# Patient Record
Sex: FEMALE | Race: WHITE | NOT HISPANIC OR LATINO | Employment: OTHER | URBAN - METROPOLITAN AREA
[De-identification: names, ages, dates, MRNs, and addresses within clinical notes are randomized per-mention and may not be internally consistent; named-entity substitution may affect disease eponyms.]

---

## 2017-02-28 ENCOUNTER — ALLSCRIPTS OFFICE VISIT (OUTPATIENT)
Dept: OTHER | Facility: OTHER | Age: 82
End: 2017-02-28

## 2017-02-28 DIAGNOSIS — L98.9 DISORDER OF SKIN OR SUBCUTANEOUS TISSUE: ICD-10-CM

## 2017-08-31 ENCOUNTER — ALLSCRIPTS OFFICE VISIT (OUTPATIENT)
Dept: OTHER | Facility: OTHER | Age: 82
End: 2017-08-31

## 2017-10-25 NOTE — PROGRESS NOTES
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)   2  Screening for skin condition (V82 0) (Z13 89)   3  Seborrheic keratosis (702 19) (L82 1)   4  H/O nonmelanoma skin cancer (V10 83) (Z85 828)    Plan   · Follow-up visit in 6 months Evaluation and Treatment  Follow-up  Status: Complete   Done: [id number]   · Use a sun block product with an SPF of 15 or more ; Status:Complete;   Done:  [id number]   · Wound care as instructed ; Status:Complete;   Done: [id number]    Discussion/Summary  Discussion Summary- [facility]:   Assessment #1: Verrucous keratosis  Care Plan:   Lesions look benign however concern raised that they are early squamous cell carcinomas hopefully will resolve with cryosurgery if not patient to let us know especially if they start growing  Assessment #2: Seborrheic keratosis  Care Plan:   Patient reassured these are normal growths we acquire with age no treatment needed  Assessment #3: History skin cancer  Care Plan:   No recurrence nothing else atypical noted sunblock recommended followup in 6 months  Assessment #4: Screening for dermatologic disorders  Care Plan:   Nothing else noted on complete exam followup in 6 months  Chief Complaint  Chief Complaint Free Text Note Form: 6 month check up  History of Present Illness  HPI: 15-year-old female with previous history skin cancer presents for overall checkup  Concerned regarding a growth on her hands and lesions on her legs      Review of Systems  [facility] [address] [place] [address] [address] Patient:   Constitutional: Denies constitutional symptoms  Eyes: Denies eye symptoms  ENT:  denies ear symptoms, nasal symptoms, mouth or throat symptoms  Cardiovascular: Denies cardiovascular symptoms  Respiratory: Denies respiratory symptoms  Gastrointestinal: Denies gastrointestinal symptoms  Musculoskeletal: Denies musculoskeletal symptoms  Integumentary: Denies skin, hair and nail symptoms     Neurological: Denies neurologic symptoms  Psychiatric: Denies psychiatric symptoms  Endocrine: Denies endocrine symptoms  Hematologic/Lymphatic: Denies hematologic symptoms  Active Problems  1  Actinic keratosis (702 0) (L57 0)   2  Changing skin lesion (709 9) (L98 9)   3  Screening for skin condition (V82 0) (Z13 89)   4  Seborrheic keratosis (702 19) (L82 1)   5  Squamous cell carcinoma of skin of right lower extremity (173 72) (C44 722)   6  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)    Past Medical History  1  H/O nonmelanoma skin cancer (V10 83) (H31 152)  Past Medical History Reviewed- Derm:   The past medical history was reviewed  Surgical History  Surgical History Reviewed [facility]- Derm:   Surgical History reviewed      Family History  Mother    1  No pertinent family history  Family History Reviewed- Derm:   Family History was reviewed      Social History   · Never a smoker  Social History Reviewed [facility]- Derm: The social history was reviewed      Current Meds   1  AmLODIPine Besylate 5 MG Oral Tablet; Therapy: [id number] to (Juliet Samples) Recorded   2  Aspirin 81 MG TABS; Therapy: (Recorded:60Bbr1667) to Recorded   3  Atorvastatin Calcium 40 MG Oral Tablet; Therapy: 97XJE0141 to (Juliet Samples) Recorded   4  Januvia 50 MG Oral Tablet; Therapy: (Recorded:03Fhv1538) to Recorded   5  Lantus SoloStar 100 UNIT/ML Subcutaneous Solution Pen-injector; Therapy: [id number] to (Evaluate:96Wuj1186) Recorded   6  Losartan Potassium-HCTZ 100-25 MG Oral Tablet; Therapy: 75CYE2361 to (60 124 37 75) Recorded   7  Metoprolol Succinate ER 25 MG Oral Tablet Extended Release 24 Hour; Therapy: 90HCJ2275 to (Evaluate:22Mar2015) Recorded    Allergies  1  No Known Drug Allergies    Physical Exam    Constitutional   General appearance: Appears healthy and well developed  Lymphatic   No visible disturbance  Musculoskeletal   Digits and nails: No clubbing, cyanosis or edema   Cutaneous and nail exam normal  Skin   Scalp skin texture and hair distribution: Normal skin texture on scalp, normal hair distribution  Head: Normal turgor, no rashes, no lesions  Neck: Normal turgor, no rashes, no lesions  Chest: Normal turgor, no rashes, no lesions  Abdomen: Normal turgor, no rashes, no lesions  Back: Normal turgor, no rashes, no lesions  Right upper extremity: Normal turgor, no rashes, no lesions  Left upper extremity: Abnormal     Right lower extremity: Normal turgor, no rashes, no lesions  Left lower extremity: Abnormal     Neuro/Psych   Alert and oriented x 3  Displays comfort and cooperation during encounterl  Affect is normal     Finding Verrucous keratotic papules noted on the left lateral fifth finger verrucous keratotic papules ? 2 also noted on the legs previous sites of skin cancer well-healed without recurrence normal keratotic papules with greasy stuck on appearance nothing else atypical noted on exam       Procedure    Procedure: destruction of lesion  Indications for the procedure include Verrucous keratosis  Risks, benefits, alternatives, infection risk, bleeding risk, risk of allergic reaction and the risk of scarring were discussed with the patient--   verbal consent was obtained prior to the procedure  Procedure Note:   The lesion location: Left hand and left leg  Destruction Technique: cryotherapy with liquid nitrogen application-- and-- 50-68 seconds via cryospray--   Destruction of 3 lesions  Post-Procedure:   Patient Status: the patient tolerated the procedure well  Complications: there were no complications        Signatures   Electronically signed by : BONG Owen ; Aug 31 2017 12:38PM EST                       (Author)

## 2017-12-27 ENCOUNTER — HOSPITAL ENCOUNTER (INPATIENT)
Facility: HOSPITAL | Age: 82
LOS: 4 days | Discharge: NURSING, REHAB OR LONG-TERM CARE | DRG: 551 | End: 2017-12-31
Attending: EMERGENCY MEDICINE | Admitting: INTERNAL MEDICINE
Payer: MEDICARE

## 2017-12-27 ENCOUNTER — APPOINTMENT (EMERGENCY)
Dept: CT IMAGING | Facility: HOSPITAL | Age: 82
DRG: 551 | End: 2017-12-27
Payer: MEDICARE

## 2017-12-27 DIAGNOSIS — I10 HTN (HYPERTENSION): ICD-10-CM

## 2017-12-27 DIAGNOSIS — R77.8 ELEVATED TROPONIN: ICD-10-CM

## 2017-12-27 DIAGNOSIS — R11.2 NAUSEA & VOMITING: Primary | ICD-10-CM

## 2017-12-27 DIAGNOSIS — M54.9 BACK PAIN: ICD-10-CM

## 2017-12-27 DIAGNOSIS — M48.061 SPINAL STENOSIS OF LUMBAR REGION, UNSPECIFIED WHETHER NEUROGENIC CLAUDICATION PRESENT: ICD-10-CM

## 2017-12-27 DIAGNOSIS — I21.4 NSTEMI (NON-ST ELEVATED MYOCARDIAL INFARCTION) (HCC): ICD-10-CM

## 2017-12-27 LAB
ALBUMIN SERPL BCP-MCNC: 3.5 G/DL (ref 3.5–5)
ALP SERPL-CCNC: 107 U/L (ref 46–116)
ALT SERPL W P-5'-P-CCNC: 45 U/L (ref 12–78)
ANION GAP SERPL CALCULATED.3IONS-SCNC: 10 MMOL/L (ref 4–13)
APTT PPP: 25 SECONDS (ref 23–35)
AST SERPL W P-5'-P-CCNC: 30 U/L (ref 5–45)
BACTERIA UR QL AUTO: NORMAL /HPF
BASOPHILS # BLD AUTO: 0.09 THOUSANDS/ΜL (ref 0–0.1)
BASOPHILS NFR BLD AUTO: 1 % (ref 0–1)
BILIRUB SERPL-MCNC: 0.5 MG/DL (ref 0.2–1)
BILIRUB UR QL STRIP: NEGATIVE
BUN SERPL-MCNC: 20 MG/DL (ref 5–25)
CALCIUM SERPL-MCNC: 9.3 MG/DL (ref 8.3–10.1)
CHLORIDE SERPL-SCNC: 103 MMOL/L (ref 100–108)
CLARITY UR: CLEAR
CO2 SERPL-SCNC: 28 MMOL/L (ref 21–32)
COLOR UR: YELLOW
CREAT SERPL-MCNC: 0.85 MG/DL (ref 0.6–1.3)
EOSINOPHIL # BLD AUTO: 0.03 THOUSAND/ΜL (ref 0–0.61)
EOSINOPHIL NFR BLD AUTO: 0 % (ref 0–6)
ERYTHROCYTE [DISTWIDTH] IN BLOOD BY AUTOMATED COUNT: 13.2 % (ref 11.6–15.1)
ERYTHROCYTE [DISTWIDTH] IN BLOOD BY AUTOMATED COUNT: 13.4 % (ref 11.6–15.1)
GFR SERPL CREATININE-BSD FRML MDRD: 61 ML/MIN/1.73SQ M
GLUCOSE SERPL-MCNC: 179 MG/DL (ref 65–140)
GLUCOSE UR STRIP-MCNC: ABNORMAL MG/DL
HCT VFR BLD AUTO: 41.3 % (ref 34.8–46.1)
HCT VFR BLD AUTO: 44.1 % (ref 34.8–46.1)
HGB BLD-MCNC: 13.8 G/DL (ref 11.5–15.4)
HGB BLD-MCNC: 15 G/DL (ref 11.5–15.4)
HGB UR QL STRIP.AUTO: ABNORMAL
INR PPP: 0.98 (ref 0.86–1.16)
KETONES UR STRIP-MCNC: ABNORMAL MG/DL
LEUKOCYTE ESTERASE UR QL STRIP: NEGATIVE
LIPASE SERPL-CCNC: 363 U/L (ref 73–393)
LYMPHOCYTES # BLD AUTO: 1.17 THOUSANDS/ΜL (ref 0.6–4.47)
LYMPHOCYTES NFR BLD AUTO: 8 % (ref 14–44)
MAGNESIUM SERPL-MCNC: 1.7 MG/DL (ref 1.6–2.6)
MCH RBC QN AUTO: 30.7 PG (ref 26.8–34.3)
MCH RBC QN AUTO: 31.2 PG (ref 26.8–34.3)
MCHC RBC AUTO-ENTMCNC: 33.4 G/DL (ref 31.4–37.4)
MCHC RBC AUTO-ENTMCNC: 34 G/DL (ref 31.4–37.4)
MCV RBC AUTO: 90 FL (ref 82–98)
MCV RBC AUTO: 93 FL (ref 82–98)
MONOCYTES # BLD AUTO: 0.79 THOUSAND/ΜL (ref 0.17–1.22)
MONOCYTES NFR BLD AUTO: 6 % (ref 4–12)
NEUTROPHILS # BLD AUTO: 12.09 THOUSANDS/ΜL (ref 1.85–7.62)
NEUTS SEG NFR BLD AUTO: 84 % (ref 43–75)
NITRITE UR QL STRIP: NEGATIVE
NON-SQ EPI CELLS URNS QL MICRO: NORMAL /HPF
NRBC BLD AUTO-RTO: 0 /100 WBCS
PH UR STRIP.AUTO: 7 [PH] (ref 4.5–8)
PLATELET # BLD AUTO: 258 THOUSANDS/UL (ref 149–390)
PLATELET # BLD AUTO: 354 THOUSANDS/UL (ref 149–390)
PMV BLD AUTO: 8.9 FL (ref 8.9–12.7)
PMV BLD AUTO: 9.6 FL (ref 8.9–12.7)
POTASSIUM SERPL-SCNC: 3.9 MMOL/L (ref 3.5–5.3)
PROT SERPL-MCNC: 7 G/DL (ref 6.4–8.2)
PROT UR STRIP-MCNC: ABNORMAL MG/DL
PROTHROMBIN TIME: 13.2 SECONDS (ref 12.1–14.4)
RBC # BLD AUTO: 4.43 MILLION/UL (ref 3.81–5.12)
RBC # BLD AUTO: 4.88 MILLION/UL (ref 3.81–5.12)
RBC #/AREA URNS AUTO: NORMAL /HPF
SODIUM SERPL-SCNC: 141 MMOL/L (ref 136–145)
SP GR UR STRIP.AUTO: 1.01 (ref 1–1.03)
TROPONIN I SERPL-MCNC: 0.11 NG/ML
UROBILINOGEN UR QL STRIP.AUTO: 0.2 E.U./DL
WBC # BLD AUTO: 13.01 THOUSAND/UL (ref 4.31–10.16)
WBC # BLD AUTO: 14.37 THOUSAND/UL (ref 4.31–10.16)
WBC #/AREA URNS AUTO: NORMAL /HPF

## 2017-12-27 PROCEDURE — 83735 ASSAY OF MAGNESIUM: CPT | Performed by: EMERGENCY MEDICINE

## 2017-12-27 PROCEDURE — 81001 URINALYSIS AUTO W/SCOPE: CPT

## 2017-12-27 PROCEDURE — 74177 CT ABD & PELVIS W/CONTRAST: CPT

## 2017-12-27 PROCEDURE — 36415 COLL VENOUS BLD VENIPUNCTURE: CPT

## 2017-12-27 PROCEDURE — 85730 THROMBOPLASTIN TIME PARTIAL: CPT | Performed by: EMERGENCY MEDICINE

## 2017-12-27 PROCEDURE — 85610 PROTHROMBIN TIME: CPT | Performed by: EMERGENCY MEDICINE

## 2017-12-27 PROCEDURE — 84484 ASSAY OF TROPONIN QUANT: CPT | Performed by: EMERGENCY MEDICINE

## 2017-12-27 PROCEDURE — 85027 COMPLETE CBC AUTOMATED: CPT | Performed by: EMERGENCY MEDICINE

## 2017-12-27 PROCEDURE — 80053 COMPREHEN METABOLIC PANEL: CPT

## 2017-12-27 PROCEDURE — 83690 ASSAY OF LIPASE: CPT

## 2017-12-27 PROCEDURE — 93005 ELECTROCARDIOGRAM TRACING: CPT

## 2017-12-27 PROCEDURE — 85025 COMPLETE CBC W/AUTO DIFF WBC: CPT

## 2017-12-27 PROCEDURE — 93005 ELECTROCARDIOGRAM TRACING: CPT | Performed by: EMERGENCY MEDICINE

## 2017-12-27 RX ORDER — MAGNESIUM HYDROXIDE/ALUMINUM HYDROXICE/SIMETHICONE 120; 1200; 1200 MG/30ML; MG/30ML; MG/30ML
30 SUSPENSION ORAL ONCE
Status: COMPLETED | OUTPATIENT
Start: 2017-12-27 | End: 2017-12-27

## 2017-12-27 RX ORDER — LOSARTAN POTASSIUM AND HYDROCHLOROTHIAZIDE 25; 100 MG/1; MG/1
TABLET ORAL
COMMUNITY
Start: 2014-07-30 | End: 2017-12-28

## 2017-12-27 RX ORDER — ATORVASTATIN CALCIUM 40 MG/1
40 TABLET, FILM COATED ORAL
COMMUNITY
Start: 2014-07-30 | End: 2020-06-24

## 2017-12-27 RX ORDER — HEPARIN SODIUM 1000 [USP'U]/ML
1950 INJECTION, SOLUTION INTRAVENOUS; SUBCUTANEOUS AS NEEDED
Status: DISCONTINUED | OUTPATIENT
Start: 2017-12-27 | End: 2017-12-28

## 2017-12-27 RX ORDER — DIAZEPAM 2 MG/1
2 TABLET ORAL ONCE
Status: COMPLETED | OUTPATIENT
Start: 2017-12-27 | End: 2017-12-27

## 2017-12-27 RX ORDER — HEPARIN SODIUM 10000 [USP'U]/100ML
3-20 INJECTION, SOLUTION INTRAVENOUS
Status: DISCONTINUED | OUTPATIENT
Start: 2017-12-27 | End: 2017-12-28

## 2017-12-27 RX ORDER — HEPARIN SODIUM 1000 [USP'U]/ML
3900 INJECTION, SOLUTION INTRAVENOUS; SUBCUTANEOUS ONCE
Status: COMPLETED | OUTPATIENT
Start: 2017-12-27 | End: 2017-12-28

## 2017-12-27 RX ORDER — ASPIRIN 81 MG/1
324 TABLET, CHEWABLE ORAL ONCE
Status: COMPLETED | OUTPATIENT
Start: 2017-12-27 | End: 2017-12-27

## 2017-12-27 RX ORDER — AMLODIPINE BESYLATE 5 MG/1
5 TABLET ORAL DAILY
COMMUNITY
Start: 2014-07-30 | End: 2020-05-26 | Stop reason: SDUPTHER

## 2017-12-27 RX ORDER — METOPROLOL SUCCINATE 50 MG/1
50 TABLET, EXTENDED RELEASE ORAL DAILY
COMMUNITY
Start: 2014-07-30 | End: 2021-11-04 | Stop reason: SDUPTHER

## 2017-12-27 RX ORDER — HEPARIN SODIUM 1000 [USP'U]/ML
3900 INJECTION, SOLUTION INTRAVENOUS; SUBCUTANEOUS AS NEEDED
Status: DISCONTINUED | OUTPATIENT
Start: 2017-12-27 | End: 2017-12-28

## 2017-12-27 RX ORDER — METOPROLOL TARTRATE 50 MG/1
25 TABLET, FILM COATED ORAL EVERY 12 HOURS SCHEDULED
COMMUNITY
End: 2017-12-28

## 2017-12-27 RX ADMIN — ASPIRIN 81 MG 324 MG: 81 TABLET ORAL at 22:24

## 2017-12-27 RX ADMIN — DIAZEPAM 2 MG: 2 TABLET ORAL at 20:35

## 2017-12-27 RX ADMIN — IOHEXOL 100 ML: 350 INJECTION, SOLUTION INTRAVENOUS at 22:03

## 2017-12-27 RX ADMIN — ALUMINUM HYDROXIDE, MAGNESIUM HYDROXIDE, AND SIMETHICONE 30 ML: 200; 200; 20 SUSPENSION ORAL at 22:55

## 2017-12-28 ENCOUNTER — APPOINTMENT (INPATIENT)
Dept: NON INVASIVE DIAGNOSTICS | Facility: HOSPITAL | Age: 82
DRG: 551 | End: 2017-12-28
Payer: MEDICARE

## 2017-12-28 PROBLEM — E11.65 DIABETES MELLITUS WITH HYPERGLYCEMIA (HCC): Status: ACTIVE | Noted: 2017-12-28

## 2017-12-28 PROBLEM — K59.00 CONSTIPATION: Status: ACTIVE | Noted: 2017-12-28

## 2017-12-28 PROBLEM — R11.2 NAUSEA AND VOMITING: Status: ACTIVE | Noted: 2017-12-28

## 2017-12-28 PROBLEM — R77.8 ELEVATED TROPONIN: Status: ACTIVE | Noted: 2017-12-28

## 2017-12-28 PROBLEM — M48.061 LUMBAR STENOSIS: Status: ACTIVE | Noted: 2017-12-28

## 2017-12-28 PROBLEM — D72.829 LEUKOCYTOSIS: Status: ACTIVE | Noted: 2017-12-28

## 2017-12-28 PROBLEM — K63.9 ABNORMALITY OF COLON: Status: ACTIVE | Noted: 2017-12-28

## 2017-12-28 PROBLEM — R53.1 WEAKNESS: Status: ACTIVE | Noted: 2017-12-28

## 2017-12-28 PROBLEM — I10 ESSENTIAL HYPERTENSION: Status: ACTIVE | Noted: 2017-12-28

## 2017-12-28 LAB
ANION GAP SERPL CALCULATED.3IONS-SCNC: 9 MMOL/L (ref 4–13)
APTT PPP: 133 SECONDS (ref 23–35)
ATRIAL RATE: 60 BPM
ATRIAL RATE: 71 BPM
ATRIAL RATE: 86 BPM
ATRIAL RATE: 93 BPM
BUN SERPL-MCNC: 19 MG/DL (ref 5–25)
CALCIUM SERPL-MCNC: 9.1 MG/DL (ref 8.3–10.1)
CHLORIDE SERPL-SCNC: 105 MMOL/L (ref 100–108)
CHOLEST SERPL-MCNC: 169 MG/DL (ref 50–200)
CO2 SERPL-SCNC: 27 MMOL/L (ref 21–32)
CREAT SERPL-MCNC: 0.82 MG/DL (ref 0.6–1.3)
ERYTHROCYTE [DISTWIDTH] IN BLOOD BY AUTOMATED COUNT: 13.5 % (ref 11.6–15.1)
EST. AVERAGE GLUCOSE BLD GHB EST-MCNC: 174 MG/DL
GFR SERPL CREATININE-BSD FRML MDRD: 64 ML/MIN/1.73SQ M
GLUCOSE SERPL-MCNC: 136 MG/DL (ref 65–140)
GLUCOSE SERPL-MCNC: 151 MG/DL (ref 65–140)
GLUCOSE SERPL-MCNC: 172 MG/DL (ref 65–140)
GLUCOSE SERPL-MCNC: 184 MG/DL (ref 65–140)
GLUCOSE SERPL-MCNC: 214 MG/DL (ref 65–140)
HBA1C MFR BLD: 7.7 % (ref 4.2–6.3)
HCT VFR BLD AUTO: 40.8 % (ref 34.8–46.1)
HDLC SERPL-MCNC: 57 MG/DL (ref 40–60)
HGB BLD-MCNC: 13.5 G/DL (ref 11.5–15.4)
INR PPP: 1.11 (ref 0.86–1.16)
LDLC SERPL CALC-MCNC: 94 MG/DL (ref 0–100)
MAGNESIUM SERPL-MCNC: 2.1 MG/DL (ref 1.6–2.6)
MCH RBC QN AUTO: 30.3 PG (ref 26.8–34.3)
MCHC RBC AUTO-ENTMCNC: 33.1 G/DL (ref 31.4–37.4)
MCV RBC AUTO: 92 FL (ref 82–98)
NT-PROBNP SERPL-MCNC: 2071 PG/ML
P AXIS: 67 DEGREES
P AXIS: 67 DEGREES
P AXIS: 68 DEGREES
P AXIS: 71 DEGREES
PLATELET # BLD AUTO: 327 THOUSANDS/UL (ref 149–390)
PMV BLD AUTO: 9 FL (ref 8.9–12.7)
POTASSIUM SERPL-SCNC: 4.1 MMOL/L (ref 3.5–5.3)
PR INTERVAL: 206 MS
PR INTERVAL: 208 MS
PR INTERVAL: 214 MS
PR INTERVAL: 216 MS
PROTHROMBIN TIME: 14.5 SECONDS (ref 12.1–14.4)
QRS AXIS: -13 DEGREES
QRS AXIS: -3 DEGREES
QRS AXIS: -6 DEGREES
QRS AXIS: -7 DEGREES
QRSD INTERVAL: 64 MS
QRSD INTERVAL: 66 MS
QRSD INTERVAL: 68 MS
QRSD INTERVAL: 68 MS
QT INTERVAL: 388 MS
QT INTERVAL: 394 MS
QT INTERVAL: 438 MS
QT INTERVAL: 460 MS
QTC INTERVAL: 460 MS
QTC INTERVAL: 471 MS
QTC INTERVAL: 475 MS
QTC INTERVAL: 482 MS
RBC # BLD AUTO: 4.45 MILLION/UL (ref 3.81–5.12)
SODIUM SERPL-SCNC: 141 MMOL/L (ref 136–145)
T WAVE AXIS: 78 DEGREES
T WAVE AXIS: 90 DEGREES
T WAVE AXIS: 93 DEGREES
T WAVE AXIS: 93 DEGREES
TRIGL SERPL-MCNC: 90 MG/DL
TROPONIN I SERPL-MCNC: 0.13 NG/ML
TROPONIN I SERPL-MCNC: 0.16 NG/ML
TROPONIN I SERPL-MCNC: 0.18 NG/ML
VENTRICULAR RATE: 60 BPM
VENTRICULAR RATE: 71 BPM
VENTRICULAR RATE: 86 BPM
VENTRICULAR RATE: 93 BPM
WBC # BLD AUTO: 13.19 THOUSAND/UL (ref 4.31–10.16)

## 2017-12-28 PROCEDURE — 93306 TTE W/DOPPLER COMPLETE: CPT

## 2017-12-28 PROCEDURE — 82948 REAGENT STRIP/BLOOD GLUCOSE: CPT

## 2017-12-28 PROCEDURE — 83036 HEMOGLOBIN GLYCOSYLATED A1C: CPT | Performed by: PHYSICIAN ASSISTANT

## 2017-12-28 PROCEDURE — 83735 ASSAY OF MAGNESIUM: CPT | Performed by: PHYSICIAN ASSISTANT

## 2017-12-28 PROCEDURE — 85610 PROTHROMBIN TIME: CPT | Performed by: PHYSICIAN ASSISTANT

## 2017-12-28 PROCEDURE — 80061 LIPID PANEL: CPT | Performed by: PHYSICIAN ASSISTANT

## 2017-12-28 PROCEDURE — 84484 ASSAY OF TROPONIN QUANT: CPT | Performed by: PHYSICIAN ASSISTANT

## 2017-12-28 PROCEDURE — 99285 EMERGENCY DEPT VISIT HI MDM: CPT

## 2017-12-28 PROCEDURE — 83880 ASSAY OF NATRIURETIC PEPTIDE: CPT | Performed by: PHYSICIAN ASSISTANT

## 2017-12-28 PROCEDURE — 51798 US URINE CAPACITY MEASURE: CPT

## 2017-12-28 PROCEDURE — 85730 THROMBOPLASTIN TIME PARTIAL: CPT | Performed by: PHYSICIAN ASSISTANT

## 2017-12-28 PROCEDURE — 36415 COLL VENOUS BLD VENIPUNCTURE: CPT | Performed by: PHYSICIAN ASSISTANT

## 2017-12-28 PROCEDURE — 93005 ELECTROCARDIOGRAM TRACING: CPT | Performed by: PHYSICIAN ASSISTANT

## 2017-12-28 PROCEDURE — 80048 BASIC METABOLIC PNL TOTAL CA: CPT | Performed by: PHYSICIAN ASSISTANT

## 2017-12-28 PROCEDURE — 93005 ELECTROCARDIOGRAM TRACING: CPT

## 2017-12-28 PROCEDURE — 85027 COMPLETE CBC AUTOMATED: CPT | Performed by: PHYSICIAN ASSISTANT

## 2017-12-28 RX ORDER — LATANOPROST 50 UG/ML
1 SOLUTION/ DROPS OPHTHALMIC
Status: DISCONTINUED | OUTPATIENT
Start: 2017-12-28 | End: 2017-12-31 | Stop reason: HOSPADM

## 2017-12-28 RX ORDER — NITROGLYCERIN 0.4 MG/1
0.4 TABLET SUBLINGUAL
Status: DISCONTINUED | OUTPATIENT
Start: 2017-12-28 | End: 2017-12-31 | Stop reason: HOSPADM

## 2017-12-28 RX ORDER — LATANOPROST 50 UG/ML
1 SOLUTION/ DROPS OPHTHALMIC
COMMUNITY
End: 2020-05-28

## 2017-12-28 RX ORDER — ASPIRIN 81 MG/1
81 TABLET, CHEWABLE ORAL DAILY
Status: DISCONTINUED | OUTPATIENT
Start: 2017-12-28 | End: 2017-12-31 | Stop reason: HOSPADM

## 2017-12-28 RX ORDER — METOPROLOL TARTRATE 5 MG/5ML
5 INJECTION INTRAVENOUS EVERY 6 HOURS PRN
Status: DISCONTINUED | OUTPATIENT
Start: 2017-12-28 | End: 2017-12-28

## 2017-12-28 RX ORDER — ONDANSETRON 2 MG/ML
4 INJECTION INTRAMUSCULAR; INTRAVENOUS EVERY 6 HOURS PRN
Status: DISCONTINUED | OUTPATIENT
Start: 2017-12-28 | End: 2017-12-31 | Stop reason: HOSPADM

## 2017-12-28 RX ORDER — METOPROLOL SUCCINATE 25 MG/1
25 TABLET, EXTENDED RELEASE ORAL DAILY
Status: DISCONTINUED | OUTPATIENT
Start: 2017-12-28 | End: 2017-12-28

## 2017-12-28 RX ORDER — ACETAMINOPHEN 325 MG/1
650 TABLET ORAL EVERY 6 HOURS PRN
Status: DISCONTINUED | OUTPATIENT
Start: 2017-12-28 | End: 2017-12-31 | Stop reason: HOSPADM

## 2017-12-28 RX ORDER — HYDRALAZINE HYDROCHLORIDE 10 MG/1
10 TABLET, FILM COATED ORAL 3 TIMES DAILY
COMMUNITY
End: 2020-05-27

## 2017-12-28 RX ORDER — METOPROLOL SUCCINATE 50 MG/1
50 TABLET, EXTENDED RELEASE ORAL DAILY
Status: DISCONTINUED | OUTPATIENT
Start: 2017-12-29 | End: 2017-12-31 | Stop reason: HOSPADM

## 2017-12-28 RX ORDER — INSULIN GLARGINE 100 [IU]/ML
14 INJECTION, SOLUTION SUBCUTANEOUS
Status: DISCONTINUED | OUTPATIENT
Start: 2017-12-28 | End: 2017-12-31 | Stop reason: HOSPADM

## 2017-12-28 RX ORDER — MORPHINE SULFATE 2 MG/ML
1 INJECTION, SOLUTION INTRAMUSCULAR; INTRAVENOUS EVERY 4 HOURS PRN
Status: DISCONTINUED | OUTPATIENT
Start: 2017-12-28 | End: 2017-12-31 | Stop reason: HOSPADM

## 2017-12-28 RX ORDER — AMLODIPINE BESYLATE 5 MG/1
5 TABLET ORAL DAILY
Status: DISCONTINUED | OUTPATIENT
Start: 2017-12-28 | End: 2017-12-31 | Stop reason: HOSPADM

## 2017-12-28 RX ORDER — LOSARTAN POTASSIUM 100 MG/1
100 TABLET ORAL DAILY
COMMUNITY
End: 2020-05-28

## 2017-12-28 RX ORDER — SENNOSIDES 8.6 MG
2 TABLET ORAL
Status: DISCONTINUED | OUTPATIENT
Start: 2017-12-28 | End: 2017-12-31 | Stop reason: HOSPADM

## 2017-12-28 RX ORDER — OXYCODONE HYDROCHLORIDE 10 MG/1
10 TABLET ORAL EVERY 4 HOURS PRN
Status: DISCONTINUED | OUTPATIENT
Start: 2017-12-28 | End: 2017-12-31 | Stop reason: HOSPADM

## 2017-12-28 RX ORDER — DOCUSATE SODIUM 100 MG/1
100 CAPSULE, LIQUID FILLED ORAL 2 TIMES DAILY
Status: DISCONTINUED | OUTPATIENT
Start: 2017-12-28 | End: 2017-12-31 | Stop reason: HOSPADM

## 2017-12-28 RX ORDER — LOSARTAN POTASSIUM 50 MG/1
100 TABLET ORAL DAILY
Status: DISCONTINUED | OUTPATIENT
Start: 2017-12-29 | End: 2017-12-31 | Stop reason: HOSPADM

## 2017-12-28 RX ORDER — CELECOXIB 200 MG/1
200 CAPSULE ORAL 2 TIMES DAILY
Status: DISCONTINUED | OUTPATIENT
Start: 2017-12-28 | End: 2017-12-31 | Stop reason: HOSPADM

## 2017-12-28 RX ORDER — POLYETHYLENE GLYCOL 3350 17 G/17G
17 POWDER, FOR SOLUTION ORAL DAILY
Status: DISCONTINUED | OUTPATIENT
Start: 2017-12-29 | End: 2017-12-31 | Stop reason: HOSPADM

## 2017-12-28 RX ORDER — OXYCODONE HYDROCHLORIDE 5 MG/1
5 TABLET ORAL EVERY 4 HOURS PRN
Status: DISCONTINUED | OUTPATIENT
Start: 2017-12-28 | End: 2017-12-31 | Stop reason: HOSPADM

## 2017-12-28 RX ORDER — CELECOXIB 200 MG/1
200 CAPSULE ORAL 2 TIMES DAILY
COMMUNITY
End: 2017-12-31 | Stop reason: HOSPADM

## 2017-12-28 RX ORDER — ATORVASTATIN CALCIUM 40 MG/1
40 TABLET, FILM COATED ORAL
Status: DISCONTINUED | OUTPATIENT
Start: 2017-12-28 | End: 2017-12-31 | Stop reason: HOSPADM

## 2017-12-28 RX ORDER — HYDRALAZINE HYDROCHLORIDE 10 MG/1
10 TABLET, FILM COATED ORAL 3 TIMES DAILY
Status: DISCONTINUED | OUTPATIENT
Start: 2017-12-28 | End: 2017-12-31 | Stop reason: HOSPADM

## 2017-12-28 RX ADMIN — HEPARIN SODIUM 3900 UNITS: 1000 INJECTION, SOLUTION INTRAVENOUS; SUBCUTANEOUS at 00:03

## 2017-12-28 RX ADMIN — METOPROLOL SUCCINATE 25 MG: 25 TABLET, FILM COATED, EXTENDED RELEASE ORAL at 09:32

## 2017-12-28 RX ADMIN — HEPARIN SODIUM AND DEXTROSE 9 UNITS/KG/HR: 10000; 5 INJECTION INTRAVENOUS at 07:07

## 2017-12-28 RX ADMIN — INSULIN LISPRO 1 UNITS: 100 INJECTION, SOLUTION INTRAVENOUS; SUBCUTANEOUS at 18:42

## 2017-12-28 RX ADMIN — DOCUSATE SODIUM 100 MG: 100 CAPSULE, LIQUID FILLED ORAL at 17:36

## 2017-12-28 RX ADMIN — INSULIN GLARGINE 14 UNITS: 100 INJECTION, SOLUTION SUBCUTANEOUS at 22:07

## 2017-12-28 RX ADMIN — Medication 1 TABLET: at 12:42

## 2017-12-28 RX ADMIN — INSULIN LISPRO 2 UNITS: 100 INJECTION, SOLUTION INTRAVENOUS; SUBCUTANEOUS at 12:44

## 2017-12-28 RX ADMIN — HYDRALAZINE HYDROCHLORIDE 10 MG: 10 TABLET, FILM COATED ORAL at 15:52

## 2017-12-28 RX ADMIN — HYDRALAZINE HYDROCHLORIDE 10 MG: 10 TABLET, FILM COATED ORAL at 21:54

## 2017-12-28 RX ADMIN — HEPARIN SODIUM AND DEXTROSE 12 UNITS/KG/HR: 10000; 5 INJECTION INTRAVENOUS at 00:05

## 2017-12-28 RX ADMIN — LATANOPROST 1 DROP: 50 SOLUTION OPHTHALMIC at 23:26

## 2017-12-28 RX ADMIN — ASPIRIN 81 MG 81 MG: 81 TABLET ORAL at 08:37

## 2017-12-28 RX ADMIN — INSULIN LISPRO 6 UNITS: 100 INJECTION, SOLUTION INTRAVENOUS; SUBCUTANEOUS at 18:43

## 2017-12-28 RX ADMIN — INSULIN LISPRO 1 UNITS: 100 INJECTION, SOLUTION INTRAVENOUS; SUBCUTANEOUS at 21:56

## 2017-12-28 RX ADMIN — HYDRALAZINE HYDROCHLORIDE 10 MG: 10 TABLET, FILM COATED ORAL at 12:43

## 2017-12-28 RX ADMIN — AMLODIPINE BESYLATE 5 MG: 5 TABLET ORAL at 09:31

## 2017-12-28 RX ADMIN — CELECOXIB 200 MG: 200 CAPSULE ORAL at 21:52

## 2017-12-28 RX ADMIN — SENNOSIDES 17.2 MG: 8.6 TABLET, FILM COATED ORAL at 21:52

## 2017-12-28 RX ADMIN — ATORVASTATIN CALCIUM 40 MG: 40 TABLET, FILM COATED ORAL at 15:52

## 2017-12-28 RX ADMIN — DOCUSATE SODIUM 100 MG: 100 CAPSULE, LIQUID FILLED ORAL at 08:37

## 2017-12-28 RX ADMIN — HYDROCHLOROTHIAZIDE: 25 TABLET ORAL at 09:33

## 2017-12-28 NOTE — ED NOTES
Dr Raiza Dubois, orthopedic surgery at the bedside evaluating the patient        Trey Schreiber, KIERRA  12/28/17 0536 [address] [facility] Alejo Angelucci RN  12/28/17 3967

## 2017-12-28 NOTE — CONSULTS
Orthopedics   Laisha Pitts 80 y o  female MRN: [id number]  Unit/Bed#: MO ECHO      Chief Complaint:   Back Pain    HPI:   80 y  o female complaining of Lumbar back pain  Patient with a history of lower back pain  Patient had lower back surgery about 17 years ago which helped with the pain for about two years and then pain gradually started coming back  Patient has had pain on and off gradually getting worse over the past few months  Patient was hospitalized last month as well for an exacerbation of lower back pain  Patient has seen pain management as well as spine surgeon, Dr Jas Mcdonald  She was told by Dr Jas Mcdonald that surgery is not indicated at this time and was told to start physical therapy  Patient has not had outpatient physical therapy yet but has had a few sessions of home therapy  Patient had recent CT scan which shows severe stenosis  Patient is unable to have MRIs, patient had recent cardiac pacemaker placed  She is lying in bed comfortable now in no acute distress  Denies any numbness or tingling in lower extremity  Denies any radiating pain down the legs  She states when she gets the pain it is always on the lower left side of the lower back pain ever radiates to the buttock or legs  Patient never has any complaints of numbness or tingling  Severe pain when attempting to walk or sit up straight  She states at times pain is so severe it causes her to have nausea and vomiting and she felt lightheaded and dizzy which was why she came to the ER this time  Review Of Systems:   · Skin: Normal  · Neuro: See HPI  · Musculoskeletal: See HPI  · 14 point review of systems negative except as stated above     Past Medical History:   Past Medical History:   Diagnosis Date    Chronic pain     Diabetes mellitus ([address] )     Hyperlipidemia     Hypertension        Past Surgical History:   History reviewed  No pertinent surgical history      Family History:  Family history reviewed and non-contributory  History reviewed  No pertinent family history  Social History:  Social History     Social History    Marital status:       Spouse name: N/A    Number of children: N/A    Years of education: N/A     Social History Main Topics    Smoking status: Unknown If Ever Smoked    Smokeless tobacco: None    Alcohol use None    Drug use: Unknown    Sexual activity: Not Asked     Other Topics Concern    None     Social History Narrative    None       Allergies:   No Known Allergies        Labs:    0  Lab Value Date/Time   HCT 40 8 12/28/2017 0502   HCT 41 3 12/27/2017 2345   HCT 44 1 12/27/2017 2021   HGB 13 5 12/28/2017 0502   HGB 13 8 12/27/2017 2345   HGB 15 0 12/27/2017 2021   INR 1 11 12/28/2017 0502   WBC 13 19 (H) 12/28/2017 0502   WBC 13 01 (H) 12/27/2017 2345   WBC 14 37 (H) 12/27/2017 2021       Meds:    Current Facility-Administered Medications:     acetaminophen (TYLENOL) tablet 650 mg, 650 mg, Oral, Q6H PRN, Aleja Hidalgo PA-C    amLODIPine (NORVASC) tablet 5 mg, 5 mg, Oral, Daily, Makenna Hoang PA-C, 5 mg at 12/28/17 1362    aspirin chewable tablet 81 mg, 81 mg, Oral, Daily, Aleja Hidalgo PA-C, 81 mg at 12/28/17 4988    atorvastatin (LIPITOR) tablet 40 mg, 40 mg, Oral, Daily With Dinner, Aleja Hidalgo PA-C    docusate sodium (COLACE) capsule 100 mg, 100 mg, Oral, BID, Aleja Hidalgo PA-C, 100 mg at 12/28/17 5169    hydrALAZINE (APRESOLINE) tablet 10 mg, 10 mg, Oral, TID, Rashid Gaitan DO, 10 mg at 12/28/17 1243    insulin glargine (LANTUS) subcutaneous injection 14 Units, 14 Units, Subcutaneous, HS, Rashid Gaitan DO    insulin lispro (HumaLOG) 100 units/mL subcutaneous injection 1-5 Units, 1-5 Units, Subcutaneous, TID AC, 2 Units at 12/28/17 1244 **AND** Fingerstick Glucose (POCT), , , TID AC, Aleja Hidalgo PA-C    insulin lispro (HumaLOG) 100 units/mL subcutaneous injection 1-5 Units, 1-5 Units, Subcutaneous, HS, Aleja Hidalgo PA-C    insulin lispro (HumaLOG) 100 units/mL subcutaneous injection 6 Units, 6 Units, Subcutaneous, TID With Meals, Yaw Duffy DO, Stopped at 12/28/17 1252    latanoprost (XALATAN) 0 005 % ophthalmic solution 1 drop, 1 drop, Both Eyes, HS, Yaw Duffy DO    [START ON 12/29/2017] losartan (COZAAR) tablet 100 mg, 100 mg, Oral, Daily, Yaw Duffy DO    magnesium hydroxide (MILK OF MAGNESIA) 400 mg/5 mL oral suspension 15 mL, 15 mL, Oral, Daily PRN, Ozzy Polanco PA-C    metoprolol (LOPRESSOR) injection 5 mg, 5 mg, Intravenous, Q6H PRN, Ozzy Polanco PA-C    [START ON 12/29/2017] metoprolol succinate (TOPROL-XL) 24 hr tablet 50 mg, 50 mg, Oral, Daily, Yaw Duffy DO    multivitamin-minerals (CENTRUM) tablet 1 tablet, 1 tablet, Oral, Daily, Yaw Duffy DO, 1 tablet at 12/28/17 1242    nitroglycerin (NITROSTAT) SL tablet 0 4 mg, 0 4 mg, Sublingual, Q5 Min PRN, Baron Niyah Hoang PA-C    ondansetron [facility]) injection 4 mg, 4 mg, Intravenous, Q6H PRN, Ozzy Polanco PA-C    Current Outpatient Prescriptions:     amLODIPine (NORVASC) 5 mg tablet, Take by mouth, Disp: , Rfl:     aspirin 81 MG tablet, Take by mouth, Disp: , Rfl:     atorvastatin (LIPITOR) 40 mg tablet, Take by mouth, Disp: , Rfl:     hydrALAZINE (APRESOLINE) 10 mg tablet, Take 10 mg by mouth 3 (three) times a day, Disp: , Rfl:     insulin glargine (LANTUS SOLOSTAR) injection pen 100 units/mL, Inject 14 Units under the skin daily at bedtime  , Disp: , Rfl:     Insulin Lispro (HUMALOG KWIKPEN SC), Inject 6 Units under the skin 3 (three) times a day before meals, Disp: , Rfl:     latanoprost (XALATAN) 0 005 % ophthalmic solution, Administer 1 drop to both eyes daily at bedtime, Disp: , Rfl:     losartan (COZAAR) 100 MG tablet, Take 100 mg by mouth daily, Disp: , Rfl:     metoprolol succinate (TOPROL-XL) 50 mg 24 hr tablet, Take 50 mg by mouth daily  , Disp: , Rfl:     Multiple Vitamins-Minerals (CENTRUM SILVER 50+WOMEN PO), Take 1 tablet by mouth daily, Disp: , Rfl:     Blood Culture:   No results found for: BLOODCX    Wound Culture:   No results found for: WOUNDCULT    Ins and Outs:  I/O last 24 hours: In: 500 [I V :500]  Out: -           Physical Exam:   BP (!) 183/76   Pulse 72   Temp 98 8 °F (37 1 °C) (Oral)   Resp 18   Ht 5' 2" (1 575 m)   Wt 65 8 kg (145 lb 1 oz)   SpO2 97%   BMI 26 53 kg/m²   Gen: Alert and oriented to person, place, time  HEENT: EOMI, eyes clear, moist mucus membranes, hearing intact  Respiratory: Bilateral chest rise  No audible wheezing found  Cardiovascular: Regular Rate and Rhythm  Abdomen: soft nontender/nondistended  Musculoskeletal: BACK  · Skin intact, no ecchymosis or erythema noted  Tenderness to palpation over the paraspinal muscles lumbar spine on the left side only L2-L3 area  · 5/5 strength with hip flexion/extension/abduction, Knee Flexion/extension, ankle dorsi/plantar flexion, EHL/FHL bilateral lower extremities  · Sensation intact L1-S1 bilateral lower extremities      Radiology:   I personally reviewed the films  CT SHOWS MULTILEVEL LUMBAR SPONDYLOSIS RESULTING IN MODERATE TO SEVERE DEGREES OF STENOSIS  THERE IS ALSO LEFT DISC EXTRUSION AT L1-L2 EXTENDING TO THE LEFT LATERAL RECESS UP L1 ENCROACHING ON THE DESCENDING LEFT L1 NERVE ROOT    _*_*_*_*_*_*_*_*_*_*_*_*_*_*_*_*_*_*_*_*_*_*_*_*_*_*_*_*_*_*_*_*_*_*_*_*_*_*_*_*_*    Assessment:  80 y  o female acute on chronic lower back pain  Patient with lumbar stenosis    Plan:   · Will follow up with Dr Lauren Marie for possible surgical options  · See pain management to discuss treatment options  · Pain medication as needed per medical team  · DVT prophylaxis per medical team  · No emergent orthopedic intervention needed at this time  · Dispo: [facility] for discharge from [facility] perspective         [facility] NENA Carter

## 2017-12-28 NOTE — PLAN OF CARE
Problem: DISCHARGE PLANNING - CARE MANAGEMENT  Goal: Discharge to post-acute care or home with appropriate resources  INTERVENTIONS:  - Conduct assessment to determine patient/family and health care team treatment goals, and need for post-acute services based on payer coverage, community resources, and patient preferences, and barriers to discharge  - Address psychosocial, clinical, and financial barriers to discharge as identified in assessment in conjunction with the patient/family and health care team  - Arrange appropriate level of post-acute services according to patients   needs and preference and payer coverage in collaboration with the physician and health care team  - Communicate with and update the patient/family, physician, and health care team regarding progress on the discharge plan  - Arrange appropriate transportation to post-acute venues  Outcome: Progressing  LOS: 1 CCM met with pt and son in law at bedside  Pt reports she lives in [facility] alone  Pt lives in [place] that has 2 steps to enter  Pt denies DME  Pt reports she could ambulate and complete ADL's independently prior to admission  Pt has no hx of Rehab  Pt had [facility] prior to admission and would like [facility]  Pt uses [facility]  Pt denies hx of MH and substance abuse  Pt's sonYudi is POA  Pt is retired and drives  Pt reports family will transport home  CCM discussed discharge planning  Pt and son in law explained pt has difficulties ambulating and with ADL's when pain worsens  CCM discussed pt may benefit from STR pending PT/OT recs  Pt and son in law agreeable for CM department to discuss further post PT/OT eval   Pt has no other needs at this time  CM department to follow pt through discharge

## 2017-12-28 NOTE — ED PROVIDER NOTES
History  Chief Complaint   Patient presents with    Weakness - Generalized     Pt presents to ER via EMS from home with c/o's nausea & vomitting for about 2 days  Healthy appearing elderly female presents in no distress  Patient is an 49-year-old female with a history of diabetes, hypertension, hyperlipidemia, degenerative disc disease as well as spinal stenosis here with her daughter after she woke up today with increased back pain  Patient states she was being feeling well up until today  Patient was diagnosed with degenerative disc disease and spinal stenosis several months ago, follows with her PCP as well as a pain management/ Ortho Spine surgeon  Patient has been receiving pain control as well as steroid injections  Patient has had x-rays only to her low back  Patient denies any fevers, chills, abdominal pain, urinary retention, loss of bowel or bladder  Patient has no paresthesias throughout the bilateral lower extremities, gait abnormalities or recent falls  Patient states she woke up this morning and "the pain was so bad I could not get out of bed or move  I just had persistent nauseousness and vomiting  The pain management doctor told me this was a normal response to pain"  Patient has been unable to take any of her pain medications or diabetes/hypertensive medications as well  She denies any chest pain, shortness of breath, thoracic spine pain          History provided by:  Patient and relative   used: No    Back Pain   Location:  Lumbar spine  Quality:  Aching and cramping  Radiates to:  Does not radiate  Pain severity:  Mild  Pain is:  Unable to specify  Onset quality:  Gradual  Timing:  Constant  Progression:  Worsening  Chronicity:  Recurrent  Context comment:  Rest  Relieved by:  None tried  Worsened by:  Twisting and standing  Ineffective treatments:  Cold packs, ibuprofen, NSAIDs, OTC medications, heating pad and being still  Associated symptoms: no abdominal pain, no abdominal swelling, no bladder incontinence, no bowel incontinence, no chest pain, no dysuria, no fever, no headaches, no leg pain, no numbness, no paresthesias, no pelvic pain, no perianal numbness, no tingling, no weakness and no weight loss    Risk factors: hx of osteoporosis and menopause    Risk factors: no hx of cancer, no lack of exercise, not obese, no recent surgery, no steroid use and no vascular disease        Prior to Admission Medications   Prescriptions Last Dose Informant Patient Reported? Taking? amLODIPine (NORVASC) 5 mg tablet  Self Yes Yes   Sig: Take by mouth   aspirin 81 MG tablet  Self Yes Yes   Sig: Take by mouth   atorvastatin (LIPITOR) 40 mg tablet  Self Yes Yes   Sig: Take by mouth   insulin glargine (LANTUS SOLOSTAR) injection pen 100 units/mL  Self Yes Yes   Sig: Inject under the skin   losartan-hydrochlorothiazide (HYZAAR) 100-25 MG per tablet  Self Yes Yes   Sig: Take by mouth   metoprolol succinate (TOPROL-XL) 25 mg 24 hr tablet  Self Yes Yes   Sig: Take by mouth   metoprolol tartrate (LOPRESSOR) 50 mg tablet  Self Yes Yes   Sig: Take 25 mg by mouth every 12 (twelve) hours   sitaGLIPtin (JANUVIA) 50 mg tablet  Self Yes Yes   Sig: Take by mouth      Facility-Administered Medications: None       Past Medical History:   Diagnosis Date    Chronic pain     Diabetes mellitus ([facility] [address] )     Hyperlipidemia     Hypertension        History reviewed  No pertinent surgical history  History reviewed  No pertinent family history  I have reviewed and agree with the history as documented  Social History   Substance Use Topics    Smoking status: Unknown If Ever Smoked    Smokeless tobacco: Not on file    Alcohol use Not on file        Review of Systems   Constitutional: Negative for chills, diaphoresis, fatigue, fever and weight loss  Respiratory: Negative for choking, chest tightness, shortness of breath, wheezing and stridor      Cardiovascular: Negative for chest pain and leg swelling  Gastrointestinal: Positive for nausea and vomiting  Negative for abdominal pain and bowel incontinence  Genitourinary: Negative for bladder incontinence, dysuria and pelvic pain  Musculoskeletal: Positive for back pain  Negative for arthralgias, gait problem and neck stiffness  Neurological: Negative for tingling, weakness, numbness, headaches and paresthesias  Physical Exam  ED Triage Vitals [12/27/17 1916]   Temperature Pulse Respirations Blood Pressure SpO2   98 8 °F (37 1 °C) 96 20 142/94 99 %      Temp Source Heart Rate Source Patient Position - Orthostatic VS BP Location FiO2 (%)   Oral Monitor Lying Left arm --      Pain Score       --           Orthostatic Vital Signs  Vitals:    12/27/17 1916   BP: 142/94   Pulse: 96   Patient Position - Orthostatic VS: Lying       Physical Exam   Constitutional: She is oriented to person, place, and time  She appears well-developed and well-nourished  No distress  HENT:   Head: Normocephalic and atraumatic  Nose: Nose normal    Oropharynx clear  Dry mucous membranes   Eyes: Conjunctivae and EOM are normal  Pupils are equal, round, and reactive to light  Neck: Normal range of motion  Neck supple  Cardiovascular: Normal rate, regular rhythm, normal heart sounds and intact distal pulses  No murmur heard  Pulmonary/Chest: Effort normal and breath sounds normal  No stridor  No respiratory distress  She exhibits no tenderness  Abdominal: Soft  Bowel sounds are normal  She exhibits no distension  There is no tenderness  Musculoskeletal: Normal range of motion  She exhibits no edema  Lumbar back: She exhibits tenderness and bony tenderness  She exhibits normal range of motion, no swelling, no edema, no deformity, no laceration, no pain, no spasm and normal pulse  Back:    Neurological: She is alert and oriented to person, place, and time  She displays normal reflexes  No cranial nerve deficit or sensory deficit   She exhibits normal muscle tone  Coordination normal    Straight leg raise bilaterally negative at 30 and 45 degrees  Negative clonus bilaterally     Skin: Skin is warm  Capillary refill takes less than 2 seconds  She is not diaphoretic  Psychiatric: She has a normal mood and affect  Her behavior is normal    Nursing note and vitals reviewed        ED Medications  Medications   heparin (ACS LOW) (not administered)    EMS REPLENISHMENT MED ( Does not apply Given to EMS 12/27/17 1922)   diazepam (VALIUM) tablet 2 mg (2 mg Oral Given 12/27/17 2035)   aspirin chewable tablet 324 mg (324 mg Oral Given 12/27/17 2224)   iohexol (OMNIPAQUE) 350 MG/ML injection (MULTI-DOSE) 100 mL (100 mL Intravenous Given 12/27/17 2203)   aluminum-magnesium hydroxide-simethicone (MYLANTA) 200-200-20 mg/5 mL oral suspension 30 mL (30 mL Oral Given 12/27/17 2255)       Diagnostic Studies  Results Reviewed     Procedure Component Value Units Date/Time    APTT six (6) hours after Heparin bolus/drip initiation or dosing change [[id number]]     Lab Status:  No result Specimen:  Blood     CBC [[id number]]     Lab Status:  No result Specimen:  Blood     Protime-INR [[id number]]     Lab Status:  No result Specimen:  Blood     Urine Microscopic [[id number]]  (Normal) Collected:  12/27/17 2251    Lab Status:  Final result Specimen:  Urine from Urine, Clean Catch Updated:  12/27/17 2305     RBC, UA None Seen /hpf      WBC, UA None Seen /hpf      Epithelial Cells None Seen /hpf      Bacteria, UA None Seen /hpf     UA w Reflex to Microscopic [[id number]]  (Abnormal) Collected:  12/27/17 2251    Lab Status:  Final result Specimen:  Urine from Urine, Clean Catch Updated:  12/27/17 2257     Color, UA Yellow     Clarity, UA Clear     Specific Gravity, UA 1 010     pH, UA 7 0     Leukocytes, UA Negative     Nitrite, UA Negative     Protein,  (2+) (A) mg/dl      Glucose,  (1/10%) (A) mg/dl      Ketones, UA 15 (1+) (A) mg/dl      Urobilinogen, UA 0 2 E U /dl Bilirubin, UA Negative     Blood, UA Trace-Intact (A)    Troponin I [[id number]]  (Abnormal) Collected:  12/27/17 2020    Lab Status:  Final result Specimen:  Blood from Arm, Right Updated:  12/27/17 2055     Troponin I 0 11 (H) ng/mL     Narrative:         [employer] Chemistry analyzer 99% cutoff is > 0 04 ng/mL in network labs    o cTnI 99% cutoff is useful only when applied to patients in the clinical setting of myocardial ischemia  o cTnI 99% cutoff should be interpreted in the context of clinical history, ECG findings and possibly cardiac imaging to establish correct diagnosis  o cTnI 99% cutoff may be suggestive but clearly not indicative of a coronary event without the clinical setting of myocardial ischemia  Comprehensive metabolic panel [[id number]]  (Abnormal) Collected:  12/27/17 2021    Lab Status:  Final result Specimen:  Blood from Line, Venous Updated:  12/27/17 2048     Sodium 141 mmol/L      Potassium 3 9 mmol/L      Chloride 103 mmol/L      CO2 28 mmol/L      Anion Gap 10 mmol/L      BUN 20 mg/dL      Creatinine 0 85 mg/dL      Glucose 179 (H) mg/dL      Calcium 9 3 mg/dL      AST 30 U/L      ALT 45 U/L      Alkaline Phosphatase 107 U/L      Total Protein 7 0 g/dL      Albumin 3 5 g/dL      Total Bilirubin 0 50 mg/dL      eGFR 61 ml/min/1 73sq m     Narrative:         National Kidney Disease Education Program recommendations are as follows:  GFR calculation is accurate only with a steady state creatinine  Chronic Kidney disease less than 60 ml/min/1 73 sq  meters  Kidney failure less than 15 ml/min/1 73 sq  meters      Lipase [[id number]]  (Normal) Collected:  12/27/17 2021    Lab Status:  Final result Specimen:  Blood from Line, Venous Updated:  12/27/17 2048     Lipase 363 u/L     Magnesium [[id number]]  (Normal) Collected:  12/27/17 2021    Lab Status:  Final result Specimen:  Blood from Line, Venous Updated:  12/27/17 2048     Magnesium 1 7 mg/dL     CBC and differential [[id number]]  (Abnormal) Collected:  12/27/17 2021    Lab Status:  Final result Specimen:  Blood from Arm, Right Updated:  12/27/17 2031     WBC 14 37 (H) Thousand/uL      RBC 4 88 Million/uL      Hemoglobin 15 0 g/dL      Hematocrit 44 1 %      MCV 90 fL      MCH 30 7 pg      MCHC 34 0 g/dL      RDW 13 2 %      MPV 8 9 fL      Platelets 136 Thousands/uL      nRBC 0 /100 WBCs      Neutrophils Relative 84 (H) %      Lymphocytes Relative 8 (L) %      Monocytes Relative 6 %      Eosinophils Relative 0 %      Basophils Relative 1 %      Neutrophils Absolute 12 09 (H) Thousands/µL      Lymphocytes Absolute 1 17 Thousands/µL      Monocytes Absolute 0 79 Thousand/µL      Eosinophils Absolute 0 03 Thousand/µL      Basophils Absolute 0 09 Thousands/µL                  CT abdomen pelvis with contrast   Final Result by Fei Pak MD (12/27 2230)      No acute inflammatory process within the abdomen or pelvis  Mild fecal burden throughout the colon      3 cm cystic structure abutting the descending colon may be developmental cyst versus acquired if there is any history of prior surgery  Workstation performed: [id number]         CT recon only lumbar spine   Final Result by Fei Pak MD (12/27 2229)      Multilevel lumbar spondylosis resulting in moderate to severe degrees of stenosis in the lower lumbar spine  Left subarticular disc extrusion at L1-L2 extending to the left lateral recess of L1 encroaching on the descending left L1 nerve roots           Workstation performed: [id number]                    Procedures  Procedures       Phone Contacts  ED Phone Contact    ED Course  ED Course          HEART Risk Score    Flowsheet Row Most Recent Value   History  1 Filed at: 12/27/2017 2253   ECG  1 Filed at: 12/27/2017 2253   Age  2 Filed at: 12/27/2017 2253   Risk Factors  2 Filed at: 12/27/2017 2253   Troponin  1 Filed at: 12/27/2017 2253   Heart Score Risk Calculator   History  1 Filed at: 12/27/2017 2253   ECG  1 Filed at: 12/27/2017 2253   Age  2 Filed at: 12/27/2017 2253   Risk Factors  2 Filed at: 12/27/2017 2253   Troponin  1 Filed at: 12/27/2017 2253   HEART Score  7 Filed at: 12/27/2017 2253   HEART Score  7 Filed at: 12/27/2017 2253                            MDM  Number of Diagnoses or Management Options  Back pain:   HTN (hypertension):   Nausea & vomiting:   NSTEMI (non-ST elevated myocardial infarction) [facility]):   Diagnosis management comments: Patient is an 27-year-old female with a history of diabetes, hypertension, hyperlipidemia with degenerative disc disease as well as spinal stenosis coming in today with worsening back pain as well as nausea vomiting  Given patient's age as well as cor morbidities will obtain EKG, labs for CBC, CMP, troponin as well as CT abdomen pelvis with recons of the lumbar spine  At the time patient is neurovascular intact with no signs of cauda equina, however will obtain CTs to rule out intra-abdominal pathology such as but not limited to:  AAA, bowel obstruction, masses, lesions, sciatica, herniated disc, arthritis, spinal stenosis, strain, sprain, fracture, cauda equina syndrome, epidural abscess, AAA, UTI  Patient and family aware and agreeable  10:12 PM  Patient updated on labs thus far as well as pending CTs  Patient does have an elevated troponin and at this time complaining of indigestion  Patient understands will repeat EKG and need for admission despite CT reading  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 however never had cardiac workup for stress test, echo or cardiac catheterization  10:53 PM  CTs without acute abdominal pathology  Will admit for elevated troponins with nausea vomiting without any EKG changes  CT scan does show severe spinal stenosis with L1 nerve entrapment however patient without any evidence of cauda equina on physical exam   Pending urine PVR  Patient remains neurovascular intact    Patient never had a cardiac workup thus priorAnd patient    EKG [id number]  RHYTHM:  Normal sinus rhythm at 86 beats per minute  AXIS:  Normal axis  INTERVALS:  1st degree AV block  QRS COMPLEX:  Within normal limits  ST SEGMENT:  Nonspecific ST segment changes  There is mild ST sloping in 1 as well as in aVL with T-wave inversions in aVL    QT INTERVAL:  QTC measured at 471 milliseconds  COMPARED WITH PRIOR no old to compare  Interpretation by Johan Serrano DO     EKG INTERPRETATION 2222  RHYTHM:  Normal sinus rhythm at 90 beats per minute  AXIS:  Normal axis  INTERVALS:  First-degree AV block  QRS COMPLEX:  Within normal limits  ST SEGMENT:  Nonspecific ST segment changes with ST sloping at 1 in aVL with T-wave inversions in aVL  QT INTERVAL:  QTC measured at 482 milliseconds  COMPARED WITH PRIOR no acute change from today's  Interpretation by Johan Serrano DO          Amount and/or Complexity of Data Reviewed  Clinical lab tests: ordered and reviewed  Tests in the radiology section of CPT®: ordered and reviewed  Tests in the medicine section of CPT®: ordered and reviewed  Independent visualization of images, tracings, or specimens: yes      CritCare Time    Disposition  Final diagnoses:   Nausea & vomiting   Back pain   HTN (hypertension)   NSTEMI (non-ST elevated myocardial infarction) ([facility] [address] )     Time reflects when diagnosis was documented in both MDM as applicable and the Disposition within this note     Time User Action Codes Description Comment    12/27/2017 10:14 PM Galdino Ping L Add [R11 2] Nausea & vomiting     12/27/2017 10:14 PM Vesna Ahmadi Add [R74 8] Elevated troponin     12/27/2017 10:14 PM Bendwayne Ping L Add [M54 9] Back pain     12/27/2017 10:14 PM [place]ne Ping L Add [I10] HTN (hypertension)     12/27/2017 11:08 PM [place] Ping L Remove [R74 8] Elevated troponin     12/27/2017 11:08 PM Vanita Ahmadi Add [I21 4] NSTEMI (non-ST elevated myocardial infarction) [facility])       ED Disposition     ED Disposition Condition Comment    Admit  Case was discussed with [place] and the patient's admission status was agreed to be Admission Status: inpatient status to the service of Dr Fabi Viveros   Follow-up Information    None       Patient's Medications   Discharge Prescriptions    No medications on file     No discharge procedures on file      ED Provider  Electronically Signed by           Jeff Garcia DO  12/29/17 4425

## 2017-12-28 NOTE — CASE MANAGEMENT
Initial Clinical Review    Admission: Date/Time/Statement: 12/27/17 @ 0284     Orders Placed This Encounter   Procedures    Inpatient Admission (expected length of stay for this patient is greater than two midnights)     Standing Status:   Standing     Number of Occurrences:   1     Order Specific Question:   Admitting Physician     Answer:   Rupinder Wright [[id number]]     Order Specific Question:   Level of Care     Answer:   Med Surg [[id number]]     Order Specific Question:   Estimated length of stay     Answer:   More than 2 Midnights     Order Specific Question:   Certification     Answer:   I certify that inpatient services are medically necessary for this patient for a duration of greater than two midnights  See H&P and MD Progress Notes for additional information about the patient's course of treatment  ED: Date/Time/Mode of Arrival:   ED Arrival Information     Expected Arrival Acuity Means of Arrival Escorted By Service Admission Type    - 12/27/2017 19:13 Urgent Ambulance [facility] Urgent    Arrival Complaint    WEAKNESS          Chief Complaint:   Chief Complaint   Patient presents with    Weakness - Generalized     Pt presents to ER via EMS from home with c/o's nausea & vomitting for about 2 days  Healthy appearing elderly female presents in no distress  History of Illness: Alexander Cline is a 80 y o  female who presents with PMHx of DM, chronic pain 2/2 arthritis, HTN, HLD with prior back surgery years ago presenting with worsening back pain limiting her ambulation today  Back pain is chronic in nature-lumbar wo radiation  Denies numbness/tingling/bowel/bladder dysfunction  Denies muscle weakness  Pt lives alone and uses no assistance for ambulation  Denies falls  Denies chest pain and tightness, denies SOB  Admits to some vomiting  Denies any other systemic sx at this time  Fhx of cardiac disease in her father       ED Vital Signs:   ED Triage Vitals   Temperature Pulse Respirations Blood Pressure SpO2   12/27/17 1916 12/27/17 1916 12/27/17 1916 12/27/17 1916 12/27/17 1916   98 8 °F (37 1 °C) 96 20 142/94 99 %      Temp Source Heart Rate Source Patient Position - Orthostatic VS BP Location FiO2 (%)   12/27/17 1916 12/27/17 1916 12/27/17 1916 12/27/17 1916 --   Oral Monitor Lying Left arm       Pain Score       12/28/17 0830       4        Wt Readings from Last 1 Encounters:   12/27/17 65 8 kg (145 lb 1 oz)       Vital Signs (abnormal):   12/28/17 0830  --  76  18  161/69  97 %  None (Room air)  Lying   12/28/17 0515  --  68  19   176/80  99 %  None (Room air)  Sitting         Abnormal Labs/Diagnostic Test Results: wbc  13 01  Protein,  (2+) (A) Negative mg/dl      Glucose,  (1/10%) (A) Negative mg/dl     Ketones, UA 15 (1+) (A) Negative mg/dl     Urobilinogen, UA 0 2 0 2, 1 0 E U /dl E U /dl     Bilirubin, UA Negative Negative     Blood, UA Trace-Intact (A) Negative       Troponin I 0 11 (H)      Glucose 179 (H)      WBC 14 37 (H) 4 31 - 10 16 Thousand   Ct abd-      No acute inflammatory process within the abdomen or pelvis       Mild fecal burden throughout the colon    3 cm cystic structure abutting the descending colon may be developmental cyst versus acquired if there is any history of prior surgery  CT lumbar spine-      Multilevel lumbar spondylosis resulting in moderate to severe degrees of stenosis in the lower lumbar spine      Left subarticular disc extrusion at L1-L2 extending to the left lateral recess of L1 encroaching on the descending left L1 nerve roots        EKG- NSR w/ 1st deg AVB     ED Treatment:   Medication Administration from 12/27/2017 1912 to 12/28/2017 1137       Date/Time Order Dose Route Action Action by Comments     12/27/2017 1922  EMS REPLENISHMENT MED 0  Does not apply Given to EMS Sully Castellanos RN [place]     12/27/2017 2035 diazepam (VALIUM) tablet 2 mg 2 mg Oral Given Parth Clark RN      12/27/2017 2224 aspirin chewable tablet 324 mg 324 mg Oral Given Odessa Blake RN      12/27/2017 2203 iohexol (OMNIPAQUE) 350 MG/ML injection (MULTI-DOSE) 100 mL 100 mL Intravenous Given Aubrie Silvestre      12/27/2017 2255 aluminum-magnesium hydroxide-simethicone (MYLANTA) 200-200-20 mg/5 mL oral suspension 30 mL 30 mL Oral Given Odessa Blake RN      12/28/2017 0003 heparin (porcine) injection 3,900 Units 3,900 Units Intravenous Given Martina Parker RN      12/28/2017 0949 heparin (porcine) 25,000 units in 250 mL infusion (premix) 0 Units/kg/hr Intravenous Stopped Wilver Gallardo RN      12/28/2017 0707 heparin (porcine) 25,000 units in 250 mL infusion (premix) 9 Units/kg/hr Intravenous [facility] KIERRA Parker      12/28/2017 0555 heparin (porcine) 25,000 units in 250 mL infusion (premix) 0 Units/kg/hr Intravenous Stopped Martina Parker RN      12/28/2017 0005 heparin (porcine) 25,000 units in 250 mL infusion (premix) 12 Units/kg/hr Intravenous 101 [facility] Banda-KIERRA Burr      12/28/2017 0931 amLODIPine (NORVASC) tablet 5 mg 5 mg Oral Given Wilver Gallardo RN      12/28/2017 8497 aspirin chewable tablet 81 mg 81 mg Oral Given Wilver Gallardo RN      12/28/2017 0933 losartan potassium-hydrochlorothiazide (HYZAAR 100/25) combo dose   Oral Given Wilver Gallardo RN      12/28/2017 0932 metoprolol succinate (TOPROL-XL) 24 hr tablet 25 mg 25 mg Oral Given Wilver Gallardo RN      12/28/2017 0757 insulin lispro (HumaLOG) 100 units/mL subcutaneous injection 1-5 Units 1 Units Subcutaneous Not Given Wilver Gallardo RN AM POCT glucose =136      12/28/2017 0837 docusate sodium (COLACE) capsule 100 mg 100 mg Oral Given Wilver Gallardo RN           Past Medical/Surgical History:    Active Ambulatory Problems     Diagnosis Date Noted    No Active Ambulatory Problems     Resolved Ambulatory Problems     Diagnosis Date Noted    No Resolved Ambulatory Problems     Past Medical History:   Diagnosis Date    Chronic pain  Diabetes mellitus ([facility] [address] )     Hyperlipidemia     Hypertension        Admitting Diagnosis: Weakness [R53 1]    Age/Sex: 80 y o  female    Assessment/Plan:Hospital Problem List:      Principal Problem:    Elevated troponin  Active Problems:    Essential hypertension    Diabetes mellitus with hyperglycemia (HCC)    Leukocytosis    Abnormality of colon    Constipation    Lumbar stenosis    Weakness    Nausea and vomiting        Plan for the Primary Problem(s):  · Elevated troponin  ? Heparin drip, trend trop, cards consult and cards diet, ECHO, ASA, statin, lipid panel, st monitoring, telemetry, nitro PRN     Plan for Additional Problems:   · HTN  ? PRN lopressor, monitor  · DM with hyperglycemia  ? SSI, a1c, BGM, diabetic diet  · Leukocytosis  ? Etiology unknown, monitor  · Abnormality of colon with constipation  ? Bowel protocol with colace and MOM, consider GI workup for cystic structure  · Lumbar stenosis  ? Neurologically intact, consider ortho consult  · Weakness/back pain  ? Fall precautions, PT/OT  · Nausea and vomiting  ? IV zofran, CT abdomen shows no acute abnormality     VTE Prophylaxis: Heparin Drip  / sequential compression device   Code Status: DNR/DNI  POLST: POLST form is not discussed and not completed at this time      Anticipated Length of Stay:  Patient will be admitted on an Inpatient basis with an anticipated length of stay of  > 2 midnights     Justification for Hospital Stay: cards consult/workup    Admission Orders:  Scheduled Meds:   amLODIPine 5 mg Oral Daily   aspirin 81 mg Oral Daily   atorvastatin 40 mg Oral Daily With Dinner   docusate sodium 100 mg Oral BID   hydrALAZINE 10 mg Oral TID   insulin glargine 14 Units Subcutaneous HS   insulin lispro 1-5 Units Subcutaneous TID AC   insulin lispro 1-5 Units Subcutaneous HS   insulin lispro 6 Units Subcutaneous TID With Meals   latanoprost 1 drop Both Eyes HS   [START ON 12/29/2017] losartan 100 mg Oral Daily   [START ON 12/29/2017] metoprolol succinate 50 mg Oral Daily   multivitamin-minerals 1 tablet Oral Daily     Continuous Infusions:    PRN Meds:   acetaminophen    magnesium hydroxide    metoprolol    nitroglycerin    ondansetron    Fingerstick ac and hs   Cardiac diet   Up w/ assist   Fall precautions  Pacemaker precautions  Inc spirom   EKG prn cp   Echo   EKG w/ 3rd trop   Serial trop   OT PT eval   O2 keep sat > 90 %   SCD  Tele   Orthopedic sx consult   Cardiology consult   12/28 lipid profile, coags, cbc , ,mg , bnp , hgb a1c     Trop #1  0 11  Trop #2   0 18   #3  0 16

## 2017-12-28 NOTE — ED NOTES
Received critical value PTT of 133  Heparin protocol is to hold heparin for an hour  Paged SLIM to notify on elevated labs        Kristine Rocha RN  12/28/17 Hilario Huerta RN  12/28/17 7927

## 2017-12-28 NOTE — SOCIAL WORK
LOS: 1 Nurse phoned CCM to speak with pt's son in law  CCM spoke with pt's son in law who requested to discuss discharge planning again  Pt's son in law explained they are interested in rehab prior to discharge home  CCM reported PT/OT will eval and CM Department will follow up to discuss recs (acute vs STR)  Pt's son in law in agreement to plan  CCM updated SLIM  CM department to follow up post PT/OT

## 2017-12-28 NOTE — SOCIAL WORK
LOS: 1 CCM met with pt and son in law at bedside  Pt reports she lives in [facility] alone  Pt lives in [place] that has 2 steps to enter  Pt denies DME  Pt reports she could ambulate and complete ADL's independently prior to admission  Pt has no hx of Rehab  Pt had [facility] prior to admission and would like [facility]  Pt uses [facility]  Pt denies hx of MH and substance abuse  Pt's son, Nikky Moise is POA  Pt is retired and drives  Pt reports family will transport home  CCM discussed discharge planning  Pt and son in law explained pt has difficulties ambulating and with ADL's when pain worsens  CCM discussed pt may benefit from STR pending PT/OT recs  Pt and son in law agreeable for CM department to discuss further post PT/OT eval   Pt has no other needs at this time  CM department to follow pt through discharge

## 2017-12-28 NOTE — ED NOTES
Report given to [facility] med surg 2 RN        Mayda Tijerina, RN  12/28/17 7316

## 2017-12-28 NOTE — CONSULTS
Consultation - Cardiology    Sabrina Soto 80 y o  female MRN: [id number]  Unit/Bed#: ED 16 Encounter: [id number]    Physician Requesting Consult: Lisa Aguirre DO    Reason for Consult / Principal Problem: elevated troponin     HPI: Sabrina Soto is a 80y o  year old female with a past medical history significant for hypertension, hyperlipidemia, diabetes mellitus, chronic back pain, permanent pacemaker in-situ  Patient presented with complaints of worsening back pain  Patient does admit to nausea  She was also noted by family to have decreased oral intake  Patient denies chest pain, palpitation, shortness of breath, fever  She follows with cardiologist Dr Jose Causey, pacemaker placed however she is uncertain of the reason  Patient denies prior history of CAD  Historical Information   Past Medical History:   Diagnosis Date    Chronic pain     Diabetes mellitus ([address] )     Hyperlipidemia     Hypertension      History reviewed  No pertinent surgical history  History   Alcohol use Not on file     History   Drug use: Unknown     History   Smoking Status    Unknown If Ever Smoked   Smokeless Tobacco    Not on file       FAMILY HISTORY:  History reviewed  No pertinent family history      MEDS & ALLERGIES:  all current active meds have been reviewed and current meds:   Current Facility-Administered Medications   Medication Dose Route Frequency    acetaminophen (TYLENOL) tablet 650 mg  650 mg Oral Q6H PRN    amLODIPine (NORVASC) tablet 5 mg  5 mg Oral Daily    aspirin chewable tablet 81 mg  81 mg Oral Daily    atorvastatin (LIPITOR) tablet 40 mg  40 mg Oral Daily With Dinner    docusate sodium (COLACE) capsule 100 mg  100 mg Oral BID    hydrALAZINE (APRESOLINE) tablet 10 mg  10 mg Oral TID    insulin glargine (LANTUS) subcutaneous injection 14 Units  14 Units Subcutaneous HS    insulin lispro (HumaLOG) 100 units/mL subcutaneous injection 1-5 Units  1-5 Units Subcutaneous TID AC    insulin lispro (HumaLOG) 100 units/mL subcutaneous injection 1-5 Units  1-5 Units Subcutaneous HS    insulin lispro (HumaLOG) 100 units/mL subcutaneous injection 6 Units  6 Units Subcutaneous TID With Meals    latanoprost (XALATAN) 0 005 % ophthalmic solution 1 drop  1 drop Both Eyes HS    [START ON 2017] losartan (COZAAR) tablet 100 mg  100 mg Oral Daily    magnesium hydroxide (MILK OF MAGNESIA) 400 mg/5 mL oral suspension 15 mL  15 mL Oral Daily PRN    metoprolol (LOPRESSOR) injection 5 mg  5 mg Intravenous Q6H PRN    [START ON 2017] metoprolol succinate (TOPROL-XL) 24 hr tablet 50 mg  50 mg Oral Daily    multivitamin-minerals (CENTRUM) tablet 1 tablet  1 tablet Oral Daily    nitroglycerin (NITROSTAT) SL tablet 0 4 mg  0 4 mg Sublingual Q5 Min PRN    ondansetron (ZOFRAN) injection 4 mg  4 mg Intravenous Q6H PRN       heparin (porcine) 3-20 Units/kg/hr (Order-Specific) Last Rate: 9 Units/kg/hr (17 0707)     No Known Allergies      REVIEW OF SYSTEMS:  Constitutional:+ poor appetite Denies fever or chills  Eyes: Denies visual disturbance change in visual acuity   HENT: Denies nasal congestion or sore throat   Respiratory: Denies cough, expectoration or shortness of breath   Cardiovascular: Denies chest pain, palpitations or lower extremity swelling   GI: + nausea/vomiting Denies abdominal pain, bloody stools or diarrhea   : Denies dysuria, frequency, hematuria  Musculoskeletal: + back pain  Neurologic: Denies lightheadedness, syncope, headache, focal weakness or sensory changes   Psychiatric: Denies depression, anxiety      PHYSICAL EXAM:  Vitals:   Vitals:    17 0932   BP: 156/69   Pulse: 72   Resp:    Temp:    SpO2:      Body mass index is 26 53 kg/m²      Systolic (84VXT), BKS:960 , Min:142 , BC     Diastolic (02QCY), ZYR:39, Min:69, Max:94      Intake/Output Summary (Last 24 hours) at 17 0941  Last data filed at 17 4573   Gross per 24 hour   Intake              500 ml   Output 0 ml   Net              500 ml     Weight (last 2 days)     Date/Time   Weight    12/27/17 1916  65 8 (145 06)            Invasive Devices     Peripheral Intravenous Line            Peripheral IV 12/27/17 Left Antecubital less than 1 day    Peripheral IV 12/27/17 Right Antecubital less than 1 day                General: Patient is not in acute distress  Laying comfortably in the bed  Head: Normocephalic  Atraumatic  HEENT: Both pupils normal sized, round and reactive to light  Nonicteric  Neck: JVP not elevated  Trachea central    Respiratory: Bilateral bronchovascular breath sounds with no crackles or rhonchi  Cardiovascular: RRR  S1 and S2 normal  No murmur, rub or gallop  GI: Abdomen soft, nontender  No guarding or rigidity  Neurologic: Patient is awake, alert, responding to commands  Well-oriented to time, place and person   Moving all extremities  Extremities: No clubbing, cyanosis or edema  Integument: No skin rashes or ulceration    LABORATORY RESULTS:    Results from last 7 days  Lab Units 12/28/17  0836 12/28/17  0502 12/27/17 2020   TROPONIN I ng/mL 0 16* 0 18* 0 11*       CBC with diff:   Results from last 7 days  Lab Units 12/28/17  0502 12/27/17  2345 12/27/17 2021   WBC Thousand/uL 13 19* 13 01* 14 37*   HEMOGLOBIN g/dL 13 5 13 8 15 0   HEMATOCRIT % 40 8 41 3 44 1   MCV fL 92 93 90   PLATELETS Thousands/uL 327 258 354   MCH pg 30 3 31 2 30 7   MCHC g/dL 33 1 33 4 34 0   RDW % 13 5 13 4 13 2   MPV fL 9 0 9 6 8 9   NRBC AUTO /100 WBCs  --   --  0       CMP:  Results from last 7 days  Lab Units 12/28/17  0502 12/27/17 2021   SODIUM mmol/L 141 141   POTASSIUM mmol/L 4 1 3 9   CHLORIDE mmol/L 105 103   CO2 mmol/L 27 28   ANION GAP mmol/L 9 10   BUN mg/dL 19 20   CREATININE mg/dL 0 82 0 85   GLUCOSE RANDOM mg/dL 151* 179*   CALCIUM mg/dL 9 1 9 3   AST U/L  --  30   ALT U/L  --  45   ALK PHOS U/L  --  107   TOTAL PROTEIN g/dL  --  7 0   ALBUMIN g/dL  --  3 5   BILIRUBIN TOTAL mg/dL  -- 0 50   EGFR ml/min/1 73sq m 64 61       BMP:  Results from last 7 days  Lab Units 12/28/17  0502 12/27/17  2021   SODIUM mmol/L 141 141   POTASSIUM mmol/L 4 1 3 9   CHLORIDE mmol/L 105 103   CO2 mmol/L 27 28   BUN mg/dL 19 20   CREATININE mg/dL 0 82 0 85   GLUCOSE RANDOM mg/dL 151* 179*   CALCIUM mg/dL 9 1 9 3         Results from last 7 days  Lab Units 12/28/17  0502   NT-PRO BNP pg/mL 2,071*        Results from last 7 days  Lab Units 12/28/17  0502 12/27/17  2021   MAGNESIUM mg/dL 2 1 1 7       Results from last 7 days  Lab Units 12/28/17  0502   HEMOGLOBIN A1C % 7 7*           Results from last 7 days  Lab Units 12/28/17  0502 12/27/17  2336   INR  1 11 0 98       Lipid Profile:   Lab Results   Component Value Date    CHOL 169 12/28/2017     Lab Results   Component Value Date    HDL 57 12/28/2017     Lab Results   Component Value Date    LDLCALC 94 12/28/2017     Lab Results   Component Value Date    TRIG 90 12/28/2017     Imaging: I have personally reviewed pertinent reports  EKG reviewed personally:  Normal sinus rhythm, nonspecific ST/T-wave gini    Telemetry reviewed personally:  No significant events      Assessment and Plan:  Elevated troponin 0 11/0 18/0 16  -Minimal and flat elevation of unknown etiology  -Patient presently on heparin drip however there does not appear to be any evidence of ACS, will discontinue at this time  -Will obtain echocardiogram to evaluate LVEF and status of valves  -may consider stress test as outpatient  Vs inpatient if echo unremarkable     Hypertension  -BP intermittently elevated, likely secondary to pain  -will continue to monitor    Hyperlipidemia  -continue statin    Code Status: Level 3 - DNAR and DNI      Thank you for allowing us to participate in this patient's care  Counseling / Coordination of Care  Total floor / unit time spent today 35 minutes    Greater than 50% of total time was spent with the patient and / or family counseling and / or coordination of care  A description of the counseling / coordination of care: Review of history, current assessment, development of a plan         Tyra Remy PA-C  12/28/2017,9:41 AM

## 2017-12-28 NOTE — ED NOTES
Notified Rivera Ceja PA-C on elevated troponin levels  NNO at this time  Will continue to monitor        Mariano Hoyt RN  12/28/17 7392

## 2017-12-28 NOTE — ED NOTES
Carrie Seek from case management on the phone with patients son, updating him at this time        Tia Dunne RN  12/28/17 5545

## 2017-12-28 NOTE — PHYSICIAN ADVISOR
Current patient class: Inpatient  The patient is currently on Hospital Day: 2      The patient was admitted to the hospital at [phone] on 12/27/17 for the following diagnosis:  Back pain [M54 9]  HTN (hypertension) [I10]  Weakness [R53 1]  Nausea & vomiting [R11 2]  Elevated troponin [R74 8]  NSTEMI (non-ST elevated myocardial infarction) [facility]) [I21 4]  Spinal stenosis of lumbar region, unspecified whether neurogenic claudication present [M48 061]       There is documentation in the medical record of an expected length of stay of at least 2 midnights  The patient is therefore expected to satisfy the 2 midnight benchmark and given the 2 midnight presumption is appropriate for INPATIENT ADMISSION  Given this expectation of a satisfying stay, CMS instructs us that the patient is most often appropriate for inpatient admission under part A provided medical necessity is documented in the chart  After review of the relevant documentation, labs, vital signs and test results, the patient is appropriate for INPATIENT ADMISSION  Admission to the hospital as an inpatient is a complex decision making process which requires the practitioner to consider the patients presenting complaint, history and physical examination and all relevant testing  With this in mind, in this case, the patient was deemed appropriate for INPATIENT ADMISSION  After review of the documentation and testing available at the time of the admission I concur with this clinical determination of medical necessity  Rationale is as follows: The patient is an 80year-old female who presented to the ED with the complaints of lower back pain and gait instability  She was found to have an elevated troponin level and was initially treated with a Heparin drip/infusion  She was seen in consultation by Cardiology  She continues to experience lower back pain    A CT scan of the lumbar spine was completed on 12/27/17, which showed moderate to severe degrees of lumbar stenosis and a left sub-articular disc extrusion at L1-L2 to the left lateral recess of L1 encroaching on the descending left L1 nerve roots  She continues to experience gait instability and limited mobility with ambulatory dysfunction secondary to her lower back pain  She will likely require either short-term rehabilitation or acute rehabilitation after discharge  The patient is appropriate for an inpatient admission  The patients vitals on arrival were ED Triage Vitals   Temperature Pulse Respirations Blood Pressure SpO2   12/27/17 1916 12/27/17 1916 12/27/17 1916 12/27/17 1916 12/27/17 1916   98 8 °F (37 1 °C) 96 20 142/94 99 %      Temp Source Heart Rate Source Patient Position - Orthostatic VS BP Location FiO2 (%)   12/27/17 1916 12/27/17 1916 12/27/17 1916 12/27/17 1916 --   Oral Monitor Lying Left arm       Pain Score       12/28/17 0830       4           Past Medical History:   Diagnosis Date    Chronic pain     Diabetes mellitus ([facility] [address] )     Hyperlipidemia     Hypertension      History reviewed  No pertinent surgical history          Consults have been placed to:   IP CONSULT TO CARDIOLOGY  IP CONSULT TO ORTHOPEDIC SURGERY    Vitals:    12/28/17 0932 12/28/17 1243 12/28/17 1357 12/28/17 1552   BP: 156/69 (!) 183/76 129/60 134/65   Pulse: 72  61 60   Resp:   18 16   Temp:       TempSrc:       SpO2:   97% 95%   Weight:       Height:           Most recent labs:    Recent Labs      12/27/17   2021   12/28/17   0502   12/28/17   1229   WBC  14 37*   < >  13 19*   --    --    HGB  15 0   < >  13 5   --    --    HCT  44 1   < >  40 8   --    --    PLT  354   < >  327   --    --    K  3 9   --   4 1   --    --    NA  141   --   141   --    --    CALCIUM  9 3   --   9 1   --    --    BUN  20   --   19   --    --    CREATININE  0 85   --   0 82   --    --    LIPASE  363   --    --    --    --    INR   --    < >  1 11   --    --    TROPONINI   --    --   0 18*   < >  0 13*   AST  30   --    --    -- --    ALT  45   --    --    --    --    ALKPHOS  107   --    --    --    --    BILITOT  0 50   --    --    --    --     < > = values in this interval not displayed         Scheduled Meds:  amLODIPine 5 mg Oral Daily   aspirin 81 mg Oral Daily   atorvastatin 40 mg Oral Daily With Dinner   docusate sodium 100 mg Oral BID   hydrALAZINE 10 mg Oral TID   insulin glargine 14 Units Subcutaneous HS   insulin lispro 1-5 Units Subcutaneous TID AC   insulin lispro 1-5 Units Subcutaneous HS   insulin lispro 6 Units Subcutaneous TID With Meals   latanoprost 1 drop Both Eyes HS   [START ON 12/29/2017] losartan 100 mg Oral Daily   [START ON 12/29/2017] metoprolol succinate 50 mg Oral Daily   multivitamin-minerals 1 tablet Oral Daily     Continuous Infusions:   PRN Meds:   acetaminophen    magnesium hydroxide    metoprolol    nitroglycerin    ondansetron    Surgical procedures (if appropriate):

## 2017-12-28 NOTE — H&P
History and Physical - [facility] Internal Medicine    Patient Information: Sabrina Soto 80 y o  female MRN: [id number]  Unit/Bed#: ED 16 Encounter: [id number]  Admitting Physician: Debra Christianson PA-C  PCP: Tenzin Garvey MD  Date of Admission:  12/28/17    Assessment/Plan:    Hospital Problem List:     Principal Problem:    Elevated troponin  Active Problems:    Essential hypertension    Diabetes mellitus with hyperglycemia (HCC)    Leukocytosis    Abnormality of colon    Constipation    Lumbar stenosis    Weakness    Nausea and vomiting      Plan for the Primary Problem(s):  · Elevated troponin  · Heparin drip, trend trop, cards consult and cards diet, ECHO, ASA, statin, lipid panel, st monitoring, telemetry, nitro PRN    Plan for Additional Problems:   · HTN  · PRN lopressor, monitor  · DM with hyperglycemia  · SSI, a1c, BGM, diabetic diet  · Leukocytosis  · Etiology unknown, monitor  · Abnormality of colon with constipation  · Bowel protocol with colace and MOM, consider GI workup for cystic structure  · Lumbar stenosis  · Neurologically intact, consider ortho consult  · Weakness/back pain  · Fall precautions, PT/OT  · Nausea and vomiting  · IV zofran, CT abdomen shows no acute abnormality    VTE Prophylaxis: Heparin Drip  / sequential compression device   Code Status: DNR/DNI  POLST: POLST form is not discussed and not completed at this time  Anticipated Length of Stay:  Patient will be admitted on an Inpatient basis with an anticipated length of stay of  > 2 midnights  Justification for Hospital Stay: cards consult/workup    Total Time for Visit, including Counseling / Coordination of Care: 1 hour  Greater than 50% of this total time spent on direct patient counseling and coordination of care      Chief Complaint:   Back pain    History of Present Illness:    Sabrina Soto is a 80 y o  female who presents with PMHx of DM, chronic pain 2/2 arthritis, HTN, HLD with prior back surgery years ago presenting with worsening back pain limiting her ambulation today  Back pain is chronic in nature-lumbar wo radiation  Denies numbness/tingling/bowel/bladder dysfunction  Denies muscle weakness  Pt lives alone and uses no assistance for ambulation  Denies falls  Denies chest pain and tightness, denies SOB  Admits to some vomiting  Denies any other systemic sx at this time  Fhx of cardiac disease in her father  Review of Systems:    Review of Systems   Constitutional: Negative for chills and fever  HENT: Negative for congestion  Eyes: Negative for visual disturbance  Respiratory: Negative for cough, shortness of breath and wheezing  Cardiovascular: Negative for chest pain, palpitations and leg swelling  Gastrointestinal: Negative for abdominal distention, abdominal pain, constipation, diarrhea, nausea and vomiting  Genitourinary: Negative for hematuria  Musculoskeletal: Positive for back pain and gait problem  Skin: Negative for color change  Neurological: Negative for dizziness, seizures, syncope, speech difficulty, weakness, light-headedness, numbness and headaches  Psychiatric/Behavioral: Negative for confusion  Past Medical and Surgical History:     Past Medical History:   Diagnosis Date    Chronic pain     Diabetes mellitus ([facility] [address] )     Hyperlipidemia     Hypertension        History reviewed  No pertinent surgical history  Meds/Allergies:    Prior to Admission medications    Medication Sig Start Date End Date Taking?  Authorizing Provider   amLODIPine (NORVASC) 5 mg tablet Take by mouth 7/30/14  Yes Historical Provider, MD   aspirin 81 MG tablet Take by mouth   Yes Historical Provider, MD   atorvastatin (LIPITOR) 40 mg tablet Take by mouth 7/30/14  Yes Historical Provider, MD   insulin glargine (LANTUS SOLOSTAR) injection pen 100 units/mL Inject under the skin 2/11/14  Yes Historical Provider, MD   losartan-hydrochlorothiazide (HYZAAR) 100-25 MG per tablet Take by mouth 7/30/14  Yes Historical Provider, MD   metoprolol succinate (TOPROL-XL) 25 mg 24 hr tablet Take by mouth 7/30/14  Yes Historical Provider, MD   metoprolol tartrate (LOPRESSOR) 50 mg tablet Take 25 mg by mouth every 12 (twelve) hours   Yes Historical Provider, MD   sitaGLIPtin (JANUVIA) 50 mg tablet Take by mouth   Yes Historical Provider, MD     nurse med rec    Allergies: No Known Allergies    Social History:     Marital Status:    Occupation: unknown  Patient Pre-hospital Living Situation: alone  Patient Pre-hospital Level of Mobility: full  Patient Pre-hospital Diet Restrictions: diabetic  Substance Use History:   History   Alcohol use Not on file     History   Smoking Status    Unknown If Ever Smoked   Smokeless Tobacco    Not on file     History   Drug use: Unknown       Family History:    History reviewed  No pertinent family history  Physical Exam:     Vitals:   Blood Pressure: 142/94 (12/27/17 1916)  Pulse: 96 (12/27/17 1916)  Temperature: 98 8 °F (37 1 °C) (12/27/17 1916)  Temp Source: Oral (12/27/17 1916)  Respirations: 20 (12/27/17 1916)  Height: 5' 2" (157 5 cm) (12/27/17 1916)  Weight - Scale: 65 8 kg (145 lb 1 oz) (12/27/17 1916)  SpO2: 99 % (12/27/17 1916)    Physical Exam   Constitutional: She is oriented to person, place, and time  She appears well-developed and well-nourished  No distress  HENT:   Head: Normocephalic and atraumatic  Mouth/Throat: Oropharynx is clear and moist  No oropharyngeal exudate  Eyes: Conjunctivae are normal  Right eye exhibits no discharge  Left eye exhibits no discharge  No scleral icterus  Neck: Neck supple  Cardiovascular: Normal rate, regular rhythm, normal heart sounds and intact distal pulses  Exam reveals no gallop and no friction rub  No murmur heard  Pulmonary/Chest: Effort normal and breath sounds normal  No respiratory distress  She has no wheezes  She has no rales  She exhibits no tenderness  Abdominal: Soft   Bowel sounds are normal  She exhibits no distension and no mass  There is no tenderness  There is no rebound and no guarding  Musculoskeletal: Normal range of motion  She exhibits no edema, tenderness or deformity  Lumbar tenderness midline, no deformities or step offs noted   Neurological: She is alert and oriented to person, place, and time  No cranial nerve deficit  Coordination normal    Full sensation UE and LE bilaterally  Skin: Skin is warm and dry  No rash noted  She is not diaphoretic  No erythema  No pallor  Psychiatric: She has a normal mood and affect  Her behavior is normal    Nursing note and vitals reviewed  Additional Data:     Lab Results: I have personally reviewed pertinent reports  Results from last 7 days  Lab Units 12/27/17  2345 12/27/17 2021   WBC Thousand/uL 13 01* 14 37*   HEMOGLOBIN g/dL 13 8 15 0   HEMATOCRIT % 41 3 44 1   PLATELETS Thousands/uL 258 354   NEUTROS PCT %  --  84*   LYMPHS PCT %  --  8*   MONOS PCT %  --  6   EOS PCT %  --  0       Results from last 7 days  Lab Units 12/27/17 2021   SODIUM mmol/L 141   POTASSIUM mmol/L 3 9   CHLORIDE mmol/L 103   CO2 mmol/L 28   BUN mg/dL 20   CREATININE mg/dL 0 85   CALCIUM mg/dL 9 3   TOTAL PROTEIN g/dL 7 0   BILIRUBIN TOTAL mg/dL 0 50   ALK PHOS U/L 107   ALT U/L 45   AST U/L 30   GLUCOSE RANDOM mg/dL 179*       Results from last 7 days  Lab Units 12/27/17  2336   INR  0 98       Imaging: I have personally reviewed pertinent reports  Ct Recon Only Lumbar Spine    Result Date: 12/27/2017  Narrative: CT LUMBAR SPINE INDICATION:  Back pain COMPARISON:  Concurrent CT of the abdomen and pelvis TECHNIQUE: Axial CT examination of the lumbar spine was obtained utilizing reconstructed images from CT of the chest, abdomen and pelvis performed the same day  Images were reformatted in the sagittal and coronal planes   This examination, like all CT scans performed in the [facility], was performed utilizing techniques to minimize radiation dose exposure, including the use of iterative reconstruction and automated exposure control  FINDINGS: ALIGNMENT: Minimal levocurvature  VERTEBRAL BODIES: No focal lytic or blastic lesion  No acute fracture  DEGENERATIVE CHANGES: T11-T12: No significant spinal canal stenosis  No right and no left neural foraminal stenosis  T12-L1: No significant spinal canal stenosis  No right and no left neural foraminal stenosis  L1-L2: There is a left subarticular disc extrusion with superior migration, measuring 2 1 cm in longest craniocaudal dimension and 8 mm in widest AP dimension  Extension into the left lateral recess of L1 noted likely encroaching on the L1 nerve roots  Mild spinal canal stenosis  Mild right and moderate left neural foraminal stenosis  L2-L3: Calcified circumferential disc bulge with left foraminal protrusion  Mild spinal canal stenosis  Mild right and mild left neural foraminal stenosis  L3-L4: Calcified circumferential disc bulge with bilateral facet arthropathy and ligamentum flavum thickening  No significant spinal canal stenosis  Moderate right and mild left neural foraminal stenosis  L4-L5: Circumferential disc bulge and bilateral facet arthropathy and ligamentum flavum thickening  Moderate spinal canal stenosis  Mild to moderate right and moderate to severe left neural foraminal stenosis  L5-S1: Left subarticular disc extrusion with the presence of air likely encroaching on the descending left S1 nerve roots  Mild spinal canal stenosis  Moderate to severe right and moderate to severe left neural foraminal stenosis  PREVERTEBRAL AND PARASPINAL SOFT TISSUES: See dedicated CT  Impression: Multilevel lumbar spondylosis resulting in moderate to severe degrees of stenosis in the lower lumbar spine  Left subarticular disc extrusion at L1-L2 extending to the left lateral recess of L1 encroaching on the descending left L1 nerve roots   Workstation performed: [id number]     Ct Abdomen Pelvis With Contrast    Result Date: 12/27/2017  Narrative: CT ABDOMEN AND PELVIS WITH IV CONTRAST INDICATION:  Nausea and vomiting  Back pain  COMPARISON: None  TECHNIQUE:  CT examination of the abdomen and pelvis was performed  Reformatted images were created in axial, sagittal, and coronal planes  Radiation dose length product (DLP) for this visit:  440 mGy-cm   This examination, like all CT scans performed in the [facility], was performed utilizing techniques to minimize radiation dose exposure, including the use of iterative reconstruction and automated exposure control  IV Contrast:  100 mL of iohexol (OMNIPAQUE)         Enteric Contrast:  Enteric contrast was not administered  FINDINGS: ABDOMEN LOWER CHEST:  Reticulation and groundglass seen at the lung bases  May reflect an element of atelectasis superimposed on chronic interstitial lung disease  LIVER/BILIARY TREE:  Calcification in the dome of the right hepatic lobe  Otherwise, no suspicious lesion portal vasculature is patent  GALLBLADDER:  No calcified gallstones  No pericholecystic inflammatory change  SPLEEN:  Unremarkable  PANCREAS:  Unremarkable  ADRENAL GLANDS:  Unremarkable  KIDNEYS/URETERS:  Unremarkable  No hydronephrosis  STOMACH AND BOWEL:  Patulous distal esophagus  No obstruction  Mild fecal burden throughout the distal colon  No signs of inflammatory change  There is a cystic structure abutting the right colon measuring 3 0 x 2 2 cm  This is of uncertain etiology and could reflect developmental cyst versus acquired given prior hysterectomy  The overall appearance is benign  Consider correlating with prior  imaging  APPENDIX:  No findings to suggest appendicitis  ABDOMINOPELVIC CAVITY:  No ascites or free intraperitoneal air  No lymphadenopathy  VESSELS:  Aortoiliac calcifications noted  PELVIS REPRODUCTIVE ORGANS:  Hysterectomy versus atrophic  URINARY BLADDER:  Mild distention   ABDOMINAL WALL/INGUINAL REGIONS: Unremarkable  OSSEOUS STRUCTURES:  Lumbar spondylosis noted  There is a moderate to large left subarticular disc extrusion at L1-L2 with superior migration into the left lateral recess at L1, presumably impinging on the descending L1 nerve roots  Additional degenerative changes described in the dedicated lumbar spine  No lytic or blastic lesion  Impression: No acute inflammatory process within the abdomen or pelvis  Mild fecal burden throughout the colon 3 cm cystic structure abutting the descending colon may be developmental cyst versus acquired if there is any history of prior surgery  Workstation performed: [id number]       EKG, Pathology, and Other Studies Reviewed on Admission:   · CT abdomen pelvis, CT lumbar spine, CMP, trop, CBC, UA    Allscripts / Epic Records Reviewed: No     ** Please Note: This note has been constructed using a voice recognition system   **

## 2017-12-29 LAB
ANION GAP SERPL CALCULATED.3IONS-SCNC: 8 MMOL/L (ref 4–13)
BUN SERPL-MCNC: 31 MG/DL (ref 5–25)
CALCIUM SERPL-MCNC: 9 MG/DL (ref 8.3–10.1)
CHLORIDE SERPL-SCNC: 105 MMOL/L (ref 100–108)
CO2 SERPL-SCNC: 27 MMOL/L (ref 21–32)
CREAT SERPL-MCNC: 1 MG/DL (ref 0.6–1.3)
ERYTHROCYTE [DISTWIDTH] IN BLOOD BY AUTOMATED COUNT: 13.5 % (ref 11.6–15.1)
GFR SERPL CREATININE-BSD FRML MDRD: 50 ML/MIN/1.73SQ M
GLUCOSE SERPL-MCNC: 112 MG/DL (ref 65–140)
GLUCOSE SERPL-MCNC: 126 MG/DL (ref 65–140)
GLUCOSE SERPL-MCNC: 183 MG/DL (ref 65–140)
GLUCOSE SERPL-MCNC: 252 MG/DL (ref 65–140)
GLUCOSE SERPL-MCNC: 71 MG/DL (ref 65–140)
HCT VFR BLD AUTO: 38.9 % (ref 34.8–46.1)
HGB BLD-MCNC: 12.8 G/DL (ref 11.5–15.4)
MCH RBC QN AUTO: 30.1 PG (ref 26.8–34.3)
MCHC RBC AUTO-ENTMCNC: 32.9 G/DL (ref 31.4–37.4)
MCV RBC AUTO: 92 FL (ref 82–98)
PLATELET # BLD AUTO: 295 THOUSANDS/UL (ref 149–390)
PMV BLD AUTO: 9.1 FL (ref 8.9–12.7)
POTASSIUM SERPL-SCNC: 4 MMOL/L (ref 3.5–5.3)
RBC # BLD AUTO: 4.25 MILLION/UL (ref 3.81–5.12)
SODIUM SERPL-SCNC: 140 MMOL/L (ref 136–145)
WBC # BLD AUTO: 12.25 THOUSAND/UL (ref 4.31–10.16)

## 2017-12-29 PROCEDURE — 82948 REAGENT STRIP/BLOOD GLUCOSE: CPT

## 2017-12-29 PROCEDURE — G8987 SELF CARE CURRENT STATUS: HCPCS

## 2017-12-29 PROCEDURE — G8978 MOBILITY CURRENT STATUS: HCPCS

## 2017-12-29 PROCEDURE — G8979 MOBILITY GOAL STATUS: HCPCS

## 2017-12-29 PROCEDURE — 80048 BASIC METABOLIC PNL TOTAL CA: CPT | Performed by: INTERNAL MEDICINE

## 2017-12-29 PROCEDURE — 85027 COMPLETE CBC AUTOMATED: CPT | Performed by: INTERNAL MEDICINE

## 2017-12-29 PROCEDURE — G8988 SELF CARE GOAL STATUS: HCPCS

## 2017-12-29 PROCEDURE — 97163 PT EVAL HIGH COMPLEX 45 MIN: CPT

## 2017-12-29 PROCEDURE — 97167 OT EVAL HIGH COMPLEX 60 MIN: CPT

## 2017-12-29 RX ORDER — ACETAMINOPHEN 325 MG/1
975 TABLET ORAL 3 TIMES DAILY
Status: DISCONTINUED | OUTPATIENT
Start: 2017-12-29 | End: 2017-12-31 | Stop reason: HOSPADM

## 2017-12-29 RX ADMIN — DOCUSATE SODIUM 100 MG: 100 CAPSULE, LIQUID FILLED ORAL at 09:43

## 2017-12-29 RX ADMIN — DOCUSATE SODIUM 100 MG: 100 CAPSULE, LIQUID FILLED ORAL at 17:25

## 2017-12-29 RX ADMIN — ATORVASTATIN CALCIUM 40 MG: 40 TABLET, FILM COATED ORAL at 17:25

## 2017-12-29 RX ADMIN — LOSARTAN POTASSIUM 100 MG: 50 TABLET, FILM COATED ORAL at 09:43

## 2017-12-29 RX ADMIN — ACETAMINOPHEN 975 MG: 325 TABLET ORAL at 21:12

## 2017-12-29 RX ADMIN — INSULIN LISPRO 6 UNITS: 100 INJECTION, SOLUTION INTRAVENOUS; SUBCUTANEOUS at 09:45

## 2017-12-29 RX ADMIN — METOPROLOL SUCCINATE 50 MG: 50 TABLET, FILM COATED, EXTENDED RELEASE ORAL at 09:53

## 2017-12-29 RX ADMIN — HYDRALAZINE HYDROCHLORIDE 10 MG: 10 TABLET, FILM COATED ORAL at 17:27

## 2017-12-29 RX ADMIN — INSULIN LISPRO 6 UNITS: 100 INJECTION, SOLUTION INTRAVENOUS; SUBCUTANEOUS at 12:05

## 2017-12-29 RX ADMIN — ACETAMINOPHEN 975 MG: 325 TABLET ORAL at 17:25

## 2017-12-29 RX ADMIN — Medication 1 TABLET: at 09:43

## 2017-12-29 RX ADMIN — CELECOXIB 200 MG: 200 CAPSULE ORAL at 09:43

## 2017-12-29 RX ADMIN — HYDRALAZINE HYDROCHLORIDE 10 MG: 10 TABLET, FILM COATED ORAL at 21:13

## 2017-12-29 RX ADMIN — CELECOXIB 200 MG: 200 CAPSULE ORAL at 17:26

## 2017-12-29 RX ADMIN — HYDRALAZINE HYDROCHLORIDE 10 MG: 10 TABLET, FILM COATED ORAL at 09:44

## 2017-12-29 RX ADMIN — ASPIRIN 81 MG 81 MG: 81 TABLET ORAL at 09:43

## 2017-12-29 RX ADMIN — INSULIN GLARGINE 14 UNITS: 100 INJECTION, SOLUTION SUBCUTANEOUS at 21:12

## 2017-12-29 RX ADMIN — POLYETHYLENE GLYCOL 3350 17 G: 17 POWDER, FOR SOLUTION ORAL at 09:44

## 2017-12-29 RX ADMIN — INSULIN LISPRO 6 UNITS: 100 INJECTION, SOLUTION INTRAVENOUS; SUBCUTANEOUS at 17:29

## 2017-12-29 RX ADMIN — AMLODIPINE BESYLATE 5 MG: 5 TABLET ORAL at 09:43

## 2017-12-29 RX ADMIN — LATANOPROST 1 DROP: 50 SOLUTION OPHTHALMIC at 21:15

## 2017-12-29 RX ADMIN — INSULIN LISPRO 1 UNITS: 100 INJECTION, SOLUTION INTRAVENOUS; SUBCUTANEOUS at 21:14

## 2017-12-29 RX ADMIN — INSULIN LISPRO 2 UNITS: 100 INJECTION, SOLUTION INTRAVENOUS; SUBCUTANEOUS at 12:05

## 2017-12-29 RX ADMIN — ENOXAPARIN SODIUM 40 MG: 40 INJECTION SUBCUTANEOUS at 09:44

## 2017-12-29 NOTE — PLAN OF CARE
Problem: OCCUPATIONAL THERAPY ADULT  Goal: Performs self-care activities at highest level of function for planned discharge setting  See evaluation for individualized goals  Equipment Recommended: Bedside commode, Tub seat with back       See flowsheet documentation for full assessment, interventions and recommendations  Limitation: Decreased ADL status, Decreased UE strength, Decreased endurance, Decreased self-care trans, Decreased high-level ADLs  Prognosis: Good  Assessment: Patient is a 80 y o  female seen for OT evaluation s/p admit to [facility] on 12/27/2017 w/Elevated troponin  Patient presenting with worsening back pain limiting her ambulation today  Back pain is chronic in nature-lumbar wo radiation  Commorbidities affecting patient's functional performance at time of assessment include:  DM, chronic pain 2/2 arthritis, HTN, HLD,  with prior back surgery years ago  Prior to admission, Patient reporting independent with ADLs/ IADLs up to 6 weeks ago with new onset of back pain  Currently has home attendant 3 days per week for assist with housework and showers  [place], [address], alone with family support  Ambulatory with no AD, with ocassional use of RW or SPC  Patient was receiving Home PT  Personal factors affecting patient at time of initial evaluation include: limited caregiver support, steps to enter, decreased initiation and engagement, difficulty performing ADLs and difficulty performing IADLs  Upon evaluation, patient requires minimal  assist for UB ADLs, moderate and maximal assist for LB ADLs, transfers and functional ambulation in room and bathroom with minimal  and moderate assist, with the use of Rolling Walker   Occupational performance is affected by the following deficits: anxiety, decreased functional use of BUEs, decreased muscle strength, degenerative arthritic joint changes, dynamic sit/ stand balance deficit with poor standing tolerance time for self care and functional mobility, decreased activity tolerance, decreased safety awareness, increased pain, orthopedic restrictions and postural control and postural alignment deficit, requiring external assistance to complete transitional movements  Therapist completed  extensive additional review of medical records and additional review of physical, cognitive or psychosocial history, clinical examination identifying 5 or more performance deficits, clinical decision making of a high complexity , consistent with a high complexity level evaluation  Patient to benefit from continued Occupational Therapy treatment while in the hospital to address deficits as defined above and maximize level of functional independence with ADLs and functional mobility        OT Discharge Recommendation: Short Term Rehab  OT - OK to Discharge: Yes (STR when medically cleared)

## 2017-12-29 NOTE — PROGRESS NOTES
Progress Note - Cardiology     Sabrina Soto 80 y o  female MRN: [id number]  Unit/Bed#: -01 Encounter: [id number]      Subjective:   No new complaints at this time  Patient continues to have back pain  She was evaluated by Orthopedics  Objective:   Vitals:  Vitals:    12/29/17 1100   BP: 152/70   Pulse: 56   Resp: 18   Temp: 98 4 °F (36 9 °C)   SpO2: 98%       Body mass index is 26 53 kg/m²  Systolic (30IWR), MNT:127 , Min:118 , GIE:425     Diastolic (16WCH), ZSM:78, Min:50, Max:76      Intake/Output Summary (Last 24 hours) at 12/29/17 1234  Last data filed at 12/28/17 2100   Gross per 24 hour   Intake              300 ml   Output                0 ml   Net              300 ml     Weight (last 2 days)     Date/Time   Weight    12/27/17 1916  65 8 (145 06)              PHYSICAL EXAM:  General: Patient is not in acute distress  Head: Normocephalic  Atraumatic  HEENT: Both pupils normal sized, round and reactive to light  Nonicteric  Neck: JVP not raised  Trachea central   Respiratory: Bilateral bronchovascular breath sounds with no crackles or rhonchi  Cardiovascular: RRR  S1 and S2 normal  No murmur, rub or gallop  GI: Abdomen soft, nontender  Neurologic: Patient is awake, alert, responding to command   Moving all extremities  Integumentary:  No skin rash  Extremities: No clubbing, cyanosis or edema    LABORATORY RESULTS:    Results from last 7 days  Lab Units 12/28/17  1229 12/28/17  0836 12/28/17  0502   TROPONIN I ng/mL 0 13* 0 16* 0 18*     CBC with diff:   Results from last 7 days  Lab Units 12/29/17  0636 12/28/17  0502 12/27/17  2345 12/27/17  2021   WBC Thousand/uL 12 25* 13 19* 13 01* 14 37*   HEMOGLOBIN g/dL 12 8 13 5 13 8 15 0   HEMATOCRIT % 38 9 40 8 41 3 44 1   MCV fL 92 92 93 90   PLATELETS Thousands/uL 295 327 258 354   MCH pg 30 1 30 3 31 2 30 7   MCHC g/dL 32 9 33 1 33 4 34 0   RDW % 13 5 13 5 13 4 13 2   MPV fL 9 1 9 0 9 6 8 9   NRBC AUTO /100 WBCs  --   --   --  0       CMP:  Results from last 7 days  Lab Units 17  0636 17  0502 17  202   SODIUM mmol/L 140 141 141   POTASSIUM mmol/L 4 0 4 1 3 9   CHLORIDE mmol/L 105 105 103   CO2 mmol/L  28   ANION GAP mmol/L 8 9 10   BUN mg/dL 31* 19 20   CREATININE mg/dL 1 00 0 82 0 85   GLUCOSE RANDOM mg/dL 126 151* 179*   CALCIUM mg/dL 9 0 9 1 9 3   AST U/L  --   --  30   ALT U/L  --   --  45   ALK PHOS U/L  --   --  107   TOTAL PROTEIN g/dL  --   --  7 0   ALBUMIN g/dL  --   --  3 5   BILIRUBIN TOTAL mg/dL  --   --  0 50   EGFR ml/min/1 73sq m 50 64 61       BMP:  Results from last 7 days  Lab Units 17  0636 17  0502 17  2021   SODIUM mmol/L 140 141 141   POTASSIUM mmol/L 4 0 4 1 3 9   CHLORIDE mmol/L 105 105 103   CO2 mmol/L    BUN mg/dL 31* 19 20   CREATININE mg/dL 1 00 0 82 0 85   GLUCOSE RANDOM mg/dL 126 151* 179*   CALCIUM mg/dL 9 0 9 1 9 3         Results from last 7 days  Lab Units 17  0502   NT-PRO BNP pg/mL 2,071*          Results from last 7 days  Lab Units 17  0502 17  2021   MAGNESIUM mg/dL 2 1 1 7         Results from last 7 days  Lab Units 17  0502   HEMOGLOBIN A1C % 7 7*               Results from last 7 days  Lab Units 17  0502 17  2336   INR  1 11 0 98       Lipid Profile:   Lab Results   Component Value Date    CHOL 169 2017     Lab Results   Component Value Date    HDL 57 2017     Lab Results   Component Value Date    LDLCALC 94 2017     Lab Results   Component Value Date    TRIG 90 2017       Cardiac testing:   Results for orders placed during the hospital encounter of 17   Echo complete with contrast if indicated    Narrative [address] [place] [id number]  [phone]    Transthoracic Echocardiogram  2D, M-mode, Doppler, and Color Doppler    Study date:  28-Dec-2017    Patient: Kenji Woods  MR number: [id number]  Account number: [de-identified]  : 24-Mar-1929  Age: 80 years  Gender: Female  Status: Inpatient  Location: Emergency room  Height: 62 in  Weight: 145 lb  BP: 129/ 60 mmHg    Indications: Assess left ventricular function  Diagnoses: R07 9 - Chest pain, unspecified    Sonographer:  EMILY Paez  Interpreting Physician:  Fransico Quinn MD  Primary Physician:  Rodney Webber PA-C  Group:  [facility]    SUMMARY    PROCEDURE INFORMATION:  This was a technically difficult study  Echocardiographic views were limited due to poor acoustic window availability, decreased penetration, and lung interference  LEFT VENTRICLE:  Systolic function was normal  Ejection fraction was estimated to be 60 %  There were no regional wall motion abnormalities  Wall thickness was mildly increased  There was mild concentric hypertrophy  Doppler parameters were consistent with abnormal left ventricular relaxation (grade 1 diastolic dysfunction)  RIGHT VENTRICLE:  The size was normal   Systolic function was normal     MITRAL VALVE:  There was mild annular calcification  AORTIC VALVE:  There was mild regurgitation  HISTORY: Symptoms: chest pain  PRIOR HISTORY: Elevated Troponin Level; Risk factors: hypertension and diabetes  PROCEDURE: The procedure was performed in the emergency room  This was a routine study  The transthoracic approach was used  The study included complete 2D imaging, M-mode, complete spectral Doppler, and color Doppler  The heart rate was  62 bpm, at the start of the study  Images were obtained from the parasternal, apical, subcostal, and suprasternal notch acoustic windows  Echocardiographic views were limited due to poor acoustic window availability, decreased penetration,  and lung interference  This was a technically difficult study  LEFT VENTRICLE: Size was normal  Systolic function was normal  Ejection fraction was estimated to be 60 %  There were no regional wall motion abnormalities  Wall thickness was mildly increased  There was mild concentric hypertrophy  No  evidence of apical thrombus  DOPPLER: Doppler parameters were consistent with abnormal left ventricular relaxation (grade 1 diastolic dysfunction)  RIGHT VENTRICLE: The size was normal  Systolic function was normal  Wall thickness was normal     LEFT ATRIUM: Size was normal     RIGHT ATRIUM: Size was normal     MITRAL VALVE: There was mild annular calcification  There was normal leaflet separation  DOPPLER: The transmitral velocity was within the normal range  There was no evidence for stenosis  There was no significant regurgitation  AORTIC VALVE: The valve was trileaflet  Leaflets exhibited mildly increased thickness and normal cuspal separation  DOPPLER: Transaortic velocity was within the normal range  There was no evidence for stenosis  There was mild  regurgitation  TRICUSPID VALVE: The valve structure was normal  There was normal leaflet separation  DOPPLER: The transtricuspid velocity was within the normal range  There was no evidence for stenosis  There was no significant regurgitation  PULMONIC VALVE: Leaflets exhibited normal thickness, no calcification, and normal cuspal separation  DOPPLER: The transpulmonic velocity was within the normal range  There was no significant regurgitation  PERICARDIUM: There was no pericardial effusion  The pericardium was normal in appearance  AORTA: The root exhibited normal size  SYSTEMIC VEINS: IVC: The inferior vena cava was not well visualized      SYSTEM MEASUREMENT TABLES    2D  %FS: 37 5 %  Ao Diam: 2 9 cm  EDV(Teich): 56 5 ml  EF(Teich): 68 4 %  ESV(Teich): 17 8 ml  IVSd: 1 2 cm  LA Area: 19 2 cm2  LA Diam: 2 9 cm  LVEDV MOD A4C: 65 4 ml  LVEF MOD A4C: 76 1 %  LVESV MOD A4C: 15 7 ml  LVIDd: 3 7 cm  LVIDs: 2 3 cm  LVLd A4C: 7 2 cm  LVLs A4C: 5 1 cm  LVOT Diam: 1 6 cm  LVPWd: 1 2 cm  RA Area: 13 3 cm2  RVIDd: 2 9 cm  SV MOD A4C: 49 8 ml  SV(Teich): 38 7 ml    CW  AR Dec [place]: 1 6 m/s2  AR Dec Time: 2394 2 ms  AR PHT: 694 3 ms  AR Vmax: 3 7 m/s  AR maxP 6 mmHg  AV Env  Ti: 300 7 ms  AV VTI: 40 cm  AV Vmax: 2 2 m/s  AV Vmean: 1 3 m/s  AV maxP 7 mmHg  AV meanP 5 mmHg  HR: 68 5 BPM  MV PHT: 82 7 ms  MV VTI: 40 6 cm  MV Vmax: 1 5 m/s  MV Vmean: 0 8 m/s  MV maxP 3 mmHg  MV meanPG: 3 1 mmHg  MVA By PHT: 2 7 cm2    MM  TAPSE: 2 4 cm    PW  LUDWIN (VTI): 1 3 cm2  LUDWIN Vmax: 1 2 cm2  AVC: 335 6 ms  E': 0 m/s  E/E': 31 3  LVOT Env  Ti: 312 1 ms  LVOT VTI: 27 cm  LVOT Vmax: 1 3 m/s  LVOT Vmean: 0 9 m/s  LVOT maxP 1 mmHg  LVOT meanPG: 3 5 mmHg  LVSV Dopp: 51 ml  MV A Cordell: 1 4 m/s  MV Dec [place]: 2 5 m/s2  MV DecT: 381 4 ms  MV E Cordell: 0 9 m/s  MV E/A Ratio: 0 7  MV PHT: 110 6 ms  MVA (VTI): 1 3 cm2  MVA By PHT: 2 cm2    [facility] Accredited [facility]    Prepared and electronically signed by    Jack Moran MD  Signed [id number] 16:37:45         Meds/Allergies   all current active meds have been reviewed  Prescriptions Prior to Admission   Medication    amLODIPine (NORVASC) 5 mg tablet    aspirin 81 MG tablet    atorvastatin (LIPITOR) 40 mg tablet    celecoxib (CeleBREX) 200 mg capsule    hydrALAZINE (APRESOLINE) 10 mg tablet    insulin glargine (LANTUS SOLOSTAR) injection pen 100 units/mL    Insulin Lispro (HUMALOG KWIKPEN SC)    latanoprost (XALATAN) 0 005 % ophthalmic solution    losartan (COZAAR) 100 MG tablet    metoprolol succinate (TOPROL-XL) 50 mg 24 hr tablet    Multiple Vitamins-Minerals (CENTRUM SILVER 50+WOMEN PO)              Assessment and Plan:  Elevated troponin 0 11/0 18/0 16  -Minimal  elevation of unknown etiology  -patient without symptoms suggestive of angina  -echocardiogram EF 60%, no regional wall motion abnormality, mild concentric hypertrophy, grade 1 diastolic dysfunction  -may consider stress test as outpatient vs continuing with medical therapy given patient's advanced age    -continue aspirin, statin, beta-blocker     Hypertension  -BP overall stable  -will continue to monitor     Hyperlipidemia  -continue statin    PPM in situ  -unknown reason for implant  Follow-up with Dr Jose Causey , electrophysiologist as outpatient    ** Please Note: Dragon 360 Dictation voice to text software may have been used in the creation of this document   **

## 2017-12-29 NOTE — SOCIAL WORK
CM met with pt at bedside  CM reviewed IMM with pt  Pt was agreeable  CM gave pt a copy and placed copy in pt medical record  CM informed pt of STR placement and time that SLET will  of 10:30am  Pt has no other needs at this time

## 2017-12-29 NOTE — PLAN OF CARE
Problem: DISCHARGE PLANNING - CARE MANAGEMENT  Goal: Discharge to post-acute care or home with appropriate resources  INTERVENTIONS:  - Conduct assessment to determine patient/family and health care team treatment goals, and need for post-acute services based on payer coverage, community resources, and patient preferences, and barriers to discharge  - Address psychosocial, clinical, and financial barriers to discharge as identified in assessment in conjunction with the patient/family and health care team  - Arrange appropriate level of post-acute services according to patients   needs and preference and payer coverage in collaboration with the physician and health care team  - Communicate with and update the patient/family, physician, and health care team regarding progress on the discharge plan  - Arrange appropriate transportation to post-acute venues   Outcome: Roberto Carlos  CM met with pt at bedside to discuss choices for STR  Pt was agreeable to [facility]  CM sent referral for both  Pt has no other needs at this time

## 2017-12-29 NOTE — PHYSICAL THERAPY NOTE
PHYSICAL THERAPY EVALUATION           Patient Name: Farzana Mnotgomery  CLMNQ'H Date: 12/29/2017 12/29/17 0930   Note Type   Note type Eval only   Pain Assessment   Pain Assessment 0-10   Pain Score 5   Pain Type Chronic pain   Pain Location Back   Pain Orientation Lower  ("lumbar" as per patient report )   Pain Radiating Towards (left side of hip )   Pain Descriptors Aching   Pain Frequency Constant/continuous   Pain Onset Ongoing   Effect of Pain on Daily Activities limited mobility, standing tolerance and ambulation secondary to pain    Home Living   [facility]   Home Layout Two level;Performs ADLs on one level; Able to live on main level with bedroom/bathroom  (2 ANTHONY; 13 steps to 2nd floor; first floor set-up )   Bathroom Shower/Tub Tub/shower unit   Bathroom Toilet Standard   Bathroom Equipment Grab bars in shower   [address] Cane;Walker  (utilizing RW 6 weeks ago; furniture walking now )   Additional Comments Pt reported that she drives  Prior Function   Level of [place] Independent with ADLs and functional mobility   Lives With Alone   Receives Help From Personal care attendant  (3 times per week; ~2 hrs/day to A w/housework and shower )   ADL Assistance Independent   IADLs Needs assistance   Falls in the last 6 months 0   Vocational Retired   Comments pt reported that the day after [place] she starting vomiting  Patient reported independence w/ADLs up until 6 weeks ago w/new onset of low back pain  Patient ambulates in home without AD (utilizes furniture) w/occasional use of RW and cane  Pt receiving home PT  Restrictions/Precautions   Weight Bearing Precautions Per Order No   Braces or Orthoses Other (Comment)  (none as per patient )   Other Precautions Chair Alarm; Bed Alarm;Telemetry; Fall Risk;Pain  (Pacemaker )   General   Additional Pertinent History refer to assessment for details    Family/Caregiver Present No   Cognition   Overall Cognitive Status WFL   Arousal/Participation Alert   Attention Within functional limits   Orientation Level Oriented X4   Memory Within functional limits   Following Commands Follows all commands and directions without difficulty   RUE Assessment   RUE Assessment (refer to OT evaluation for details )   LUE Assessment   LUE Assessment (refer to OT evaluation for details )   RLE Assessment   RLE Assessment WFL   Strength RLE   R Hip Flexion 4-/5   R Knee Extension 4-/5   R Ankle Dorsiflexion 4-/5   LLE Assessment   LLE Assessment WFL   Strength LLE   L Hip Flexion 4-/5   L Knee Extension 4-/5   L Ankle Dorsiflexion 4-/5   Coordination   Movements are Fluid and Coordinated 1   Sensation WFL   Light Touch   RLE Light Touch Grossly intact   LLE Light Touch Grossly intact   Bed Mobility   Supine to Sit 4  Minimal assistance   Additional items Assist x 2;HOB elevated; Bedrails; Increased time required;Verbal cues;LE management  (sidelying to sit after log roll technique performed )   Transfers   Sit to Stand 4  Minimal assistance   Additional items Assist x 1;Bedrails; Increased time required;Verbal cues   Stand to Sit 4  Minimal assistance   Additional items Assist x 1; Armrests; Increased time required;Verbal cues   Toilet transfer 4  Minimal assistance   Additional items Assist x 1; Increased time required;Verbal cues;Standard toilet  (grab bar )   Additional Comments Pt required 100% verbal cues to increase her step and stride length bilaterally in order to provide for an increase in gait efficiency and stability and to maintain body position within the center frame of the walker  Patient required verbal cues for proper sequencing and management of RW especially w/completion of 180 degree turn  Ambulation/Elevation   Gait pattern Decreased foot clearance;Shuffling; Short stride; Excessively slow; Forward Flexion   Gait Assistance 4  Minimal assist   Additional items Assist x 1;Verbal cues; Tactile cues   Assistive Device Rolling walker   Distance 70 feet    Stair Management Assistance Not tested   Additional items (fair carryover for correction of gait deficits and posture)   Balance   Static Sitting Good   Dynamic Sitting Fair +   Static Standing Fair  (with RW )   Dynamic Standing Fair -  (with RW )   Ambulatory Fair -   Activity Tolerance   Activity Tolerance Patient limited by fatigue;Patient limited by pain   Medical Staff Made Aware yes, pt w/up w/A order   Nurse Made Aware yes, RN Ela Lou verbalized pt appropriate for PT evaluation    Assessment   Prognosis Good   Problem List Decreased strength;Decreased endurance; Impaired balance;Decreased mobility; Decreased safety awareness;Pain   Assessment Pt is 80 y o  female seen for PT evaluation on 12/29/2017 s/p admit to [facility] on 12/27/2017 w/ Elevated troponin  PT consulted to assess pt's functional mobility and d/c needs  Order placed for PT eval and tx, w/ up w/ A order  Comorbidities affecting pt's physical performance at time of assessment include: essential HTN, diabetes mellitus with hyperglycemia, leukocytosis, abnormality of colon, constipation, lumbar stenosis, weakness and nausea and vomiting   PTA, pt was independent w/ all functional mobility w/ no AD in home w/occasional use of RW and cane , ambulates household distances, has 2 ANTHONY, lives alone in two story home with first floor set-up, retired and requiring A with showering and housework 3 times a week from personal care attendant   Personal factors affecting pt at time of IE include: inaccessible home environment, ambulating w/ assistive device, stairs to enter home, inability to ambulate household distances, inability to navigate level surfaces w/o external assistance, unable to perform physical activity, inability to perform IADLs, inability to perform ADLs and inability to live alone   Please find objective findings from PT assessment regarding body systems outlined above with impairments and limitations including weakness, impaired balance, decreased endurance, gait deviations, pain, decreased activity tolerance, decreased functional mobility tolerance, decreased safety awareness and fall risk, as well as mobility assessment (need for minimal  assist w/ all phases of mobility when usually ambulating independently, tolerance to only 70 feet of ambulation and need for cueing for mobility technique)  The following objective measures performed on IE also reveal limitations: Barthel Index: 55/100 and Modified Collier: 4 (moderate/severe disability)  Pt's clinical presentation is currently unstable/unpredictable seen in pt's presentation of need for minimal  assist w/ all phases of mobility when usually mobilizing independently, tolerance to only 70 feet of ambulation, back  pain impacting overall mobility status and on telemetry monitoring  Pt to benefit from continued PT tx to address deficits as defined above and maximize level of functional independent mobility and consistency  From PT/mobility standpoint, recommendation at time of d/c would be STR pending progress in order to facilitate return to PLOF     Barriers to Discharge Inaccessible home environment;Decreased caregiver support   Goals   Patient Goals to return home   STG Expiration Date 01/08/18   Short Term Goal #1 In 7-10 days: Increase bilateral LE strength 1/2 grade to facilitate independent mobility, Perform all bed mobility tasks modified independent to decrease caregiver burden, Perform all transfers modified independent to improve independence, Ambulate > 150 ft  with RW modified independent w/o LOB and w/ normalized gait pattern 100% of the time, Navigate 2 stairs modified independent with bilateral handrails to facilitate return to previous living environment, Increase static and dynamic standing balance 1 grade to decrease risk for falls, Complete exercise program independently, Tolerate 4 hr OOB to faciliate upright tolerance and Improve Barthel Index score to 85 or greater to facilitate independence   Plan   Treatment/Interventions Functional transfer training;LE strengthening/ROM; Elevations; Therapeutic exercise; Endurance training;Patient/family training;Equipment eval/education; Bed mobility;Gait training;Spoke to nursing;OT   PT Frequency 5x/wk   Recommendation   Recommendation Short-term skilled PT   Equipment Recommended Walker  (RW )   PT - OK to Discharge Yes  (when medically cleared, if to STR )   Modified [place] Scale   Modified Cher Scale 4   Barthel Index   Feeding 10   Bathing 0   Grooming Score 5   Dressing Score 5   Bladder Score 10   Bowels Score 10   Toilet Use Score 5   Transfers (Bed/Chair) Score 10   Mobility (Level Surface) Score 0   Stairs Score 0   Barthel Index Score 55   Macey Gaytan, PT

## 2017-12-29 NOTE — OCCUPATIONAL THERAPY NOTE
Occupational Therapy Evaluation        Patient Name: Guerline Gary  Today's Date: 12/29/2017 12/29/17 0900   Note Type   Note type Eval/Treat   Restrictions/Precautions   Weight Bearing Precautions Per Order No   Braces or Orthoses Other (Comment)  (none per patient)   Pain Assessment   Pain Assessment 0-10   Pain Score 5   Pain Type Chronic pain;Acute pain   Pain Location Back   Pain Orientation Lower   Pain Radiating Towards left side of the hip   Pain Descriptors Aching   Pain Frequency Constant/continuous   Pain Onset Ongoing   Effect of Pain on Daily Activities limits mobility and comfort   Home Living   Type of Home House  (townhouse)   Home Layout Two level;Performs ADLs on one level; Able to live on main level with bedroom/bathroom  (2 ANTHONY/ 13 steps to 2nd floor/ 1st floor set up)   Bathroom Shower/Tub Tub/shower unit   Bathroom Toilet Standard   Bathroom Equipment Grab bars in shower   [address] Cane;Walker  (usually walking with RW 6 weeks ago, furniture walking now)   Additional Comments patient drives   Prior Function   Level of [place] Independent with ADLs and functional mobility   Lives With Alone   Receives Help From Personal care attendant  (3 times per week- ~ 2 hours housework & shower)   ADL Assistance Independent   IADLs Needs assistance   Falls in the last 6 months 0   Vocational Retired   Lifestyle   Autonomy Patient reporting independent with ADLs/ IADLs up to 6 weeks ago with new onset of back pain  Currently has home attendant 3 days per week for assist with housework and showers  Lives in a townhouse, 1st floor set up, 2 ANTHONY, alone with family support  Ambulatory with no AD, with ocassional use of RW or SPC  Patient was receiving Home PT      Reciprocal Relationships Supportive family   Psychosocial   Psychosocial (WDL) WDL   ADL   Eating Assistance 5  Supervision/Setup   Grooming Assistance 5  Supervision/Setup   UB Bathing Assistance 4  Minimal Assistance   LB Bathing Assistance 2  Maximal Assistance   UB Dressing Assistance 4  Minimal Assistance   LB Dressing Assistance 2  Maximal Assistance   Toileting Assistance  3  Moderate Assistance   Functional Assistance 3  Moderate Assistance   Bed Mobility   Rolling R 4  Minimal assistance   Additional items Assist x 1; Increased time required;Verbal cues;LE management  (Log rolling technique)   Rolling L 4  Minimal assistance   Additional items Assist x 1;Bedrails; Increased time required;Verbal cues;LE management  (log rolling technique)   Transfers   Sit to Stand 4  Minimal assistance   Additional items Assist x 1; Armrests; Increased time required;Verbal cues   Stand to Sit 4  Minimal assistance   Additional items Assist x 1; Increased time required;Verbal cues   Toilet transfer 4  Minimal assistance   Additional items Assist x 1; Increased time required;Verbal cues;Standard toilet  (grab bar)   Functional Mobility   Functional Mobility 4  Minimal assistance   Additional items Rolling walker   Balance   Static Sitting Good   Dynamic Sitting Fair +   Static Standing Fair +   Dynamic Standing Fair +   Activity Tolerance   Activity Tolerance Patient limited by fatigue;Patient limited by pain   Nurse Made Aware KIERRA Avendano verbalized patient appropriate for therapy     RUE Assessment   RUE Assessment X  (AROM WFL, strength deficit)   RUE Strength   RUE Overall Strength Deficits  (3+/5)   LUE Assessment   LUE Assessment X  (AROM WFL, strength deficit)   LUE Strength   LUE Overall Strength Deficits  (3+/5)   Hand Function   Gross Motor Coordination Functional   Fine Motor Coordination Functional   Sensation   Light Touch No apparent deficits  (BUEs)   Proprioception   Proprioception No apparent deficits   Vision-Basic Assessment   Current Vision Wears glasses only for reading   Patient Visual Report (No significant changes reported)   Perception   Inattention/Neglect Appears intact   Cognition Overall Cognitive Status [facility]   Arousal/Participation Alert; Responsive; Cooperative   Attention Within functional limits   Orientation Level Oriented X4   Memory Within functional limits   Following Commands Follows all commands and directions without difficulty   AssessmentPatient is a 80 y o  female seen for OT evaluation s/p admit to [address] [place] [address] on 12/27/2017 w/Elevated troponin  Patient presenting with worsening back pain limiting her ambulation today  Back pain is chronic in nature-lumbar wo radiation  Commorbidities affecting patient's functional performance at time of assessment include:  DM, chronic pain 2/2 arthritis, HTN, HLD,  with prior back surgery years ago  Prior to admission, Patient reporting independent with ADLs/ IADLs up to 6 weeks ago with new onset of back pain  Currently has home attendant 3 days per week for assist with housework and showers  Lives in a townhouse, 1st floor set up, 2 ANTHONY, alone with family support  Ambulatory with no AD, with ocassional use of RW or SPC  Patient was receiving Home PT  Personal factors affecting patient at time of initial evaluation include: limited caregiver support, steps to enter, decreased initiation and engagement, difficulty performing ADLs and difficulty performing IADLs  Upon evaluation, patient requires minimal  assist for UB ADLs, moderate and maximal assist for LB ADLs, transfers and functional ambulation in room and bathroom with minimal  and moderate assist, with the use of Rolling Walker   Occupational performance is affected by the following deficits: anxiety, decreased functional use of BUEs, decreased muscle strength, degenerative arthritic joint changes, dynamic sit/ stand balance deficit with poor standing tolerance time for self care and functional mobility, decreased activity tolerance, decreased safety awareness, increased pain, orthopedic restrictions and postural control and postural alignment deficit, requiring external assistance to complete transitional movements  Therapist completed  extensive additional review of medical records and additional review of physical, cognitive or psychosocial history, clinical examination identifying 5 or more performance deficits, clinical decision making of a high complexity , consistent with a high complexity level evaluation  Patient to benefit from continued Occupational Therapy treatment while in the hospital to address deficits as defined above and maximize level of functional independence with ADLs and functional mobility  Occupational Performance areas to address include: grooming , bathing/ shower, dressing, toilet hygiene, transfer to all surfaces, functional ambulation, functional mobility, emergency response, health maintenance, IADLs: safety procedures, Leisure Participation and Social participation  From OT standpoint, recommendation at time of d/c would be Short Term Rehab  Limitation Decreased ADL status; Decreased UE strength;Decreased endurance;Decreased self-care trans;Decreased high-level ADLs   Prognosis Good   Recommendation   OT Discharge Recommendation Short Term Rehab   Equipment Recommended Bedside commode;Tub seat with back   OT - OK to Discharge Yes  (STR when medically cleared)   Barthel Index   Feeding 10   Bathing 0   Grooming Score 5   Dressing Score 5   Bladder Score 10   Bowels Score 10   Toilet Use Score 5   Transfers (Bed/Chair) Score 10   Mobility (Level Surface) Score 0   Stairs Score 0   Barthel Index Score 55   Modified Luverne Scale   Modified Luverne Scale 4       Occupational therapy Goals: In 7- 10 days:  1- Patient will verbalize and demonstrate use of energy conservation/ deep breathing technique and work simplification skills during functional activity with no verbal cues    2- Patient will verbalize and demonstrate good body mechanics and joint protection techniques during  ADLs/ IADLs with no verbal cues   3- Patient will increase OOB/ sitting tolerance to 2-4 hours per day for increased participation in self care and leisure tasks with no s/s of exertion    4- Patient will identify s/s of exertion during ADL and functional mobility with no  verbal cues  5-Patient will verbalize/ demonstrate  compensatory strategies to recover from exertion with no  verbal cues  6-Patient will increase standing tolerance time to  10  minutes with  Unilateral UE support to complete sink level ADLs @ Mod I level    7- Patient will increase sitting tolerance at edge of bed to 30 minutes to complete UB ADLs @ Mod I level  ADLs/IADLs  8- Patient will complete LB ADLs at Mod I level,  with the use of AE as indicated    5- Patient/ Family  will demonstrate competency with UE Home Exercise Program

## 2017-12-29 NOTE — SOCIAL WORK
CM met with pt at bedside to discuss choices for STR  Pt was agreeable to [facility] [employer] and [facility]  CM sent referral for both  Pt has no other needs at this time

## 2017-12-29 NOTE — PLAN OF CARE
Problem: DISCHARGE PLANNING - CARE MANAGEMENT  Goal: Discharge to post-acute care or home with appropriate resources  INTERVENTIONS:  - Conduct assessment to determine patient/family and health care team treatment goals, and need for post-acute services based on payer coverage, community resources, and patient preferences, and barriers to discharge  - Address psychosocial, clinical, and financial barriers to discharge as identified in assessment in conjunction with the patient/family and health care team  - Arrange appropriate level of post-acute services according to patients   needs and preference and payer coverage in collaboration with the physician and health care team  - Communicate with and update the patient/family, physician, and health care team regarding progress on the discharge plan  - Arrange appropriate transportation to post-acute venues   Outcome: Progressing  CM met with pt at bedside  CM reviewed IMM with pt  Pt was agreeable  CM gave pt a copy and placed copy in pt medical record  CM informed pt of STR placement and time that SLET will  of 10:30am  Pt has no other needs at this time

## 2017-12-29 NOTE — PROGRESS NOTES
[facility] Internal Medicine Progress Note  Patient: Tariq Corley 80 y o  female   MRN: [id number]  PCP: Gloria Justin MD  Unit/Bed#: -[facility] Encounter: [id number]  Date Of Visit: 17    Assessment:    Principal Problem:    Elevated troponin  Active Problems:    Essential hypertension    Diabetes mellitus with hyperglycemia (HCC)    Leukocytosis    Abnormality of colon    Constipation    Lumbar stenosis    Weakness    Nausea and vomiting      Plan:    # Elevated troponin - non cardiac, trended down  - no further intervention per cardiology   - pt is cp free    # Back pain - secondary to lumbar stenosis  - s/p orthopedics input, no need emergent surgical intervention can be considered in the future but pt is reluctant against this  - cont prn pain analgesics, cont celexa, add tylenol 975 tid, and prn oxycodone  - s/p PT/OT eval needs STR    # HTN - cont meds    # IDDM - cont insulin regimen    # Leukocytosis - stress induced, trending down no focal source of infxn    VTE Pharmacologic Prophylaxis:   Pharmacologic: Enoxaparin (Lovenox)  Mechanical VTE Prophylaxis in Place: Yes    Patient Centered Rounds: I have performed bedside rounds with nursing staff today  Discussions with Specialists or Other Care Team Provider:     Education and Discussions with Family / Patient: Patient and her son-in-law    Time Spent for Care: 30 minutes  More than 50% of total time spent on counseling and coordination of care as described above  Current Length of Stay: 2 day(s)    Current Patient Status: Inpatient   Certification Statement: The patient will continue to require additional inpatient hospital stay due to Pain management, awaiting placement    Discharge Plan / Estimated Discharge Date: d/c planning tomorrow    Code Status: Level 3 - DNAR and DNI      Subjective:   + back pain mostly exacerbated with movement    No cardiac symptoms, no acute events overnight  Objective:     Vitals:   Temp (24hrs), Av 4 °F (36 9 °C), Min:98 °F (36 7 °C), Max:98 7 °F (37 1 °C)    HR:  [56-64] 56  Resp:  [16-18] 18  BP: (118-155)/(50-70) 152/70  SpO2:  [95 %-99 %] 98 %  Body mass index is 26 53 kg/m²  Input and Output Summary (last 24 hours): Intake/Output Summary (Last 24 hours) at 12/29/17 1539  Last data filed at 12/29/17 1322   Gross per 24 hour   Intake              660 ml   Output              625 ml   Net               35 ml       Physical Exam:     Physical Exam   Constitutional: She is oriented to person, place, and time  She appears well-developed and well-nourished  Neck: Neck supple  Cardiovascular: Normal rate, regular rhythm, normal heart sounds and intact distal pulses  Exam reveals no gallop and no friction rub  No murmur heard  Pulmonary/Chest: Effort normal and breath sounds normal  No respiratory distress  She has no wheezes  She has no rales  She exhibits no tenderness  Abdominal: Soft  Bowel sounds are normal  She exhibits no distension and no mass  There is no tenderness  There is no rebound and no guarding  Musculoskeletal: She exhibits no edema, tenderness or deformity  Limited ROM of lumbar region   Neurological: She is alert and oriented to person, place, and time  She has normal reflexes  She displays normal reflexes  No cranial nerve deficit  She exhibits normal muscle tone  Coordination normal    Skin: Skin is warm and dry  Psychiatric: She has a normal mood and affect  Her behavior is normal  Judgment and thought content normal      Additional Data:     Labs:      Results from last 7 days  Lab Units 12/29/17  0636  12/27/17 2021   WBC Thousand/uL 12 25*  < > 14 37*   HEMOGLOBIN g/dL 12 8  < > 15 0   HEMATOCRIT % 38 9  < > 44 1   PLATELETS Thousands/uL 295  < > 354   NEUTROS PCT %  --   --  84*   LYMPHS PCT %  --   --  8*   MONOS PCT %  --   --  6   EOS PCT %  --   --  0   < > = values in this interval not displayed      Results from last 7 days  Lab Units 12/29/17  0636  12/27/17 2021 SODIUM mmol/L 140  < > 141   POTASSIUM mmol/L 4 0  < > 3 9   CHLORIDE mmol/L 105  < > 103   CO2 mmol/L 27  < > 28   BUN mg/dL 31*  < > 20   CREATININE mg/dL 1 00  < > 0 85   CALCIUM mg/dL 9 0  < > 9 3   TOTAL PROTEIN g/dL  --   --  7 0   BILIRUBIN TOTAL mg/dL  --   --  0 50   ALK PHOS U/L  --   --  107   ALT U/L  --   --  45   AST U/L  --   --  30   GLUCOSE RANDOM mg/dL 126  < > 179*   < > = values in this interval not displayed  Results from last 7 days  Lab Units 12/28/17  0502   INR  1 11       * I Have Reviewed All Lab Data Listed Above  * Additional Pertinent Lab Tests Reviewed: All Labs Within Last 24 Hours Reviewed    Imaging:    Imaging Reports Reviewed Today Include:   Imaging Personally Reviewed by Myself Includes:      Recent Cultures (last 7 days):           Last 24 Hours Medication List:     acetaminophen 975 mg Oral TID   amLODIPine 5 mg Oral Daily   aspirin 81 mg Oral Daily   atorvastatin 40 mg Oral Daily With Dinner   celecoxib 200 mg Oral BID   docusate sodium 100 mg Oral BID   enoxaparin 40 mg Subcutaneous Q24H BOONE   hydrALAZINE 10 mg Oral TID   insulin glargine 14 Units Subcutaneous HS   insulin lispro 1-5 Units Subcutaneous TID AC   insulin lispro 1-5 Units Subcutaneous HS   insulin lispro 6 Units Subcutaneous TID With Meals   latanoprost 1 drop Both Eyes HS   losartan 100 mg Oral Daily   metoprolol succinate 50 mg Oral Daily   multivitamin-minerals 1 tablet Oral Daily   polyethylene glycol 17 g Oral Daily   senna 2 tablet Oral HS        Today, Patient Was Seen By: Lisa Aguirre DO    ** Please Note: This note has been constructed using a voice recognition system   **

## 2017-12-29 NOTE — PLAN OF CARE
DISCHARGE PLANNING - CARE MANAGEMENT     Discharge to post-acute care or home with appropriate resources Progressing        Knowledge Deficit     Patient/family/caregiver demonstrates understanding of disease process, treatment plan, medications, and discharge instructions Progressing        Nutrition/Hydration-ADULT     Nutrient/Hydration intake appropriate for improving, restoring or maintaining nutritional needs Progressing        PAIN - ADULT     Verbalizes/displays adequate comfort level or baseline comfort level Progressing        Potential for Falls     Patient will remain free of falls Progressing

## 2017-12-29 NOTE — PLAN OF CARE
Problem: PHYSICAL THERAPY ADULT  Goal: Performs mobility at highest level of function for planned discharge setting  See evaluation for individualized goals  Treatment/Interventions: Functional transfer training, LE strengthening/ROM, Elevations, Therapeutic exercise, Endurance training, Patient/family training, Equipment eval/education, Bed mobility, Gait training, Spoke to nursing, OT  Equipment Recommended: Obie Castleman (CHUCK )       See flowsheet documentation for full assessment, interventions and recommendations  Prognosis: Good  Problem List: Decreased strength, Decreased endurance, Impaired balance, Decreased mobility, Decreased safety awareness, Pain  Assessment: Pt is 80 y o  female seen for PT evaluation on 12/29/2017 s/p admit to [facility] on 12/27/2017 w/ Elevated troponin  PT consulted to assess pt's functional mobility and d/c needs  Order placed for PT eval and tx, w/ up w/ A order  Comorbidities affecting pt's physical performance at time of assessment include: essential HTN, diabetes mellitus with hyperglycemia, leukocytosis, abnormality of colon, constipation, lumbar stenosis, weakness and nausea and vomiting   PTA, pt was independent w/ all functional mobility w/ no AD in home w/occasional use of RW and cane , ambulates household distances, has 2 ANTHONY, lives alone in two story home with first floor set-up, retired and requiring A with showering and housework 3 times a week from personal care attendant   Personal factors affecting pt at time of IE include: inaccessible home environment, ambulating w/ assistive device, stairs to enter home, inability to ambulate household distances, inability to navigate level surfaces w/o external assistance, unable to perform physical activity, inability to perform IADLs, inability to perform ADLs and inability to live alone   Please find objective findings from PT assessment regarding body systems outlined above with impairments and limitations including weakness, impaired balance, decreased endurance, gait deviations, pain, decreased activity tolerance, decreased functional mobility tolerance, decreased safety awareness and fall risk, as well as mobility assessment (need for minimal  assist w/ all phases of mobility when usually ambulating independently, tolerance to only 70 feet of ambulation and need for cueing for mobility technique)  The following objective measures performed on IE also reveal limitations: Barthel Index: 55/100 and Modified [place]: 4 (moderate/severe disability)  Pt's clinical presentation is currently unstable/unpredictable seen in pt's presentation of need for minimal  assist w/ all phases of mobility when usually mobilizing independently, tolerance to only 70 feet of ambulation, back  pain impacting overall mobility status and on telemetry monitoring  Pt to benefit from continued PT tx to address deficits as defined above and maximize level of functional independent mobility and consistency  From PT/mobility standpoint, recommendation at time of d/c would be STR pending progress in order to facilitate return to PLOF  Barriers to Discharge: Inaccessible home environment, Decreased caregiver support     Recommendation: Short-term skilled PT     PT - OK to Discharge: Yes (when medically cleared, if to STR )    See flowsheet documentation for full assessment

## 2017-12-30 LAB
ANION GAP SERPL CALCULATED.3IONS-SCNC: 9 MMOL/L (ref 4–13)
BUN SERPL-MCNC: 33 MG/DL (ref 5–25)
CALCIUM SERPL-MCNC: 8.9 MG/DL (ref 8.3–10.1)
CHLORIDE SERPL-SCNC: 104 MMOL/L (ref 100–108)
CO2 SERPL-SCNC: 26 MMOL/L (ref 21–32)
CREAT SERPL-MCNC: 1.36 MG/DL (ref 0.6–1.3)
ERYTHROCYTE [DISTWIDTH] IN BLOOD BY AUTOMATED COUNT: 13.4 % (ref 11.6–15.1)
GFR SERPL CREATININE-BSD FRML MDRD: 35 ML/MIN/1.73SQ M
GLUCOSE SERPL-MCNC: 127 MG/DL (ref 65–140)
GLUCOSE SERPL-MCNC: 140 MG/DL (ref 65–140)
GLUCOSE SERPL-MCNC: 143 MG/DL (ref 65–140)
GLUCOSE SERPL-MCNC: 209 MG/DL (ref 65–140)
GLUCOSE SERPL-MCNC: 223 MG/DL (ref 65–140)
HCT VFR BLD AUTO: 38.2 % (ref 34.8–46.1)
HGB BLD-MCNC: 12.7 G/DL (ref 11.5–15.4)
MCH RBC QN AUTO: 30.5 PG (ref 26.8–34.3)
MCHC RBC AUTO-ENTMCNC: 33.2 G/DL (ref 31.4–37.4)
MCV RBC AUTO: 92 FL (ref 82–98)
PLATELET # BLD AUTO: 272 THOUSANDS/UL (ref 149–390)
PMV BLD AUTO: 9.7 FL (ref 8.9–12.7)
POTASSIUM SERPL-SCNC: 4.4 MMOL/L (ref 3.5–5.3)
RBC # BLD AUTO: 4.16 MILLION/UL (ref 3.81–5.12)
SODIUM SERPL-SCNC: 139 MMOL/L (ref 136–145)
WBC # BLD AUTO: 13.08 THOUSAND/UL (ref 4.31–10.16)

## 2017-12-30 PROCEDURE — 85027 COMPLETE CBC AUTOMATED: CPT | Performed by: INTERNAL MEDICINE

## 2017-12-30 PROCEDURE — 82948 REAGENT STRIP/BLOOD GLUCOSE: CPT

## 2017-12-30 PROCEDURE — 80048 BASIC METABOLIC PNL TOTAL CA: CPT | Performed by: INTERNAL MEDICINE

## 2017-12-30 RX ORDER — SODIUM CHLORIDE 9 MG/ML
75 INJECTION, SOLUTION INTRAVENOUS CONTINUOUS
Status: DISCONTINUED | OUTPATIENT
Start: 2017-12-30 | End: 2017-12-31

## 2017-12-30 RX ORDER — HYDRALAZINE HYDROCHLORIDE 20 MG/ML
20 INJECTION INTRAMUSCULAR; INTRAVENOUS ONCE
Status: COMPLETED | OUTPATIENT
Start: 2017-12-30 | End: 2017-12-30

## 2017-12-30 RX ORDER — ENALAPRILAT 2.5 MG/2ML
1.25 INJECTION INTRAVENOUS ONCE
Status: COMPLETED | OUTPATIENT
Start: 2017-12-30 | End: 2017-12-30

## 2017-12-30 RX ADMIN — HYDRALAZINE HYDROCHLORIDE 10 MG: 10 TABLET, FILM COATED ORAL at 08:31

## 2017-12-30 RX ADMIN — SODIUM CHLORIDE 75 ML/HR: 0.9 INJECTION, SOLUTION INTRAVENOUS at 09:05

## 2017-12-30 RX ADMIN — CELECOXIB 200 MG: 200 CAPSULE ORAL at 17:03

## 2017-12-30 RX ADMIN — SENNOSIDES 17.2 MG: 8.6 TABLET, FILM COATED ORAL at 22:14

## 2017-12-30 RX ADMIN — INSULIN GLARGINE 14 UNITS: 100 INJECTION, SOLUTION SUBCUTANEOUS at 22:16

## 2017-12-30 RX ADMIN — ENOXAPARIN SODIUM 40 MG: 40 INJECTION SUBCUTANEOUS at 10:25

## 2017-12-30 RX ADMIN — HYDRALAZINE HYDROCHLORIDE 10 MG: 10 TABLET, FILM COATED ORAL at 17:04

## 2017-12-30 RX ADMIN — ACETAMINOPHEN 975 MG: 325 TABLET ORAL at 22:14

## 2017-12-30 RX ADMIN — INSULIN LISPRO 2 UNITS: 100 INJECTION, SOLUTION INTRAVENOUS; SUBCUTANEOUS at 11:56

## 2017-12-30 RX ADMIN — HYDRALAZINE HYDROCHLORIDE 20 MG: 20 INJECTION INTRAMUSCULAR; INTRAVENOUS at 08:55

## 2017-12-30 RX ADMIN — HYDRALAZINE HYDROCHLORIDE 10 MG: 10 TABLET, FILM COATED ORAL at 22:15

## 2017-12-30 RX ADMIN — INSULIN LISPRO 6 UNITS: 100 INJECTION, SOLUTION INTRAVENOUS; SUBCUTANEOUS at 11:56

## 2017-12-30 RX ADMIN — ENALAPRILAT 1.25 MG: 1.25 INJECTION INTRAVENOUS at 08:55

## 2017-12-30 RX ADMIN — ATORVASTATIN CALCIUM 40 MG: 40 TABLET, FILM COATED ORAL at 17:03

## 2017-12-30 RX ADMIN — INSULIN LISPRO 6 UNITS: 100 INJECTION, SOLUTION INTRAVENOUS; SUBCUTANEOUS at 17:04

## 2017-12-30 RX ADMIN — INSULIN LISPRO 1 UNITS: 100 INJECTION, SOLUTION INTRAVENOUS; SUBCUTANEOUS at 22:16

## 2017-12-30 RX ADMIN — AMLODIPINE BESYLATE 5 MG: 5 TABLET ORAL at 08:28

## 2017-12-30 RX ADMIN — DOCUSATE SODIUM 100 MG: 100 CAPSULE, LIQUID FILLED ORAL at 08:29

## 2017-12-30 RX ADMIN — OXYCODONE HYDROCHLORIDE 10 MG: 10 TABLET ORAL at 05:42

## 2017-12-30 RX ADMIN — ASPIRIN 81 MG 81 MG: 81 TABLET ORAL at 08:28

## 2017-12-30 RX ADMIN — ACETAMINOPHEN 975 MG: 325 TABLET ORAL at 17:03

## 2017-12-30 RX ADMIN — LOSARTAN POTASSIUM 100 MG: 50 TABLET, FILM COATED ORAL at 08:28

## 2017-12-30 RX ADMIN — Medication 1 TABLET: at 08:28

## 2017-12-30 RX ADMIN — METOPROLOL SUCCINATE 50 MG: 50 TABLET, FILM COATED, EXTENDED RELEASE ORAL at 13:19

## 2017-12-30 RX ADMIN — ONDANSETRON 4 MG: 2 INJECTION INTRAMUSCULAR; INTRAVENOUS at 08:55

## 2017-12-30 RX ADMIN — LATANOPROST 1 DROP: 50 SOLUTION OPHTHALMIC at 22:15

## 2017-12-30 NOTE — SOCIAL WORK
Pt was to have been discharged today to [facility] via SLET at 10:30  Discharge cx per SLIM  SLET and [facility] notified

## 2017-12-30 NOTE — PROGRESS NOTES
[facility] Progress Note  Patient: Arya Steele 80 y o  female   MRN: [id number]  PCP: Myrna Clark MD  Unit/Bed#: -01 Encounter: [id number]  Date Of Visit: 12/30/17    Assessment:    Principal Problem:    Elevated troponin  Active Problems:    Essential hypertension    Diabetes mellitus with hyperglycemia (HCC)    Leukocytosis    Abnormality of colon    Constipation    Lumbar stenosis    Weakness    Nausea and vomiting      Plan:    # Nausea/vomiting - likely medication induced, ie narcotics  - no clinical sign of intestinal obstruction  - cont supportive care    # Back pain - secondary to lumbar stenosis  - s/p orthopedics input, no need emergent surgical intervention can be considered in the future but pt is reluctant against this  - cont prn pain analgesics, cont celexa, add tylenol 975 tid, cont prn oxycodone as has relief but use zofran prior  - s/p PT/OT eval needs STR    # Elevated troponin - non cardiac, trended down  - no further intervention per cardiology   - pt is cp free    # DAVE - likely pre-renal  - IVF hydration    # HTN urgency  - likely stress induced give IV hydralazine now, better controlled  - cont to monitor    # IDDM - cont insulin regimen    # Leukocytosis - possibly stress induced, trending down no focal source of infxn; u/a benign  - cont to monitor     VTE Pharmacologic Prophylaxis:   Pharmacologic: Enoxaparin (Lovenox)  Mechanical VTE Prophylaxis in Place: Yes    Patient Centered Rounds: I have performed bedside rounds with nursing staff today  Discussions with Specialists or Other Care Team Provider:     Education and Discussions with Family / Patient: Patient and I also called her son and updated him on change in plans on discharge  Time Spent for Care: 30 minutes  More than 50% of total time spent on counseling and coordination of care as described above      Current Length of Stay: 3 day(s)    Current Patient Status: Inpatient   Certification Statement: The patient will continue to require additional inpatient hospital stay due to Clinical condition; DAVE requiring IVF hydration    Discharge Plan / Estimated Discharge Date: Unfortunately held d/c given acute events this am   Hopefully d/c planning tomorrow  Code Status: Level 3 - DNAR and DNI      Subjective:   Notified per RN pt with vomiting episode, bp elevated in the 200's; she was given her meds which she vomited  She denies cp or assn symptoms  She did express relief of back pain w/ oxycodone though  Objective:     Vitals:   Temp (24hrs), Av 2 °F (36 8 °C), Min:97 9 °F (36 6 °C), Max:98 7 °F (37 1 °C)    HR:  [60-69] 69  Resp:  [18-19] 18  BP: (122-212)/(52-92) 137/65  SpO2:  [94 %-98 %] 94 %  Body mass index is 26 53 kg/m²  Input and Output Summary (last 24 hours): Intake/Output Summary (Last 24 hours) at 17 1258  Last data filed at 17 2112   Gross per 24 hour   Intake              600 ml   Output             1075 ml   Net             -475 ml       Physical Exam:     Physical Exam   Constitutional: She is oriented to person, place, and time  She appears well-developed and well-nourished  Neck: Neck supple  Cardiovascular: Normal rate, regular rhythm, normal heart sounds and intact distal pulses  Exam reveals no gallop and no friction rub  No murmur heard  Pulmonary/Chest: Effort normal and breath sounds normal  No respiratory distress  She has no wheezes  She has no rales  She exhibits no tenderness  Abdominal: Soft  Bowel sounds are normal  She exhibits no distension and no mass  There is no tenderness  There is no rebound and no guarding  Musculoskeletal: She exhibits no edema, tenderness or deformity  Limited ROM of lumbar region   Neurological: She is alert and oriented to person, place, and time  She has normal reflexes  No cranial nerve deficit  She exhibits normal muscle tone  Coordination normal    Skin: Skin is warm and dry     Psychiatric: She has a normal mood and affect  Her behavior is normal  Judgment and thought content normal      Additional Data:     Labs:      Results from last 7 days  Lab Units 12/30/17 0444 12/27/17 2021   WBC Thousand/uL 13 08*  < > 14 37*   HEMOGLOBIN g/dL 12 7  < > 15 0   HEMATOCRIT % 38 2  < > 44 1   PLATELETS Thousands/uL 272  < > 354   NEUTROS PCT %  --   --  84*   LYMPHS PCT %  --   --  8*   MONOS PCT %  --   --  6   EOS PCT %  --   --  0   < > = values in this interval not displayed  Results from last 7 days  Lab Units 12/30/17 0444 12/27/17 2021   SODIUM mmol/L 139  < > 141   POTASSIUM mmol/L 4 4  < > 3 9   CHLORIDE mmol/L 104  < > 103   CO2 mmol/L 26  < > 28   BUN mg/dL 33*  < > 20   CREATININE mg/dL 1 36*  < > 0 85   CALCIUM mg/dL 8 9  < > 9 3   TOTAL PROTEIN g/dL  --   --  7 0   BILIRUBIN TOTAL mg/dL  --   --  0 50   ALK PHOS U/L  --   --  107   ALT U/L  --   --  45   AST U/L  --   --  30   GLUCOSE RANDOM mg/dL 143*  < > 179*   < > = values in this interval not displayed  Results from last 7 days  Lab Units 12/28/17  0502   INR  1 11       * I Have Reviewed All Lab Data Listed Above  * Additional Pertinent Lab Tests Reviewed:  All Labs Within Last 24 Hours Reviewed    Imaging:    Imaging Reports Reviewed Today Include:   Imaging Personally Reviewed by Myself Includes:      Recent Cultures (last 7 days):           Last 24 Hours Medication List:     acetaminophen 975 mg Oral TID   amLODIPine 5 mg Oral Daily   aspirin 81 mg Oral Daily   atorvastatin 40 mg Oral Daily With Dinner   celecoxib 200 mg Oral BID   docusate sodium 100 mg Oral BID   enoxaparin 40 mg Subcutaneous Q24H BOONE   hydrALAZINE 10 mg Oral TID   insulin glargine 14 Units Subcutaneous HS   insulin lispro 1-5 Units Subcutaneous TID AC   insulin lispro 1-5 Units Subcutaneous HS   insulin lispro 6 Units Subcutaneous TID With Meals   latanoprost 1 drop Both Eyes HS   losartan 100 mg Oral Daily   metoprolol succinate 50 mg Oral Daily multivitamin-minerals 1 tablet Oral Daily   polyethylene glycol 17 g Oral Daily   senna 2 tablet Oral HS        Today, Patient Was Seen By: Ivonne Crockett DO    ** Please Note: This note has been constructed using a voice recognition system   **

## 2017-12-30 NOTE — PLAN OF CARE
Problem: DISCHARGE PLANNING - CARE MANAGEMENT  Goal: Discharge to post-acute care or home with appropriate resources  INTERVENTIONS:  - Conduct assessment to determine patient/family and health care team treatment goals, and need for post-acute services based on payer coverage, community resources, and patient preferences, and barriers to discharge  - Address psychosocial, clinical, and financial barriers to discharge as identified in assessment in conjunction with the patient/family and health care team  - Arrange appropriate level of post-acute services according to patients   needs and preference and payer coverage in collaboration with the physician and health care team  - Communicate with and update the patient/family, physician, and health care team regarding progress on the discharge plan  - Arrange appropriate transportation to post-acute venues   Outcome: Progressing  Pt was to have been discharged today to [facility] via [facility] at 10:30  Discharge cx per [facility]  [facility] and [facility] notified

## 2017-12-31 ENCOUNTER — APPOINTMENT (INPATIENT)
Dept: RADIOLOGY | Facility: HOSPITAL | Age: 82
DRG: 551 | End: 2017-12-31
Payer: MEDICARE

## 2017-12-31 VITALS
TEMPERATURE: 98.2 F | OXYGEN SATURATION: 95 % | WEIGHT: 145.06 LBS | HEART RATE: 64 BPM | SYSTOLIC BLOOD PRESSURE: 168 MMHG | BODY MASS INDEX: 26.69 KG/M2 | DIASTOLIC BLOOD PRESSURE: 72 MMHG | HEIGHT: 62 IN | RESPIRATION RATE: 17 BRPM

## 2017-12-31 PROBLEM — K63.9 ABNORMALITY OF COLON: Status: RESOLVED | Noted: 2017-12-28 | Resolved: 2017-12-31

## 2017-12-31 PROBLEM — M48.061 LUMBAR STENOSIS: Status: RESOLVED | Noted: 2017-12-28 | Resolved: 2017-12-31

## 2017-12-31 PROBLEM — K59.00 CONSTIPATION: Status: RESOLVED | Noted: 2017-12-28 | Resolved: 2017-12-31

## 2017-12-31 PROBLEM — E11.65 DIABETES MELLITUS WITH HYPERGLYCEMIA (HCC): Status: RESOLVED | Noted: 2017-12-28 | Resolved: 2017-12-31

## 2017-12-31 PROBLEM — R53.1 WEAKNESS: Status: RESOLVED | Noted: 2017-12-28 | Resolved: 2017-12-31

## 2017-12-31 PROBLEM — R11.2 NAUSEA AND VOMITING: Status: RESOLVED | Noted: 2017-12-28 | Resolved: 2017-12-31

## 2017-12-31 LAB
ANION GAP SERPL CALCULATED.3IONS-SCNC: 8 MMOL/L (ref 4–13)
BILIRUB UR QL STRIP: NEGATIVE
BUN SERPL-MCNC: 26 MG/DL (ref 5–25)
CALCIUM SERPL-MCNC: 9.2 MG/DL (ref 8.3–10.1)
CHLORIDE SERPL-SCNC: 105 MMOL/L (ref 100–108)
CLARITY UR: CLEAR
CO2 SERPL-SCNC: 26 MMOL/L (ref 21–32)
COLOR UR: YELLOW
CREAT SERPL-MCNC: 1.13 MG/DL (ref 0.6–1.3)
ERYTHROCYTE [DISTWIDTH] IN BLOOD BY AUTOMATED COUNT: 13.6 % (ref 11.6–15.1)
GFR SERPL CREATININE-BSD FRML MDRD: 43 ML/MIN/1.73SQ M
GLUCOSE SERPL-MCNC: 156 MG/DL (ref 65–140)
GLUCOSE SERPL-MCNC: 157 MG/DL (ref 65–140)
GLUCOSE SERPL-MCNC: 225 MG/DL (ref 65–140)
GLUCOSE UR STRIP-MCNC: NEGATIVE MG/DL
HCT VFR BLD AUTO: 40.5 % (ref 34.8–46.1)
HGB BLD-MCNC: 13.6 G/DL (ref 11.5–15.4)
HGB UR QL STRIP.AUTO: NEGATIVE
KETONES UR STRIP-MCNC: NEGATIVE MG/DL
LEUKOCYTE ESTERASE UR QL STRIP: NEGATIVE
MCH RBC QN AUTO: 30.6 PG (ref 26.8–34.3)
MCHC RBC AUTO-ENTMCNC: 33.6 G/DL (ref 31.4–37.4)
MCV RBC AUTO: 91 FL (ref 82–98)
NITRITE UR QL STRIP: NEGATIVE
PH UR STRIP.AUTO: 6.5 [PH] (ref 4.5–8)
PLATELET # BLD AUTO: 310 THOUSANDS/UL (ref 149–390)
PMV BLD AUTO: 9.3 FL (ref 8.9–12.7)
POTASSIUM SERPL-SCNC: 4.6 MMOL/L (ref 3.5–5.3)
PROT UR STRIP-MCNC: NEGATIVE MG/DL
RBC # BLD AUTO: 4.44 MILLION/UL (ref 3.81–5.12)
SODIUM SERPL-SCNC: 139 MMOL/L (ref 136–145)
SP GR UR STRIP.AUTO: 1.01 (ref 1–1.03)
UROBILINOGEN UR QL STRIP.AUTO: 0.2 E.U./DL
WBC # BLD AUTO: 13.21 THOUSAND/UL (ref 4.31–10.16)

## 2017-12-31 PROCEDURE — 80048 BASIC METABOLIC PNL TOTAL CA: CPT | Performed by: INTERNAL MEDICINE

## 2017-12-31 PROCEDURE — 90670 PCV13 VACCINE IM: CPT | Performed by: INTERNAL MEDICINE

## 2017-12-31 PROCEDURE — 81003 URINALYSIS AUTO W/O SCOPE: CPT | Performed by: INTERNAL MEDICINE

## 2017-12-31 PROCEDURE — 71010 HB  X-RAY EXAM CHEST 1 VIEW (PORTABLE): CPT

## 2017-12-31 PROCEDURE — G0009 ADMIN PNEUMOCOCCAL VACCINE: HCPCS | Performed by: INTERNAL MEDICINE

## 2017-12-31 PROCEDURE — 82948 REAGENT STRIP/BLOOD GLUCOSE: CPT

## 2017-12-31 PROCEDURE — 85027 COMPLETE CBC AUTOMATED: CPT | Performed by: INTERNAL MEDICINE

## 2017-12-31 RX ORDER — POLYETHYLENE GLYCOL 3350 17 G/17G
17 POWDER, FOR SOLUTION ORAL DAILY PRN
Qty: 14 EACH | Refills: 0
Start: 2017-12-31 | End: 2020-05-26 | Stop reason: ALTCHOICE

## 2017-12-31 RX ORDER — TRAMADOL HYDROCHLORIDE 50 MG/1
50 TABLET ORAL EVERY 6 HOURS PRN
Qty: 20 TABLET | Refills: 0 | Status: SHIPPED | OUTPATIENT
Start: 2017-12-31 | End: 2017-12-31

## 2017-12-31 RX ORDER — TRAMADOL HYDROCHLORIDE 50 MG/1
50 TABLET ORAL EVERY 6 HOURS PRN
Qty: 20 TABLET | Refills: 0 | Status: SHIPPED | OUTPATIENT
Start: 2017-12-31 | End: 2018-01-10

## 2017-12-31 RX ORDER — DOCUSATE SODIUM 100 MG/1
100 CAPSULE, LIQUID FILLED ORAL 2 TIMES DAILY PRN
Qty: 10 CAPSULE | Refills: 0 | Status: SHIPPED | OUTPATIENT
Start: 2017-12-31 | End: 2020-05-26 | Stop reason: ALTCHOICE

## 2017-12-31 RX ORDER — ACETAMINOPHEN 325 MG/1
975 TABLET ORAL 3 TIMES DAILY
Qty: 30 TABLET | Refills: 0 | Status: SHIPPED | OUTPATIENT
Start: 2017-12-31 | End: 2020-10-15

## 2017-12-31 RX ORDER — ONDANSETRON 4 MG/1
4 TABLET, FILM COATED ORAL EVERY 8 HOURS PRN
Qty: 12 TABLET | Refills: 0
Start: 2017-12-31 | End: 2020-05-26 | Stop reason: ALTCHOICE

## 2017-12-31 RX ORDER — SENNOSIDES 8.6 MG
2 TABLET ORAL
Qty: 120 EACH | Refills: 0 | Status: SHIPPED | OUTPATIENT
Start: 2017-12-31 | End: 2020-05-26 | Stop reason: ALTCHOICE

## 2017-12-31 RX ADMIN — AMLODIPINE BESYLATE 5 MG: 5 TABLET ORAL at 08:11

## 2017-12-31 RX ADMIN — PNEUMOCOCCAL 13-VALENT CONJUGATE VACCINE 0.5 ML: 2.2; 2.2; 2.2; 2.2; 2.2; 4.4; 2.2; 2.2; 2.2; 2.2; 2.2; 2.2; 2.2 INJECTION, SUSPENSION INTRAMUSCULAR at 13:38

## 2017-12-31 RX ADMIN — ONDANSETRON 4 MG: 2 INJECTION INTRAMUSCULAR; INTRAVENOUS at 07:04

## 2017-12-31 RX ADMIN — ACETAMINOPHEN 975 MG: 325 TABLET ORAL at 08:10

## 2017-12-31 RX ADMIN — ENOXAPARIN SODIUM 40 MG: 40 INJECTION SUBCUTANEOUS at 08:08

## 2017-12-31 RX ADMIN — POLYETHYLENE GLYCOL 3350 17 G: 17 POWDER, FOR SOLUTION ORAL at 08:08

## 2017-12-31 RX ADMIN — METOPROLOL SUCCINATE 50 MG: 50 TABLET, FILM COATED, EXTENDED RELEASE ORAL at 08:12

## 2017-12-31 RX ADMIN — ASPIRIN 81 MG 81 MG: 81 TABLET ORAL at 08:13

## 2017-12-31 RX ADMIN — SODIUM CHLORIDE 75 ML/HR: 0.9 INJECTION, SOLUTION INTRAVENOUS at 04:46

## 2017-12-31 RX ADMIN — HYDRALAZINE HYDROCHLORIDE 10 MG: 10 TABLET, FILM COATED ORAL at 08:14

## 2017-12-31 RX ADMIN — INSULIN LISPRO 1 UNITS: 100 INJECTION, SOLUTION INTRAVENOUS; SUBCUTANEOUS at 08:09

## 2017-12-31 RX ADMIN — DOCUSATE SODIUM 100 MG: 100 CAPSULE, LIQUID FILLED ORAL at 08:10

## 2017-12-31 RX ADMIN — INSULIN LISPRO 2 UNITS: 100 INJECTION, SOLUTION INTRAVENOUS; SUBCUTANEOUS at 11:50

## 2017-12-31 RX ADMIN — INSULIN LISPRO 6 UNITS: 100 INJECTION, SOLUTION INTRAVENOUS; SUBCUTANEOUS at 08:09

## 2017-12-31 RX ADMIN — LOSARTAN POTASSIUM 100 MG: 50 TABLET, FILM COATED ORAL at 08:13

## 2017-12-31 RX ADMIN — INSULIN LISPRO 6 UNITS: 100 INJECTION, SOLUTION INTRAVENOUS; SUBCUTANEOUS at 11:51

## 2017-12-31 RX ADMIN — Medication 1 TABLET: at 08:09

## 2017-12-31 NOTE — PROGRESS NOTES
Attempted to change patient IV  Patient refused  Patient IV leaking  Slim Pa made aware  Unable to continue NSS administration

## 2017-12-31 NOTE — PLAN OF CARE
Knowledge Deficit     Patient/family/caregiver demonstrates understanding of disease process, treatment plan, medications, and discharge instructions Progressing        Nutrition/Hydration-ADULT     Nutrient/Hydration intake appropriate for improving, restoring or maintaining nutritional needs Progressing        PAIN - ADULT     Verbalizes/displays adequate comfort level or baseline comfort level Progressing        Potential for Falls     Patient will remain free of falls Progressing

## 2017-12-31 NOTE — DISCHARGE SUMMARY
Discharge Summary - [facility]    Patient Information: Tamie Ledbetter 80 y o  female MRN: [id number]  Unit/Bed#: -01 Encounter: [id number]    Discharging Physician / Practitioner: Zaira Romo DO  PCP: Becky Evangelista MD  Admission Date: 12/27/2017  Discharge Date: 12/31/17    Reason for Admission: Back pain, elevated troponin    Discharge Diagnoses:     Principal Problem (Resolved):    Lumbar stenosis  Active Problems:    Essential hypertension    Leukocytosis    Elevated troponin  Resolved Problems:    Diabetes mellitus with hyperglycemia (HCC)    Abnormality of colon    Constipation    Weakness    Nausea and vomiting    HPI: Patient is a 80-year-old female medical history significant for lumbar stenosis, diabetes, hypertension, pacemaker placement, otherwise no no history of coronary disease she presents with an acute exacerbation of chronic back pain  Upon presentation to the ER noted with mildly elevated troponin  However the patient denies any cardiac symptoms  Consultations During Hospital Stay:  · Cardiology  · Orthopedics    Procedures Performed:     · CT a/p  · CT lumbar    Significant Findings:     · Refer to hospital course    Incidental Findings:   ·     Test Results Pending at Discharge (will require follow up):   ·      Outpatient Tests Requested:  ·     Complications:  None    Hospital Course:     # Nausea/vomiting - likely medication induced, ie narcotics  - no clinical sign of intestinal obstruction; s/p ct a/p  - resolved  - consider use of zofran prior to administration of tramadol     # Back pain - secondary to lumbar stenosis  - s/p orthopedics input, no need for emergent surgical intervention can be considered in the future but pt is reluctant against this    Patient follows with spine ortho Dr Lito Dias  - cont prn pain analgesics, hold celexa given baseline renal insufficiency (have communicated to patient) on tylenol 975 tid then can change to 650mg q6 prn in 1 week, prn low dose tramadol prn (use zofran prior if needed)  - s/p PT/OT, d/c to STR     # Elevated troponin - non cardiac, trended down  - no further intervention per cardiology   - pt is cp free  - s/p echo - preserved ef, no wall motion wall abnormalitiy     # DAVE - likely pre-renal  - s/p IVF hydration, trended down     # HTN urgency  - BP better controlled     # IDDM - cont insulin regimen     # Leukocytosis - stress induced, trending down no focal source of infxn; u/a benign, s/p cxr; s/p CT spine     Disp: D/c to STR  Plan of care extensively discussed with patient and I also called her son-in-law    Condition at Discharge: stable     Discharge Day Visit / Exam:     Subjective:  Back pain improved, actually managed to be ambulating in her room  No recurring vomiting episode  BP improved after meds   Vitals: Blood Pressure: 168/72 (12/31/17 1100)  Pulse: 64 (12/31/17 0811)  Temperature: 98 2 °F (36 8 °C) (12/31/17 0700)  Temp Source: Oral (12/31/17 0700)  Respirations: 17 (12/31/17 0700)  Height: 5' 2" (157 5 cm) (12/29/17 1349)  Weight - Scale: 65 8 kg (145 lb 1 oz) (12/27/17 1916)  SpO2: 95 % (12/31/17 0700)  Exam:   Physical Exam   Constitutional: She is oriented to person, place, and time  She appears well-developed and well-nourished  Neck: Neck supple  Cardiovascular: Normal rate, regular rhythm, normal heart sounds and intact distal pulses  Exam reveals no gallop and no friction rub  No murmur heard  Pulmonary/Chest: Effort normal and breath sounds normal  No respiratory distress  She has no wheezes  She has no rales  She exhibits no tenderness  Abdominal: Soft  Bowel sounds are normal  She exhibits no distension and no mass  There is no tenderness  There is no rebound and no guarding  Musculoskeletal: She exhibits no edema, tenderness or deformity  Limited ROM of lower back   Neurological: She is alert and oriented to person, place, and time  She has normal reflexes   She displays normal reflexes  No cranial nerve deficit  She exhibits normal muscle tone  Coordination normal    Skin: Skin is warm and dry  No rash noted  No erythema  No pallor  Psychiatric: She has a normal mood and affect  Her behavior is normal  Judgment and thought content normal        Discharge instructions/Information to patient and family:   See after visit summary for information provided to patient and family  Provisions for Follow-Up Care:  See after visit summary for information related to follow-up care and any pertinent home health orders  Disposition:     [facility] to [facility]:   · Not Applicable to this Patient - Not Applicable to this Patient    Planned Readmission: NO   Discharge Statement:  I spent > 30 minutes discharging the patient  This time was spent on the day of discharge  I had direct contact with the patient on the day of discharge  Greater than 50% of the total time was spent examining patient, answering all patient questions, arranging and discussing plan of care with patient as well as directly providing post-discharge instructions  Additional time then spent on discharge activities  Discharge Medications:  See after visit summary for reconciled discharge medications provided to patient and family  ** Please Note: Dragon 360 Dictation voice to text software may have been used in the creation of this document   **

## 2017-12-31 NOTE — PLAN OF CARE

## 2017-12-31 NOTE — DISCHARGE INSTRUCTIONS
Degenerative Disc Disease   WHAT YOU NEED TO KNOW:   Degenerative disc disease happens when one or more discs between the vertebrae (bones in your spine) wear down  Discs act like a cushion between your vertebrae and help to stabilize your spine  Degenerative disc disease commonly occurs in the neck or lower back as you get older  DISCHARGE INSTRUCTIONS:   Medicines: You may need the following:  · NSAIDs , such as ibuprofen, help decrease swelling, pain, and fever  This medicine is available with or without a doctor's order  NSAIDs can cause stomach bleeding or kidney problems in certain people  If you take blood thinner medicine, always ask your healthcare provider if NSAIDs are safe for you  Always read the medicine label and follow directions  · Acetaminophen  decreases pain  It is available without a doctor's order  Ask how much to take and how often to take it  Follow directions  Acetaminophen can cause liver damage if not taken correctly  · Prescription pain medicine  may be given  Ask how to take this medicine safely  · Take your medicine as directed  Contact your healthcare provider if you think your medicine is not helping or if you have side effects  Tell him or her if you are allergic to any medicine  Keep a list of the medicines, vitamins, and herbs you take  Include the amounts, and when and why you take them  Bring the list or the pill bottles to follow-up visits  Carry your medicine list with you in case of an emergency  Follow up with your healthcare provider as directed:  Write down your questions so you remember to ask them during your visits  Go to physical therapy as directed:  A physical therapist will help you find stretches and exercises to decrease pain and improve movement and strength  He may also do spinal decompression to stretch and open the area between your vertebra  Manage your symptoms:   · Avoid activities that make your symptoms worse    Ask your healthcare provider for ways to decrease your symptoms  Certain stretches or exercises may relieve your symptoms  Ask how to stay active without further injury  · Apply heat or ice as directed  Heat or ice may help decrease pain, inflammation, and muscle spasms  · Maintain a healthy weight  If you are overweight, weight loss may help improve your symptoms  Ask your healthcare provider to help you create a weight loss plan if you are overweight  · Find ways to manage your stress  Behavioral therapy may help you learn ways to manage stress and decrease pain  Ask for more information about behavioral therapy  · Do not smoke  If you smoke, it is never too late to quit  Ask for information if you need help quitting  Contact your healthcare provider if:   · Your pain gets worse, or you cannot control it with pain medicine  · Your symptoms get worse  · You have questions or concerns about your condition or care  Seek care immediately if:   · You have severe pain or weakness, or you cannot move your arm or leg  · You lose control of your bladder or bowels  © 2017 [facility] Information is for End User's use only and may not be sold, redistributed or otherwise used for commercial purposes  All illustrations and images included in CareNotes® are the copyrighted property of A D A M , Inc  or Damion Gonzalez  The above information is an  only  It is not intended as medical advice for individual conditions or treatments  Talk to your doctor, nurse or pharmacist before following any medical regimen to see if it is safe and effective for you  Lower Back Exercises   WHAT YOU NEED TO KNOW:   What do I need to know about lower back exercises? Lower back exercises help heal and strengthen your back muscles to prevent another injury  Ask your healthcare provider if you need to see a physical therapist for more advanced exercises    · Do the exercises on a mat or firm surface  (not on a bed) to support your spine and prevent low back pain  · Move slowly and smoothly  Avoid fast or jerky motions  · Breathe normally  Do not hold your breath  · Stop if you feel pain  It is normal to feel some discomfort at first  Regular exercise will help decrease your discomfort over time  How do I perform lower back exercises safely? Your healthcare provider may recommend that you do back exercises 10 to 30 minutes each day  He may also recommend that you do exercises 1 to 3 times each day  Ask your healthcare provider which exercises are best for you and how often to do them  · Ankle pumps:  Lie on your back  Move your foot up (with your toes pointing toward your head)  Then, move your foot down (with your toes pointing away from you)  Repeat this exercise 10 times on each side  · Heel slides:  Lie on your back  Slowly bend one leg and then straighten it  Next, bend the other leg and then straighten it  Repeat 10 times on each side  · Pelvic tilt:  Lie on your back with your knees bent and feet flat on the floor  Place your arms in a relaxed position beside your body  Tighten the muscles of your abdomen and flatten your back against the floor  Hold for 5 seconds  Repeat 5 times  · Back stretch:  Lie on your back with your hands behind your head  Bend your knees and turn the lower half of your body to one side  Hold this position for 10 seconds  Repeat 3 times on each side  · Straight leg raises:  Lie on your back with one leg straight  Bend the other knee  Tighten your abdomen and then slowly lift the straight leg up about 6 to 12 inches off the floor  Hold for 1 to 5 seconds  Lower your leg slowly  Repeat 10 times on each leg  · Knee-to-chest:  Lie on your back with your knees bent and feet flat on the floor  Pull one of your knees toward your chest and hold it there for 5 seconds   Return your leg to the starting position  Lift the other knee toward your chest and hold for 5 seconds  Do this 5 times on each side  · Cat and camel:  Place your hands and knees on the floor  Arch your back upward toward the ceiling and lower your head  Round out your spine as much as you can  Hold for 5 seconds  Lift your head upward and push your chest downward toward the floor  Hold for 5 seconds  Do 3 sets or as directed  · Wall squats:  Stand with your back against a wall  Tighten the muscles of your abdomen  Slowly lower your body until your knees are bent at a 45 degree angle  Hold this position for 5 seconds  Slowly move back up to a standing position  Repeat 10 times  · Curl up:  Lie on your back with your knees bent and feet flat on the floor  Place your hands, palms down, underneath the curve in your lower back  Next, with your elbows on the floor, lift your shoulders and chest 2 to 3 inches  Keep your head in line with your shoulders  Hold this position for 5 seconds  When you can do this exercise without pain for 10 to 15 seconds, you may add a rotation  While your shoulders and chest are lifted off the ground, turn slightly to the left and hold  Repeat on the other side  · Bird dog:  Place your hands and knees on the floor  Keep your wrists directly below your shoulders and your knees directly below your hips  Pull your belly button in toward your spine  Do not flatten or arch your back  Tighten your abdominal muscles  Raise one arm straight out so that it is aligned with your head  Next, raise the leg opposite your arm  Hold this position for 15 seconds  Lower your arm and leg slowly and change sides  Do 5 sets  When should I seek immediate care? · You have severe pain that prevents you from moving  When should I contact my healthcare provider? · Your pain becomes worse  · You have new pain  · You have questions or concerns about your condition or care    CARE AGREEMENT: You have the right to help plan your care  Learn about your health condition and how it may be treated  Discuss treatment options with your caregivers to decide what care you want to receive  You always have the right to refuse treatment  The above information is an  only  It is not intended as medical advice for individual conditions or treatments  Talk to your doctor, nurse or pharmacist before following any medical regimen to see if it is safe and effective for you  © 2017 [address] [employer]  Information is for End User's use only and may not be sold, redistributed or otherwise used for commercial purposes  All illustrations and images included in CareNotes® are the copyrighted property of A D A M , Inc  or Damion Gonzalez  Lumbar Spinal Stenosis   WHAT YOU NEED TO KNOW:   Lumbar spinal stenosis is narrowing of the spinal canal in your lower back  Your spinal canal holds your spinal cord  The spinal cord controls your ability to move  When your spinal canal narrows, it may put pressure on your spinal cord  DISCHARGE INSTRUCTIONS:   Seek care immediately if:   · You have pain in your leg that does not go away or gets worse  · You have trouble moving your legs  · You cannot control when you urinate or have a bowel movement  Contact your healthcare provider if:   · You have new or worsening symptoms  · Your symptoms keep you from doing your daily activities  · You have questions or concerns about your condition or care  Medicines:   · NSAIDs , such as ibuprofen, help decrease swelling, pain, and fever  NSAIDs can cause stomach bleeding or kidney problems in certain people  If you take blood thinner medicine, always ask your healthcare provider if NSAIDs are safe for you  Always read the medicine label and follow directions  · Acetaminophen  decreases pain and fever  It is available without a doctor's order  Ask how much to take and how often to take it  Follow directions  Read the labels of all other medicines you are using to see if they also contain acetaminophen, or ask your doctor or pharmacist  Acetaminophen can cause liver damage if not taken correctly  Do not use more than 4 grams (4,000 milligrams) total of acetaminophen in one day  · Prescription pain medicine  may be given  Ask how to take this medicine safely  · Muscle relaxers  help decrease pain and muscle spasms  · Take your medicine as directed  Contact your healthcare provider if you think your medicine is not helping or if you have side effects  Tell him or her if you are allergic to any medicine  Keep a list of the medicines, vitamins, and herbs you take  Include the amounts, and when and why you take them  Bring the list or the pill bottles to follow-up visits  Carry your medicine list with you in case of an emergency  Self-care:   · Rest  when you feel it is needed  Slowly start to do more each day  Return to your daily activities as directed  · Apply heat on your back for 20 to 30 minutes every 2 hours for as many days as directed  Heat helps decrease pain and muscle spasms  · Apply ice  on your back for 15 to 20 minutes every hour or as directed  Use an ice pack, or put crushed ice in a plastic bag  Cover it with a towel before you apply it to your skin  Ice helps prevent tissue damage and decreases swelling and pain  Go to physical and occupational therapy as directed:  A physical therapist teaches you exercises to help improve movement and strength, and to decrease pain  An occupational therapist teaches you skills to help with your daily activities  Follow up with your healthcare provider as directed:  Write down your questions so you remember to ask them during your visits  © 2017 [address] [facility] Information is for End User's use only and may not be sold, redistributed or otherwise used for commercial purposes   All illustrations and images included in CareNotes® are the copyrighted property of A [employer] M , Inc  or Damion Gonzalez  The above information is an  only  It is not intended as medical advice for individual conditions or treatments  Talk to your doctor, nurse or pharmacist before following any medical regimen to see if it is safe and effective for you

## 2017-12-31 NOTE — PLAN OF CARE
Problem: DISCHARGE PLANNING - CARE MANAGEMENT  Goal: Discharge to post-acute care or home with appropriate resources  INTERVENTIONS:  - Conduct assessment to determine patient/family and health care team treatment goals, and need for post-acute services based on payer coverage, community resources, and patient preferences, and barriers to discharge  - Address psychosocial, clinical, and financial barriers to discharge as identified in assessment in conjunction with the patient/family and health care team  - Arrange appropriate level of post-acute services according to patient's   needs and preference and payer coverage in collaboration with the physician and health care team  - Communicate with and update the patient/family, physician, and health care team regarding progress on the discharge plan  - Arrange appropriate transportation to post-acute venues    Outcome: Completed Date Met: 12/31/17  CM met with pt at bedside  Pt to be transported to [place] at 13:30  IMM reviewed  No questions  Copy to pt and copy to MR for scanning

## 2017-12-31 NOTE — PLAN OF CARE
Knowledge Deficit     Patient/family/caregiver demonstrates understanding of disease process, treatment plan, medications, and discharge instructions Adequate for Discharge        Nutrition/Hydration-ADULT     Nutrient/Hydration intake appropriate for improving, restoring or maintaining nutritional needs Adequate for Discharge        PAIN - ADULT     Verbalizes/displays adequate comfort level or baseline comfort level Adequate for Discharge        Potential for Falls     Patient will remain free of falls Adequate for Discharge

## 2017-12-31 NOTE — PROGRESS NOTES
Pt IV fell out  Refused placement of new Iv  SLIM aware    Zayda Guerrero, KIERRA  12/31/17  7:51 AM  ----------------------------------------------------------------------

## 2017-12-31 NOTE — SOCIAL WORK
CM met with pt at bedside  Pt to be transported to [place] at 13:30  IMM reviewed  No questions  Copy to pt and copy to MR for scanning

## 2018-02-28 ENCOUNTER — OFFICE VISIT (OUTPATIENT)
Dept: DERMATOLOGY | Facility: CLINIC | Age: 83
End: 2018-02-28
Payer: MEDICARE

## 2018-02-28 DIAGNOSIS — L57.0 ACTINIC KERATOSIS: Primary | ICD-10-CM

## 2018-02-28 DIAGNOSIS — L82.1 SEBORRHEIC KERATOSIS: ICD-10-CM

## 2018-02-28 DIAGNOSIS — Z13.89 SCREENING FOR SKIN CONDITION: ICD-10-CM

## 2018-02-28 DIAGNOSIS — Z85.828 HISTORY OF SKIN CANCER: ICD-10-CM

## 2018-02-28 PROCEDURE — 17003 DESTRUCT PREMALG LES 2-14: CPT | Performed by: DERMATOLOGY

## 2018-02-28 PROCEDURE — 99213 OFFICE O/P EST LOW 20 MIN: CPT | Performed by: DERMATOLOGY

## 2018-02-28 PROCEDURE — 17000 DESTRUCT PREMALG LESION: CPT | Performed by: DERMATOLOGY

## 2018-02-28 RX ORDER — OMEPRAZOLE 20 MG/1
20 CAPSULE, DELAYED RELEASE ORAL DAILY
COMMUNITY
End: 2020-06-24

## 2018-02-28 NOTE — PATIENT INSTRUCTIONS
Actinic Keratosis:  Patient advised lesions are precancers  These should resolve with cryosurgery if not to let us know sun block recommended  Seborrheic keratosis patient reassured these are normal growths we acquire with age no treatment needed  History of skin cancer in no recurrence nothing else atypical sunblock recommended follow-up in 6 months  Screening for dermatologic disorders nothing else of concern noted on complete exam follow-up in 6 months  Treatment with Cryotherapy    The doctor has treated your skin with nitrogen, which is 320 degrees Fahrenheit below zero  He has given the treated area "frostbite "    Stinging should subside within a few hours  You can take Tylenol for pain, if needed  Over the next few days, it is normal if the area becomes reddened, a blood blister, or swollen with fluid  If the lesion treated was near the eye - you could get a swollen eye over the next few days  Do not panic! This is all temporary, and will resolve with time  There is no special treatment - just keep the area clean  Makeup and BandAids can be used, if preferred  When the area starts to dry up and peel off, using Vaseline can help healing  It usually takes up to a month for it to heal   Some lesions are recurrent and may require repeat treatments  If a lesion has not healed in one month, please don't hesitate to contact us  If you have any further questions that are not answered here, please call us  [phone]    Thank you for allowing us to care for you

## 2018-02-28 NOTE — PROGRESS NOTES
[address] [facility]  [address] [address]     MRN: [id number] : 3/24/1929  Encounter: [id number]  Patient Information: Arya Steele  Chief complaint: 6 month skin check    History of present illness:  80-year-old female with previous history of skin cancer actinic keratosis presents for overall checkup concerned regarding several new lesions on her skin  Past Medical History:   Diagnosis Date    Chronic pain     Diabetes mellitus ([address] )     Hyperlipidemia     Hypertension      No past surgical history on file  Social History   History   Alcohol Use No     History   Drug Use No     History   Smoking Status    Never Smoker   Smokeless Tobacco    Never Used     No family history on file  Meds/Allergies   No Known Allergies    Meds:  Prior to Admission medications    Medication Sig Start Date End Date Taking?  Authorizing Provider   amLODIPine (NORVASC) 5 mg tablet Take 5 mg by mouth daily   14  Yes Historical Provider, MD   aspirin 81 MG tablet Take 81 mg by mouth daily     Yes Historical Provider, MD   atorvastatin (LIPITOR) 40 mg tablet Take 40 mg by mouth daily at bedtime   14  Yes Historical Provider, MD   docusate sodium (COLACE) 100 mg capsule Take 1 capsule by mouth 2 (two) times a day as needed for constipation 17  Yes Jennifer Ellis DO   hydrALAZINE (APRESOLINE) 10 mg tablet Take 10 mg by mouth 3 (three) times a day   Yes Historical Provider, MD   insulin glargine (LANTUS SOLOSTAR) injection pen 100 units/mL Inject 14 Units under the skin daily at bedtime   14  Yes Historical Provider, MD   Insulin Lispro (HUMALOG KWIKPEN SC) Inject 6 Units under the skin 3 (three) times a day before meals   Yes Historical Provider, MD   insulin lispro (HumaLOG) 100 units/mL injection Inject 1-5 Units under the skin 3 (three) times a day before meals 17  Yes Jennifer Ellis DO   insulin lispro (HumaLOG) 100 units/mL injection Inject 1-5 Units under the skin daily at bedtime 12/31/17  Yes Jocelyne Strange DO   latanoprost (XALATAN) 0 005 % ophthalmic solution Administer 1 drop to both eyes daily at bedtime   Yes Historical Provider, MD   losartan (COZAAR) 100 MG tablet Take 100 mg by mouth daily   Yes Historical Provider, MD   magnesium hydroxide (MILK OF MAGNESIA) 400 mg/5 mL oral suspension Take 15 mL by mouth daily as needed for constipation or heartburn 12/31/17  Yes Jocelyne Strange DO   metoprolol succinate (TOPROL-XL) 50 mg 24 hr tablet Take 50 mg by mouth daily   7/30/14  Yes Historical Provider, MD   Multiple Vitamins-Minerals (CENTRUM SILVER 50+WOMEN PO) Take 1 tablet by mouth daily   Yes Historical Provider, MD   omeprazole (PriLOSEC) 20 mg delayed release capsule Take 20 mg by mouth daily   Yes Historical Provider, MD   ondansetron (ZOFRAN) 4 mg tablet Take 1 tablet by mouth every 8 (eight) hours as needed for nausea or vomiting 12/31/17  Yes Jocelyne Strange DO   polyethylene glycol (MIRALAX) 17 g packet Take 17 g by mouth daily as needed (constipation) 12/31/17  Yes Jocelyne Strange DO   senna (SENOKOT) 8 6 mg Take 2 tablets by mouth daily at bedtime 12/31/17  Yes Jocelyne Strange DO   acetaminophen (TYLENOL) 325 mg tablet Take 3 tablets by mouth 3 (three) times a day Take 975mg by mouth three times a day x 1 week then change to 650mg PO q4hr prn for pain 12/31/17   Jocelyne Strange DO       Subjective:     Review of Systems:    General: negative for - chills, fatigue, fever,  weight gain or weight loss  Psychological: negative for - anxiety, behavioral disorder, concentration difficulties, decreased libido, depression, irritability, memory difficulties, mood swings, sleep disturbances or suicidal ideation  ENT: negative for - hearing difficulties , nasal congestion, nasal discharge, oral lesions, sinus pain, sneezing, sore throat  Allergy and Immunology: negative for - hives, insect bite sensitivity,  Hematological and Lymphatic: negative for - bleeding problems, blood clots,bruising, swollen lymph nodes  Endocrine: negative for - hair pattern changes, hot flashes, malaise/lethargy, mood swings, palpitations, polydipsia/polyuria, skin changes, temperature intolerance or unexpected weight change  Respiratory: negative for - cough, hemoptysis, orthopnea, shortness of breath, or wheezing  Cardiovascular: negative for - chest pain, dyspnea on exertion, edema,  Gastrointestinal: negative for - abdominal pain, nausea/vomiting  Genito-Urinary: negative for - dysuria, incontinence, irregular/heavy menses or urinary frequency/urgency  Musculoskeletal: negative for - gait disturbance, joint pain, joint stiffness, joint swelling, muscle pain, muscular weakness  Dermatological:  As in HPI  Neurological: negative for confusion, dizziness, headaches, impaired coordination/balance, memory loss, numbness/tingling, seizures, speech problems, tremors or weakness       Objective: There were no vitals taken for this visit  Physical Exam:    General Appearance:    Alert, cooperative, no distress   Head:    Normocephalic, without obvious abnormality, atraumatic           Skin:   A full skin exam was performed including scalp, head scalp, eyes, ears, nose, lips, neck, chest, axilla, abdomen, back, buttocks, bilateral upper extremities, bilateral lower extremities, hands, feet, fingers, toes, fingernails, and toenails previous site of skin cancer well healed without recurrence scaling erythematous areas as noted below normal keratotic papules with greasy stuck on appearance slightly indurated area noted on the left upper arm that appears to be almost verrucous in nature  Nothing else atypical noted on exam   Cryotherapy Procedure Note    Pre-operative Diagnosis: actinic keratosis    Plan:  1  Instructed to keep the area dry and clean thereafter  Apply petrolatum if area gets crusty        2  Recommended that the patient use acetaminophen  as needed for pain  Locations: Face left leg both and arms and right hand    Indications: Destruction of  Actinic keratosis times 6    Patient informed of risks (permanent scarring, infection, light or dark discoloration, bleeding, infection, weakness, numbness and recurrence of the lesion) and benefits of the procedure and verbal informed consent obtained  The areas are treated with liquid nitrogen therapy, frozen until ice ball extended 2 mm beyond lesion, allowed to thaw, and treated again  The patient tolerated procedure well  The patient was instructed on post-op care, warned that there may be blister formation, redness and pain  Recommend OTC analgesia as needed for pain  Condition:  Stable    Complications:  none  Physical Exam   Constitutional:           Assessment:     1  Actinic keratosis     2  Seborrheic keratosis     3  Screening for skin condition     4  History of skin cancer           Plan:   Actinic Keratosis:  Patient advised lesions are precancers  These should resolve with cryosurgery if not to let us know sun block recommended  Seborrheic keratosis patient reassured these are normal growths we acquire with age no treatment needed  History of skin cancer in no recurrence nothing else atypical sunblock recommended follow-up in 6 months  Screening for dermatologic disorders nothing else of concern noted on complete exam follow-up in 6 months    Ivonne Keller MD  2/28/2018,2:18 PM    Portions of the record may have been created with voice recognition software   Occasional wrong word or "sound a like" substitutions may have occurred due to the inherent limitations of voice recognition software   Read the chart carefully and recognize, using context, where substitutions have occurred

## 2018-08-28 ENCOUNTER — OFFICE VISIT (OUTPATIENT)
Dept: DERMATOLOGY | Facility: CLINIC | Age: 83
End: 2018-08-28
Payer: MEDICARE

## 2018-08-28 DIAGNOSIS — L82.1 SEBORRHEIC KERATOSIS: Primary | ICD-10-CM

## 2018-08-28 DIAGNOSIS — Z85.828 HISTORY OF SKIN CANCER: ICD-10-CM

## 2018-08-28 DIAGNOSIS — L98.9 UNKNOWN SKIN LESION: ICD-10-CM

## 2018-08-28 DIAGNOSIS — Z13.89 SCREENING FOR SKIN CONDITION: ICD-10-CM

## 2018-08-28 PROCEDURE — 88305 TISSUE EXAM BY PATHOLOGIST: CPT | Performed by: PATHOLOGY

## 2018-08-28 PROCEDURE — 11100 PR BIOPSY OF SKIN LESION: CPT | Performed by: DERMATOLOGY

## 2018-08-28 PROCEDURE — 99213 OFFICE O/P EST LOW 20 MIN: CPT | Performed by: DERMATOLOGY

## 2018-08-28 NOTE — PROGRESS NOTES
[address]  [address]  [place]  [phone]  [phone]     MRN: [id number] : 3/24/1929  Encounter: [id number]  Patient Information: Kat Baez  Chief complaint:Six-month check  History of present illness:  71-year-old female with previous history of skin cancer presents for overall checkup no specific complaints lesion we noted on left jaw appears to be relatively new  Past Medical History:   Diagnosis Date    Chronic pain     Diabetes mellitus (HCC)     Hyperlipidemia     Hypertension      No past surgical history on file  Social History   History   Alcohol Use No     History   Drug Use No     History   Smoking Status    Never Smoker   Smokeless Tobacco    Never Used     No family history on file  Meds/Allergies   No Known Allergies    Meds:  Prior to Admission medications    Medication Sig Start Date End Date Taking?  Authorizing Provider   acetaminophen (TYLENOL) 325 mg tablet Take 3 tablets by mouth 3 (three) times a day Take 975mg by mouth three times a day x 1 week then change to 650mg PO q4hr prn for pain 17  Yes Kevin Sterling DO   amLODIPine (NORVASC) 5 mg tablet Take 5 mg by mouth daily   14  Yes Historical Provider, MD   aspirin 81 MG tablet Take 81 mg by mouth daily     Yes Historical Provider, MD   atorvastatin (LIPITOR) 40 mg tablet Take 40 mg by mouth daily at bedtime   14  Yes Historical Provider, MD   docusate sodium (COLACE) 100 mg capsule Take 1 capsule by mouth 2 (two) times a day as needed for constipation 17  Yes Kevin Sterling DO   hydrALAZINE (APRESOLINE) 10 mg tablet Take 10 mg by mouth 3 (three) times a day   Yes Historical Provider, MD   insulin glargine (LANTUS SOLOSTAR) injection pen 100 units/mL Inject 14 Units under the skin daily at bedtime   14  Yes Historical Provider, MD   Insulin Lispro (HUMALOG KWIKPEN SC) Inject 6 Units under the skin 3 (three) times a day before meals   Yes Historical Provider, MD   insulin lispro (HumaLOG) 100 units/mL injection Inject 1-5 Units under the skin 3 (three) times a day before meals 12/31/17  Yes Radha Burleson DO   insulin lispro (HumaLOG) 100 units/mL injection Inject 1-5 Units under the skin daily at bedtime 12/31/17  Yes Radha Burleson DO   latanoprost (XALATAN) 0 005 % ophthalmic solution Administer 1 drop to both eyes daily at bedtime   Yes Historical Provider, MD   losartan (COZAAR) 100 MG tablet Take 100 mg by mouth daily   Yes Historical Provider, MD   magnesium hydroxide (MILK OF MAGNESIA) 400 mg/5 mL oral suspension Take 15 mL by mouth daily as needed for constipation or heartburn 12/31/17  Yes Radha Burleson DO   metoprolol succinate (TOPROL-XL) 50 mg 24 hr tablet Take 50 mg by mouth daily   7/30/14  Yes Historical Provider, MD   Multiple Vitamins-Minerals (CENTRUM SILVER 50+WOMEN PO) Take 1 tablet by mouth daily   Yes Historical Provider, MD   omeprazole (PriLOSEC) 20 mg delayed release capsule Take 20 mg by mouth daily   Yes Historical Provider, MD   ondansetron (ZOFRAN) 4 mg tablet Take 1 tablet by mouth every 8 (eight) hours as needed for nausea or vomiting 12/31/17  Yes Radha Burleson DO   polyethylene glycol (MIRALAX) 17 g packet Take 17 g by mouth daily as needed (constipation) 12/31/17  Yes Radha Burleson DO   senna (SENOKOT) 8 6 mg Take 2 tablets by mouth daily at bedtime 12/31/17  Yes Radha Burleson DO       Subjective:     Review of Systems:    General: negative for - chills, fatigue, fever,  weight gain or weight loss  Psychological: negative for - anxiety, behavioral disorder, concentration difficulties, decreased libido, depression, irritability, memory difficulties, mood swings, sleep disturbances or suicidal ideation  ENT: negative for - hearing difficulties , nasal congestion, nasal discharge, oral lesions, sinus pain, sneezing, sore throat  Allergy and Immunology: negative for - hives, insect bite sensitivity,  Hematological and Lymphatic: negative for - bleeding problems, blood clots,bruising, swollen lymph nodes  Endocrine: negative for - hair pattern changes, hot flashes, malaise/lethargy, mood swings, palpitations, polydipsia/polyuria, skin changes, temperature intolerance or unexpected weight change  Respiratory: negative for - cough, hemoptysis, orthopnea, shortness of breath, or wheezing  Cardiovascular: negative for - chest pain, dyspnea on exertion, edema,  Gastrointestinal: negative for - abdominal pain, nausea/vomiting  Genito-Urinary: negative for - dysuria, incontinence, irregular/heavy menses or urinary frequency/urgency  Musculoskeletal: negative for - gait disturbance, joint pain, joint stiffness, joint swelling, muscle pain, muscular weakness  Dermatological:  As in HPI  Neurological: negative for confusion, dizziness, headaches, impaired coordination/balance, memory loss, numbness/tingling, seizures, speech problems, tremors or weakness       Objective: There were no vitals taken for this visit  Physical Exam:    General Appearance:    Alert, cooperative, no distress   Head:    Normocephalic, without obvious abnormality, atraumatic           Skin:   A full skin exam was performed including scalp, head scalp, eyes, ears, nose, lips, neck, chest, axilla, abdomen, back, buttocks, bilateral upper extremities, bilateral lower extremities, hands, feet, fingers, toes, fingernails, and toenails   Normal keratotic papules with greasy stuck on appearance numerous scaling s erythematous areas noted on the dorsum of the feet  Indurated 6 mm papule noted on the left jaw which is pearly nature previous sites of skin cancer well healed without recurrence      Shave Biopsy Procedure Note    Pre-operative Diagnosis:  Basal cell carcinoma    Plan:  1  Instructed to keep the wound dry and covered for 24 and clean thereafter  2  Warning signs of infection were reviewed      3  Recommended that the patient use OTC acetaminophen as needed for pain  4  Return  Pending results of biopsy(ies)    Locations: left jawline    Indications:  Suspicious lesion    Anesthesia: Lidocaine 1% without epinephrine without added sodium bicarbonate    Procedure Details     Patient informed of the risks (including bleeding and infection) and benefits of the   procedure and Verbal informed consent obtained  The lesion and surrounding area were given a sterile prep using alcohol and draped in the usual sterile fashion  A Blue blade razor was used to obtain a specimen  Hemostasis achieved with aluminum chloride  Petrolatum and a sterile dressing applied  The specimen was sent for pathologic examination  The patient tolerated the procedure(s) well  Complications:  none  Assessment:     1  Seborrheic keratosis     2  History of skin cancer     3  Screening for skin condition     4  Unknown skin lesion           Plan:    skin lesion possible basal cell carcinoma would require excision  Seborrheic keratosis patient reassured these are normal growths we acquire with age no treatment needed  History of skin cancer in no recurrence nothing else atypical sunblock recommended follow-up in 6 months  Screening for dermatologic disorders nothing else of concern noted on complete exam follow-up in 6 months lesions on the foot  May be actinic verses stucco type keratoses at present elected not to do anything because of concerns regarding potential healing will only consider biopsy if lesions appear to be active    Mary Jo Rios MD  8/28/2018,2:18 PM    Portions of the record may have been created with voice recognition software   Occasional wrong word or "sound a like" substitutions may have occurred due to the inherent limitations of voice recognition software   Read the chart carefully and recognize, using context, where substitutions have occurred

## 2018-10-02 ENCOUNTER — OFFICE VISIT (OUTPATIENT)
Dept: DERMATOLOGY | Facility: CLINIC | Age: 83
End: 2018-10-02
Payer: MEDICARE

## 2018-10-02 DIAGNOSIS — L57.0 ACTINIC KERATOSIS: Primary | ICD-10-CM

## 2018-10-02 PROCEDURE — 17000 DESTRUCT PREMALG LESION: CPT | Performed by: DERMATOLOGY

## 2018-10-02 NOTE — PATIENT INSTRUCTIONS
Actinic Keratosis:  Patient advised lesions are precancers  These should resolve with cryosurgery if not to let us know sun block recommended  Treatment with Cryotherapy    The doctor has treated your skin with nitrogen, which is 320 degrees Fahrenheit below zero  He has given the treated area "frostbite "    Stinging should subside within a few hours  You can take Tylenol for pain, if needed  Over the next few days, it is normal if the area becomes reddened, a blood blister, or swollen with fluid  If the lesion treated was near the eye - you could get a swollen eye over the next few days  Do not panic! This is all temporary, and will resolve with time  There is no special treatment - just keep the area clean  Makeup and BandAids can be used, if preferred  When the area starts to dry up and peel off, using Vaseline can help healing  It usually takes up to a month for it to heal   Some lesions are recurrent and may require repeat treatments  If a lesion has not healed in one month, please don't hesitate to contact us  If you have any further questions that are not answered here, please call us  [phone]    Thank you for allowing us to care for you

## 2018-10-02 NOTE — PROGRESS NOTES
[facility] DERMATOLOGY  [address] [place] [address]  [phone]  [phone]     MRN: [id number] : 3/24/1929  Encounter: [id number]  Patient Information: Tamie Ledbetter    Subjective:     71-year-old female presents for  Previously biopsy actinic keratosis left jawline and planned treatment     Objective: There were no vitals taken for this visit  Physical Exam:    General Appearance:    Alert, cooperative, no distress   Skin:    Previous site of biopsy noted  Cryotherapy Procedure Note    Pre-operative Diagnosis: actinic keratosis    Plan:  1  Instructed to keep the area dry and clean thereafter  Apply petrolatum if area gets crusty  2  Recommended that the patient use acetaminophen  as needed for pain  Locations:  Left cheek    Indications: Destruction of  Actinic keratosis x1 previously biopsied    Patient informed of risks (permanent scarring, infection, light or dark discoloration, bleeding, infection, weakness, numbness and recurrence of the lesion) and benefits of the procedure and verbal informed consent obtained  The areas are treated with liquid nitrogen therapy, frozen until ice ball extended 2 mm beyond lesion, allowed to thaw, and treated again  The patient tolerated procedure well  The patient was instructed on post-op care, warned that there may be blister formation, redness and pain  Recommend OTC analgesia as needed for pain  Condition:  Stable    Complications:  none  Assessment:     1  Actinic keratosis           Plan:   Actinic Keratosis:  Patient advised lesions are precancers  These should resolve with cryosurgery if not to let us know sun block recommended  Prior to Admission medications    Medication Sig Start Date End Date Taking?  Authorizing Provider   acetaminophen (TYLENOL) 325 mg tablet Take 3 tablets by mouth 3 (three) times a day Take 975mg by mouth three times a day x 1 week then change to 650mg PO q4hr prn for pain 12/31/17  Yes Amos Liter, DO   amLODIPine (NORVASC) 5 mg tablet Take 5 mg by mouth daily   7/30/14  Yes Historical Provider, MD   aspirin 81 MG tablet Take 81 mg by mouth daily     Yes Historical Provider, MD   atorvastatin (LIPITOR) 40 mg tablet Take 40 mg by mouth daily at bedtime   7/30/14  Yes Historical Provider, MD   docusate sodium (COLACE) 100 mg capsule Take 1 capsule by mouth 2 (two) times a day as needed for constipation 12/31/17  Yes Amos Liter, DO   hydrALAZINE (APRESOLINE) 10 mg tablet Take 10 mg by mouth 3 (three) times a day   Yes Historical Provider, MD   insulin glargine (LANTUS SOLOSTAR) injection pen 100 units/mL Inject 14 Units under the skin daily at bedtime   2/11/14  Yes Historical Provider, MD   Insulin Lispro (HUMALOG KWIKPEN SC) Inject 6 Units under the skin 3 (three) times a day before meals   Yes Historical Provider, MD   insulin lispro (HumaLOG) 100 units/mL injection Inject 1-5 Units under the skin 3 (three) times a day before meals 12/31/17  Yes Amos Liter, DO   insulin lispro (HumaLOG) 100 units/mL injection Inject 1-5 Units under the skin daily at bedtime 12/31/17  Yes Amos Barros, DO   latanoprost (XALATAN) 0 005 % ophthalmic solution Administer 1 drop to both eyes daily at bedtime   Yes Historical Provider, MD   losartan (COZAAR) 100 MG tablet Take 100 mg by mouth daily   Yes Historical Provider, MD   magnesium hydroxide (MILK OF MAGNESIA) 400 mg/5 mL oral suspension Take 15 mL by mouth daily as needed for constipation or heartburn 12/31/17  Yes Amos Liter, DO   metoprolol succinate (TOPROL-XL) 50 mg 24 hr tablet Take 50 mg by mouth daily   7/30/14  Yes Historical Provider, MD   Multiple Vitamins-Minerals (CENTRUM SILVER 50+WOMEN PO) Take 1 tablet by mouth daily   Yes Historical Provider, MD   omeprazole (PriLOSEC) 20 mg delayed release capsule Take 20 mg by mouth daily   Yes Historical Provider, MD   ondansetron (ZOFRAN) 4 mg tablet Take 1 tablet by mouth every 8 (eight) hours as needed for nausea or vomiting 12/31/17  Yes Dot Ground, DO   polyethylene glycol (MIRALAX) 17 g packet Take 17 g by mouth daily as needed (constipation) 12/31/17  Yes Dot Ground, DO   senna (SENOKOT) 8 6 mg Take 2 tablets by mouth daily at bedtime 12/31/17  Yes Dot Ground, DO     No Known Allergies    Mj Aviles MD  10/2/2018,2:58 PM    Portions of the record may have been created with voice recognition software   Occasional wrong word or "sound a like" substitutions may have occurred due to the inherent limitations of voice recognition software   Read the chart carefully and recognize, using context, where substitutions have occurred

## 2018-11-07 NOTE — PATIENT INSTRUCTIONS
skin lesion possible basal cell carcinoma would require excision  Seborrheic keratosis patient reassured these are normal growths we acquire with age no treatment needed  History of skin cancer in no recurrence nothing else atypical sunblock recommended follow-up in 6 months  Screening for dermatologic disorders nothing else of concern noted on complete exam follow-up in 6 months lesions on the foot  May be actinic verses stucco type keratoses at present elected not to do anything because of concerns regarding potential healing will only consider biopsy if lesions appear to be active  Wound care instructions given to patient Pt referred to ED by DR DARRION Ratliff to be seen by DR Varela and for possible bronchoscopy - per records pt has a lesion of LT lower lung field. Pt denies pain, shortness of breath or chest/back pain. Pt reports hemoptysis in the last two days.

## 2019-03-07 ENCOUNTER — OFFICE VISIT (OUTPATIENT)
Dept: DERMATOLOGY | Facility: CLINIC | Age: 84
End: 2019-03-07
Payer: MEDICARE

## 2019-03-07 DIAGNOSIS — Z85.828 HISTORY OF SKIN CANCER: ICD-10-CM

## 2019-03-07 DIAGNOSIS — Z13.89 SCREENING FOR SKIN CONDITION: ICD-10-CM

## 2019-03-07 DIAGNOSIS — L82.1 SEBORRHEIC KERATOSIS: Primary | ICD-10-CM

## 2019-03-07 PROCEDURE — 99213 OFFICE O/P EST LOW 20 MIN: CPT | Performed by: DERMATOLOGY

## 2019-03-07 RX ORDER — TRAMADOL HYDROCHLORIDE 50 MG/1
50 TABLET ORAL 2 TIMES DAILY
COMMUNITY
End: 2020-11-10 | Stop reason: ALTCHOICE

## 2019-03-07 RX ORDER — LANOLIN ALCOHOL/MO/W.PET/CERES
1000 CREAM (GRAM) TOPICAL DAILY
COMMUNITY
End: 2020-05-28

## 2019-03-07 RX ORDER — POLYVINYL ALCOHOL 14 MG/ML
1 SOLUTION/ DROPS OPHTHALMIC EVERY 4 HOURS PRN
COMMUNITY
End: 2020-05-28 | Stop reason: CLARIF

## 2019-03-07 NOTE — PROGRESS NOTES
[facility] DERMATOLOGY  [address]  [phone]  [phone]     MRN: [id number] : 3/24/1929  Encounter: [id number]  Patient Information: Raymundo Cockayne  Chief complaint:  6 month skin check    History of present illness:  80-year-old female presents for overall skin check previous history of skin cancer no specific concerns noted today  Past Medical History:   Diagnosis Date    Chronic pain     Diabetes mellitus ([address] )     Hyperlipidemia     Hypertension      No past surgical history on file  Social History   Social History     Substance and Sexual Activity   Alcohol Use No     Social History     Substance and Sexual Activity   Drug Use No     Social History     Tobacco Use   Smoking Status Never Smoker   Smokeless Tobacco Never Used     No family history on file  Meds/Allergies   No Known Allergies    Meds:  Prior to Admission medications    Medication Sig Start Date End Date Taking?  Authorizing Provider   amLODIPine (NORVASC) 5 mg tablet Take 5 mg by mouth daily   14  Yes Historical Provider, MD   aspirin 81 MG tablet Take 81 mg by mouth daily     Yes Historical Provider, MD   atorvastatin (LIPITOR) 40 mg tablet Take 40 mg by mouth daily at bedtime   14  Yes Historical Provider, MD   cyanocobalamin (VITAMIN B-12) 1,000 mcg tablet Take 1,000 mcg by mouth daily   Yes Historical Provider, MD   glucose blood (ACCU-CHEK ACTIVE STRIPS) test strip 1 each by Other route as needed Use as instructed   Yes Historical Provider, MD   hydrALAZINE (APRESOLINE) 10 mg tablet Take 10 mg by mouth 3 (three) times a day   Yes Historical Provider, MD   insulin lispro (HumaLOG) 100 units/mL injection Inject 1-5 Units under the skin daily at bedtime 17  Yes Jocelyne Strange DO   latanoprost (XALATAN) 0 005 % ophthalmic solution Administer 1 drop to both eyes daily at bedtime   Yes Historical Provider, MD   metoprolol succinate (TOPROL-XL) 50 mg 24 hr tablet Take 50 mg by mouth daily   7/30/14  Yes Historical Provider, MD   Multiple Vitamins-Minerals (CENTRUM SILVER 50+WOMEN PO) Take 1 tablet by mouth daily   Yes Historical Provider, MD   omeprazole (PriLOSEC) 20 mg delayed release capsule Take 20 mg by mouth daily   Yes Historical Provider, MD   polyvinyl alcohol (ARTIFICIAL TEARS) 1 4 % ophthalmic solution 1 drop every 4 (four) hours as needed for dry eyes   Yes Historical Provider, MD   traMADol (ULTRAM) 50 mg tablet Take 50 mg by mouth 2 (two) times a day   Yes Historical Provider, MD   acetaminophen (TYLENOL) 325 mg tablet Take 3 tablets by mouth 3 (three) times a day Take 975mg by mouth three times a day x 1 week then change to 650mg PO q4hr prn for pain  Patient not taking: Reported on 3/7/2019 12/31/17   Thom Palomares DO   docusate sodium (COLACE) 100 mg capsule Take 1 capsule by mouth 2 (two) times a day as needed for constipation  Patient not taking: Reported on 3/7/2019 12/31/17   Thom Palomares DO   insulin glargine (LANTUS SOLOSTAR) injection pen 100 units/mL Inject 14 Units under the skin daily at bedtime   2/11/14   Historical Provider, MD   Insulin Lispro (HUMALOG KWIKPEN SC) Inject 6 Units under the skin 3 (three) times a day before meals    Historical Provider, MD   insulin lispro (HumaLOG) 100 units/mL injection Inject 1-5 Units under the skin 3 (three) times a day before meals  Patient not taking: Reported on 3/7/2019 12/31/17   Thom Palomares DO   losartan (COZAAR) 100 MG tablet Take 100 mg by mouth daily    Historical Provider, MD   magnesium hydroxide (MILK OF MAGNESIA) 400 mg/5 mL oral suspension Take 15 mL by mouth daily as needed for constipation or heartburn  Patient not taking: Reported on 3/7/2019 12/31/17   Thom Palomares DO   [facility]etron [facility]) 4 mg tablet Take 1 tablet by mouth every 8 (eight) hours as needed for nausea or vomiting  Patient not taking: Reported on 3/7/2019 12/31/17   Thom Palomares DO polyethylene glycol (MIRALAX) 17 g packet Take 17 g by mouth daily as needed (constipation)  Patient not taking: Reported on 3/7/2019 12/31/17   Marvin Singleton DO   senna (SENOKOT) 8 6 mg Take 2 tablets by mouth daily at bedtime  Patient not taking: Reported on 3/7/2019 12/31/17   Marvin Singleton DO       Subjective:     Review of Systems:    General: negative for - chills, fatigue, fever,  weight gain or weight loss  Psychological: negative for - anxiety, behavioral disorder, concentration difficulties, decreased libido, depression, irritability, memory difficulties, mood swings, sleep disturbances or suicidal ideation  ENT: negative for - hearing difficulties , nasal congestion, nasal discharge, oral lesions, sinus pain, sneezing, sore throat  Allergy and Immunology: negative for - hives, insect bite sensitivity,  Hematological and Lymphatic: negative for - bleeding problems, blood clots,bruising, swollen lymph nodes  Endocrine: negative for - hair pattern changes, hot flashes, malaise/lethargy, mood swings, palpitations, polydipsia/polyuria, skin changes, temperature intolerance or unexpected weight change  Respiratory: negative for - cough, hemoptysis, orthopnea, shortness of breath, or wheezing  Cardiovascular: negative for - chest pain, dyspnea on exertion, edema,  Gastrointestinal: negative for - abdominal pain, nausea/vomiting  Genito-Urinary: negative for - dysuria, incontinence, irregular/heavy menses or urinary frequency/urgency  Musculoskeletal: negative for - gait disturbance, joint pain, joint stiffness, joint swelling, muscle pain, muscular weakness  Dermatological:  As in HPI  Neurological: negative for confusion, dizziness, headaches, impaired coordination/balance, memory loss, numbness/tingling, seizures, speech problems, tremors or weakness       Objective: There were no vitals taken for this visit      Physical Exam:    General Appearance:    Alert, cooperative, no distress   Head: Normocephalic, without obvious abnormality, atraumatic           Skin:   A full skin exam was performed including scalp, head scalp, eyes, ears, nose, lips, neck, chest, axilla, abdomen, back, buttocks, bilateral upper extremities, bilateral lower extremities, hands, feet, fingers, toes, fingernails, and toenails previous sites of skin cancer well healed without recurrence normal keratotic papules with greasy stuck on appearance nothing else atypical noted on exam     Assessment:     1  Seborrheic keratosis     2  Screening for skin condition     3  History of skin cancer           Plan:   Seborrheic keratosis patient reassured these are normal growths we acquire with age no treatment needed  History of skin cancer in no recurrence nothing else atypical sunblock recommended follow-up in 6 months  Screening for dermatologic disorders nothing else of concern noted on complete exam follow-up in 6 months    Ubaldo Goode MD  3/7/2019,2:00 PM    Portions of the record may have been created with voice recognition software   Occasional wrong word or "sound a like" substitutions may have occurred due to the inherent limitations of voice recognition software   Read the chart carefully and recognize, using context, where substitutions have occurred

## 2019-11-12 ENCOUNTER — OFFICE VISIT (OUTPATIENT)
Dept: GASTROENTEROLOGY | Facility: CLINIC | Age: 84
End: 2019-11-12
Payer: MEDICARE

## 2019-11-12 VITALS
BODY MASS INDEX: 29.1 KG/M2 | SYSTOLIC BLOOD PRESSURE: 136 MMHG | WEIGHT: 159.13 LBS | HEART RATE: 60 BPM | DIASTOLIC BLOOD PRESSURE: 66 MMHG

## 2019-11-12 DIAGNOSIS — K59.01 SLOW TRANSIT CONSTIPATION: Primary | ICD-10-CM

## 2019-11-12 PROCEDURE — 99204 OFFICE O/P NEW MOD 45 MIN: CPT | Performed by: INTERNAL MEDICINE

## 2019-11-12 NOTE — PROGRESS NOTES
Consultation - 126 [facility]  Paula Wasserman 3/24/1929 80 y o  female     ASSESSMENT @ PLAN:   She is a 44-year-old female with mild slow motility constipation and fecal smearing that is being partially helped with Citrucel daily  She had a normal colonoscopy 7 years ago  She had a CT scan in 2017 that showed fecal burden    1 will check abdominal x-ray series to rule out constipation with overflow fecal smearing    2 she will do the Citrucel every day    3 for now she will continue on MiraLax as needed but we made the going to a daily strategy based on the x-ray results    Chief Complaint:   Constipation and fecal smearing    HPI:   She is a 44-year-old female with disordered bowel habits  She was having a period of constipation and her primary care provider put her on MiraLax and it is helping a little bit  She has days where she has fecal smearing after a bowel movement or at times without knowing it  She does have urinary incontinence as well  She characterizes these problems as mild  She has no melena hematochezia her weight is stable  She does have a little bit of straining  She has no nausea vomiting she is eating okay  She had a colonoscopy 7 years ago  Nobody in the family had Crohn's disease ulcerative colitis or colon malignancy  She is doing Citrucel on every day  She does MiraLax as needed  Her diabetes is under control she eats a diabetic diet  REVIEW OF SYSTEMS:     CONSTITUTIONAL: Denies any fever, chills, or rigors  Good appetite, and no recent weight loss  HEENT: No earache or tinnitus  Denies hearing loss or visual disturbances  CARDIOVASCULAR: No chest pain or palpitations  RESPIRATORY: Denies any cough, hemoptysis, shortness of breath or dyspnea on exertion  GASTROINTESTINAL: As noted in the History of Present Illness  GENITOURINARY: No problems with urination  Denies any hematuria or dysuria  NEUROLOGIC: No dizziness or vertigo, denies headaches  MUSCULOSKELETAL: Denies any muscle or joint pain  SKIN: Denies skin rashes or itching  ENDOCRINE: Denies excessive thirst  Denies intolerance to heat or cold  PSYCHOSOCIAL: Denies depression or anxiety  Denies any recent memory loss  Past Medical History:   Diagnosis Date    Chronic pain     Diabetes mellitus ([facility] [address] )     Hyperlipidemia     Hypertension       Past Surgical History:   Procedure Laterality Date    BACK SURGERY      COLONOSCOPY       Social History     Socioeconomic History    Marital status:       Spouse name: Not on file    Number of children: Not on file    Years of education: Not on file    Highest education level: Not on file   Occupational History    Not on file   Social Needs    Financial resource strain: Not on file    Food insecurity:     Worry: Not on file     Inability: Not on file    Transportation needs:     Medical: Not on file     Non-medical: Not on file   Tobacco Use    Smoking status: Never Smoker    Smokeless tobacco: Never Used   Substance and Sexual Activity    Alcohol use: No    Drug use: No    Sexual activity: Not on file   Lifestyle    Physical activity:     Days per week: Not on file     Minutes per session: Not on file    Stress: Not on file   Relationships    Social connections:     Talks on phone: Not on file     Gets together: Not on file     Attends [employer]ology service: Not on file     Active member of club or organization: Not on file     Attends meetings of clubs or organizations: Not on file     Relationship status: Not on file    Intimate partner violence:     Fear of current or ex partner: Not on file     Emotionally abused: Not on file     Physically abused: Not on file     Forced sexual activity: Not on file   Other Topics Concern    Not on file   Social History Narrative    Not on file     Family History   Problem Relation Age of Onset    Colon cancer Mother     Heart attack Father      Hydrocodone-acetaminophen  Current Outpatient Medications   Medication Sig Dispense Refill    amLODIPine (NORVASC) 5 mg tablet Take 5 mg by mouth daily        aspirin 81 MG tablet Take 81 mg by mouth daily        atorvastatin (LIPITOR) 40 mg tablet Take 40 mg by mouth daily at bedtime        glucose blood (ACCU-CHEK ACTIVE STRIPS) test strip 1 each by Other route as needed Use as instructed      hydrALAZINE (APRESOLINE) 10 mg tablet Take 10 mg by mouth 3 (three) times a day      insulin glargine (LANTUS SOLOSTAR) injection pen 100 units/mL Inject 14 Units under the skin daily at bedtime        Insulin Lispro (HUMALOG KWIKPEN SC) Inject 6 Units under the skin 3 (three) times a day before meals      latanoprost (XALATAN) 0 005 % ophthalmic solution Administer 1 drop to both eyes daily at bedtime      losartan (COZAAR) 100 MG tablet Take 100 mg by mouth daily      metoprolol succinate (TOPROL-XL) 50 mg 24 hr tablet Take 50 mg by mouth daily        Multiple Vitamins-Minerals (CENTRUM SILVER 50+WOMEN PO) Take 1 tablet by mouth daily      omeprazole (PriLOSEC) 20 mg delayed release capsule Take 20 mg by mouth daily      polyethylene glycol (MIRALAX) 17 g packet Take 17 g by mouth daily as needed (constipation) 14 each 0    acetaminophen (TYLENOL) 325 mg tablet Take 3 tablets by mouth 3 (three) times a day Take 975mg by mouth three times a day x 1 week then change to 650mg PO q4hr prn for pain (Patient not taking: Reported on 11/12/2019) 30 tablet 0    cyanocobalamin (VITAMIN B-12) 1,000 mcg tablet Take 1,000 mcg by mouth daily      docusate sodium (COLACE) 100 mg capsule Take 1 capsule by mouth 2 (two) times a day as needed for constipation (Patient not taking: Reported on 3/7/2019) 10 capsule 0    insulin lispro (HumaLOG) 100 units/mL injection Inject 1-5 Units under the skin 3 (three) times a day before meals (Patient not taking: Reported on 11/12/2019)  0    insulin lispro (HumaLOG) 100 units/mL injection Inject 1-5 Units under the skin daily at bedtime (Patient not taking: Reported on 11/12/2019)  0    magnesium hydroxide (MILK OF MAGNESIA) 400 mg/5 mL oral suspension Take 15 mL by mouth daily as needed for constipation or heartburn (Patient not taking: Reported on 3/7/2019) 360 mL 0    ondansetron (ZOFRAN) 4 mg tablet Take 1 tablet by mouth every 8 (eight) hours as needed for nausea or vomiting (Patient not taking: Reported on 3/7/2019) 12 tablet 0    polyvinyl alcohol (ARTIFICIAL TEARS) 1 4 % ophthalmic solution 1 drop every 4 (four) hours as needed for dry eyes      senna (SENOKOT) 8 6 mg Take 2 tablets by mouth daily at bedtime (Patient not taking: Reported on 3/7/2019) 120 each 0    traMADol (ULTRAM) 50 mg tablet Take 50 mg by mouth 2 (two) times a day       No current facility-administered medications for this visit  Blood pressure 136/66, pulse 60, weight 72 2 kg (159 lb 2 oz)  PHYSICAL EXAM:     General Appearance:   Alert, cooperative, no distress, appears stated age    HEENT:   Normocephalic, atraumatic, anicteric      Neck:  Supple, symmetrical, trachea midline, no adenopathy;    thyroid: no enlargement/tenderness/nodules; no carotid  bruit or JVD    Lungs:   Clear to auscultation bilaterally; no rales, rhonchi or wheezing; respirations unlabored    Heart[de-identified]   S1 and S2 normal; regular rate and rhythm; no murmur, rub, or gallop     Abdomen:   Soft, non-tender, non-distended; normal bowel sounds; no masses, no organomegaly    Genitalia:   Deferred    Rectal:   Deferred    Extremities:  No cyanosis, clubbing or edema    Pulses:  2+ and symmetric all extremities    Skin:  Skin color, texture, turgor normal, no rashes or lesions    Lymph nodes:  No palpable cervical, axillary or inguinal lymphadenopathy        Lab Results   Component Value Date    WBC 13 21 (H) 12/31/2017    HGB 13 6 12/31/2017    HCT 40 5 12/31/2017    MCV 91 12/31/2017     12/31/2017     Lab Results   Component Value Date    CALCIUM 9 2 12/31/2017 K 4 6 12/31/2017    CO2 26 12/31/2017     12/31/2017    BUN 26 (H) 12/31/2017    CREATININE 1 13 12/31/2017     Lab Results   Component Value Date    ALT 45 12/27/2017    AST 30 12/27/2017    ALKPHOS 107 12/27/2017     Lab Results   Component Value Date    INR 1 11 12/28/2017    INR 0 98 12/27/2017    PROTIME 14 5 (H) 12/28/2017    PROTIME 13 2 12/27/2017

## 2019-11-13 ENCOUNTER — HOSPITAL ENCOUNTER (OUTPATIENT)
Dept: RADIOLOGY | Facility: HOSPITAL | Age: 84
Discharge: HOME | End: 2019-11-13
Attending: INTERNAL MEDICINE
Payer: MEDICARE

## 2019-11-13 DIAGNOSIS — K59.01 SLOW TRANSIT CONSTIPATION: ICD-10-CM

## 2019-11-13 PROCEDURE — 74022 RADEX COMPL AQT ABD SERIES: CPT

## 2019-11-14 ENCOUNTER — TELEPHONE (OUTPATIENT)
Dept: GASTROENTEROLOGY | Facility: CLINIC | Age: 84
End: 2019-11-14

## 2019-11-14 NOTE — TELEPHONE ENCOUNTER
Dr Claudetta Pay - patent called lmom  X-ray was completed yesterday  Nothing has happened, movement wise, since office visit  Patient has put on four pounds  Does not know what to do   Please call Gege Biggs at [phone]

## 2019-11-14 NOTE — TELEPHONE ENCOUNTER
CESAR: Spoke with patient history of slow transit constipation  Patient c/o no BM for two days  She I staking the Rella Getting was done yesterday awaiting results  She has not utilized miralax yet  Advised patient to use miralax daily PRN, and we will follow-up with x-ray series results

## 2019-11-15 ENCOUNTER — TELEPHONE (OUTPATIENT)
Dept: GASTROENTEROLOGY | Facility: CLINIC | Age: 84
End: 2019-11-15

## 2019-11-15 NOTE — TELEPHONE ENCOUNTER
----- Message from Gerard Phillips MD sent at 11/15/2019 12:32 PM EST -----  We spoke about the result and I told her to do MiraLax 17 g p o  B i d  For 1 week followed by 17 g a day  Please make follow-up with a physician assistant in about 6-8 weeks

## 2020-01-07 ENCOUNTER — OFFICE VISIT (OUTPATIENT)
Dept: GASTROENTEROLOGY | Facility: CLINIC | Age: 85
End: 2020-01-07
Payer: MEDICARE

## 2020-01-07 VITALS
WEIGHT: 162.2 LBS | HEIGHT: 61 IN | BODY MASS INDEX: 30.62 KG/M2 | DIASTOLIC BLOOD PRESSURE: 68 MMHG | HEART RATE: 60 BPM | SYSTOLIC BLOOD PRESSURE: 150 MMHG

## 2020-01-07 DIAGNOSIS — K59.01 SLOW TRANSIT CONSTIPATION: Primary | ICD-10-CM

## 2020-01-07 PROCEDURE — 99213 OFFICE O/P EST LOW 20 MIN: CPT | Performed by: PHYSICIAN ASSISTANT

## 2020-01-07 NOTE — PROGRESS NOTES
[facility]  Aquilino Jan 80 y o  female MRN: [id number]       CC: Follow-up for overflow diarrhea    HPI:  Erasmo Starks is a 51-year-old female who presents for follow-up after being seen last by Dr Rolo Jimenez in November for fecal smearing  Dr Rolo Jimenez send the patient for an abdominal x-ray, which showed moderate stool burden  Patient was instructed to take MiraLax twice daily until she had a bowel movement  She was then taking MiraLax once daily, however she was having incontinence  Therefore, she has been taking Citrucel daily  She reports that on this regimen, she is going every other day now  She denies signs of overt GI bleeding  She has gained weight, no unintentional weight loss  Review of Systems:    CONSTITUTIONAL: Denies any fever, chills, or rigors  Good appetite, and no recent weight loss  HEENT: No earache or tinnitus  Denies hearing loss or visual disturbances  CARDIOVASCULAR: No chest pain or palpitations  RESPIRATORY: Denies any cough, hemoptysis, shortness of breath or dyspnea on exertion  GASTROINTESTINAL: As noted in the History of Present Illness  GENITOURINARY: No problems with urination  Denies any hematuria or dysuria  NEUROLOGIC: No dizziness or vertigo, denies headaches  MUSCULOSKELETAL: Denies any muscle or joint pain  SKIN: Denies skin rashes or itching  ENDOCRINE: Denies excessive thirst  Denies intolerance to heat or cold  PSYCHOSOCIAL: Denies depression or anxiety  Denies any recent memory loss         Current Outpatient Medications   Medication Sig Dispense Refill    amLODIPine (NORVASC) 5 mg tablet Take 5 mg by mouth daily        aspirin 81 MG tablet Take 81 mg by mouth daily        atorvastatin (LIPITOR) 40 mg tablet Take 40 mg by mouth daily at bedtime        cyanocobalamin (VITAMIN B-12) 1,000 mcg tablet Take 1,000 mcg by mouth daily      docusate sodium (COLACE) 100 mg capsule Take 1 capsule by mouth 2 (two) times a day as needed for constipation 10 capsule 0    glucose blood (ACCU-CHEK ACTIVE STRIPS) test strip 1 each by Other route as needed Use as instructed      hydrALAZINE (APRESOLINE) 10 mg tablet Take 10 mg by mouth 3 (three) times a day      insulin glargine (LANTUS SOLOSTAR) injection pen 100 units/mL Inject 14 Units under the skin daily at bedtime        Insulin Lispro (HUMALOG KWIKPEN SC) Inject 6 Units under the skin 3 (three) times a day before meals      insulin lispro (HumaLOG) 100 units/mL injection Inject 1-5 Units under the skin 3 (three) times a day before meals  0    insulin lispro (HumaLOG) 100 units/mL injection Inject 1-5 Units under the skin daily at bedtime  0    latanoprost (XALATAN) 0 005 % ophthalmic solution Administer 1 drop to both eyes daily at bedtime      losartan (COZAAR) 100 MG tablet Take 100 mg by mouth daily      metoprolol succinate (TOPROL-XL) 50 mg 24 hr tablet Take 50 mg by mouth daily        Multiple Vitamins-Minerals (CENTRUM SILVER 50+WOMEN PO) Take 1 tablet by mouth daily      omeprazole (PriLOSEC) 20 mg delayed release capsule Take 20 mg by mouth daily      polyvinyl alcohol (ARTIFICIAL TEARS) 1 4 % ophthalmic solution 1 drop every 4 (four) hours as needed for dry eyes      acetaminophen (TYLENOL) 325 mg tablet Take 3 tablets by mouth 3 (three) times a day Take 975mg by mouth three times a day x 1 week then change to 650mg PO q4hr prn for pain (Patient not taking: Reported on 1/7/2020) 30 tablet 0    magnesium hydroxide (MILK OF MAGNESIA) 400 mg/5 mL oral suspension Take 15 mL by mouth daily as needed for constipation or heartburn (Patient not taking: Reported on 1/7/2020) 360 mL 0    ondansetron (ZOFRAN) 4 mg tablet Take 1 tablet by mouth every 8 (eight) hours as needed for nausea or vomiting (Patient not taking: Reported on 3/7/2019) 12 tablet 0    polyethylene glycol (MIRALAX) 17 g packet Take 17 g by mouth daily as needed (constipation) (Patient not taking: Reported on 1/7/2020) 14 each 0    senna (SENOKOT) 8 6 mg Take 2 tablets by mouth daily at bedtime (Patient not taking: Reported on 3/7/2019) 120 each 0    traMADol (ULTRAM) 50 mg tablet Take 50 mg by mouth 2 (two) times a day       No current facility-administered medications for this visit  Past Medical History:   Diagnosis Date    Chronic pain     Diabetes mellitus ([address] )     Hyperlipidemia     Hypertension      Past Surgical History:   Procedure Laterality Date    BACK SURGERY      COLONOSCOPY       Social History     Socioeconomic History    Marital status:      Spouse name: None    Number of children: None    Years of education: None    Highest education level: None   Occupational History    None   Social Needs    Financial resource strain: None    Food insecurity:     Worry: None     Inability: None    Transportation needs:     Medical: None     Non-medical: None   Tobacco Use    Smoking status: Never Smoker    Smokeless tobacco: Never Used   Substance and Sexual Activity    Alcohol use: No    Drug use: No    Sexual activity: None   Lifestyle    Physical activity:     Days per week: None     Minutes per session: None    Stress: None   Relationships    Social connections:     Talks on phone: None     Gets together: None     Attends Anabaptist service: None     Active member of club or organization: None     Attends meetings of clubs or organizations: None     Relationship status: None    Intimate partner violence:     Fear of current or ex partner: None     Emotionally abused: None     Physically abused: None     Forced sexual activity: None   Other Topics Concern    None   Social History Narrative    None     Family History   Problem Relation Age of Onset    Colon cancer Mother     Heart attack Father             PHYSICAL EXAM:    Vitals:    01/07/20 1424   BP: 150/68   Pulse: 60   Weight: 73 6 kg (162 lb 3 2 oz)   Height: 5' 1" (1 549 m)     General Appearance:   Alert and oriented x 3  Cooperative, and in no respiratory distress   HEENT:   Normocephalic, atraumatic, anicteric      Neck:  Supple, symmetrical, trachea midline   Lungs:   Clear to auscultation bilaterally    Heart[de-identified]   Regular rate and rhythm   Abdomen:   Soft, non-tender, non-distended; normal bowel sounds; no masses, no organomegaly    Genitalia:   Deferred    Rectal:   Deferred    Extremities:  No cyanosis, clubbing or edema    Pulses:  2+ and symmetric all extremities    Skin:  Skin color, texture, turgor normal, no rashes or lesions    Lymph nodes:  No palpable cervical or supraclavicular lymphadenopathy        Lab Results:             Invalid input(s): LABALBU            Imaging Studies: I have personally reviewed pertinent imaging studies  Xr Abdomen Obstruction Series    Result Date: 11/15/2019  Impression: Progressive moderate colonic stool burden consistent with constipation No acute cardiopulmonary disease Workstation performed: [id number]       ASSESSMENT and PLAN:      1) Chronic constipation, stool transit - Patient is currently going every other day  She feels as though she is fully evacuating at this point  No alarm symptoms at this time  - I instructed the patient to take MiraLax if she has not had a bowel movement in 2 days  - I also instructed her to call the office if she were to have any difficulty at home  - She will continue daily Citrucel  - No indication for colonoscopy at this time secondary to advanced age and lack of alarm symptoms        Follow up PRN

## 2020-02-13 ENCOUNTER — OFFICE VISIT (OUTPATIENT)
Dept: DERMATOLOGY | Facility: CLINIC | Age: 85
End: 2020-02-13
Payer: MEDICARE

## 2020-02-13 DIAGNOSIS — L82.1 SEBORRHEIC KERATOSIS: ICD-10-CM

## 2020-02-13 DIAGNOSIS — L57.0 ACTINIC KERATOSIS: Primary | ICD-10-CM

## 2020-02-13 DIAGNOSIS — Z85.828 HISTORY OF SKIN CANCER: ICD-10-CM

## 2020-02-13 DIAGNOSIS — Z13.89 SCREENING FOR SKIN CONDITION: ICD-10-CM

## 2020-02-13 PROCEDURE — 17000 DESTRUCT PREMALG LESION: CPT | Performed by: DERMATOLOGY

## 2020-02-13 PROCEDURE — 1160F RVW MEDS BY RX/DR IN RCRD: CPT | Performed by: DERMATOLOGY

## 2020-02-13 PROCEDURE — 4040F PNEUMOC VAC/ADMIN/RCVD: CPT | Performed by: DERMATOLOGY

## 2020-02-13 PROCEDURE — 17003 DESTRUCT PREMALG LES 2-14: CPT | Performed by: DERMATOLOGY

## 2020-02-13 PROCEDURE — 3078F DIAST BP <80 MM HG: CPT | Performed by: DERMATOLOGY

## 2020-02-13 PROCEDURE — 99213 OFFICE O/P EST LOW 20 MIN: CPT | Performed by: DERMATOLOGY

## 2020-02-13 PROCEDURE — 1036F TOBACCO NON-USER: CPT | Performed by: DERMATOLOGY

## 2020-02-13 PROCEDURE — 3077F SYST BP >= 140 MM HG: CPT | Performed by: DERMATOLOGY

## 2020-02-13 RX ORDER — LEVOTHYROXINE SODIUM 0.05 MG/1
50 TABLET ORAL DAILY
COMMUNITY
Start: 2020-02-04 | End: 2021-09-16 | Stop reason: SDUPTHER

## 2020-02-13 NOTE — PROGRESS NOTES
[facility] DERMATOLOGY  [address] [place] [address]  [facility] [address]  [phone]  [phone]     MRN: [id number] : 3/24/1929  Encounter: [id number]  Patient Information: Akash Door  Chief complaint:  Six-month check    History of present illness:  80-year-old female with previous history of skin cancer actinic keratosis presents for follow-up patient notes some scaling areas on the dorsum of the hands also on his on her feet and face no other concerns noted  Past Medical History:   Diagnosis Date    Chronic pain     Diabetes mellitus ([address] )     Hyperlipidemia     Hypertension      Past Surgical History:   Procedure Laterality Date    BACK SURGERY      COLONOSCOPY       Social History   Social History     Substance and Sexual Activity   Alcohol Use No     Social History     Substance and Sexual Activity   Drug Use No     Social History     Tobacco Use   Smoking Status Never Smoker   Smokeless Tobacco Never Used     Family History   Problem Relation Age of Onset    Colon cancer Mother     Heart attack Father      Meds/Allergies   Allergies   Allergen Reactions    Hydrocodone-Acetaminophen Nausea Only       Meds:  Prior to Admission medications    Medication Sig Start Date End Date Taking?  Authorizing Provider   amLODIPine (NORVASC) 5 mg tablet Take 5 mg by mouth daily   14  Yes Historical Provider, MD   aspirin 81 MG tablet Take 81 mg by mouth daily     Yes Historical Provider, MD   atorvastatin (LIPITOR) 40 mg tablet Take 40 mg by mouth daily at bedtime   14  Yes Historical Provider, MD   glucose blood (ACCU-CHEK ACTIVE STRIPS) test strip 1 each by Other route as needed Use as instructed   Yes Historical Provider, MD   hydrALAZINE (APRESOLINE) 10 mg tablet Take 10 mg by mouth 3 (three) times a day   Yes Historical Provider, MD   insulin glargine (LANTUS SOLOSTAR) injection pen 100 units/mL Inject 14 Units under the skin daily at bedtime   14  Yes Historical Provider, MD   Insulin Lispro (HUMALOG KWIKPEN SC) Inject 6 Units under the skin 3 (three) times a day before meals   Yes Historical Provider, MD   latanoprost (XALATAN) 0 005 % ophthalmic solution Administer 1 drop to both eyes daily at bedtime   Yes Historical Provider, MD   levothyroxine 50 mcg tablet Take 50 mcg by mouth daily 2/4/20 2/3/21 Yes Historical Provider, MD   losartan (COZAAR) 100 MG tablet Take 100 mg by mouth daily   Yes Historical Provider, MD   metoprolol succinate (TOPROL-XL) 50 mg 24 hr tablet Take 50 mg by mouth daily   7/30/14  Yes Historical Provider, MD   Multiple Vitamins-Minerals (CENTRUM SILVER 50+WOMEN PO) Take 1 tablet by mouth daily   Yes Historical Provider, MD   omeprazole (PriLOSEC) 20 mg delayed release capsule Take 20 mg by mouth daily   Yes Historical Provider, MD   acetaminophen (TYLENOL) 325 mg tablet Take 3 tablets by mouth 3 (three) times a day Take 975mg by mouth three times a day x 1 week then change to 650mg PO q4hr prn for pain  Patient not taking: Reported on 1/7/2020 12/31/17   Laxmi Jonas DO   cyanocobalamin (VITAMIN B-12) 1,000 mcg tablet Take 1,000 mcg by mouth daily    Historical Provider, MD   docusate sodium (COLACE) 100 mg capsule Take 1 capsule by mouth 2 (two) times a day as needed for constipation  Patient not taking: Reported on 2/13/2020 12/31/17   Laxmi Jonas DO   insulin lispro (HumaLOG) 100 units/mL injection Inject 1-5 Units under the skin 3 (three) times a day before meals  Patient not taking: Reported on 2/13/2020 12/31/17   Laxmi Jonas DO   insulin lispro (HumaLOG) 100 units/mL injection Inject 1-5 Units under the skin daily at bedtime  Patient not taking: Reported on 2/13/2020 12/31/17   Laxmi Jonas DO   magnesium hydroxide (MILK OF MAGNESIA) 400 mg/5 mL oral suspension Take 15 mL by mouth daily as needed for constipation or heartburn  Patient not taking: Reported on 1/7/2020 12/31/17   Laxmi Jonas DO ondansetron (ZOFRAN) 4 mg tablet Take 1 tablet by mouth every 8 (eight) hours as needed for nausea or vomiting  Patient not taking: Reported on 3/7/2019 12/31/17   Hai Simpson DO   polyethylene glycol (MIRALAX) 17 g packet Take 17 g by mouth daily as needed (constipation)  Patient not taking: Reported on 1/7/2020 12/31/17   Hai Simpson DO   polyvinyl alcohol (ARTIFICIAL TEARS) 1 4 % ophthalmic solution 1 drop every 4 (four) hours as needed for dry eyes    Historical Provider, MD   senna (SENOKOT) 8 6 mg Take 2 tablets by mouth daily at bedtime  Patient not taking: Reported on 3/7/2019 12/31/17   Hai Simpson DO   traMADol (ULTRAM) 50 mg tablet Take 50 mg by mouth 2 (two) times a day    Historical Provider, MD       Subjective:     Review of Systems:    General: negative for - chills, fatigue, fever,  weight gain or weight loss  Psychological: negative for - anxiety, behavioral disorder, concentration difficulties, decreased libido, depression, irritability, memory difficulties, mood swings, sleep disturbances or suicidal ideation  ENT: negative for - hearing difficulties , nasal congestion, nasal discharge, oral lesions, sinus pain, sneezing, sore throat  Allergy and Immunology: negative for - hives, insect bite sensitivity,  Hematological and Lymphatic: negative for - bleeding problems, blood clots,bruising, swollen lymph nodes  Endocrine: negative for - hair pattern changes, hot flashes, malaise/lethargy, mood swings, palpitations, polydipsia/polyuria, skin changes, temperature intolerance or unexpected weight change  Respiratory: negative for - cough, hemoptysis, orthopnea, shortness of breath, or wheezing  Cardiovascular: negative for - chest pain, dyspnea on exertion, edema,  Gastrointestinal: negative for - abdominal pain, nausea/vomiting  Genito-Urinary: negative for - dysuria, incontinence, irregular/heavy menses or urinary frequency/urgency  Musculoskeletal: negative for - gait disturbance, joint pain, joint stiffness, joint swelling, muscle pain, muscular weakness  Dermatological:  As in HPI  Neurological: negative for confusion, dizziness, headaches, impaired coordination/balance, memory loss, numbness/tingling, seizures, speech problems, tremors or weakness       Objective: There were no vitals taken for this visit  Physical Exam:    General Appearance:    Alert, cooperative, no distress   Head:    Normocephalic, without obvious abnormality, atraumatic           Skin:   A full skin exam was performed including scalp, head scalp, eyes, ears, nose, lips, neck, chest, axilla, abdomen, back, buttocks, bilateral upper extremities, bilateral lower extremities, hands, feet, fingers, toes, fingernails, and toenails scaling erythematous areas noted face also dorsum hands also the instep of the left foot normal keratotic papules greasy stuck on appearance previous sites skin cancer well healed without recurrence nothing else atypical noted on exam  Physical Exam   Constitutional:       HENT:   Head:       Cryotherapy Procedure Note    Pre-operative Diagnosis: actinic keratosis    Plan:  1  Instructed to keep the area dry and clean thereafter  Apply petrolatum if area gets crusty  2  Recommended that the patient use acetaminophen  as needed for pain  Locations:  Face and dorsum of the hands and left instep and leg    Indications: Destruction of actinic keratoses x8    Patient informed of risks (permanent scarring, infection, light or dark discoloration, bleeding, infection, weakness, numbness and recurrence of the lesion) and benefits of the procedure and verbal informed consent obtained  The areas are treated with liquid nitrogen therapy, frozen until ice ball extended 2 mm beyond lesion, allowed to thaw, and treated again  The patient tolerated procedure well  The patient was instructed on post-op care, warned that there may be blister formation, redness and pain   Recommend OTC analgesia as needed for pain  Condition:  Stable    Complications:  none  Assessment:     1  Actinic keratosis     2  Seborrheic keratosis     3  History of skin cancer     4  Screening for skin condition           Plan:   Actinic Keratosis:  Patient advised lesions are precancers  These should resolve with cryosurgery if not to let us know sun block recommended  Seborrheic keratosis patient reassured these are normal growths we acquire with age no treatment needed  History of skin cancer in no recurrence nothing else atypical sunblock recommended follow-up in 6 months  Screening for dermatologic disorders nothing else of concern noted on complete exam follow-up in 6 months    Joann Granger MD  2/13/2020,11:23 AM    Portions of the record may have been created with voice recognition software   Occasional wrong word or "sound a like" substitutions may have occurred due to the inherent limitations of voice recognition software   Read the chart carefully and recognize, using context, where substitutions have occurred

## 2020-02-13 NOTE — PATIENT INSTRUCTIONS
Actinic Keratosis:  Patient advised lesions are precancers  These should resolve with cryosurgery if not to let us know sun block recommended  Seborrheic keratosis patient reassured these are normal growths we acquire with age no treatment needed  History of skin cancer in no recurrence nothing else atypical sunblock recommended follow-up in 6 months  Screening for dermatologic disorders nothing else of concern noted on complete exam follow-up in 6 months  Treatment with Cryotherapy    The doctor has treated your skin with nitrogen, which is 320 degrees Fahrenheit below zero  He has given the treated area "frostbite "    Stinging should subside within a few hours  You can take Tylenol for pain, if needed  Over the next few days, it is normal if the area becomes reddened, a blood blister, or swollen with fluid  If the lesion treated was near the eye - you could get a swollen eye over the next few days  Do not panic! This is all temporary, and will resolve with time  There is no special treatment - just keep the area clean  Makeup and BandAids can be used, if preferred  When the area starts to dry up and peel off, using Vaseline can help healing  It usually takes up to a month for it to heal   Some lesions are recurrent and may require repeat treatments  If a lesion has not healed in one month, please don't hesitate to contact us  If you have any further questions that are not answered here, please call us  [phone]    Thank you for allowing us to care for you

## 2020-04-14 ENCOUNTER — HOSPITAL ENCOUNTER (INPATIENT)
Facility: HOSPITAL | Age: 85
LOS: 6 days | Discharge: HOME | DRG: 177 | End: 2020-04-21
Attending: EMERGENCY MEDICINE | Admitting: INTERNAL MEDICINE
Payer: MEDICARE

## 2020-04-14 ENCOUNTER — APPOINTMENT (EMERGENCY)
Dept: RADIOLOGY | Facility: HOSPITAL | Age: 85
DRG: 177 | End: 2020-04-14
Payer: MEDICARE

## 2020-04-14 DIAGNOSIS — J18.9 PNEUMONIA: Primary | ICD-10-CM

## 2020-04-14 DIAGNOSIS — Z20.822 SUSPECTED COVID-19 VIRUS INFECTION: ICD-10-CM

## 2020-04-14 PROBLEM — R50.9 FEVER: Status: ACTIVE | Noted: 2020-04-14

## 2020-04-14 PROBLEM — E11.9 DM (DIABETES MELLITUS), TYPE 2 (HCC): Status: ACTIVE | Noted: 2017-12-28

## 2020-04-14 LAB
ANION GAP SERPL CALCULATED.3IONS-SCNC: 9 MMOL/L (ref 4–13)
BASOPHILS # BLD AUTO: 0.04 THOUSANDS/ΜL (ref 0–0.1)
BASOPHILS NFR BLD AUTO: 1 % (ref 0–1)
BUN SERPL-MCNC: 24 MG/DL (ref 5–25)
CA-I BLD-SCNC: 1.09 MMOL/L (ref 1.12–1.32)
CALCIUM SERPL-MCNC: 8.9 MG/DL (ref 8.3–10.1)
CHLORIDE SERPL-SCNC: 101 MMOL/L (ref 100–108)
CK SERPL-CCNC: 71 U/L (ref 26–192)
CO2 SERPL-SCNC: 25 MMOL/L (ref 21–32)
CREAT SERPL-MCNC: 1.23 MG/DL (ref 0.6–1.3)
CRP SERPL QL: 6.2 MG/L
D DIMER PPP FEU-MCNC: 1.12 UG/ML FEU
EOSINOPHIL # BLD AUTO: 0.02 THOUSAND/ΜL (ref 0–0.61)
EOSINOPHIL NFR BLD AUTO: 0 % (ref 0–6)
ERYTHROCYTE [DISTWIDTH] IN BLOOD BY AUTOMATED COUNT: 12.8 % (ref 11.6–15.1)
GFR SERPL CREATININE-BSD FRML MDRD: 38 ML/MIN/1.73SQ M
GLUCOSE SERPL-MCNC: 233 MG/DL (ref 65–140)
GLUCOSE SERPL-MCNC: 234 MG/DL (ref 65–140)
GLUCOSE SERPL-MCNC: 288 MG/DL (ref 65–140)
HCT VFR BLD AUTO: 46.7 % (ref 34.8–46.1)
HGB BLD-MCNC: 15.3 G/DL (ref 11.5–15.4)
IMM GRANULOCYTES # BLD AUTO: 0.04 THOUSAND/UL (ref 0–0.2)
IMM GRANULOCYTES NFR BLD AUTO: 1 % (ref 0–2)
INR PPP: 1.01 (ref 0.84–1.19)
LACTATE SERPL-SCNC: 1.3 MMOL/L (ref 0.5–2)
LDH SERPL-CCNC: 298 U/L (ref 81–234)
LYMPHOCYTES # BLD AUTO: 1.53 THOUSANDS/ΜL (ref 0.6–4.47)
LYMPHOCYTES NFR BLD AUTO: 19 % (ref 14–44)
MCH RBC QN AUTO: 30.5 PG (ref 26.8–34.3)
MCHC RBC AUTO-ENTMCNC: 32.8 G/DL (ref 31.4–37.4)
MCV RBC AUTO: 93 FL (ref 82–98)
MONOCYTES # BLD AUTO: 0.75 THOUSAND/ΜL (ref 0.17–1.22)
MONOCYTES NFR BLD AUTO: 10 % (ref 4–12)
NEUTROPHILS # BLD AUTO: 5.52 THOUSANDS/ΜL (ref 1.85–7.62)
NEUTS SEG NFR BLD AUTO: 69 % (ref 43–75)
NRBC BLD AUTO-RTO: 0 /100 WBCS
NT-PROBNP SERPL-MCNC: 343 PG/ML
PLATELET # BLD AUTO: 225 THOUSANDS/UL (ref 149–390)
PMV BLD AUTO: 9.6 FL (ref 8.9–12.7)
POTASSIUM SERPL-SCNC: 4.6 MMOL/L (ref 3.5–5.3)
PROTHROMBIN TIME: 13.3 SECONDS (ref 11.6–14.5)
RBC # BLD AUTO: 5.02 MILLION/UL (ref 3.81–5.12)
SODIUM SERPL-SCNC: 135 MMOL/L (ref 136–145)
TROPONIN I SERPL-MCNC: <0.02 NG/ML
WBC # BLD AUTO: 7.9 THOUSAND/UL (ref 4.31–10.16)

## 2020-04-14 PROCEDURE — 82948 REAGENT STRIP/BLOOD GLUCOSE: CPT

## 2020-04-14 PROCEDURE — 36415 COLL VENOUS BLD VENIPUNCTURE: CPT | Performed by: NURSE PRACTITIONER

## 2020-04-14 PROCEDURE — 99220 PR INITIAL OBSERVATION CARE/DAY 70 MINUTES: CPT | Performed by: NURSE PRACTITIONER

## 2020-04-14 PROCEDURE — 1123F ACP DISCUSS/DSCN MKR DOCD: CPT | Performed by: FAMILY MEDICINE

## 2020-04-14 PROCEDURE — 83605 ASSAY OF LACTIC ACID: CPT | Performed by: NURSE PRACTITIONER

## 2020-04-14 PROCEDURE — 99285 EMERGENCY DEPT VISIT HI MDM: CPT

## 2020-04-14 PROCEDURE — 84484 ASSAY OF TROPONIN QUANT: CPT | Performed by: NURSE PRACTITIONER

## 2020-04-14 PROCEDURE — 82330 ASSAY OF CALCIUM: CPT | Performed by: NURSE PRACTITIONER

## 2020-04-14 PROCEDURE — 80048 BASIC METABOLIC PNL TOTAL CA: CPT | Performed by: NURSE PRACTITIONER

## 2020-04-14 PROCEDURE — 71045 X-RAY EXAM CHEST 1 VIEW: CPT

## 2020-04-14 PROCEDURE — 82550 ASSAY OF CK (CPK): CPT | Performed by: NURSE PRACTITIONER

## 2020-04-14 PROCEDURE — 87040 BLOOD CULTURE FOR BACTERIA: CPT | Performed by: NURSE PRACTITIONER

## 2020-04-14 PROCEDURE — 93005 ELECTROCARDIOGRAM TRACING: CPT

## 2020-04-14 PROCEDURE — 86140 C-REACTIVE PROTEIN: CPT | Performed by: NURSE PRACTITIONER

## 2020-04-14 PROCEDURE — 85610 PROTHROMBIN TIME: CPT | Performed by: NURSE PRACTITIONER

## 2020-04-14 PROCEDURE — 83880 ASSAY OF NATRIURETIC PEPTIDE: CPT | Performed by: NURSE PRACTITIONER

## 2020-04-14 PROCEDURE — 85025 COMPLETE CBC W/AUTO DIFF WBC: CPT | Performed by: NURSE PRACTITIONER

## 2020-04-14 PROCEDURE — 99284 EMERGENCY DEPT VISIT MOD MDM: CPT | Performed by: NURSE PRACTITIONER

## 2020-04-14 PROCEDURE — 85379 FIBRIN DEGRADATION QUANT: CPT | Performed by: NURSE PRACTITIONER

## 2020-04-14 PROCEDURE — 83615 LACTATE (LD) (LDH) ENZYME: CPT | Performed by: NURSE PRACTITIONER

## 2020-04-14 PROCEDURE — 85384 FIBRINOGEN ACTIVITY: CPT | Performed by: NURSE PRACTITIONER

## 2020-04-14 PROCEDURE — 82728 ASSAY OF FERRITIN: CPT | Performed by: NURSE PRACTITIONER

## 2020-04-14 PROCEDURE — 84145 PROCALCITONIN (PCT): CPT | Performed by: NURSE PRACTITIONER

## 2020-04-14 RX ORDER — HYDROXYCHLOROQUINE SULFATE 200 MG/1
400 TABLET, FILM COATED ORAL EVERY 12 HOURS
Status: COMPLETED | OUTPATIENT
Start: 2020-04-14 | End: 2020-04-15

## 2020-04-14 RX ORDER — ASCORBIC ACID 500 MG
1000 TABLET ORAL EVERY 12 HOURS SCHEDULED
Status: COMPLETED | OUTPATIENT
Start: 2020-04-14 | End: 2020-04-21

## 2020-04-14 RX ORDER — METHYLPREDNISOLONE SODIUM SUCCINATE 40 MG/ML
40 INJECTION, POWDER, LYOPHILIZED, FOR SOLUTION INTRAMUSCULAR; INTRAVENOUS EVERY 12 HOURS SCHEDULED
Status: DISCONTINUED | OUTPATIENT
Start: 2020-04-14 | End: 2020-04-15

## 2020-04-14 RX ORDER — HYDRALAZINE HYDROCHLORIDE 10 MG/1
10 TABLET, FILM COATED ORAL 3 TIMES DAILY
Status: DISCONTINUED | OUTPATIENT
Start: 2020-04-14 | End: 2020-04-21 | Stop reason: HOSPADM

## 2020-04-14 RX ORDER — AZITHROMYCIN 250 MG/1
250 TABLET, FILM COATED ORAL EVERY 24 HOURS
Status: COMPLETED | OUTPATIENT
Start: 2020-04-15 | End: 2020-04-18

## 2020-04-14 RX ORDER — AZITHROMYCIN 250 MG/1
TABLET, FILM COATED ORAL
Qty: 6 TABLET | Refills: 0 | Status: SHIPPED | OUTPATIENT
Start: 2020-04-14 | End: 2020-04-20 | Stop reason: HOSPADM

## 2020-04-14 RX ORDER — METOPROLOL SUCCINATE 50 MG/1
50 TABLET, EXTENDED RELEASE ORAL DAILY
Status: DISCONTINUED | OUTPATIENT
Start: 2020-04-15 | End: 2020-04-21 | Stop reason: HOSPADM

## 2020-04-14 RX ORDER — SENNOSIDES 8.6 MG
2 TABLET ORAL
Status: DISCONTINUED | OUTPATIENT
Start: 2020-04-14 | End: 2020-04-21 | Stop reason: HOSPADM

## 2020-04-14 RX ORDER — PANTOPRAZOLE SODIUM 20 MG/1
20 TABLET, DELAYED RELEASE ORAL
Status: DISCONTINUED | OUTPATIENT
Start: 2020-04-15 | End: 2020-04-21 | Stop reason: HOSPADM

## 2020-04-14 RX ORDER — AMLODIPINE BESYLATE 5 MG/1
5 TABLET ORAL DAILY
Status: DISCONTINUED | OUTPATIENT
Start: 2020-04-15 | End: 2020-04-21 | Stop reason: HOSPADM

## 2020-04-14 RX ORDER — MAGNESIUM HYDROXIDE/ALUMINUM HYDROXICE/SIMETHICONE 120; 1200; 1200 MG/30ML; MG/30ML; MG/30ML
30 SUSPENSION ORAL EVERY 6 HOURS PRN
Status: DISCONTINUED | OUTPATIENT
Start: 2020-04-14 | End: 2020-04-21 | Stop reason: HOSPADM

## 2020-04-14 RX ORDER — POLYVINYL ALCOHOL 14 MG/ML
1 SOLUTION/ DROPS OPHTHALMIC EVERY 4 HOURS PRN
Status: DISCONTINUED | OUTPATIENT
Start: 2020-04-14 | End: 2020-04-14

## 2020-04-14 RX ORDER — TRAMADOL HYDROCHLORIDE 50 MG/1
50 TABLET ORAL 2 TIMES DAILY
Status: DISCONTINUED | OUTPATIENT
Start: 2020-04-14 | End: 2020-04-21 | Stop reason: HOSPADM

## 2020-04-14 RX ORDER — ASPIRIN 81 MG/1
81 TABLET ORAL DAILY
Status: DISCONTINUED | OUTPATIENT
Start: 2020-04-15 | End: 2020-04-21 | Stop reason: HOSPADM

## 2020-04-14 RX ORDER — SODIUM CHLORIDE 9 MG/ML
125 INJECTION, SOLUTION INTRAVENOUS CONTINUOUS
Status: DISCONTINUED | OUTPATIENT
Start: 2020-04-14 | End: 2020-04-14

## 2020-04-14 RX ORDER — LOSARTAN POTASSIUM 50 MG/1
100 TABLET ORAL DAILY
Status: DISCONTINUED | OUTPATIENT
Start: 2020-04-15 | End: 2020-04-21 | Stop reason: HOSPADM

## 2020-04-14 RX ORDER — MULTIVITAMIN/IRON/FOLIC ACID 18MG-0.4MG
1 TABLET ORAL DAILY
Status: DISCONTINUED | OUTPATIENT
Start: 2020-04-22 | End: 2020-04-21 | Stop reason: HOSPADM

## 2020-04-14 RX ORDER — LEVOTHYROXINE SODIUM 0.05 MG/1
50 TABLET ORAL DAILY
Status: DISCONTINUED | OUTPATIENT
Start: 2020-04-15 | End: 2020-04-21 | Stop reason: HOSPADM

## 2020-04-14 RX ORDER — ATORVASTATIN CALCIUM 40 MG/1
40 TABLET, FILM COATED ORAL
Status: DISCONTINUED | OUTPATIENT
Start: 2020-04-14 | End: 2020-04-21 | Stop reason: HOSPADM

## 2020-04-14 RX ORDER — ZINC SULFATE 50(220)MG
220 CAPSULE ORAL DAILY
Status: COMPLETED | OUTPATIENT
Start: 2020-04-15 | End: 2020-04-21

## 2020-04-14 RX ORDER — LATANOPROST 50 UG/ML
1 SOLUTION/ DROPS OPHTHALMIC
Status: DISCONTINUED | OUTPATIENT
Start: 2020-04-14 | End: 2020-04-21 | Stop reason: HOSPADM

## 2020-04-14 RX ORDER — HYDROXYCHLOROQUINE SULFATE 200 MG/1
200 TABLET, FILM COATED ORAL EVERY 12 HOURS
Status: COMPLETED | OUTPATIENT
Start: 2020-04-15 | End: 2020-04-19

## 2020-04-14 RX ORDER — ACETAMINOPHEN 325 MG/1
650 TABLET ORAL EVERY 6 HOURS PRN
Status: DISCONTINUED | OUTPATIENT
Start: 2020-04-14 | End: 2020-04-21 | Stop reason: HOSPADM

## 2020-04-14 RX ORDER — INSULIN GLARGINE 100 [IU]/ML
14 INJECTION, SOLUTION SUBCUTANEOUS
Status: DISCONTINUED | OUTPATIENT
Start: 2020-04-14 | End: 2020-04-15

## 2020-04-14 RX ORDER — AZITHROMYCIN 500 MG/1
500 TABLET, FILM COATED ORAL EVERY 24 HOURS
Status: DISCONTINUED | OUTPATIENT
Start: 2020-04-14 | End: 2020-04-14

## 2020-04-14 RX ORDER — AZITHROMYCIN 250 MG/1
250 TABLET, FILM COATED ORAL EVERY 24 HOURS
Status: DISCONTINUED | OUTPATIENT
Start: 2020-04-15 | End: 2020-04-14

## 2020-04-14 RX ORDER — ONDANSETRON 2 MG/ML
4 INJECTION INTRAMUSCULAR; INTRAVENOUS EVERY 6 HOURS PRN
Status: DISCONTINUED | OUTPATIENT
Start: 2020-04-14 | End: 2020-04-21 | Stop reason: HOSPADM

## 2020-04-14 RX ORDER — DOCUSATE SODIUM 100 MG/1
100 CAPSULE, LIQUID FILLED ORAL 2 TIMES DAILY PRN
Status: DISCONTINUED | OUTPATIENT
Start: 2020-04-14 | End: 2020-04-21 | Stop reason: HOSPADM

## 2020-04-14 RX ORDER — AZITHROMYCIN 500 MG/1
500 TABLET, FILM COATED ORAL ONCE
Status: COMPLETED | OUTPATIENT
Start: 2020-04-14 | End: 2020-04-14

## 2020-04-14 RX ORDER — HEPARIN SODIUM 5000 [USP'U]/ML
5000 INJECTION, SOLUTION INTRAVENOUS; SUBCUTANEOUS EVERY 8 HOURS SCHEDULED
Status: DISCONTINUED | OUTPATIENT
Start: 2020-04-14 | End: 2020-04-14

## 2020-04-14 RX ADMIN — LATANOPROST 1 DROP: 50 SOLUTION OPHTHALMIC at 22:43

## 2020-04-14 RX ADMIN — ATORVASTATIN CALCIUM 40 MG: 40 TABLET, FILM COATED ORAL at 22:42

## 2020-04-14 RX ADMIN — HYDROXYCHLOROQUINE SULFATE 400 MG: 200 TABLET, FILM COATED ORAL at 22:40

## 2020-04-14 RX ADMIN — HYDRALAZINE HYDROCHLORIDE 10 MG: 10 TABLET, FILM COATED ORAL at 22:43

## 2020-04-14 RX ADMIN — INSULIN LISPRO 4 UNITS: 100 INJECTION, SOLUTION INTRAVENOUS; SUBCUTANEOUS at 22:43

## 2020-04-14 RX ADMIN — AZITHROMYCIN 500 MG: 500 TABLET, FILM COATED ORAL at 18:25

## 2020-04-14 RX ADMIN — CEFTRIAXONE SODIUM 1000 MG: 10 INJECTION, POWDER, FOR SOLUTION INTRAVENOUS at 23:55

## 2020-04-14 RX ADMIN — INSULIN GLARGINE 14 UNITS: 100 INJECTION, SOLUTION SUBCUTANEOUS at 22:40

## 2020-04-14 RX ADMIN — OXYCODONE HYDROCHLORIDE AND ACETAMINOPHEN 1000 MG: 500 TABLET ORAL at 22:41

## 2020-04-14 RX ADMIN — METHYLPREDNISOLONE SODIUM SUCCINATE 40 MG: 40 INJECTION, POWDER, FOR SOLUTION INTRAMUSCULAR; INTRAVENOUS at 22:40

## 2020-04-14 RX ADMIN — SENNOSIDES 17.2 MG: 8.6 TABLET, FILM COATED ORAL at 22:41

## 2020-04-14 RX ADMIN — SODIUM CHLORIDE 125 ML/HR: 0.9 INJECTION, SOLUTION INTRAVENOUS at 19:41

## 2020-04-14 NOTE — ED NOTES
[facility] notified of pt's d/c from ED  Supervisor became angry and stated, "we cannot accept this patient back because she has presumed COVID-19 " [facility] document stating patients positive for COVID-19 that do not meet admission requirements are to be accepted back at facility  Advsd pt is not hypoxic, not tachycardic and is mantaining oxygen saturation above 96% on RA  Supervisor adamant about not receiving patient back at facility  Provider made aware  [facility] transport cancelled at this time       Virginia Garza RN  04/14/20 [id number]

## 2020-04-14 NOTE — DISCHARGE INSTRUCTIONS
Patient with likely presumed COVID case  Confirmation COVID testing cannot always be feasibly obtained and often cannot be used for developing treatment plan given prolonged multiple day turn around times  All patients counseled about presumed COVID infection and need for frequent hand hygiene, covering cough and self-isolation for 14 days from exposure even without symptoms, or if symptomatic for 7-10 days after symptom onset or until asymptomatic for 72 hours  Clinical criteria for admission was based on patients general appearance, respiratory status, exertional pulse oximetry and overall comorbid medical conditions  If patient without hypoxia, increased work of breathing, and otherwise non-toxic patient does not always meet criteria for hospitalization at the time of decision making  I counseled the patient about home health care including taking tylenol for fever and avoiding NSAIDs  Discussed with them that condition may worsen and they may require hospitalization at a later time and if they have any worsening shortness of breath, syncope, chest pain, inability to tolerate oral intake, fever that does not resolve with tylenol, or any new or worsening symptoms they should return to the hospital for repeat evaluation  A possible worsening condition may require additional treatment and/or admission

## 2020-04-14 NOTE — ED PROVIDER NOTES
History  Chief Complaint   Patient presents with    Fever - 75 years or older     reports of fever of 101 8, brougth in via EMS [facility], dry cough, 98 4 temproral      This is a 77-year-old female sent in from the nursing facility [facility] [employer] [facility]  She was sent in today secondary to fever reportedly of 101 8 and reports of a dry cough  She has had the symptoms for about 2 days  Patient reports that other patients at her facility have had the novel corona virus and some people of   Otherwise the patient appears well  She has no hypoxia no increased respiratory rate  She does endorse some feeling of weakness  Will obtain a chest x-ray to evaluate for pneumonia and also send corona virus testing and attempted send the patient back to the facility she does not require admission at this point  Prior to Admission Medications   Prescriptions Last Dose Informant Patient Reported? Taking?    Insulin Lispro (HUMALOG KWIKPEN SC)  Self Yes No   Sig: Inject 6 Units under the skin 3 (three) times a day before meals   Multiple Vitamins-Minerals (CENTRUM SILVER 50+WOMEN PO)  Self Yes No   Sig: Take 1 tablet by mouth daily   acetaminophen (TYLENOL) 325 mg tablet  Self No No   Sig: Take 3 tablets by mouth 3 (three) times a day Take 975mg by mouth three times a day x 1 week then change to 650mg PO q4hr prn for pain   Patient not taking: Reported on 2020   amLODIPine (NORVASC) 5 mg tablet  Self Yes No   Sig: Take 5 mg by mouth daily     aspirin 81 MG tablet  Self Yes No   Sig: Take 81 mg by mouth daily     atorvastatin (LIPITOR) 40 mg tablet  Self Yes No   Sig: Take 40 mg by mouth daily at bedtime     cyanocobalamin (VITAMIN B-12) 1,000 mcg tablet  Self Yes No   Sig: Take 1,000 mcg by mouth daily   docusate sodium (COLACE) 100 mg capsule  Self No No   Sig: Take 1 capsule by mouth 2 (two) times a day as needed for constipation   Patient not taking: Reported on 2020   glucose blood (ACCU-CHEK ACTIVE STRIPS) test strip  Self Yes No   Si each by Other route as needed Use as instructed   hydrALAZINE (APRESOLINE) 10 mg tablet  Self Yes No   Sig: Take 10 mg by mouth 3 (three) times a day   insulin glargine (LANTUS SOLOSTAR) injection pen 100 units/mL  Self Yes No   Sig: Inject 14 Units under the skin daily at bedtime     insulin lispro (HumaLOG) 100 units/mL injection  Self No No   Sig: Inject 1-5 Units under the skin 3 (three) times a day before meals   Patient not taking: Reported on 2020   insulin lispro (HumaLOG) 100 units/mL injection  Self No No   Sig: Inject 1-5 Units under the skin daily at bedtime   Patient not taking: Reported on 2020   latanoprost (XALATAN) 0 005 % ophthalmic solution  Self Yes No   Sig: Administer 1 drop to both eyes daily at bedtime   levothyroxine 50 mcg tablet  Self Yes No   Sig: Take 50 mcg by mouth daily   losartan (COZAAR) 100 MG tablet  Self Yes No   Sig: Take 100 mg by mouth daily   magnesium hydroxide (MILK OF MAGNESIA) 400 mg/5 mL oral suspension  Self No No   Sig: Take 15 mL by mouth daily as needed for constipation or heartburn   Patient not taking: Reported on 2020   metoprolol succinate (TOPROL-XL) 50 mg 24 hr tablet  Self Yes No   Sig: Take 50 mg by mouth daily     omeprazole (PriLOSEC) 20 mg delayed release capsule  Self Yes No   Sig: Take 20 mg by mouth daily   ondansetron (ZOFRAN) 4 mg tablet  Self No No   Sig: Take 1 tablet by mouth every 8 (eight) hours as needed for nausea or vomiting   Patient not taking: Reported on 3/7/2019   polyethylene glycol (MIRALAX) 17 g packet  Self No No   Sig: Take 17 g by mouth daily as needed (constipation)   Patient not taking: Reported on 2020   polyvinyl alcohol (ARTIFICIAL TEARS) 1 4 % ophthalmic solution  Self Yes No   Si drop every 4 (four) hours as needed for dry eyes   senna (SENOKOT) 8 6 mg  Self No No   Sig: Take 2 tablets by mouth daily at bedtime   Patient not taking: Reported on 3/7/2019   traMADol (ULTRAM) 50 mg tablet  Self Yes No   Sig: Take 50 mg by mouth 2 (two) times a day      Facility-Administered Medications: None       Past Medical History:   Diagnosis Date    Chronic pain     Diabetes mellitus ([address] )     Hyperlipidemia     Hypertension        Past Surgical History:   Procedure Laterality Date    BACK SURGERY      COLONOSCOPY         Family History   Problem Relation Age of Onset    Colon cancer Mother     Heart attack Father      I have reviewed and agree with the history as documented  E-Cigarette/Vaping     E-Cigarette/Vaping Substances     Social History     Tobacco Use    Smoking status: Never Smoker    Smokeless tobacco: Never Used   Substance Use Topics    Alcohol use: No    Drug use: No       Review of Systems   Constitutional: Positive for fever  Negative for diaphoresis and fatigue  HENT: Negative for congestion, ear pain, nosebleeds and sore throat  Eyes: Negative for photophobia, pain, discharge and visual disturbance  Respiratory: Positive for cough  Negative for choking, chest tightness, shortness of breath and wheezing  Cardiovascular: Negative for chest pain and palpitations  Gastrointestinal: Negative for abdominal distention, abdominal pain, diarrhea and vomiting  Genitourinary: Negative for dysuria, flank pain and frequency  Musculoskeletal: Negative for back pain, gait problem and joint swelling  Skin: Negative for color change and rash  Neurological: Negative for dizziness, syncope and headaches  Psychiatric/Behavioral: Negative for behavioral problems and confusion  The patient is not nervous/anxious  All other systems reviewed and are negative  Physical Exam  Physical Exam   Constitutional: She is oriented to person, place, and time  She appears well-developed and well-nourished  She is cooperative  Non-toxic appearance  She does not have a sickly appearance  She does not appear ill  No distress  HENT:   Head: Normocephalic and atraumatic  Right Ear: Tympanic membrane and external ear normal    Left Ear: Tympanic membrane and external ear normal    Nose: No rhinorrhea, sinus tenderness or nasal deformity  No epistaxis  Right sinus exhibits no maxillary sinus tenderness and no frontal sinus tenderness  Left sinus exhibits no maxillary sinus tenderness and no frontal sinus tenderness  Mouth/Throat: Oropharynx is clear and moist and mucous membranes are normal  Normal dentition  Eyes: Pupils are equal, round, and reactive to light  EOM are normal    Neck: Normal range of motion  Neck supple  Cardiovascular: Normal rate, regular rhythm and normal heart sounds  No murmur heard  Pulmonary/Chest: Effort normal and breath sounds normal  No accessory muscle usage  No respiratory distress  She has no wheezes  She has no rales  She exhibits no tenderness  Abdominal: Soft  She exhibits no distension  There is no guarding  Musculoskeletal: Normal range of motion  She exhibits no edema or tenderness  Lymphadenopathy:     She has no cervical adenopathy  Neurological: She is alert and oriented to person, place, and time  She exhibits normal muscle tone  Skin: Skin is warm and dry  No rash noted  No erythema  Psychiatric: She has a normal mood and affect  Nursing note and vitals reviewed        Vital Signs  ED Triage Vitals   Temperature Pulse Respirations Blood Pressure SpO2   04/14/20 1644 04/14/20 1644 04/14/20 1644 04/14/20 1644 04/14/20 1645   98 5 °F (36 9 °C) 71 18 (!) 221/91 94 %      Temp Source Heart Rate Source Patient Position - Orthostatic VS BP Location FiO2 (%)   04/14/20 1644 04/14/20 1644 04/14/20 1824 04/14/20 1824 --   Temporal Monitor Lying Right arm       Pain Score       --                  Vitals:    04/14/20 1644 04/14/20 1824 04/14/20 1915   BP: (!) 221/91 (!) 171/95    Pulse: 71 62 63   Patient Position - Orthostatic VS:  Lying          Visual Acuity      ED Medications  Medications   sodium chloride 0 9 % infusion (has no administration in time range)   ceftriaxone (ROCEPHIN) 1 g/50 mL in dextrose IVPB (has no administration in time range)   azithromycin (ZITHROMAX) tablet 500 mg (has no administration in time range)     Followed by   azithromycin (ZITHROMAX) tablet 250 mg (has no administration in time range)   hydroxychloroquine (PLAQUENIL) tablet 400 mg (has no administration in time range)     Followed by   hydroxychloroquine (PLAQUENIL) tablet 200 mg (has no administration in time range)   ascorbic acid (VITAMIN C) tablet 1,000 mg (has no administration in time range)   zinc sulfate (ZINCATE) capsule 220 mg (has no administration in time range)     Followed by   multivitamin-minerals (CENTRUM ADULTS) tablet 1 tablet (has no administration in time range)   atorvastatin (LIPITOR) tablet 40 mg (has no administration in time range)   methylPREDNISolone sodium succinate (Solu-MEDROL) injection 40 mg (has no administration in time range)   enoxaparin (LOVENOX) subcutaneous injection 40 mg (has no administration in time range)   acetaminophen (TYLENOL) tablet 650 mg (has no administration in time range)   aluminum-magnesium hydroxide-simethicone (MYLANTA) 200-200-20 mg/5 mL oral suspension 30 mL (has no administration in time range)   ondansetron (ZOFRAN) injection 4 mg (has no administration in time range)   azithromycin (ZITHROMAX) tablet 500 mg (500 mg Oral Given 4/14/20 1825)       Diagnostic Studies  Results Reviewed     Procedure Component Value Units Date/Time    Calcium, ionized [[id number]]     Lab Status:  No result Specimen:  Blood     C-reactive protein [[id number]]     Lab Status:  No result Specimen:  Blood     Ferritin [[id number]]     Lab Status:  No result Specimen:  Blood     D-dimer, quantitative [[id number]]     Lab Status:  No result Specimen:  Blood     NT-BNP PRO [[id number]]     Lab Status:  No result Specimen:  Blood     Troponin I [[id number]]     Lab Status:  No result Specimen:  Blood     LD,Blood [[id number]]     Lab Status:  No result Specimen:  Blood     Fibrinogen [[id number]]     Lab Status:  No result Specimen:  Blood     Protime-INR [[id number]]     Lab Status:  No result Specimen:  Blood     CK (with reflex to MB) [[id number]]     Lab Status:  No result Specimen:  Blood     Procalcitonin [[id number]]     Lab Status:  No result Specimen:  Blood     Blood culture #1 [[id number]]     Lab Status:  No result Specimen:  Blood     Blood culture #2 [[id number]]     Lab Status:  No result Specimen:  Blood     Lactic acid [[id number]]     Lab Status:  No result Specimen:  Blood     CBC and differential [[id number]]     Lab Status:  No result Specimen:  Blood     Basic metabolic panel [[id number]]     Lab Status:  No result Specimen:  Blood     Novel Coronavirus (COVID-19), PCR [facility] [[id number]] Collected:  04/14/20 1742    Lab Status: In process Specimen:  Nasopharyngeal Swab Updated:  04/14/20 1746    Fingerstick Glucose (POCT) [[id number]]  (Abnormal) Collected:  04/14/20 1648    Lab Status:  Final result Updated:  04/14/20 1649     POC Glucose 234 mg/dl                  XR chest 1 view portable   Final Result by Deborah Bueno MD (04/14 1745)      New right upper lobe infiltrate in keeping with pneumonia  The study was marked in [facility] for immediate notification  Workstation performed: [id number]                    Procedures  Procedures         ED Course                                       MDM  Number of Diagnoses or Management Options  Pneumonia: new and requires workup  Suspected Covid-19 Virus Infection: new and requires workup  Diagnosis management comments: Patient does have a right upper lobe infiltrate which could be related to novel Coronavirus  PCR testing sent to the lab core because we are planning on sending the patient home  The nursing home refused return until testing is resulted an patient is medically clear    This was discussed with the nursing facility [address] who continued to refuse the patient's return and reported no beds  This point we will admit the patient for observation as she does not require full admission at this point  Amount and/or Complexity of Data Reviewed  Clinical lab tests: reviewed and ordered  Tests in the radiology section of CPT®: reviewed and ordered  Independent visualization of images, tracings, or specimens: yes    Patient Progress  Patient progress: stable        Disposition  Final diagnoses:   Pneumonia   Suspected Covid-19 Virus Infection     Time reflects when diagnosis was documented in both MDM as applicable and the Disposition within this note     Time User Action Codes Description Comment    4/14/2020  6:07 PM Lluvia Delgado Add [J18 9] Pneumonia     4/14/2020  6:08 PM Lluvia Delgado Add [R68 89] Suspected Covid-19 Virus Infection       ED Disposition     ED Disposition Condition Date/Time Comment    Discharge Stable Tue Apr 14, 2020  6:07 PM Corinne Zhu discharge to home/self care  Follow-up Information     Follow up With Specialties Details [address], [address] [facility], Nurse Practitioner Schedule an appointment as soon as possible for a visit  For [facility] [address]  [phone]            Patient's Medications   Discharge Prescriptions    AZITHROMYCIN (ZITHROMAX) 250 MG TABLET    Take 500 mg day 1, 250 mg days 2-5       Start Date: 4/14/2020 End Date: 4/18/2020       Order Dose: --       Quantity: 6 tablet    Refills: 0     No discharge procedures on file      PDMP Review     None          ED Provider  Electronically Signed by           JASBIR Bella  04/14/20 3721

## 2020-04-15 PROBLEM — U07.1 COVID-19 VIRUS DETECTED: Status: ACTIVE | Noted: 2020-04-14

## 2020-04-15 LAB
ANION GAP SERPL CALCULATED.3IONS-SCNC: 11 MMOL/L (ref 4–13)
ATRIAL RATE: 70 BPM
BUN SERPL-MCNC: 27 MG/DL (ref 5–25)
CALCIUM SERPL-MCNC: 9 MG/DL (ref 8.3–10.1)
CHLORIDE SERPL-SCNC: 102 MMOL/L (ref 100–108)
CK SERPL-CCNC: 92 U/L (ref 26–192)
CO2 SERPL-SCNC: 23 MMOL/L (ref 21–32)
CREAT SERPL-MCNC: 1.45 MG/DL (ref 0.6–1.3)
CRP SERPL QL: 6.7 MG/L
FERRITIN SERPL-MCNC: 208 NG/ML (ref 8–388)
FERRITIN SERPL-MCNC: 212 NG/ML (ref 8–388)
FIBRINOGEN PPP-MCNC: 522 MG/DL (ref 227–495)
FIBRINOGEN PPP-MCNC: 528 MG/DL (ref 227–495)
GFR SERPL CREATININE-BSD FRML MDRD: 32 ML/MIN/1.73SQ M
GLUCOSE SERPL-MCNC: 335 MG/DL (ref 65–140)
GLUCOSE SERPL-MCNC: 341 MG/DL (ref 65–140)
GLUCOSE SERPL-MCNC: 342 MG/DL (ref 65–140)
GLUCOSE SERPL-MCNC: 385 MG/DL (ref 65–140)
GLUCOSE SERPL-MCNC: 438 MG/DL (ref 65–140)
INR PPP: 1 (ref 0.84–1.19)
LDH SERPL-CCNC: 399 U/L (ref 81–234)
MAGNESIUM SERPL-MCNC: 2.2 MG/DL (ref 1.6–2.6)
P AXIS: 54 DEGREES
POTASSIUM SERPL-SCNC: 4.5 MMOL/L (ref 3.5–5.3)
PR INTERVAL: 244 MS
PROCALCITONIN SERPL-MCNC: <0.05 NG/ML
PROCALCITONIN SERPL-MCNC: <0.05 NG/ML
PROTHROMBIN TIME: 13.2 SECONDS (ref 11.6–14.5)
QRS AXIS: -1 DEGREES
QRSD INTERVAL: 68 MS
QT INTERVAL: 428 MS
QTC INTERVAL: 462 MS
SARS-COV-2 RNA RESP QL NAA+PROBE: POSITIVE
SODIUM SERPL-SCNC: 136 MMOL/L (ref 136–145)
T WAVE AXIS: 56 DEGREES
VENTRICULAR RATE: 70 BPM

## 2020-04-15 PROCEDURE — 87635 SARS-COV-2 COVID-19 AMP PRB: CPT | Performed by: INTERNAL MEDICINE

## 2020-04-15 PROCEDURE — 80048 BASIC METABOLIC PNL TOTAL CA: CPT | Performed by: NURSE PRACTITIONER

## 2020-04-15 PROCEDURE — 83615 LACTATE (LD) (LDH) ENZYME: CPT | Performed by: NURSE PRACTITIONER

## 2020-04-15 PROCEDURE — 84145 PROCALCITONIN (PCT): CPT | Performed by: NURSE PRACTITIONER

## 2020-04-15 PROCEDURE — 83735 ASSAY OF MAGNESIUM: CPT | Performed by: NURSE PRACTITIONER

## 2020-04-15 PROCEDURE — 93010 ELECTROCARDIOGRAM REPORT: CPT | Performed by: INTERNAL MEDICINE

## 2020-04-15 PROCEDURE — 99232 SBSQ HOSP IP/OBS MODERATE 35: CPT | Performed by: INTERNAL MEDICINE

## 2020-04-15 PROCEDURE — 82948 REAGENT STRIP/BLOOD GLUCOSE: CPT

## 2020-04-15 PROCEDURE — 85384 FIBRINOGEN ACTIVITY: CPT | Performed by: NURSE PRACTITIONER

## 2020-04-15 PROCEDURE — 85610 PROTHROMBIN TIME: CPT | Performed by: NURSE PRACTITIONER

## 2020-04-15 PROCEDURE — 86140 C-REACTIVE PROTEIN: CPT | Performed by: NURSE PRACTITIONER

## 2020-04-15 PROCEDURE — 82550 ASSAY OF CK (CPK): CPT | Performed by: NURSE PRACTITIONER

## 2020-04-15 PROCEDURE — 82728 ASSAY OF FERRITIN: CPT | Performed by: NURSE PRACTITIONER

## 2020-04-15 RX ORDER — INSULIN GLARGINE 100 [IU]/ML
18 INJECTION, SOLUTION SUBCUTANEOUS
Status: DISCONTINUED | OUTPATIENT
Start: 2020-04-15 | End: 2020-04-21 | Stop reason: HOSPADM

## 2020-04-15 RX ADMIN — INSULIN LISPRO 6 UNITS: 100 INJECTION, SOLUTION INTRAVENOUS; SUBCUTANEOUS at 21:21

## 2020-04-15 RX ADMIN — TRAMADOL HYDROCHLORIDE 50 MG: 50 TABLET, FILM COATED ORAL at 08:20

## 2020-04-15 RX ADMIN — CYANOCOBALAMIN TAB 500 MCG 1000 MCG: 500 TAB at 08:30

## 2020-04-15 RX ADMIN — ATORVASTATIN CALCIUM 40 MG: 40 TABLET, FILM COATED ORAL at 21:21

## 2020-04-15 RX ADMIN — INSULIN LISPRO 6 UNITS: 100 INJECTION, SOLUTION INTRAVENOUS; SUBCUTANEOUS at 16:55

## 2020-04-15 RX ADMIN — ENOXAPARIN SODIUM 30 MG: 30 INJECTION SUBCUTANEOUS at 08:30

## 2020-04-15 RX ADMIN — CEFTRIAXONE SODIUM 1000 MG: 10 INJECTION, POWDER, FOR SOLUTION INTRAVENOUS at 18:39

## 2020-04-15 RX ADMIN — INSULIN LISPRO 5 UNITS: 100 INJECTION, SOLUTION INTRAVENOUS; SUBCUTANEOUS at 16:55

## 2020-04-15 RX ADMIN — METHYLPREDNISOLONE SODIUM SUCCINATE 40 MG: 40 INJECTION, POWDER, FOR SOLUTION INTRAMUSCULAR; INTRAVENOUS at 08:20

## 2020-04-15 RX ADMIN — LOSARTAN POTASSIUM 100 MG: 50 TABLET, FILM COATED ORAL at 08:29

## 2020-04-15 RX ADMIN — METOPROLOL SUCCINATE 50 MG: 50 TABLET, EXTENDED RELEASE ORAL at 08:30

## 2020-04-15 RX ADMIN — INSULIN LISPRO 6 UNITS: 100 INJECTION, SOLUTION INTRAVENOUS; SUBCUTANEOUS at 07:25

## 2020-04-15 RX ADMIN — INSULIN GLARGINE 18 UNITS: 100 INJECTION, SOLUTION SUBCUTANEOUS at 21:21

## 2020-04-15 RX ADMIN — AZITHROMYCIN 250 MG: 250 TABLET, FILM COATED ORAL at 18:22

## 2020-04-15 RX ADMIN — OXYCODONE HYDROCHLORIDE AND ACETAMINOPHEN 1000 MG: 500 TABLET ORAL at 08:19

## 2020-04-15 RX ADMIN — HYDROXYCHLOROQUINE SULFATE 200 MG: 200 TABLET, FILM COATED ORAL at 18:39

## 2020-04-15 RX ADMIN — LEVOTHYROXINE SODIUM 50 MCG: 50 TABLET ORAL at 08:29

## 2020-04-15 RX ADMIN — PANTOPRAZOLE SODIUM 20 MG: 20 TABLET, DELAYED RELEASE ORAL at 05:09

## 2020-04-15 RX ADMIN — INSULIN LISPRO 6 UNITS: 100 INJECTION, SOLUTION INTRAVENOUS; SUBCUTANEOUS at 11:15

## 2020-04-15 RX ADMIN — HYDROXYCHLOROQUINE SULFATE 400 MG: 200 TABLET, FILM COATED ORAL at 08:20

## 2020-04-15 RX ADMIN — OXYCODONE HYDROCHLORIDE AND ACETAMINOPHEN 1000 MG: 500 TABLET ORAL at 21:21

## 2020-04-15 RX ADMIN — HYDRALAZINE HYDROCHLORIDE 10 MG: 10 TABLET, FILM COATED ORAL at 21:20

## 2020-04-15 RX ADMIN — LATANOPROST 1 DROP: 50 SOLUTION OPHTHALMIC at 21:21

## 2020-04-15 RX ADMIN — ZINC SULFATE 220 MG (50 MG) CAPSULE 220 MG: CAPSULE at 08:30

## 2020-04-15 RX ADMIN — HYDRALAZINE HYDROCHLORIDE 10 MG: 10 TABLET, FILM COATED ORAL at 16:55

## 2020-04-15 RX ADMIN — DEXTRAN 70 AND HYPROMELLOSE 2910 1 DROP: 1; 3 SOLUTION/ DROPS OPHTHALMIC at 08:20

## 2020-04-15 RX ADMIN — INSULIN LISPRO 5 UNITS: 100 INJECTION, SOLUTION INTRAVENOUS; SUBCUTANEOUS at 07:25

## 2020-04-15 RX ADMIN — HYDRALAZINE HYDROCHLORIDE 10 MG: 10 TABLET, FILM COATED ORAL at 08:21

## 2020-04-15 RX ADMIN — AMLODIPINE BESYLATE 5 MG: 5 TABLET ORAL at 08:29

## 2020-04-15 RX ADMIN — ASPIRIN 81 MG: 81 TABLET, COATED ORAL at 08:31

## 2020-04-15 NOTE — UTILIZATION REVIEW
Initial Clinical Review    OBS order 1933 converted to IP on 4/15 1296     Admitting Physician MARY MADDOX    Level of Care Med Surg    Estimated length of stay More than 2 Midnights    Certification I certify that inpatient services are medically necessary for this patient for a duration of greater than two midnights  See H&P and MD Progress Notes for additional information about the patient's course of treatment  ED Arrival Information     Expected Arrival Acuity Means of Arrival Escorted By Service Admission Type    - 4/14/2020 16:43 Urgent Ambulance [facility] EMS Hospitalist Urgent    Arrival Complaint    fever        Chief Complaint   Patient presents with    Fever - 76 years or older     reports of fever of 101 8, brougth in via EMS [facility], dry cough, 98 4 temproral      Assessment/Plan: 80 y o  female who presents with fever and cough  Patient [facility] a [facility] of [facility]  She is unable to return home until she has a completed COVID-19 test   She was experiencing of fever and cough over the last 24-48 hours as well as exposure secondary to living in a nursing home  She denies any chest pain chest tightness shortness of breath or difficulty breathing denies any lightheadedness dizziness or blurry vision denies any abdominal the nausea vomiting or diarrhea  She appears to be resting comfortably in the stretcher   * Fever  Assessment & Plan   Background:  Patient has had worsening cough over the last 48 hours and experienced a fever this morning, patient is a resident at [address] [place] [address] and was set to the ED for concern of COVID -19, patient has had exposure from other residents  ?  ROS: cough and fever  · Past Medical History: diabetes,   · COVID19- pending  · Patient is currently on room air oxygenating in mid to high 90s  · CXR revealed "New right upper lobe infiltrate in keeping with pneumonia "  · , CRP 6 2 , Ferritin pending, Troponin negative, , D-dimer 1 1 to, Procalcitonin pending, Fibrinogen pending, CK 71,   ? If procalcitonin negative X2 would consider discontinuing antibiotics  ? If ferritin >600 and/or CRP >90, notify CC team and consider 1mg/kg of glucocorticoids   · Azithromycin PO 500mg Qday X1 with 250mg on day 2-5  · Hydroxychloroquine for 5 days (400mg BID X1 day, 200mg BID X4 days)  · Ceftriaxone IV 1g daily   · Supplementation with vitamin C 1g PO BID and zinc 220 mg PO QD  · Atorvastatin 40mg QHS; will discontinue all other prehospital statin therapy   · Avoid use of NSAIDS  · Antipyretic therapy with tylenol   · EKG once now for baseline and daily for QTC monitoring  · Airborne/Droplet precautions ordered  · Supportive Care        DM (diabetes mellitus), type 2 ([facility] [address] )  Assessment & Plan        Lab Results   Component Value Date     HGBA1C 6 8 (H) 01/14/2020             Recent Labs     04/14/20  1648   POCGLU 234*         Blood Sugar Average: Last 72 hrs:  (P) 234   Continue patient's home regiment  Carb controlled diet  Plus sliding scale     Essential hypertension  Assessment & Plan  Continue home medications  Appears controlled on review     VTE Prophylaxis: Heparin  / sequential compression device   Code Status:  DNR  POLST: POLST form is not discussed and not completed at this time  Discussion with family:  Not available at this time     Anticipated Length of Stay:  Patient will be admitted on an Observation basis with an anticipated length of stay of  < 2 midnights     Justification for Hospital Stay:  Patient will require further testing for COVID and potential oxygen requirements       ED Triage Vitals   Temperature Pulse Respirations Blood Pressure SpO2   04/14/20 1644 04/14/20 1644 04/14/20 1644 04/14/20 1644 04/14/20 1645   98 5 °F (36 9 °C) 71 18 (!) 221/91 94 %      Temp Source Heart Rate Source Patient Position - Orthostatic VS BP Location FiO2 (%)   04/14/20 1644 04/14/20 1644 04/14/20 1824 04/14/20 1824 --   Temporal Monitor Lying Right arm Pain Score       04/14/20 2025       No Pain        Wt Readings from Last 1 Encounters:   04/14/20 74 8 kg (164 lb 14 5 oz)     Additional Vital Signs:   04/15/20 07:20:08  98 1 °F (36 7 °C)  67  18  161/71  101  96 %  None (Room air)     04/15/20 02:54:30  99 °F (37 2 °C)  65    145/69  94  96 %       04/14/20 23:10:25  98 2 °F (36 8 °C)  68  20  185/84Abnormal   118  97 %  None (Room air)     04/14/20 20:42:30  99 8 °F (37 7 °C)  67  19  176/70Abnormal     95 %       04/14/20 2038              None (Room air)     04/14/20 2025  99 8 °F (37 7 °C)  63  20  182/69Abnormal     97 %  None (Room air)  Lying   04/14/20 1915    63  25Abnormal              04/14/20 1824    62  18  171/95Abnormal     97 %  None (Room air)  Lying   04/14/20 1645            94 %  None (Room air       Pertinent Labs/Diagnostic Test Results:   4/14 CXR -    New right upper lobe infiltrate in keeping with pneumonia  4/14 EKG - pending   4/14 COVID - pending   Results from last 7 days   Lab Units 04/14/20 1941   WBC Thousand/uL 7 90   HEMOGLOBIN g/dL 15 3   HEMATOCRIT % 46 7*   PLATELETS Thousands/uL 225   NEUTROS ABS Thousands/µL 5 52     Results from last 7 days   Lab Units 04/14/20 1948 04/14/20  1941   SODIUM mmol/L  --  135*   POTASSIUM mmol/L  --  4 6   CHLORIDE mmol/L  --  101   CO2 mmol/L  --  25   ANION GAP mmol/L  --  9   BUN mg/dL  --  24   CREATININE mg/dL  --  1 23   EGFR ml/min/1 73sq m  --  38   CALCIUM mg/dL  --  8 9   CALCIUM, IONIZED mmol/L 1 09*  --      Results from last 7 days   Lab Units 04/15/20  0609 04/14/20 2121 04/14/20  1648   POC GLUCOSE mg/dl 341* 288* 234*     Results from last 7 days   Lab Units 04/14/20 1941   GLUCOSE RANDOM mg/dL 233*     Results from last 7 days   Lab Units 04/14/20 1948   CK TOTAL U/L 71     Results from last 7 days   Lab Units 04/14/20 1948   TROPONIN I ng/mL <0 02     Results from last 7 days   Lab Units 04/14/20 1948   D-DIMER QUANTITATIVE ug/ml FEU 1 12*     Results from last 7 days   Lab Units 04/15/20  0524 04/14/20  1948   PROTIME seconds 13 2 13 3   INR  1 00 1 01     Results from last 7 days   Lab Units 04/14/20  1941   LACTIC ACID mmol/L 1 3     Results from last 7 days   Lab Units 04/14/20 1948   NT-PRO BNP pg/mL 343     Results from last 7 days   Lab Units 04/14/20  1948   CRP mg/L 6 2*     Results from last 7 days   Lab Units 04/14/20 1941   BLOOD CULTURE  Received in Microbiology Lab  Culture in Progress  Received in Microbiology Lab  Culture in Progress       ED Treatment:   Medication Administration from 04/14/2020 1643 to 04/14/2020 2014       Date/Time Order Dose Route Action Action by Comments     04/14/2020 1825 azithromycin (ZITHROMAX) tablet 500 mg 500 mg Oral Given Renetta Montague RN      04/14/2020 1941 sodium chloride 0 9 % infusion 125 mL/hr Intravenous New Bag Renetta Montague RN         Past Medical History:   Diagnosis Date    Chronic pain     Diabetes mellitus ([facility] )     Hyperlipidemia     Hypertension      Present on Admission:   Essential hypertension   Fever   DM (diabetes mellitus), type 2 ([facility] )      Admitting Diagnosis: Pneumonia [J18 9]  Suspected Covid-19 Virus Infection [R68 89]  Age/Sex: 80 y o  female  Admission Orders:  Scheduled Medications:    Medications:  amLODIPine 5 mg Oral Daily   ascorbic acid 1,000 mg Oral Q12H [address]   aspirin 81 mg Oral Daily   atorvastatin 40 mg Oral HS   azithromycin 250 mg Oral Q24H   cefTRIAXone 1,000 mg Intravenous Q24H   vitamin B-12 1,000 mcg Oral Daily   enoxaparin 30 mg Subcutaneous Daily   hydrALAZINE 10 mg Oral TID   hydroxychloroquine 400 mg Oral Q12H   Followed by      hydroxychloroquine 200 mg Oral Q12H   insulin glargine 14 Units Subcutaneous HS   insulin lispro 1-6 Units Subcutaneous TID AC   insulin lispro 1-6 Units Subcutaneous HS   insulin lispro 6 Units Subcutaneous TID With Meals   latanoprost 1 drop Both Eyes HS   levothyroxine 50 mcg Oral Daily   losartan 100 mg Oral Daily   methylPREDNISolone sodium succinate 40 mg Intravenous Q12H [address]   metoprolol succinate 50 mg Oral Daily   zinc sulfate 220 mg Oral Daily   Followed by      David Wheatley ON 4/22/2020] multivitamin-minerals 1 tablet Oral Daily   pantoprazole 20 mg Oral Early Morning   senna 2 tablet Oral HS   traMADol 50 mg Oral BID     Continuous IV Infusions:     PRN Meds:    acetaminophen 650 mg Oral Q6H PRN   aluminum-magnesium hydroxide-simethicone 30 mL Oral Q6H PRN   dextran 70-hypromellose 1 drop Both Eyes Q4H PRN   docusate sodium 100 mg Oral BID PRN   ondansetron 4 mg Intravenous Q6H PRN     Fingerstick ac and hs   Reg diet  O2 to keep sat >92%   Tele   Contact and airborne isolation    Network Utilization Review Department  [email or web address]  ATTENTION: Please call with any questions or concerns to [phone] and carefully listen to the prompts so that you are directed to the right person  All voicemails are confidential   Kylie Ruiz all requests for admission clinical reviews, approved or denied determinations and any other requests to dedicated fax number below belonging to the campus where the patient is receiving treatment   List of dedicated fax numbers for the Facilities:  [address] [place] [address] DENIALS (Administrative/Medical Necessity) [phone]   [address] [place] [address] (Maternity/NICU/Pediatrics) [phone] [address] [facility] [phone]     [address] [phone]   [place] [phone]   [facility] [phone]   [address] [phone]   [facility]  [phone]   [address] [facility] [address]  [address] [facility] [address] [facility] [phone]

## 2020-04-15 NOTE — ED NOTES
1  Cc: pneumonia, suspected covid-19  2  Admission related to injury?: no  3  Orientation status: a/ox4  4  Abnormal labs/abnormal focused assessment/vitals: all labs in pending  5  Medication/drips: sodium chloride  6  Time of last narcotics given: see mar  7  IV lines, drains, tubes: 22 R forearm  8  Isolation status: standard  9  Skin check: pt voiced no complaints  10  Ambulation status: pt was not ambulatory during my care for assessment  Pt is from a nursing facility  11   ED RN name and phone number: Katelynn Velez [address] [facility], RN  04/14/20 2000

## 2020-04-15 NOTE — PROGRESS NOTES
Progress Note - Bo Garibay 3/24/1929, 80 y o  female MRN: [id number]    Unit/Bed#: -Yolande Encounter: [id number]    Primary Care Provider: JASBIR Groves   Date and time admitted to hospital: 4/14/2020  4:43 PM        * COVID-19 virus detected  Assessment & Plan   Background:  Patient has had worsening cough over the last 48 hours and experienced a fever this morning, patient is a resident at [facility] and was set to the ED for concern of COVID -19, patient has had exposure from other residents     COVID-19 possible   Patient is currently on room air oxygenating in mid to high 90s   CXR revealed "New right upper lobe infiltrate in keeping with pneumonia "   Will continue with azithromycin, Plaquenil, ceftriaxone   Monitor EKG   Supplementation with vitamin C 1g PO BID and zinc 220 mg PO QD   Atorvastatin 40mg QHS; will discontinue all other prehospital statin therapy    Airborne/Droplet precautions ordered   Supportive Care      DM (diabetes mellitus), type 2 [facility])  Assessment & Plan  Lab Results   Component Value Date    HGBA1C 6 8 (H) 01/14/2020       Recent Labs     04/14/20  1648 04/14/20  2121 04/15/20  0609 04/15/20  1112   POCGLU 234* 288* 341* 438*       Blood Sugar Average: Last 72 hrs:  (P) 325 25   Will increase patient to Lantus 18 units at bedtime and continue insulin lispro  6 units t i d  with meals   patient did receive steroids  Monitor Accu-Cheks, sliding scale for coverage    Essential hypertension  Assessment & Plan  Continue home medications  Appears controlled on review        VTE Pharmacologic Prophylaxis:   Pharmacologic:  Lovenox    Patient Centered Rounds: I have performed bedside rounds with nursing staff today  Education and Discussions with Family / Patient:  Patient, patient states she will be updating family herself    Time Spent for Care: 20 minutes    More than 50% of total time spent on counseling and coordination of care as described above     Current Length of Stay: 0 day(s)    Current Patient Status: Inpatient   Certification Statement: The patient will continue to require additional inpatient hospital stay due to COVID-19, fever, pneumonia    Discharge Plan / Estimated Discharge Date:  24-48 hours    Code Status: Level 3 - DNAR and DNI      Subjective:   Patient seen and examined at bedside, complaining of generalized weakness and diarrhea    Objective:     Vitals:   Temp (24hrs), Av 7 °F (37 1 °C), Min:97 8 °F (36 6 °C), Max:99 8 °F (37 7 °C)    Temp:  [97 8 °F (36 6 °C)-99 8 °F (37 7 °C)] 97 8 °F (36 6 °C)  HR:  [62-71] 66  Resp:  [18-25] 18  BP: (145-221)/(69-95) 168/73  SpO2:  [94 %-98 %] 98 %  Body mass index is 31 16 kg/m²  Input and Output Summary (last 24 hours): Intake/Output Summary (Last 24 hours) at 4/15/2020 1425  Last data filed at 4/15/2020 1300  Gross per 24 hour   Intake 1509 17 ml   Output    Net 1509 17 ml       Physical Exam:    Constitutional: Patient is oriented to person, place and time, no acute distress  HEENT:  Normocephalic, atraumatic  Cardiovascular: Normal S1S2, RRR, No murmurs/rubs/gallops appreciated  Pulmonary:  Bilateral air entry, No rhonchi/rales/wheezing appreciated  Abdominal: Soft, Bowel sounds present, Non-tender, Non-distended  Extremities:  No cyanosis, clubbing or edema  Neurological: Cranial nerves II-XII grossly intact, sensation intact, otherwise no focal neurological symptoms       Additional Data:     Labs:    Results from last 7 days   Lab Units 20  1941   WBC Thousand/uL 7 90   HEMOGLOBIN g/dL 15 3   HEMATOCRIT % 46 7*   PLATELETS Thousands/uL 225   NEUTROS PCT % 69   LYMPHS PCT % 19   MONOS PCT % 10   EOS PCT % 0     Results from last 7 days   Lab Units 20  194   POTASSIUM mmol/L 4 6   CHLORIDE mmol/L 101   CO2 mmol/L 25   BUN mg/dL 24   CREATININE mg/dL 1 23   CALCIUM mg/dL 8 9     Results from last 7 days   Lab Units 04/15/20  0524   INR  1 00        I Have Reviewed All Lab Data Listed Above  Recent Cultures (last 7 days):     Results from last 7 days   Lab Units 04/14/20 1941   BLOOD CULTURE  Received in Microbiology Lab  Culture in Progress  Received in Microbiology Lab  Culture in Progress         Last 24 Hours Medication List:     Current Facility-Administered Medications:  acetaminophen 650 mg Oral Q6H PRN [facility] [place], CRNP    aluminum-magnesium hydroxide-simethicone 30 mL Oral Q6H PRN [facility] [place], CRNP    amLODIPine 5 mg Oral Daily [facility] [place], CRNP    ascorbic acid 1,000 mg Oral Q12H [address] [facility], CRNP    aspirin 81 mg Oral Daily [facility], CRNP    atorvastatin 40 mg Oral HS [facility], CRNP    azithromycin 250 mg Oral Q24H [facility], CRNP    cefTRIAXone 1,000 mg Intravenous Q24H [facility] [place], CRNP Last Rate: 1,000 mg (04/14/20 6602)   vitamin B-12 1,000 mcg Oral Daily Danita Allred, JASBIR    dextran 70-hypromellose 1 drop Both Eyes Q4H PRN [facility] [place], CRNP    docusate sodium 100 mg Oral BID PRN [facility] Rosie, CRNP    enoxaparin 30 mg Subcutaneous Daily [facility] [place], CRJING    hydrALAZINE 10 mg Oral TID [facility] [place], CRJING    hydroxychloroquine 200 mg Oral Q12H [facility] Rosie, CRNP    insulin glargine 18 Units Subcutaneous HS Senait Reyes MD    insulin lispro 1-6 Units Subcutaneous TID AC Danita Allred, JASBIR    insulin lispro 1-6 Units Subcutaneous HS [facility] Rosie, CRJING    insulin lispro 6 Units Subcutaneous TID With Meals [facility] Rosie, CRJING    latanoprost 1 drop Both Eyes HS [facility] Rosie, CRJING    levothyroxine 50 mcg Oral Daily [facility] Rosie, CRNP    losartan 100 mg Oral Daily [facility] Rosie, CRNP    metoprolol succinate 50 mg Oral Daily [facility], CRNP    zinc sulfate 220 mg Oral Daily [facility], CRNP    Followed by        Rachel Blinks ON 4/22/2020] multivitamin-minerals 1 tablet Oral Daily JASBIR Murray    ondansetron 4 mg Intravenous Q6H PRN JASBIR Villagomez    pantoprazole 20 mg Oral Early Morning JASBIR Villagomez    senna 2 tablet Oral HS JASBIR Villagomez    traMADol 50 mg Oral BID JASBIR Villagomez         Today, Patient Was Seen By: Tyrese Meek MD

## 2020-04-15 NOTE — ASSESSMENT & PLAN NOTE
Background:  Patient has had worsening cough over the last 48 hours and experienced a fever this morning, patient is a resident at [address] [place] [address] and was set to the ED for concern of COVID -19, patient has had exposure from other residents  o ROS: cough and fever   Past Medical History: diabetes,    COVID19- pending   Patient is currently on room air oxygenating in mid to high 90s   CXR revealed "New right upper lobe infiltrate in keeping with pneumonia "   , CRP 6 2 , Ferritin pending, Troponin negative, , D-dimer 1 1 to, Procalcitonin pending, Fibrinogen pending, CK 71,   o If procalcitonin negative X2 would consider discontinuing antibiotics  o If ferritin >600 and/or CRP >90, notify CC team and consider 1mg/kg of glucocorticoids    Azithromycin PO 500mg Qday X1 with 250mg on day 2-5   Hydroxychloroquine for 5 days (400mg BID X1 day, 200mg BID X4 days)   Ceftriaxone IV 1g daily    Supplementation with vitamin C 1g PO BID and zinc 220 mg PO QD   Atorvastatin 40mg QHS; will discontinue all other prehospital statin therapy    Avoid use of NSAIDS   Antipyretic therapy with tylenol    EKG once now for baseline and daily for QTC monitoring   Airborne/Droplet precautions ordered  [facility]

## 2020-04-15 NOTE — ASSESSMENT & PLAN NOTE
Lab Results   Component Value Date    HGBA1C 6 8 (H) 01/14/2020       Recent Labs     04/14/20  1648   POCGLU 234*       Blood Sugar Average: Last 72 hrs:  (P) 234   Continue patient's home regiment  Carb controlled diet  Plus sliding scale

## 2020-04-15 NOTE — ASSESSMENT & PLAN NOTE
Background:  Patient has had worsening cough over the last 48 hours and experienced a fever this morning, patient is a resident at [address] [place] [address] and was set to the ED for concern of COVID -19, patient has had exposure from other residents     COVID-19 possible   Patient is currently on room air oxygenating in mid to high 90s   CXR revealed "New right upper lobe infiltrate in keeping with pneumonia "   Will continue with azithromycin, Plaquenil, ceftriaxone   Monitor EKG   Supplementation with vitamin C 1g PO BID and zinc 220 mg PO QD   Atorvastatin 40mg QHS; will discontinue all other prehospital statin therapy    Airborne/Droplet precautions ordered  [facility]

## 2020-04-15 NOTE — H&P
[facility] Internal Medicine  H&P- [facility] 3/24/1929, 80 y o  female MRN: [id number]    Unit/Bed#: -Yolande Encounter: [id number]    Primary Care Provider: JASBIR Reynoso   Date and time admitted to hospital: 4/14/2020  4:43 PM    * Fever  Assessment & Plan   Background:  Patient has had worsening cough over the last 48 hours and experienced a fever this morning, patient is a resident at [facility] and was set to the ED for concern of COVID -19, patient has had exposure from other residents  o ROS: cough and fever   Past Medical History: diabetes,    COVID19- pending   Patient is currently on room air oxygenating in mid to high 90s   CXR revealed "New right upper lobe infiltrate in keeping with pneumonia "   , CRP 6 2 , Ferritin pending, Troponin negative, , D-dimer 1 1 to, Procalcitonin pending, Fibrinogen pending, CK 71,   o If procalcitonin negative X2 would consider discontinuing antibiotics  o If ferritin >600 and/or CRP >90, notify CC team and consider 1mg/kg of glucocorticoids    Azithromycin PO 500mg Qday X1 with 250mg on day 2-5   Hydroxychloroquine for 5 days (400mg BID X1 day, 200mg BID X4 days)   Ceftriaxone IV 1g daily    Supplementation with vitamin C 1g PO BID and zinc 220 mg PO QD   Atorvastatin 40mg QHS; will discontinue all other prehospital statin therapy    Avoid use of NSAIDS   Antipyretic therapy with tylenol    EKG once now for baseline and daily for QTC monitoring   Airborne/Droplet precautions ordered   Supportive Care      DM (diabetes mellitus), type 2 ([facility] [address] )  Assessment & Plan  Lab Results   Component Value Date    HGBA1C 6 8 (H) 01/14/2020       Recent Labs     04/14/20  1648   POCGLU 234*       Blood Sugar Average: Last 72 hrs:  (P) 234   Continue patient's home regiment  Carb controlled diet  Plus sliding scale    Essential hypertension  Assessment & Plan  Continue home medications  Appears controlled on review     VTE Prophylaxis: Heparin  / sequential compression device   Code Status:  DNR  POLST: POLST form is not discussed and not completed at this time  Discussion with family:  Not available at this time    Anticipated Length of Stay:  Patient will be admitted on an Observation basis with an anticipated length of stay of  < 2 midnights  Justification for Hospital Stay:  Patient will require further testing for COVID and potential oxygen requirements    Total Time for Visit, including Counseling / Coordination of Care: 1 hour  Greater than 50% of this total time spent on direct patient counseling and coordination of care  Chief Complaint:   Fever and cough     Chief Complaint   Patient presents with    Fever - 75 years or older     reports of fever of 101 8, brougth in via [facility] [place], dry cough, 98 4 temproral          History of Present Illness:    Zaynab Johnson is a 80 y o  female who presents with fever and cough  Patient is a resident of [facility]  She is unable to return home until she has a completed COVID-19 test   She was experiencing of fever and cough over the last 24-48 hours as well as exposure secondary to living in a nursing home  She denies any chest pain chest tightness shortness of breath or difficulty breathing denies any lightheadedness dizziness or blurry vision denies any abdominal the nausea vomiting or diarrhea  She appears to be resting comfortably in the stretcher    Review of Systems:    Review of Systems    Past Medical and Surgical History:     Past Medical History:   Diagnosis Date    Chronic pain     Diabetes mellitus ([facility] [place] [address] )     Hyperlipidemia     Hypertension        Past Surgical History:   Procedure Laterality Date    BACK SURGERY      COLONOSCOPY         Meds/Allergies:    Prior to Admission medications    Medication Sig Start Date End Date Taking?  Authorizing Provider   amLODIPine (NORVASC) 5 mg tablet Take 5 mg by mouth daily   7/30/14  Yes Historical Provider, MD   aspirin 81 MG tablet Take 81 mg by mouth daily     Yes Historical Provider, MD   atorvastatin (LIPITOR) 40 mg tablet Take 40 mg by mouth daily at bedtime   7/30/14  Yes Historical Provider, MD   cyanocobalamin (VITAMIN B-12) 1,000 mcg tablet Take 1,000 mcg by mouth daily   Yes Historical Provider, MD   glucose blood (ACCU-CHEK ACTIVE STRIPS) test strip 1 each by Other route as needed Use as instructed   Yes Historical Provider, MD   hydrALAZINE (APRESOLINE) 10 mg tablet Take 10 mg by mouth 3 (three) times a day   Yes Historical Provider, MD   insulin glargine (LANTUS SOLOSTAR) injection pen 100 units/mL Inject 20 Units under the skin daily at bedtime  2/11/14  Yes Historical Provider, MD   Insulin Lispro (HUMALOG KWIKPEN SC) Inject 8 Units under the skin 3 (three) times a day before meals    Yes Historical Provider, MD   insulin lispro (HumaLOG) 100 units/mL injection Inject 1-5 Units under the skin 3 (three) times a day before meals 12/31/17  Yes Noah Rincon DO   latanoprost (XALATAN) 0 005 % ophthalmic solution Administer 1 drop to both eyes daily at bedtime   Yes Historical Provider, MD   levothyroxine 50 mcg tablet Take 50 mcg by mouth daily 2/4/20 2/3/21 Yes Historical Provider, MD   losartan (COZAAR) 100 MG tablet Take 100 mg by mouth daily   Yes Historical Provider, MD   metoprolol succinate (TOPROL-XL) 50 mg 24 hr tablet Take 50 mg by mouth daily   7/30/14  Yes Historical Provider, MD   Multiple Vitamins-Minerals (CENTRUM SILVER 50+WOMEN PO) Take 1 tablet by mouth daily   Yes Historical Provider, MD   omeprazole (PriLOSEC) 20 mg delayed release capsule Take 20 mg by mouth daily   Yes Historical Provider, MD   acetaminophen (TYLENOL) 325 mg tablet Take 3 tablets by mouth 3 (three) times a day Take 975mg by mouth three times a day x 1 week then change to 650mg PO q4hr prn for pain 12/31/17   Noah Rincon DO   azithromycin (ZITHROMAX) 250 mg tablet Take 500 mg day 1, 250 mg days 2-5 4/14/20 4/18/20  Author Hassan JASBIR   docusate sodium (COLACE) 100 mg capsule Take 1 capsule by mouth 2 (two) times a day as needed for constipation  Patient not taking: Reported on 2/13/2020 12/31/17   Ky Diggs DO   insulin lispro (HumaLOG) 100 units/mL injection Inject 1-5 Units under the skin daily at bedtime  Patient not taking: Reported on 2/13/2020 12/31/17   Ky Diggs DO   magnesium hydroxide (MILK OF MAGNESIA) 400 mg/5 mL oral suspension Take 15 mL by mouth daily as needed for constipation or heartburn  Patient not taking: Reported on 1/7/2020 12/31/17   Ky Diggs DO   [facility]etron [facility] 4 mg tablet Take 1 tablet by mouth every 8 (eight) hours as needed for nausea or vomiting  Patient not taking: Reported on 3/7/2019 12/31/17   Ky Diggs DO   polyethylene glycol (MIRALAX) 17 g packet Take 17 g by mouth daily as needed (constipation)  Patient not taking: Reported on 1/7/2020 12/31/17   Ky Diggs DO   polyvinyl alcohol (ARTIFICIAL TEARS) 1 4 % ophthalmic solution 1 drop every 4 (four) hours as needed for dry eyes    Historical Provider, MD   senna (SENOKOT) 8 6 mg Take 2 tablets by mouth daily at bedtime  Patient not taking: Reported on 3/7/2019 12/31/17   Ky Diggs DO   traMADol (ULTRAM) 50 mg tablet Take 50 mg by mouth 2 (two) times a day    Historical Provider, MD KEN have reviewed home medications with patient personally  Allergies: Allergies   Allergen Reactions    Hydrocodone-Acetaminophen Nausea Only       Social History:     Marital Status:     Occupation:  Retired  Patient Pre-hospital Living Situation:  Mathieu barrett  Patient Pre-hospital Level of Mobility:  Independent  Patient Pre-hospital Diet Restrictions:  None  Substance Use History:   Social History     Substance and Sexual Activity   Alcohol Use No     Social History     Tobacco Use   Smoking Status Never Smoker   Smokeless Tobacco Never Used     Social History     Substance and Sexual Activity   Drug Use No Family History:    Family History   Problem Relation Age of Onset    Colon cancer Mother     Heart attack Father        Physical Exam:     Vitals:   Blood Pressure: (!) 182/69 (04/14/20 2025)  Pulse: 67 (04/14/20 2042)  Temperature: 99 8 °F (37 7 °C) (04/14/20 2025)  Temp Source: Oral (04/14/20 2025)  Respirations: 19 (04/14/20 2042)  Weight - Scale: 74 8 kg (164 lb 14 5 oz) (04/14/20 1645)  SpO2: 95 % (04/14/20 2042)    Physical Exam    Additional Data:     Lab Results: I have personally reviewed pertinent reports  Results from last 7 days   Lab Units 04/14/20  1941   WBC Thousand/uL 7 90   HEMOGLOBIN g/dL 15 3   HEMATOCRIT % 46 7*   PLATELETS Thousands/uL 225   NEUTROS PCT % 69   LYMPHS PCT % 19   MONOS PCT % 10   EOS PCT % 0     Results from last 7 days   Lab Units 04/14/20  1941   SODIUM mmol/L 135*   POTASSIUM mmol/L 4 6   CHLORIDE mmol/L 101   CO2 mmol/L 25   BUN mg/dL 24   CREATININE mg/dL 1 23   ANION GAP mmol/L 9   CALCIUM mg/dL 8 9   GLUCOSE RANDOM mg/dL 233*     Results from last 7 days   Lab Units 04/14/20  1948   INR  1 01     Results from last 7 days   Lab Units 04/14/20  1648   POC GLUCOSE mg/dl 234*         Results from last 7 days   Lab Units 04/14/20  1941   LACTIC ACID mmol/L 1 3       Imaging: I have personally reviewed pertinent reports  XR chest 1 view portable   Final Result by Liz Spence MD (04/14 1745)      New right upper lobe infiltrate in keeping with pneumonia  The study was marked in [facility] for immediate notification  Workstation performed: [id number]             EKG, Pathology, and Other Studies Reviewed on Admission:   · EKG:  Not available    Allscripts / Epic Records Reviewed: Yes     ** Please Note: This note has been constructed using a voice recognition system   **

## 2020-04-15 NOTE — ASSESSMENT & PLAN NOTE
Lab Results   Component Value Date    HGBA1C 6 8 (H) 01/14/2020       Recent Labs     04/14/20  1648 04/14/20  2121 04/15/20  0609 04/15/20  1112   POCGLU 234* 288* 341* 438*       Blood Sugar Average: Last 72 hrs:  (P) 325 25   Will increase patient to Lantus 18 units at bedtime and continue insulin lispro  6 units t i d  with meals   patient did receive steroids  Monitor Accu-Cheks, sliding scale for coverage

## 2020-04-16 PROBLEM — Z86.79 HISTORY OF HEART BLOCK: Status: ACTIVE | Noted: 2020-04-16

## 2020-04-16 PROBLEM — E03.9 HYPOTHYROIDISM: Status: ACTIVE | Noted: 2020-04-16

## 2020-04-16 LAB
ANION GAP SERPL CALCULATED.3IONS-SCNC: 10 MMOL/L (ref 4–13)
ATRIAL RATE: 64 BPM
BASOPHILS # BLD AUTO: 0.02 THOUSANDS/ΜL (ref 0–0.1)
BASOPHILS NFR BLD AUTO: 0 % (ref 0–1)
BUN SERPL-MCNC: 33 MG/DL (ref 5–25)
CALCIUM SERPL-MCNC: 8.8 MG/DL (ref 8.3–10.1)
CHLORIDE SERPL-SCNC: 102 MMOL/L (ref 100–108)
CO2 SERPL-SCNC: 24 MMOL/L (ref 21–32)
CREAT SERPL-MCNC: 1.29 MG/DL (ref 0.6–1.3)
EOSINOPHIL # BLD AUTO: 0 THOUSAND/ΜL (ref 0–0.61)
EOSINOPHIL NFR BLD AUTO: 0 % (ref 0–6)
ERYTHROCYTE [DISTWIDTH] IN BLOOD BY AUTOMATED COUNT: 12.4 % (ref 11.6–15.1)
GFR SERPL CREATININE-BSD FRML MDRD: 36 ML/MIN/1.73SQ M
GLUCOSE SERPL-MCNC: 187 MG/DL (ref 65–140)
GLUCOSE SERPL-MCNC: 189 MG/DL (ref 65–140)
GLUCOSE SERPL-MCNC: 190 MG/DL (ref 65–140)
GLUCOSE SERPL-MCNC: 246 MG/DL (ref 65–140)
GLUCOSE SERPL-MCNC: 309 MG/DL (ref 65–140)
HCT VFR BLD AUTO: 39.7 % (ref 34.8–46.1)
HGB BLD-MCNC: 13.1 G/DL (ref 11.5–15.4)
IMM GRANULOCYTES # BLD AUTO: 0.15 THOUSAND/UL (ref 0–0.2)
IMM GRANULOCYTES NFR BLD AUTO: 1 % (ref 0–2)
LYMPHOCYTES # BLD AUTO: 1.18 THOUSANDS/ΜL (ref 0.6–4.47)
LYMPHOCYTES NFR BLD AUTO: 7 % (ref 14–44)
MCH RBC QN AUTO: 30.3 PG (ref 26.8–34.3)
MCHC RBC AUTO-ENTMCNC: 33 G/DL (ref 31.4–37.4)
MCV RBC AUTO: 92 FL (ref 82–98)
MONOCYTES # BLD AUTO: 1.15 THOUSAND/ΜL (ref 0.17–1.22)
MONOCYTES NFR BLD AUTO: 6 % (ref 4–12)
NEUTROPHILS # BLD AUTO: 15.62 THOUSANDS/ΜL (ref 1.85–7.62)
NEUTS SEG NFR BLD AUTO: 86 % (ref 43–75)
NRBC BLD AUTO-RTO: 0 /100 WBCS
P AXIS: 68 DEGREES
PLATELET # BLD AUTO: 211 THOUSANDS/UL (ref 149–390)
PMV BLD AUTO: 9.8 FL (ref 8.9–12.7)
POTASSIUM SERPL-SCNC: 4.4 MMOL/L (ref 3.5–5.3)
PR INTERVAL: 258 MS
QRS AXIS: 4 DEGREES
QRSD INTERVAL: 82 MS
QT INTERVAL: 478 MS
QTC INTERVAL: 493 MS
RBC # BLD AUTO: 4.32 MILLION/UL (ref 3.81–5.12)
SODIUM SERPL-SCNC: 136 MMOL/L (ref 136–145)
T WAVE AXIS: 21 DEGREES
VENTRICULAR RATE: 64 BPM
WBC # BLD AUTO: 18.12 THOUSAND/UL (ref 4.31–10.16)

## 2020-04-16 PROCEDURE — 82948 REAGENT STRIP/BLOOD GLUCOSE: CPT

## 2020-04-16 PROCEDURE — 99232 SBSQ HOSP IP/OBS MODERATE 35: CPT | Performed by: INTERNAL MEDICINE

## 2020-04-16 PROCEDURE — 80048 BASIC METABOLIC PNL TOTAL CA: CPT | Performed by: INTERNAL MEDICINE

## 2020-04-16 PROCEDURE — 85025 COMPLETE CBC W/AUTO DIFF WBC: CPT | Performed by: INTERNAL MEDICINE

## 2020-04-16 PROCEDURE — 93010 ELECTROCARDIOGRAM REPORT: CPT | Performed by: INTERNAL MEDICINE

## 2020-04-16 PROCEDURE — 93005 ELECTROCARDIOGRAM TRACING: CPT

## 2020-04-16 RX ADMIN — ATORVASTATIN CALCIUM 40 MG: 40 TABLET, FILM COATED ORAL at 21:39

## 2020-04-16 RX ADMIN — INSULIN LISPRO 2 UNITS: 100 INJECTION, SOLUTION INTRAVENOUS; SUBCUTANEOUS at 16:37

## 2020-04-16 RX ADMIN — CEFTRIAXONE SODIUM 1000 MG: 10 INJECTION, POWDER, FOR SOLUTION INTRAVENOUS at 18:47

## 2020-04-16 RX ADMIN — HYDRALAZINE HYDROCHLORIDE 10 MG: 10 TABLET, FILM COATED ORAL at 08:26

## 2020-04-16 RX ADMIN — INSULIN LISPRO 3 UNITS: 100 INJECTION, SOLUTION INTRAVENOUS; SUBCUTANEOUS at 12:05

## 2020-04-16 RX ADMIN — ASPIRIN 81 MG: 81 TABLET, COATED ORAL at 08:23

## 2020-04-16 RX ADMIN — OXYCODONE HYDROCHLORIDE AND ACETAMINOPHEN 1000 MG: 500 TABLET ORAL at 21:39

## 2020-04-16 RX ADMIN — INSULIN LISPRO 6 UNITS: 100 INJECTION, SOLUTION INTRAVENOUS; SUBCUTANEOUS at 07:20

## 2020-04-16 RX ADMIN — ENOXAPARIN SODIUM 30 MG: 30 INJECTION SUBCUTANEOUS at 08:24

## 2020-04-16 RX ADMIN — HYDRALAZINE HYDROCHLORIDE 10 MG: 10 TABLET, FILM COATED ORAL at 16:36

## 2020-04-16 RX ADMIN — AMLODIPINE BESYLATE 5 MG: 5 TABLET ORAL at 08:24

## 2020-04-16 RX ADMIN — OXYCODONE HYDROCHLORIDE AND ACETAMINOPHEN 1000 MG: 500 TABLET ORAL at 08:24

## 2020-04-16 RX ADMIN — LOSARTAN POTASSIUM 100 MG: 50 TABLET, FILM COATED ORAL at 08:23

## 2020-04-16 RX ADMIN — INSULIN GLARGINE 18 UNITS: 100 INJECTION, SOLUTION SUBCUTANEOUS at 21:36

## 2020-04-16 RX ADMIN — CYANOCOBALAMIN TAB 500 MCG 1000 MCG: 500 TAB at 08:24

## 2020-04-16 RX ADMIN — INSULIN LISPRO 6 UNITS: 100 INJECTION, SOLUTION INTRAVENOUS; SUBCUTANEOUS at 16:37

## 2020-04-16 RX ADMIN — INSULIN LISPRO 1 UNITS: 100 INJECTION, SOLUTION INTRAVENOUS; SUBCUTANEOUS at 21:37

## 2020-04-16 RX ADMIN — INSULIN LISPRO 4 UNITS: 100 INJECTION, SOLUTION INTRAVENOUS; SUBCUTANEOUS at 07:20

## 2020-04-16 RX ADMIN — INSULIN LISPRO 6 UNITS: 100 INJECTION, SOLUTION INTRAVENOUS; SUBCUTANEOUS at 12:05

## 2020-04-16 RX ADMIN — AZITHROMYCIN 250 MG: 250 TABLET, FILM COATED ORAL at 18:22

## 2020-04-16 RX ADMIN — HYDROXYCHLOROQUINE SULFATE 200 MG: 200 TABLET, FILM COATED ORAL at 18:47

## 2020-04-16 RX ADMIN — HYDRALAZINE HYDROCHLORIDE 10 MG: 10 TABLET, FILM COATED ORAL at 21:40

## 2020-04-16 RX ADMIN — HYDROXYCHLOROQUINE SULFATE 200 MG: 200 TABLET, FILM COATED ORAL at 08:23

## 2020-04-16 RX ADMIN — LATANOPROST 1 DROP: 50 SOLUTION OPHTHALMIC at 21:36

## 2020-04-16 RX ADMIN — TRAMADOL HYDROCHLORIDE 50 MG: 50 TABLET, FILM COATED ORAL at 08:24

## 2020-04-16 RX ADMIN — LEVOTHYROXINE SODIUM 50 MCG: 50 TABLET ORAL at 08:23

## 2020-04-16 RX ADMIN — METOPROLOL SUCCINATE 50 MG: 50 TABLET, EXTENDED RELEASE ORAL at 08:24

## 2020-04-16 RX ADMIN — PANTOPRAZOLE SODIUM 20 MG: 20 TABLET, DELAYED RELEASE ORAL at 05:25

## 2020-04-16 RX ADMIN — ZINC SULFATE 220 MG (50 MG) CAPSULE 220 MG: CAPSULE at 08:34

## 2020-04-16 NOTE — ASSESSMENT & PLAN NOTE
· COVID-19 positive from [facility] Currently on azithromycin and hydroxychloroquine   On ceftriaxone for empiric bacterial pneumonia    Recheck procalcitonin in a m  and consider discontinuation of antibiotics if remains negative    Results from last 7 days   Lab Units 04/15/20  0838   SARS-COV-2  Positive*     Results from last 7 days   Lab Units 04/15/20  1548 04/15/20  0524 04/14/20  2113 04/14/20  1948   D-DIMER QUANTITATIVE ug/ml FEU  --   --   --  1 12*   CRP mg/L 6 7*  --   --  6 2*   FERRITIN ng/mL  --  212 208  --    LD U/L 399*  --   --  298*     Results from last 7 days   Lab Units 04/15/20  0524 04/14/20  2113   PROCALCITONIN ng/ml <0 05 <0 05

## 2020-04-16 NOTE — SOCIAL WORK
Cm spoke with the pt son, Shayy Feng and he states that the pt has been residing at [facility] for several years now and they take care of her Rx needs  The pt has had [facility] in the past and has been placed at the [facility] in the past   The pt son, Shayy Feng is her POA  The pt is retired and [employer] assist her with transport to and from [facility]  The pt will require transport arrangement at [place]  CM reviewed discharge planning process including the following: identifying caregivers at home, preference for d/c planning needs, availability of treatment team to discuss questions or concerns patient and/or family may have regarding diagnosis, plan of care, old or new medications and discharge planning  Discharge Checklist reviewed and CM will continue to monitor for progress toward discharge goals in nursing and provider rounds  Transportation will need to be arranged at [place]

## 2020-04-16 NOTE — ASSESSMENT & PLAN NOTE
Lab Results   Component Value Date    HGBA1C 6 8 (H) 01/14/2020     Recent Labs     04/15/20  1542 04/15/20  2051 04/16/20  0625 04/16/20  1117   POCGLU 335* 385* 309* 246*     · Diabetes mellitus with steroid induced hyperglycemia    Continue glargine and sliding scale lispro

## 2020-04-16 NOTE — PLAN OF CARE
Problem: Potential for Falls  Goal: Patient will remain free of falls  Description  INTERVENTIONS:  - Assess patient frequently for physical needs  -  Identify cognitive and physical deficits and behaviors that affect risk of falls    -  [place] fall precautions as indicated by assessment   - Educate patient/family on patient safety including physical limitations  - Instruct patient to call for assistance with activity based on assessment  - Modify environment to reduce risk of injury  - Consider OT/PT consult to assist with strengthening/mobility  Outcome: Progressing

## 2020-04-16 NOTE — PROGRESS NOTES
Progress Note - Jc Bernstein 3/24/1929, 80 y o  female MRN: [id number]    Unit/Bed#: -01 Encounter: [id number]    Primary Care Provider: JASBIR Tam   Date and time admitted to hospital: 4/14/2020  4:43 PM        * COVID-19 virus detected  Assessment & Plan  · COVID-19 positive from [facility] Currently on azithromycin and hydroxychloroquine   On ceftriaxone for empiric bacterial pneumonia  Recheck procalcitonin in a m  and consider discontinuation of antibiotics if remains negative    Results from last 7 days   Lab Units 04/15/20  0838   SARS-COV-2  Positive*     Results from last 7 days   Lab Units 04/15/20  1548 04/15/20  0524 04/14/20  2113 04/14/20  1948   D-DIMER QUANTITATIVE ug/ml FEU  --   --   --  1 12*   CRP mg/L 6 7*  --   --  6 2*   FERRITIN ng/mL  --  212 208  --    LD U/L 399*  --   --  298*     Results from last 7 days   Lab Units 04/15/20  0524 04/14/20 2113   PROCALCITONIN ng/ml <0 05 <0 05       History of heart block  Assessment & Plan  · History of heart block status post pacer    Hypothyroidism  Assessment & Plan  · Hypothyroidism continue levothyroxine    DM (diabetes mellitus), type 2 [facility])  Assessment & Plan  Lab Results   Component Value Date    HGBA1C 6 8 (H) 01/14/2020     Recent Labs     04/15/20  1542 04/15/20  2051 04/16/20  0625 04/16/20  1117   POCGLU 335* 385* 309* 246*     · Diabetes mellitus with steroid induced hyperglycemia  Continue glargine and sliding scale lispro    Essential hypertension  Assessment & Plan  · Essential hypertension continue amlodipine hydralazine metoprolol and losartan      VTE Pharmacologic Prophylaxis: Enoxaparin (Lovenox)    Patient Centered Rounds: I have performed bedside rounds with nursing staff today  Discussions with Specialists or Other Care Team Provider:   Education and Discussions with Family / Patient:  Discussed at length with son-in-law    Time Spent for Care: 25 mins    More than 50% of total time spent on counseling and coordination of care as described above  Current Length of Stay: 1 day(s)  Current Patient Status: Inpatient     Certification Statement: The patient will continue to require additional inpatient hospital stay due to COVID-19 virus detected  Discharge Plan / Estimated Discharge Date:  Recheck inflammatory markers in a m  Code Status: Level 3 - DNAR and DNI  ______________________________________________________________________________    Subjective:   Patient seen and examined  Feeling pretty good  No shortness of breath  Objective:   Vitals: Blood pressure 142/66, pulse 60, temperature 97 9 °F (36 6 °C), resp  rate 20, weight 74 8 kg (164 lb 14 5 oz), SpO2 96 %      Physical Exam:   General appearance: alert, appears stated age and cooperative  Head: Normocephalic, without obvious abnormality, atraumatic  Lungs: diminished breath sounds  Heart: regular rate and rhythm  Abdomen: soft, non-tender, positive bowel sounds   Back: negative  Extremities: extremities atraumatic, no cyanosis or edema  Neurologic: Grossly normal    Additional Data:   Labs:  Results from last 7 days   Lab Units 04/15/20  0524 04/14/20 1948 04/14/20 1941   WBC Thousand/uL  --   --  7 90   HEMOGLOBIN g/dL  --   --  15 3   HEMATOCRIT %  --   --  46 7*   MCV fL  --   --  93   PLATELETS Thousands/uL  --   --  225   INR  1 00 1 01  --      Results from last 7 days   Lab Units 04/15/20  1548 04/14/20 1941   SODIUM mmol/L 136 135*   POTASSIUM mmol/L 4 5 4 6   CHLORIDE mmol/L 102 101   CO2 mmol/L 23 25   ANION GAP mmol/L 11 9   BUN mg/dL 27* 24   CREATININE mg/dL 1 45* 1 23   CALCIUM mg/dL 9 0 8 9   EGFR ml/min/1 73sq m 32 38   GLUCOSE RANDOM mg/dL 342* 233*     Results from last 7 days   Lab Units 04/15/20  1548   MAGNESIUM mg/dL 2 2     Results from last 7 days   Lab Units 04/15/20  1548 04/14/20  1948   CK TOTAL U/L 92 71   TROPONIN I ng/mL  --  <0 02     Results from last 7 days   Lab Units 04/14/20  1948   NT-PRO BNP pg/mL 343      Results from last 7 days   Lab Units 04/15/20  0524  04/14/20 1941   LACTIC ACID mmol/L  --   --  1 3   PROCALCITONIN ng/ml <0 05   < >  --     < > = values in this interval not displayed  Results from last 7 days   Lab Units 04/16/20  1117 04/16/20  0625 04/15/20  2051 04/15/20  1542 04/15/20  1112 04/15/20  0609 04/14/20  2121 04/14/20  1648   POC GLUCOSE mg/dl 246* 309* 385* 335* 438* 341* 288* 234*             * I Have Reviewed All Lab Data Listed Above  Cultures:   Results from last 7 days   Lab Units 04/14/20 1941   BLOOD CULTURE  No Growth at 24 hrs  No Growth at 24 hrs  Imaging:  Imaging Reports Reviewed Today Include:   Xr Chest 1 View Portable  Result Date: 4/14/2020  Impression: New right upper lobe infiltrate in keeping with pneumonia  The study was marked in [facility] for immediate notification   Workstation performed: [id number]     Scheduled Meds:  Current Facility-Administered Medications:  acetaminophen 650 mg Oral Q6H PRN [facility], CRNP    aluminum-magnesium hydroxide-simethicone 30 mL Oral Q6H PRN [facility], CRNP    amLODIPine 5 mg Oral Daily [facility], AUDREYNP    ascorbic acid 1,000 mg Oral Q12H [facility] Rosie, CRNP    aspirin 81 mg Oral Daily [facility], CRNP    atorvastatin 40 mg Oral HS [facility], CRNP    azithromycin 250 mg Oral Q24H [facility] Rosie, CRNP    cefTRIAXone 1,000 mg Intravenous Q24H [facility] Rosie, CRNP Last Rate: Stopped (04/15/20 1924)   vitamin B-12 1,000 mcg Oral Daily JASBIR Murray    dextran 70-hypromellose 1 drop Both Eyes Q4H PRN Brittney Velez, JASBIR    docusate sodium 100 mg Oral BID PRN Sylvarenaeulalio Velez, JASBIR    enoxaparin 30 mg Subcutaneous Daily [facility] Rosie, JASBIR    hydrALAZINE 10 mg Oral TID [facility] Rosie, JASBIR    hydroxychloroquine 200 mg Oral Q12H JASBIR Murray    insulin glargine 18 Units Subcutaneous HS Senait Reyes MD    insulin lispro 1-6 Units Subcutaneous TID AC Danita Allred, JASBIR    insulin lispro 1-6 Units Subcutaneous HS [place] Julio Cesar Chalino, CRJING    insulin lispro 6 Units Subcutaneous TID With Meals [facility], JASBIR    latanoprost 1 drop Both Eyes HS [facility], CRNP    levothyroxine 50 mcg Oral Daily [facility], CRNP    losartan 100 mg Oral Daily [facility], CRNP    metoprolol succinate 50 mg Oral Daily [facility], CRNP    zinc sulfate 220 mg Oral Daily [facility], CRNP    Followed by        Dario Corral ON 4/22/2020] multivitamin-minerals 1 tablet Oral Daily Danita Allred, JASBIR    ondansetron 4 mg Intravenous Q6H PRN [facility], CRNP    pantoprazole 20 mg Oral Early Morning [facility], CRNP    senna 2 tablet Oral HS [facility], CRNP    traMADol 50 mg Oral BID [facility], AUDREYNP        [address] [facility]  Hospitalist    ** Please Note: This note has been constructed using a voice recognition system   **

## 2020-04-17 LAB
ALBUMIN SERPL BCP-MCNC: 2.9 G/DL (ref 3.5–5)
ALP SERPL-CCNC: 105 U/L (ref 46–116)
ALT SERPL W P-5'-P-CCNC: 32 U/L (ref 12–78)
ANION GAP SERPL CALCULATED.3IONS-SCNC: 11 MMOL/L (ref 4–13)
AST SERPL W P-5'-P-CCNC: 27 U/L (ref 5–45)
ATRIAL RATE: 60 BPM
ATRIAL RATE: 60 BPM
BILIRUB SERPL-MCNC: 0.3 MG/DL (ref 0.2–1)
BUN SERPL-MCNC: 28 MG/DL (ref 5–25)
CALCIUM SERPL-MCNC: 8.9 MG/DL (ref 8.3–10.1)
CHLORIDE SERPL-SCNC: 101 MMOL/L (ref 100–108)
CO2 SERPL-SCNC: 23 MMOL/L (ref 21–32)
CREAT SERPL-MCNC: 1.12 MG/DL (ref 0.6–1.3)
CRP SERPL QL: 6.9 MG/L
D DIMER PPP FEU-MCNC: 0.83 UG/ML FEU
ERYTHROCYTE [DISTWIDTH] IN BLOOD BY AUTOMATED COUNT: 12.7 % (ref 11.6–15.1)
FERRITIN SERPL-MCNC: 210 NG/ML (ref 8–388)
GFR SERPL CREATININE-BSD FRML MDRD: 43 ML/MIN/1.73SQ M
GLUCOSE SERPL-MCNC: 169 MG/DL (ref 65–140)
GLUCOSE SERPL-MCNC: 174 MG/DL (ref 65–140)
GLUCOSE SERPL-MCNC: 204 MG/DL (ref 65–140)
GLUCOSE SERPL-MCNC: 241 MG/DL (ref 65–140)
GLUCOSE SERPL-MCNC: 95 MG/DL (ref 65–140)
HCT VFR BLD AUTO: 43.7 % (ref 34.8–46.1)
HGB BLD-MCNC: 14.2 G/DL (ref 11.5–15.4)
LDH SERPL-CCNC: 280 U/L (ref 81–234)
MCH RBC QN AUTO: 30.5 PG (ref 26.8–34.3)
MCHC RBC AUTO-ENTMCNC: 32.5 G/DL (ref 31.4–37.4)
MCV RBC AUTO: 94 FL (ref 82–98)
P AXIS: 53 DEGREES
P AXIS: 79 DEGREES
PLATELET # BLD AUTO: 205 THOUSANDS/UL (ref 149–390)
PMV BLD AUTO: 10.1 FL (ref 8.9–12.7)
POTASSIUM SERPL-SCNC: 4.3 MMOL/L (ref 3.5–5.3)
PR INTERVAL: 204 MS
PR INTERVAL: 232 MS
PROCALCITONIN SERPL-MCNC: <0.05 NG/ML
PROT SERPL-MCNC: 6.7 G/DL (ref 6.4–8.2)
QRS AXIS: -5 DEGREES
QRS AXIS: 12 DEGREES
QRSD INTERVAL: 70 MS
QRSD INTERVAL: 78 MS
QT INTERVAL: 448 MS
QT INTERVAL: 482 MS
QTC INTERVAL: 448 MS
QTC INTERVAL: 482 MS
RBC # BLD AUTO: 4.66 MILLION/UL (ref 3.81–5.12)
SODIUM SERPL-SCNC: 135 MMOL/L (ref 136–145)
T WAVE AXIS: 44 DEGREES
T WAVE AXIS: 56 DEGREES
VENTRICULAR RATE: 60 BPM
VENTRICULAR RATE: 60 BPM
WBC # BLD AUTO: 12.38 THOUSAND/UL (ref 4.31–10.16)

## 2020-04-17 PROCEDURE — 93010 ELECTROCARDIOGRAM REPORT: CPT | Performed by: INTERNAL MEDICINE

## 2020-04-17 PROCEDURE — 85379 FIBRIN DEGRADATION QUANT: CPT | Performed by: INTERNAL MEDICINE

## 2020-04-17 PROCEDURE — 84145 PROCALCITONIN (PCT): CPT | Performed by: INTERNAL MEDICINE

## 2020-04-17 PROCEDURE — 99232 SBSQ HOSP IP/OBS MODERATE 35: CPT | Performed by: INTERNAL MEDICINE

## 2020-04-17 PROCEDURE — 83615 LACTATE (LD) (LDH) ENZYME: CPT | Performed by: INTERNAL MEDICINE

## 2020-04-17 PROCEDURE — 93005 ELECTROCARDIOGRAM TRACING: CPT

## 2020-04-17 PROCEDURE — 85027 COMPLETE CBC AUTOMATED: CPT | Performed by: INTERNAL MEDICINE

## 2020-04-17 PROCEDURE — 82948 REAGENT STRIP/BLOOD GLUCOSE: CPT

## 2020-04-17 PROCEDURE — 86140 C-REACTIVE PROTEIN: CPT | Performed by: INTERNAL MEDICINE

## 2020-04-17 PROCEDURE — 82728 ASSAY OF FERRITIN: CPT | Performed by: INTERNAL MEDICINE

## 2020-04-17 PROCEDURE — 80053 COMPREHEN METABOLIC PANEL: CPT | Performed by: INTERNAL MEDICINE

## 2020-04-17 RX ADMIN — OXYCODONE HYDROCHLORIDE AND ACETAMINOPHEN 1000 MG: 500 TABLET ORAL at 08:20

## 2020-04-17 RX ADMIN — AZITHROMYCIN 250 MG: 250 TABLET, FILM COATED ORAL at 17:53

## 2020-04-17 RX ADMIN — HYDRALAZINE HYDROCHLORIDE 10 MG: 10 TABLET, FILM COATED ORAL at 21:51

## 2020-04-17 RX ADMIN — ATORVASTATIN CALCIUM 40 MG: 40 TABLET, FILM COATED ORAL at 21:27

## 2020-04-17 RX ADMIN — INSULIN LISPRO 1 UNITS: 100 INJECTION, SOLUTION INTRAVENOUS; SUBCUTANEOUS at 08:19

## 2020-04-17 RX ADMIN — LOSARTAN POTASSIUM 100 MG: 50 TABLET, FILM COATED ORAL at 08:20

## 2020-04-17 RX ADMIN — CYANOCOBALAMIN TAB 500 MCG 1000 MCG: 500 TAB at 08:20

## 2020-04-17 RX ADMIN — INSULIN LISPRO 6 UNITS: 100 INJECTION, SOLUTION INTRAVENOUS; SUBCUTANEOUS at 08:28

## 2020-04-17 RX ADMIN — INSULIN LISPRO 6 UNITS: 100 INJECTION, SOLUTION INTRAVENOUS; SUBCUTANEOUS at 18:15

## 2020-04-17 RX ADMIN — HYDRALAZINE HYDROCHLORIDE 10 MG: 10 TABLET, FILM COATED ORAL at 08:23

## 2020-04-17 RX ADMIN — ENOXAPARIN SODIUM 30 MG: 30 INJECTION SUBCUTANEOUS at 08:23

## 2020-04-17 RX ADMIN — OXYCODONE HYDROCHLORIDE AND ACETAMINOPHEN 1000 MG: 500 TABLET ORAL at 21:27

## 2020-04-17 RX ADMIN — PANTOPRAZOLE SODIUM 20 MG: 20 TABLET, DELAYED RELEASE ORAL at 04:44

## 2020-04-17 RX ADMIN — HYDROXYCHLOROQUINE SULFATE 200 MG: 200 TABLET, FILM COATED ORAL at 08:22

## 2020-04-17 RX ADMIN — INSULIN LISPRO 2 UNITS: 100 INJECTION, SOLUTION INTRAVENOUS; SUBCUTANEOUS at 12:23

## 2020-04-17 RX ADMIN — ZINC SULFATE 220 MG (50 MG) CAPSULE 220 MG: CAPSULE at 08:22

## 2020-04-17 RX ADMIN — LATANOPROST 1 DROP: 50 SOLUTION OPHTHALMIC at 21:28

## 2020-04-17 RX ADMIN — ONDANSETRON 4 MG: 2 INJECTION INTRAMUSCULAR; INTRAVENOUS at 09:03

## 2020-04-17 RX ADMIN — INSULIN GLARGINE 18 UNITS: 100 INJECTION, SOLUTION SUBCUTANEOUS at 21:30

## 2020-04-17 RX ADMIN — METOPROLOL SUCCINATE 50 MG: 50 TABLET, EXTENDED RELEASE ORAL at 08:21

## 2020-04-17 RX ADMIN — AMLODIPINE BESYLATE 5 MG: 5 TABLET ORAL at 08:22

## 2020-04-17 RX ADMIN — INSULIN LISPRO 1 UNITS: 100 INJECTION, SOLUTION INTRAVENOUS; SUBCUTANEOUS at 18:15

## 2020-04-17 RX ADMIN — INSULIN LISPRO 6 UNITS: 100 INJECTION, SOLUTION INTRAVENOUS; SUBCUTANEOUS at 12:23

## 2020-04-17 RX ADMIN — LEVOTHYROXINE SODIUM 50 MCG: 50 TABLET ORAL at 08:22

## 2020-04-17 RX ADMIN — HYDROXYCHLOROQUINE SULFATE 200 MG: 200 TABLET, FILM COATED ORAL at 19:46

## 2020-04-17 RX ADMIN — ASPIRIN 81 MG: 81 TABLET, COATED ORAL at 08:22

## 2020-04-17 RX ADMIN — ACETAMINOPHEN 650 MG: 325 TABLET ORAL at 17:19

## 2020-04-17 RX ADMIN — HYDRALAZINE HYDROCHLORIDE 10 MG: 10 TABLET, FILM COATED ORAL at 17:20

## 2020-04-17 NOTE — ASSESSMENT & PLAN NOTE
· COVID-19 positive from [facility] Currently on azithromycin and hydroxychloroquine   On ceftriaxone for empiric bacterial pneumonia however given negative procalcitonin will discontinue    Results from last 7 days   Lab Units 04/15/20  0838   SARS-COV-2  Positive*     Results from last 7 days   Lab Units 04/17/20  0511 04/15/20  1548 04/15/20  0524 04/14/20 2113 04/14/20 1948   D-DIMER QUANTITATIVE ug/ml FEU 0 83*  --   --   --  1 12*   CRP mg/L  --  6 7*  --   --  6 2*   FERRITIN ng/mL  --   --  212 208  --    LD U/L  --  399*  --   --  298*     Results from last 7 days   Lab Units 04/17/20  0511 04/15/20  0524 04/14/20  2113   PROCALCITONIN ng/ml <0 05 <0 05 <0 05

## 2020-04-17 NOTE — SOCIAL WORK
LOS 4  Per SLIM patient is cleared for discharge  CC is working with Des justin to have patient discharged back to [facility]    Treatment team informed

## 2020-04-17 NOTE — ASSESSMENT & PLAN NOTE
Lab Results   Component Value Date    HGBA1C 6 8 (H) 01/14/2020     Recent Labs     04/16/20  1537 04/16/20  2117 04/17/20  0622 04/17/20  1038   POCGLU 190* 187* 174* 204*     · Diabetes mellitus did steroid induced hyperglycemia    Currently stable on current dose of glargine and sliding scale lispro

## 2020-04-17 NOTE — PROGRESS NOTES
Progress Note - Noemi Destiney 3/24/1929, 80 y o  female MRN: [id number]    Unit/Bed#: -[facility] Encounter: [id number]    Primary Care Provider: JASBIR Alexander   Date and time admitted to hospital: 4/14/2020  4:43 PM        * COVID-19 virus detected  Assessment & Plan  · COVID-19 positive from [facility] Currently on azithromycin and hydroxychloroquine   On ceftriaxone for empiric bacterial pneumonia however given negative procalcitonin will discontinue    Results from last 7 days   Lab Units 04/15/20  0838   SARS-COV-2  Positive*     Results from last 7 days   Lab Units 04/17/20  0511 04/15/20  1548 04/15/20  0524 04/14/20 2113 04/14/20  1948   D-DIMER QUANTITATIVE ug/ml FEU 0 83*  --   --   --  1 12*   CRP mg/L  --  6 7*  --   --  6 2*   FERRITIN ng/mL  --   --  212 208  --    LD U/L  --  399*  --   --  298*     Results from last 7 days   Lab Units 04/17/20  0511 04/15/20  0524 04/14/20 2113   PROCALCITONIN ng/ml <0 05 <0 05 <0 05       History of heart block  Assessment & Plan  · History of heart block status post pacer    Hypothyroidism  Assessment & Plan  · Hypothyroidism continue levothyroxine    DM (diabetes mellitus), type 2 [facility])  Assessment & Plan  Lab Results   Component Value Date    HGBA1C 6 8 (H) 01/14/2020     Recent Labs     04/16/20  1537 04/16/20  2117 04/17/20  0622 04/17/20  1038   POCGLU 190* 187* 174* 204*     · Diabetes mellitus did steroid induced hyperglycemia  Currently stable on current dose of glargine and sliding scale lispro    Essential hypertension  Assessment & Plan  · Essential hypertension continue amlodipine hydralazine metoprolol and losartan      VTE Pharmacologic Prophylaxis: Enoxaparin (Lovenox)    Patient Centered Rounds: I have performed bedside rounds with nursing staff today  Discussions with Specialists or Other Care Team Provider:  Case management  Education and Discussions with Family / Patient:     Time Spent for Care: 25 mins    More than 50% of total time spent on counseling and coordination of care as described above  Current Length of Stay: 2 day(s)  Current Patient Status: Inpatient     Certification Statement: The patient will continue to require additional inpatient hospital stay due to COVID-19 virus detected  Discharge Plan / Estimated Discharge Date:  Medically stable for discharge to back to care home    Code Status: Level 3 - DNAR and DNI  ______________________________________________________________________________    Subjective:   Patient seen and examined  No new complaints  Feeling okay  Objective:   Vitals: Blood pressure 159/95, pulse 60, temperature 98 6 °F (37 °C), resp  rate 17, height 5' 1" (1 549 m), weight 74 8 kg (164 lb 14 5 oz), SpO2 96 %      Physical Exam:   General appearance: alert, appears stated age and cooperative  Head: Normocephalic, without obvious abnormality, atraumatic  Lungs: diminished breath sounds  Heart: regular rate and rhythm  Abdomen: soft, non-tender, positive bowel sounds   Back: negative  Extremities: extremities atraumatic, no cyanosis or edema  Neurologic: Grossly normal    Additional Data:   Labs:  Results from last 7 days   Lab Units 04/16/20  1544 04/15/20  0524 04/14/20 1948 04/14/20  1941   WBC Thousand/uL 18 12*  --   --  7 90   HEMOGLOBIN g/dL 13 1  --   --  15 3   HEMATOCRIT % 39 7  --   --  46 7*   MCV fL 92  --   --  93   PLATELETS Thousands/uL 211  --   --  225   INR   --  1 00 1 01  --      Results from last 7 days   Lab Units 04/16/20  1544 04/15/20  1548 04/14/20 1941   SODIUM mmol/L 136 136 135*   POTASSIUM mmol/L 4 4 4 5 4 6   CHLORIDE mmol/L 102 102 101   CO2 mmol/L 24 23 25   ANION GAP mmol/L 10 11 9   BUN mg/dL 33* 27* 24   CREATININE mg/dL 1 29 1 45* 1 23   CALCIUM mg/dL 8 8 9 0 8 9   EGFR ml/min/1 73sq m 36 32 38   GLUCOSE RANDOM mg/dL 189* 342* 233*     Results from last 7 days   Lab Units 04/15/20  1548   MAGNESIUM mg/dL 2 2     Results from last 7 days   Lab Units 04/15/20  1548 04/14/20 1948   CK TOTAL U/L 92 71   TROPONIN I ng/mL  --  <0 02     Results from last 7 days   Lab Units 04/14/20 1948   NT-PRO BNP pg/mL 343      Results from last 7 days   Lab Units 04/17/20  0511  04/14/20 1941   LACTIC ACID mmol/L  --   --  1 3   PROCALCITONIN ng/ml <0 05   < >  --     < > = values in this interval not displayed  Results from last 7 days   Lab Units 04/17/20  1038 04/17/20  0622 04/16/20  2117 04/16/20  1537 04/16/20  1117 04/16/20  0625 04/15/20  2051 04/15/20  1542 04/15/20  1112 04/15/20  0609 04/14/20  2121 04/14/20  1648   POC GLUCOSE mg/dl 204* 174* 187* 190* 246* 309* 385* 335* 438* 341* 288* 234*             * I Have Reviewed All Lab Data Listed Above  Cultures:   Results from last 7 days   Lab Units 04/14/20 1941   BLOOD CULTURE  No Growth at 48 hrs  No Growth at 48 hrs  Imaging:  Imaging Reports Reviewed Today Include:   Xr Chest 1 View Portable    Result Date: 4/14/2020  Impression: New right upper lobe infiltrate in keeping with pneumonia  The study was marked in [facility] for immediate notification   Workstation performed: [id number]     Scheduled Meds:  Current Facility-Administered Medications:  acetaminophen 650 mg Oral Q6H PRN Tarry Angers, CRNP    aluminum-magnesium hydroxide-simethicone 30 mL Oral Q6H PRN Tarry Angers, CRNP    amLODIPine 5 mg Oral Daily Tarry Angers, CRNP    ascorbic acid 1,000 mg Oral Q12H [address] Tarry Angers, CRNP    aspirin 81 mg Oral Daily Tarry Angers, CRNP    atorvastatin 40 mg Oral HS Tarry Angers, CRNP    azithromycin 250 mg Oral Q24H Tarry Angers, CRNP    cefTRIAXone 1,000 mg Intravenous Q24H Tarry Angers, CRNP Last Rate: Stopped (04/16/20 1929)   vitamin B-12 1,000 mcg Oral Daily JASBIR Murray    dextran 70-hypromellose 1 drop Both Eyes Q4H PRN Tarry Angers, CRNP    docusate sodium 100 mg Oral BID PRN Cory Simpsons, JASBIR    enoxaparin 30 mg Subcutaneous Daily Cory Martino, JASBIR hydrALAZINE 10 mg Oral TID JASBIR Sanders    hydroxychloroquine 200 mg Oral Q12H Danita Siddiqi, JASBIR    insulin glargine 18 Units Subcutaneous HS Andrew Mendez MD    insulin lispro 1-6 Units Subcutaneous TID AC JASBIR Murray    insulin lispro 1-6 Units Subcutaneous HS Briana Duran, JASBIR    insulin lispro 6 Units Subcutaneous TID With Meals JASBIR Sanders    latanoprost 1 drop Both Eyes HS JASBIR Sanders    levothyroxine 50 mcg Oral Daily Briana Duran, JASBIR    losartan 100 mg Oral Daily Briana Duran, JASBIR    metoprolol succinate 50 mg Oral Daily Briana Duran, JASBIR    zinc sulfate 220 mg Oral Daily JASBIR Sanders    Followed by        Juan Fernandez ON 4/22/2020] multivitamin-minerals 1 tablet Oral Daily JASBIR Murray    ondansetron 4 mg Intravenous Q6H PRN JASBIR Sanders    pantoprazole 20 mg Oral Early Morning JASBIR Sanders    senna 2 tablet Oral HS JASBIR Sanders    traMADol 50 mg Oral BID JASBIR Sanders        [address] [facility]  Hospitalist    ** Please Note: This note has been constructed using a voice recognition system   **

## 2020-04-18 LAB
GLUCOSE SERPL-MCNC: 133 MG/DL (ref 65–140)
GLUCOSE SERPL-MCNC: 134 MG/DL (ref 65–140)
GLUCOSE SERPL-MCNC: 170 MG/DL (ref 65–140)
GLUCOSE SERPL-MCNC: 185 MG/DL (ref 65–140)

## 2020-04-18 PROCEDURE — 93005 ELECTROCARDIOGRAM TRACING: CPT

## 2020-04-18 PROCEDURE — 82948 REAGENT STRIP/BLOOD GLUCOSE: CPT

## 2020-04-18 PROCEDURE — 99232 SBSQ HOSP IP/OBS MODERATE 35: CPT | Performed by: INTERNAL MEDICINE

## 2020-04-18 RX ADMIN — INSULIN LISPRO 1 UNITS: 100 INJECTION, SOLUTION INTRAVENOUS; SUBCUTANEOUS at 21:17

## 2020-04-18 RX ADMIN — HYDRALAZINE HYDROCHLORIDE 10 MG: 10 TABLET, FILM COATED ORAL at 17:40

## 2020-04-18 RX ADMIN — LEVOTHYROXINE SODIUM 50 MCG: 50 TABLET ORAL at 08:45

## 2020-04-18 RX ADMIN — ENOXAPARIN SODIUM 30 MG: 30 INJECTION SUBCUTANEOUS at 08:48

## 2020-04-18 RX ADMIN — INSULIN LISPRO 6 UNITS: 100 INJECTION, SOLUTION INTRAVENOUS; SUBCUTANEOUS at 08:48

## 2020-04-18 RX ADMIN — INSULIN LISPRO 1 UNITS: 100 INJECTION, SOLUTION INTRAVENOUS; SUBCUTANEOUS at 12:41

## 2020-04-18 RX ADMIN — SENNOSIDES 17.2 MG: 8.6 TABLET, FILM COATED ORAL at 21:15

## 2020-04-18 RX ADMIN — INSULIN LISPRO 6 UNITS: 100 INJECTION, SOLUTION INTRAVENOUS; SUBCUTANEOUS at 12:41

## 2020-04-18 RX ADMIN — HYDROXYCHLOROQUINE SULFATE 200 MG: 200 TABLET, FILM COATED ORAL at 21:15

## 2020-04-18 RX ADMIN — INSULIN GLARGINE 18 UNITS: 100 INJECTION, SOLUTION SUBCUTANEOUS at 21:26

## 2020-04-18 RX ADMIN — HYDROXYCHLOROQUINE SULFATE 200 MG: 200 TABLET, FILM COATED ORAL at 06:05

## 2020-04-18 RX ADMIN — ZINC SULFATE 220 MG (50 MG) CAPSULE 220 MG: CAPSULE at 08:43

## 2020-04-18 RX ADMIN — OXYCODONE HYDROCHLORIDE AND ACETAMINOPHEN 1000 MG: 500 TABLET ORAL at 21:15

## 2020-04-18 RX ADMIN — AMLODIPINE BESYLATE 5 MG: 5 TABLET ORAL at 08:44

## 2020-04-18 RX ADMIN — LATANOPROST 1 DROP: 50 SOLUTION OPHTHALMIC at 21:16

## 2020-04-18 RX ADMIN — HYDRALAZINE HYDROCHLORIDE 10 MG: 10 TABLET, FILM COATED ORAL at 08:46

## 2020-04-18 RX ADMIN — HYDRALAZINE HYDROCHLORIDE 10 MG: 10 TABLET, FILM COATED ORAL at 21:16

## 2020-04-18 RX ADMIN — OXYCODONE HYDROCHLORIDE AND ACETAMINOPHEN 1000 MG: 500 TABLET ORAL at 08:43

## 2020-04-18 RX ADMIN — ASPIRIN 81 MG: 81 TABLET, COATED ORAL at 08:43

## 2020-04-18 RX ADMIN — PANTOPRAZOLE SODIUM 20 MG: 20 TABLET, DELAYED RELEASE ORAL at 06:03

## 2020-04-18 RX ADMIN — CYANOCOBALAMIN TAB 500 MCG 1000 MCG: 500 TAB at 08:45

## 2020-04-18 RX ADMIN — LOSARTAN POTASSIUM 100 MG: 50 TABLET, FILM COATED ORAL at 08:42

## 2020-04-18 RX ADMIN — ATORVASTATIN CALCIUM 40 MG: 40 TABLET, FILM COATED ORAL at 21:15

## 2020-04-18 RX ADMIN — METOPROLOL SUCCINATE 50 MG: 50 TABLET, EXTENDED RELEASE ORAL at 08:44

## 2020-04-18 RX ADMIN — AZITHROMYCIN 250 MG: 250 TABLET, FILM COATED ORAL at 17:40

## 2020-04-18 RX ADMIN — INSULIN LISPRO 6 UNITS: 100 INJECTION, SOLUTION INTRAVENOUS; SUBCUTANEOUS at 17:39

## 2020-04-18 NOTE — PLAN OF CARE
Problem: Potential for Falls  Goal: Patient will remain free of falls  Description  INTERVENTIONS:  - Assess patient frequently for physical needs  -  Identify cognitive and physical deficits and behaviors that affect risk of falls  -  [place] fall precautions as indicated by assessment   - Educate patient/family on patient safety including physical limitations  - Instruct patient to call for assistance with activity based on assessment  - Modify environment to reduce risk of injury  - Consider OT/PT consult to assist with strengthening/mobility  Outcome: Progressing     Problem: INFECTION - ADULT  Goal: Absence or prevention of progression during hospitalization  Description  INTERVENTIONS:  - Assess and monitor for signs and symptoms of infection  - Monitor lab/diagnostic results  - Monitor all insertion sites, i e  indwelling lines, tubes, and drains  - Monitor endotracheal if appropriate and nasal secretions for changes in amount and color  - [place] appropriate cooling/warming therapies per order  - Administer medications as ordered  - Instruct and encourage patient and family to use good hand hygiene technique  - Identify and instruct in appropriate isolation precautions for identified infection/condition  Outcome: Progressing     Problem: SAFETY ADULT  Goal: Patient will remain free of falls  Description  INTERVENTIONS:  - Assess patient frequently for physical needs  -  Identify cognitive and physical deficits and behaviors that affect risk of falls    -  [place] fall precautions as indicated by assessment   - Educate patient/family on patient safety including physical limitations  - Instruct patient to call for assistance with activity based on assessment  - Modify environment to reduce risk of injury  - Consider OT/PT consult to assist with strengthening/mobility  Outcome: Progressing  Goal: Maintain or return to baseline ADL function  Description  INTERVENTIONS:  -  Assess patient's ability to carry out ADLs; assess patient's baseline for ADL function and identify physical deficits which impact ability to perform ADLs (bathing, care of mouth/teeth, toileting, grooming, dressing, etc )  - Assess/evaluate cause of self-care deficits   - Assess range of motion  - Assess patient's mobility; develop plan if impaired  - Assess patient's need for assistive devices and provide as appropriate  - Encourage maximum independence but intervene and supervise when necessary  - Involve family in performance of ADLs  - Assess for home care needs following discharge   - Consider OT consult to assist with ADL evaluation and planning for discharge  - Provide patient education as appropriate  Outcome: Progressing  Goal: Maintain or return mobility status to optimal level  Description  INTERVENTIONS:  - Assess patient's baseline mobility status (ambulation, transfers, stairs, etc )    - Identify cognitive and physical deficits and behaviors that affect mobility  - Identify mobility aids required to assist with transfers and/or ambulation (gait belt, sit-to-stand, lift, walker, cane, etc )  - [place] fall precautions as indicated by assessment  - Record patient progress and toleration of activity level on Mobility SBAR; progress patient to next Phase/Stage  - Instruct patient to call for assistance with activity based on assessment  - Consider rehabilitation consult to assist with strengthening/weightbearing, etc   Outcome: Progressing     Problem: DISCHARGE PLANNING  Goal: Discharge to home or other facility with appropriate resources  Description  INTERVENTIONS:  - Identify barriers to discharge w/patient and caregiver  - Arrange for needed discharge resources and transportation as appropriate  - Identify discharge learning needs (meds, wound care, etc )  - Arrange for interpretive services to assist at discharge as needed  - Refer to Case Management Department for coordinating discharge planning if the patient needs post-hospital services based on physician/advanced practitioner order or complex needs related to functional status, cognitive ability, or social support system  Outcome: Progressing     Problem: Knowledge Deficit  Goal: Patient/family/caregiver demonstrates understanding of disease process, treatment plan, medications, and discharge instructions  Description  Complete learning assessment and assess knowledge base    Interventions:  - Provide teaching at level of understanding  - Provide teaching via preferred learning methods  Outcome: Progressing

## 2020-04-18 NOTE — ASSESSMENT & PLAN NOTE
Lab Results   Component Value Date    HGBA1C 6 8 (H) 01/14/2020     Recent Labs     04/17/20  1709 04/17/20  2125 04/18/20  0634 04/18/20  1233   POCGLU 169* 95 134 170*     · Diabetes mellitus did steroid induced hyperglycemia    Currently stable on current dose of glargine and sliding scale lispro

## 2020-04-18 NOTE — ASSESSMENT & PLAN NOTE
· COVID-19 positive from [facility] Currently on azithromycin and hydroxychloroquine   On ceftriaxone for empiric bacterial pneumonia however given negative procalcitonin has been discontinued    Results from last 7 days   Lab Units 04/15/20  0838   SARS-COV-2  Positive*     Results from last 7 days   Lab Units 04/17/20  1353 04/17/20  0511 04/15/20  1548 04/15/20  0524 04/14/20 2113 04/14/20  1948   D-DIMER QUANTITATIVE ug/ml FEU  --  0 83*  --   --   --  1 12*   CRP mg/L 6 9*  --  6 7*  --   --  6 2*   FERRITIN ng/mL 210  --   --  212 208  --    LD U/L 280*  --  399*  --   --  298*     Results from last 7 days   Lab Units 04/17/20  0511 04/15/20  0524 04/14/20 2113   PROCALCITONIN ng/ml <0 05 <0 05 <0 05

## 2020-04-18 NOTE — PROGRESS NOTES
Progress Note - Boo Jacobs 3/24/1929, 80 y o  female MRN: [id number]    Unit/Bed#: -[facility] Encounter: [id number]    Primary Care Provider: JASBIR Bhatia   Date and time admitted to hospital: 4/14/2020  4:43 PM        * COVID-19 virus detected  Assessment & Plan  · COVID-19 positive from [facility] Currently on azithromycin and hydroxychloroquine   On ceftriaxone for empiric bacterial pneumonia however given negative procalcitonin has been discontinued    Results from last 7 days   Lab Units 04/15/20  0838   SARS-COV-2  Positive*     Results from last 7 days   Lab Units 04/17/20  1353 04/17/20  0511 04/15/20  1548 04/15/20  0524 04/14/20  2113 04/14/20  1948   D-DIMER QUANTITATIVE ug/ml FEU  --  0 83*  --   --   --  1 12*   CRP mg/L 6 9*  --  6 7*  --   --  6 2*   FERRITIN ng/mL 210  --   --  212 208  --    LD U/L 280*  --  399*  --   --  298*     Results from last 7 days   Lab Units 04/17/20  0511 04/15/20  0524 04/14/20  2113   PROCALCITONIN ng/ml <0 05 <0 05 <0 05       History of heart block  Assessment & Plan  · History of heart block status post pacer    Hypothyroidism  Assessment & Plan  · Hypothyroidism continue levothyroxine    DM (diabetes mellitus), type 2 [facility])  Assessment & Plan  Lab Results   Component Value Date    HGBA1C 6 8 (H) 01/14/2020     Recent Labs     04/17/20  1709 04/17/20  2125 04/18/20  0634 04/18/20  1233   POCGLU 169* 95 134 170*     · Diabetes mellitus did steroid induced hyperglycemia  Currently stable on current dose of glargine and sliding scale lispro    Essential hypertension  Assessment & Plan  · Essential hypertension continue amlodipine hydralazine metoprolol and losartan      VTE Pharmacologic Prophylaxis: Enoxaparin (Lovenox)    Patient Centered Rounds: I have performed bedside rounds with nursing staff today    Discussions with Specialists or Other Care Team Provider:   Education and Discussions with Family / Patient:  Discussed at length with patient's daughter and son-in-law    Time Spent for Care: 25 mins  More than 50% of total time spent on counseling and coordination of care as described above  Current Length of Stay: 3 day(s)  Current Patient Status: Inpatient     Certification Statement: The patient will continue to require additional inpatient hospital stay due to COVID-19 virus detected  Discharge Plan / Estimated Discharge Date:     Code Status: Level 3 - DNAR and DNI  ______________________________________________________________________________    Subjective:   Patient seen and examined  No new complaints  No chest pain shortness breath or diarrhea  Objective:   Vitals: Blood pressure 149/67, pulse 62, temperature 98 7 °F (37 1 °C), temperature source Oral, resp  rate 17, height 5' 1" (1 549 m), weight 74 8 kg (164 lb 14 5 oz), SpO2 96 %      Physical Exam:   General appearance: alert, appears stated age and cooperative  Head: Normocephalic, without obvious abnormality, atraumatic  Lungs: diminished breath sounds  Heart: regular rate and rhythm  Abdomen: soft, non-tender, positive bowel sounds   Back: negative, range of motion normal  Extremities: extremities atraumatic, no cyanosis or edema  Neurologic: Grossly normal    Additional Data:   Labs:  Results from last 7 days   Lab Units 04/17/20  1353 04/16/20  1544 04/15/20  0524 04/14/20 1948 04/14/20 1941   WBC Thousand/uL 12 38* 18 12*  --   --  7 90   HEMOGLOBIN g/dL 14 2 13 1  --   --  15 3   HEMATOCRIT % 43 7 39 7  --   --  46 7*   MCV fL 94 92  --   --  93   PLATELETS Thousands/uL 205 211  --   --  225   INR   --   --  1 00 1 01  --      Results from last 7 days   Lab Units 04/17/20  1353 04/16/20  1544 04/15/20  1548 04/14/20 1941   SODIUM mmol/L 135* 136 136 135*   POTASSIUM mmol/L 4 3 4 4 4 5 4 6   CHLORIDE mmol/L 101 102 102 101   CO2 mmol/L 23 24 23 25   ANION GAP mmol/L 11 10 11 9   BUN mg/dL 28* 33* 27* 24   CREATININE mg/dL 1 12 1 29 1 45* 1 23   CALCIUM mg/dL 8 9 8 8 9 0 8 9   ALBUMIN g/dL 2 9*  --   --   --    TOTAL BILIRUBIN mg/dL 0 30  --   --   --    ALK PHOS U/L 105  --   --   --    ALT U/L 32  --   --   --    AST U/L 27  --   --   --    EGFR ml/min/1 73sq m 43 36 32 38   GLUCOSE RANDOM mg/dL 241* 189* 342* 233*     Results from last 7 days   Lab Units 04/15/20  1548   MAGNESIUM mg/dL 2 2     Results from last 7 days   Lab Units 04/15/20  1548 04/14/20  1948   CK TOTAL U/L 92 71   TROPONIN I ng/mL  --  <0 02     Results from last 7 days   Lab Units 04/14/20  1948   NT-PRO BNP pg/mL 343      Results from last 7 days   Lab Units 04/17/20  0511  04/14/20 1941   LACTIC ACID mmol/L  --   --  1 3   PROCALCITONIN ng/ml <0 05   < >  --     < > = values in this interval not displayed  Results from last 7 days   Lab Units 04/18/20  1233 04/18/20  0634 04/17/20  2125 04/17/20  1709 04/17/20  1038 04/17/20  0622 04/16/20  2117 04/16/20  1537 04/16/20  1117 04/16/20  0625 04/15/20  2051 04/15/20  1542   POC GLUCOSE mg/dl 170* 134 95 169* 204* 174* 187* 190* 246* 309* 385* 335*             * I Have Reviewed All Lab Data Listed Above  Cultures:   Results from last 7 days   Lab Units 04/14/20 1941   BLOOD CULTURE  No Growth at 72 hrs  No Growth at 72 hrs  Imaging:  Imaging Reports Reviewed Today Include:   Xr Chest 1 View Portable    Result Date: 4/14/2020  Impression: New right upper lobe infiltrate in keeping with pneumonia  The study was marked in [facility] for immediate notification   Workstation performed: [id number]     Scheduled Meds:  Current Facility-Administered Medications:  acetaminophen 650 mg Oral Q6H PRN Yesi Chimera, CRNP   aluminum-magnesium hydroxide-simethicone 30 mL Oral Q6H PRN Yesi Chimera, CRNP   amLODIPine 5 mg Oral Daily Yesi Chimera, CRNP   ascorbic acid 1,000 mg Oral Q12H [address] Yesi Chimera, CRNP   aspirin 81 mg Oral Daily Yesi Chimera, CRNP   atorvastatin 40 mg Oral HS Yesi Chimera, CRNP   azithromycin 250 mg Oral Q24H JASBIR Ayoub   vitamin B-12 1,000 mcg Oral Daily JASBIR Ayoub   dextran 70-hypromellose 1 drop Both Eyes Q4H PRN JASBIR Ayoub   docusate sodium 100 mg Oral BID PRN JASBIR Ayoub   enoxaparin 30 mg Subcutaneous Daily JASBIR Ayoub   hydrALAZINE 10 mg Oral TID JASBIR Ayoub   hydroxychloroquine 200 mg Oral Q12H JASBIR Ayoub   insulin glargine 18 Units Subcutaneous HS Jetta Cogan, MD   insulin lispro 1-6 Units Subcutaneous TID AC JASBIR Murray   insulin lispro 1-6 Units Subcutaneous HS JASBIR Ayoub   insulin lispro 6 Units Subcutaneous TID With Meals JASBIR Ayoub   latanoprost 1 drop Both Eyes HS JASBIR Ayoub   levothyroxine 50 mcg Oral Daily JASBIR Ayoub   losartan 100 mg Oral Daily JASBIR Ayoub   metoprolol succinate 50 mg Oral Daily JASBIR Ayoub   zinc sulfate 220 mg Oral Daily JASBIR Ayoub   Followed by       Terri Baumann ON 4/22/2020] multivitamin-minerals 1 tablet Oral Daily JASBIR Murray   ondansetron 4 mg Intravenous Q6H PRN JASBIR Ayoub   pantoprazole 20 mg Oral Early Morning JASBIR Ayoub   senna 2 tablet Oral HS JASBIR Ayoub   traMADol 50 mg Oral BID JASBIR Ayoub       [address] [facility]  Hospitalist    ** Please Note: This note has been constructed using a voice recognition system   **

## 2020-04-19 LAB
ATRIAL RATE: 60 BPM
BACTERIA BLD CULT: NORMAL
BACTERIA BLD CULT: NORMAL
GLUCOSE SERPL-MCNC: 127 MG/DL (ref 65–140)
GLUCOSE SERPL-MCNC: 177 MG/DL (ref 65–140)
GLUCOSE SERPL-MCNC: 185 MG/DL (ref 65–140)
GLUCOSE SERPL-MCNC: 202 MG/DL (ref 65–140)
PR INTERVAL: 262 MS
QRS AXIS: -8 DEGREES
QRSD INTERVAL: 70 MS
QT INTERVAL: 458 MS
QTC INTERVAL: 458 MS
T WAVE AXIS: 16 DEGREES
VENTRICULAR RATE: 60 BPM

## 2020-04-19 PROCEDURE — 99232 SBSQ HOSP IP/OBS MODERATE 35: CPT | Performed by: INTERNAL MEDICINE

## 2020-04-19 PROCEDURE — 93010 ELECTROCARDIOGRAM REPORT: CPT | Performed by: INTERNAL MEDICINE

## 2020-04-19 PROCEDURE — 82948 REAGENT STRIP/BLOOD GLUCOSE: CPT

## 2020-04-19 RX ADMIN — CYANOCOBALAMIN TAB 500 MCG 1000 MCG: 500 TAB at 08:13

## 2020-04-19 RX ADMIN — INSULIN LISPRO 2 UNITS: 100 INJECTION, SOLUTION INTRAVENOUS; SUBCUTANEOUS at 08:19

## 2020-04-19 RX ADMIN — INSULIN LISPRO 1 UNITS: 100 INJECTION, SOLUTION INTRAVENOUS; SUBCUTANEOUS at 17:43

## 2020-04-19 RX ADMIN — ENOXAPARIN SODIUM 30 MG: 30 INJECTION SUBCUTANEOUS at 08:14

## 2020-04-19 RX ADMIN — ASPIRIN 81 MG: 81 TABLET, COATED ORAL at 08:13

## 2020-04-19 RX ADMIN — HYDRALAZINE HYDROCHLORIDE 10 MG: 10 TABLET, FILM COATED ORAL at 17:44

## 2020-04-19 RX ADMIN — INSULIN LISPRO 1 UNITS: 100 INJECTION, SOLUTION INTRAVENOUS; SUBCUTANEOUS at 22:23

## 2020-04-19 RX ADMIN — LEVOTHYROXINE SODIUM 50 MCG: 50 TABLET ORAL at 08:14

## 2020-04-19 RX ADMIN — HYDROXYCHLOROQUINE SULFATE 200 MG: 200 TABLET, FILM COATED ORAL at 08:13

## 2020-04-19 RX ADMIN — METOPROLOL SUCCINATE 50 MG: 50 TABLET, EXTENDED RELEASE ORAL at 08:13

## 2020-04-19 RX ADMIN — LATANOPROST 1 DROP: 50 SOLUTION OPHTHALMIC at 22:22

## 2020-04-19 RX ADMIN — SENNOSIDES 17.2 MG: 8.6 TABLET, FILM COATED ORAL at 22:23

## 2020-04-19 RX ADMIN — ZINC SULFATE 220 MG (50 MG) CAPSULE 220 MG: CAPSULE at 08:13

## 2020-04-19 RX ADMIN — INSULIN LISPRO 6 UNITS: 100 INJECTION, SOLUTION INTRAVENOUS; SUBCUTANEOUS at 12:59

## 2020-04-19 RX ADMIN — INSULIN LISPRO 6 UNITS: 100 INJECTION, SOLUTION INTRAVENOUS; SUBCUTANEOUS at 08:19

## 2020-04-19 RX ADMIN — INSULIN GLARGINE 18 UNITS: 100 INJECTION, SOLUTION SUBCUTANEOUS at 22:23

## 2020-04-19 RX ADMIN — HYDRALAZINE HYDROCHLORIDE 10 MG: 10 TABLET, FILM COATED ORAL at 08:15

## 2020-04-19 RX ADMIN — INSULIN LISPRO 6 UNITS: 100 INJECTION, SOLUTION INTRAVENOUS; SUBCUTANEOUS at 17:43

## 2020-04-19 RX ADMIN — AMLODIPINE BESYLATE 5 MG: 5 TABLET ORAL at 08:14

## 2020-04-19 RX ADMIN — PANTOPRAZOLE SODIUM 20 MG: 20 TABLET, DELAYED RELEASE ORAL at 05:32

## 2020-04-19 RX ADMIN — ATORVASTATIN CALCIUM 40 MG: 40 TABLET, FILM COATED ORAL at 22:23

## 2020-04-19 RX ADMIN — LOSARTAN POTASSIUM 100 MG: 50 TABLET, FILM COATED ORAL at 08:13

## 2020-04-19 RX ADMIN — OXYCODONE HYDROCHLORIDE AND ACETAMINOPHEN 1000 MG: 500 TABLET ORAL at 22:23

## 2020-04-19 RX ADMIN — OXYCODONE HYDROCHLORIDE AND ACETAMINOPHEN 1000 MG: 500 TABLET ORAL at 08:14

## 2020-04-19 RX ADMIN — HYDRALAZINE HYDROCHLORIDE 10 MG: 10 TABLET, FILM COATED ORAL at 22:22

## 2020-04-19 NOTE — ASSESSMENT & PLAN NOTE
· COVID-19 positive from [facility] course of azithromycin and hydroxychloroquine   Was on ceftriaxone for empiric bacterial pneumonia however given negative procalcitonin has been discontinued   Medically stable for discharge too Ellouise Him when she can be accommodated    Results from last 7 days   Lab Units 04/15/20  0838   SARS-COV-2  Positive*     Results from last 7 days   Lab Units 04/17/20  1353 04/17/20  0511 04/15/20  1548 04/15/20  0524 04/14/20 2113 04/14/20  1948   D-DIMER QUANTITATIVE ug/ml FEU  --  0 83*  --   --   --  1 12*   CRP mg/L 6 9*  --  6 7*  --   --  6 2*   FERRITIN ng/mL 210  --   --  212 208  --    LD U/L 280*  --  399*  --   --  298*     Results from last 7 days   Lab Units 04/17/20  0511 04/15/20  0524 04/14/20 2113   PROCALCITONIN ng/ml <0 05 <0 05 <0 05

## 2020-04-19 NOTE — PROGRESS NOTES
Progress Note - Angely Navarro 3/24/1929, 80 y o  female MRN: [id number]    Unit/Bed#: -[facility] Encounter: [id number]    Primary Care Provider: JASBIR Wilson   Date and time admitted to hospital: 4/14/2020  4:43 PM        * COVID-19 virus detected  Assessment & Plan  · COVID-19 positive from [facility] course of azithromycin and hydroxychloroquine   Was on ceftriaxone for empiric bacterial pneumonia however given negative procalcitonin has been discontinued   Medically stable for discharge too Berto Gottigrady when she can be accommodated    Results from last 7 days   Lab Units 04/15/20  0838   SARS-COV-2  Positive*     Results from last 7 days   Lab Units 04/17/20  1353 04/17/20  0511 04/15/20  1548 04/15/20  0524 04/14/20  2113 04/14/20  1948   D-DIMER QUANTITATIVE ug/ml FEU  --  0 83*  --   --   --  1 12*   CRP mg/L 6 9*  --  6 7*  --   --  6 2*   FERRITIN ng/mL 210  --   --  212 208  --    LD U/L 280*  --  399*  --   --  298*     Results from last 7 days   Lab Units 04/17/20  0511 04/15/20  0524 04/14/20  2113   PROCALCITONIN ng/ml <0 05 <0 05 <0 05       History of heart block  Assessment & Plan  · History of heart block status post pacer    Hypothyroidism  Assessment & Plan  · Hypothyroidism continue levothyroxine    DM (diabetes mellitus), type 2 [facility])  Assessment & Plan  Lab Results   Component Value Date    HGBA1C 6 8 (H) 01/14/2020     Recent Labs     04/18/20  1233 04/18/20  1644 04/18/20  2114 04/19/20  0808   POCGLU 170* 133 185* 202*     · Diabetes mellitus did steroid induced hyperglycemia  Currently stable on current dose of glargine and sliding scale lispro    Essential hypertension  Assessment & Plan  · Essential hypertension continue amlodipine hydralazine metoprolol and losartan      VTE Pharmacologic Prophylaxis: Enoxaparin (Lovenox)    Patient Centered Rounds: I have performed bedside rounds with nursing staff today    Discussions with Specialists or Other Care Team Provider:   Education and Discussions with Family / Patient:  Case was discussed with daughter and son-in-law yesterday    Time Spent for Care: 20 mins  More than 50% of total time spent on counseling and coordination of care as described above  Current Length of Stay: 4 day(s)  Current Patient Status: Inpatient     Certification Statement: The patient will continue to require additional inpatient hospital stay due to COVID-19 virus detected  Discharge Plan / Estimated Discharge Date:  Medically stable for discharge with facility can take patient back    Code Status: Level 3 - DNAR and DNI  ______________________________________________________________________________    Subjective:   Patient seen and examined  No new complaints  Feeling pretty good    Objective:   Vitals: Blood pressure 161/69, pulse 62, temperature 98 3 °F (36 8 °C), temperature source Oral, resp  rate 19, height 5' 1" (1 549 m), weight 74 8 kg (164 lb 14 5 oz), SpO2 98 %      Physical Exam:   General appearance: alert, appears stated age and cooperative  Head: Normocephalic, without obvious abnormality, atraumatic  Lungs: diminished breath sounds  Heart: regular rate and rhythm  Abdomen: soft, non-tender, positive bowel sounds   Back: negative, range of motion normal  Extremities: extremities atraumatic, no cyanosis or edema  Neurologic: Grossly normal    Additional Data:   Labs:  Results from last 7 days   Lab Units 04/17/20  1353 04/16/20  1544 04/15/20  0524 04/14/20  1948 04/14/20  1941   WBC Thousand/uL 12 38* 18 12*  --   --  7 90   HEMOGLOBIN g/dL 14 2 13 1  --   --  15 3   HEMATOCRIT % 43 7 39 7  --   --  46 7*   MCV fL 94 92  --   --  93   PLATELETS Thousands/uL 205 211  --   --  225   INR   --   --  1 00 1 01  --      Results from last 7 days   Lab Units 04/17/20  1353 04/16/20  1544 04/15/20  1548 04/14/20  1941   SODIUM mmol/L 135* 136 136 135*   POTASSIUM mmol/L 4 3 4 4 4 5 4 6   CHLORIDE mmol/L 101 102 102 101   CO2 mmol/L 23 24 23 25   ANION GAP mmol/L 11 10 11 9   BUN mg/dL 28* 33* 27* 24   CREATININE mg/dL 1 12 1 29 1 45* 1 23   CALCIUM mg/dL 8 9 8 8 9 0 8 9   ALBUMIN g/dL 2 9*  --   --   --    TOTAL BILIRUBIN mg/dL 0 30  --   --   --    ALK PHOS U/L 105  --   --   --    ALT U/L 32  --   --   --    AST U/L 27  --   --   --    EGFR ml/min/1 73sq m 43 36 32 38   GLUCOSE RANDOM mg/dL 241* 189* 342* 233*     Results from last 7 days   Lab Units 04/15/20  1548   MAGNESIUM mg/dL 2 2     Results from last 7 days   Lab Units 04/15/20  1548 04/14/20  1948   CK TOTAL U/L 92 71   TROPONIN I ng/mL  --  <0 02     Results from last 7 days   Lab Units 04/14/20  1948   NT-PRO BNP pg/mL 343      Results from last 7 days   Lab Units 04/17/20  0511  04/14/20  1941   LACTIC ACID mmol/L  --   --  1 3   PROCALCITONIN ng/ml <0 05   < >  --     < > = values in this interval not displayed  Results from last 7 days   Lab Units 04/19/20  0808 04/18/20  2114 04/18/20  1644 04/18/20  1233 04/18/20  0634 04/17/20  2125 04/17/20  1709 04/17/20  1038 04/17/20  0622 04/16/20  2117 04/16/20  1537 04/16/20  1117   POC GLUCOSE mg/dl 202* 185* 133 170* 134 95 169* 204* 174* 187* 190* 246*             * I Have Reviewed All Lab Data Listed Above  Cultures:   Results from last 7 days   Lab Units 04/14/20  1941   BLOOD CULTURE  No Growth After 4 Days  No Growth After 4 Days  Imaging:  Imaging Reports Reviewed Today Include:   Xr Chest 1 View Portable    Result Date: 4/14/2020  Impression: New right upper lobe infiltrate in keeping with pneumonia  The study was marked in [facility] for immediate notification   Workstation performed: [id number]     Scheduled Meds:  Current Facility-Administered Medications:  acetaminophen 650 mg Oral Q6H PRN JASBIR Rao   aluminum-magnesium hydroxide-simethicone 30 mL Oral Q6H PRN JASBIR Rao   amLODIPine 5 mg Oral Daily JASBIR Rao   ascorbic acid 1,000 mg Oral Q12H [address] Gandara Tonya aspirin 81 mg Oral Daily Valere Nims, CRNP   atorvastatin 40 mg Oral HS Valere Nims, CRNP   vitamin B-12 1,000 mcg Oral Daily Valere Nims, CRNP   dextran 70-hypromellose 1 drop Both Eyes Q4H PRN Valere Nims, CRNP   docusate sodium 100 mg Oral BID PRN Valere Nims, CRNP   enoxaparin 30 mg Subcutaneous Daily Valere Nims, CRNP   hydrALAZINE 10 mg Oral TID Valere Nims, CRNP   insulin glargine 18 Units Subcutaneous HS Monet Lee MD   insulin lispro 1-6 Units Subcutaneous TID AC Danita Allred, CRNP   insulin lispro 1-6 Units Subcutaneous HS Valere Nims, CRNP   insulin lispro 6 Units Subcutaneous TID With Meals Valere Nims, CRNP   latanoprost 1 drop Both Eyes HS Valere Nims, CRNP   levothyroxine 50 mcg Oral Daily Valere Nims, CRNP   losartan 100 mg Oral Daily Valere Nims, CRNP   metoprolol succinate 50 mg Oral Daily Valere Nims, CRNP   zinc sulfate 220 mg Oral Daily Valere Nims, CRNP   Followed by       Khang Hein ON 4/22/2020] multivitamin-minerals 1 tablet Oral Daily Danita Allred, CRJING   ondansetron 4 mg Intravenous Q6H PRN Valere Nims, CRNP   pantoprazole 20 mg Oral Early Morning Valere Nims, CRNP   senna 2 tablet Oral HS Valere Nims, CRNP   traMADol 50 mg Oral BID Valere Nims, CRNP       [address] [place] [facility]  Hospitalist    ** Please Note: This note has been constructed using a voice recognition system   **

## 2020-04-19 NOTE — ASSESSMENT & PLAN NOTE
Lab Results   Component Value Date    HGBA1C 6 8 (H) 01/14/2020     Recent Labs     04/18/20  1233 04/18/20  1644 04/18/20  2114 04/19/20  0808   POCGLU 170* 133 185* 202*     · Diabetes mellitus did steroid induced hyperglycemia    Currently stable on current dose of glargine and sliding scale lispro

## 2020-04-20 LAB
GLUCOSE SERPL-MCNC: 124 MG/DL (ref 65–140)
GLUCOSE SERPL-MCNC: 155 MG/DL (ref 65–140)
GLUCOSE SERPL-MCNC: 233 MG/DL (ref 65–140)
GLUCOSE SERPL-MCNC: 256 MG/DL (ref 65–140)

## 2020-04-20 PROCEDURE — 99239 HOSP IP/OBS DSCHRG MGMT >30: CPT | Performed by: FAMILY MEDICINE

## 2020-04-20 PROCEDURE — 82948 REAGENT STRIP/BLOOD GLUCOSE: CPT

## 2020-04-20 RX ADMIN — ASPIRIN 81 MG: 81 TABLET, COATED ORAL at 08:40

## 2020-04-20 RX ADMIN — OXYCODONE HYDROCHLORIDE AND ACETAMINOPHEN 1000 MG: 500 TABLET ORAL at 21:51

## 2020-04-20 RX ADMIN — INSULIN GLARGINE 18 UNITS: 100 INJECTION, SOLUTION SUBCUTANEOUS at 21:51

## 2020-04-20 RX ADMIN — INSULIN LISPRO 3 UNITS: 100 INJECTION, SOLUTION INTRAVENOUS; SUBCUTANEOUS at 17:29

## 2020-04-20 RX ADMIN — INSULIN LISPRO 3 UNITS: 100 INJECTION, SOLUTION INTRAVENOUS; SUBCUTANEOUS at 12:07

## 2020-04-20 RX ADMIN — PANTOPRAZOLE SODIUM 20 MG: 20 TABLET, DELAYED RELEASE ORAL at 06:02

## 2020-04-20 RX ADMIN — INSULIN LISPRO 6 UNITS: 100 INJECTION, SOLUTION INTRAVENOUS; SUBCUTANEOUS at 12:06

## 2020-04-20 RX ADMIN — ENOXAPARIN SODIUM 30 MG: 30 INJECTION SUBCUTANEOUS at 08:42

## 2020-04-20 RX ADMIN — LEVOTHYROXINE SODIUM 50 MCG: 50 TABLET ORAL at 08:40

## 2020-04-20 RX ADMIN — HYDRALAZINE HYDROCHLORIDE 10 MG: 10 TABLET, FILM COATED ORAL at 17:30

## 2020-04-20 RX ADMIN — ATORVASTATIN CALCIUM 40 MG: 40 TABLET, FILM COATED ORAL at 21:51

## 2020-04-20 RX ADMIN — INSULIN LISPRO 1 UNITS: 100 INJECTION, SOLUTION INTRAVENOUS; SUBCUTANEOUS at 08:45

## 2020-04-20 RX ADMIN — CYANOCOBALAMIN TAB 500 MCG 1000 MCG: 500 TAB at 08:41

## 2020-04-20 RX ADMIN — INSULIN LISPRO 6 UNITS: 100 INJECTION, SOLUTION INTRAVENOUS; SUBCUTANEOUS at 08:46

## 2020-04-20 RX ADMIN — TRAMADOL HYDROCHLORIDE 50 MG: 50 TABLET, FILM COATED ORAL at 17:30

## 2020-04-20 RX ADMIN — SENNOSIDES 17.2 MG: 8.6 TABLET, FILM COATED ORAL at 21:51

## 2020-04-20 RX ADMIN — HYDRALAZINE HYDROCHLORIDE 10 MG: 10 TABLET, FILM COATED ORAL at 08:41

## 2020-04-20 RX ADMIN — METOPROLOL SUCCINATE 50 MG: 50 TABLET, EXTENDED RELEASE ORAL at 08:41

## 2020-04-20 RX ADMIN — ZINC SULFATE 220 MG (50 MG) CAPSULE 220 MG: CAPSULE at 08:42

## 2020-04-20 RX ADMIN — TRAMADOL HYDROCHLORIDE 50 MG: 50 TABLET, FILM COATED ORAL at 08:41

## 2020-04-20 RX ADMIN — INSULIN LISPRO 6 UNITS: 100 INJECTION, SOLUTION INTRAVENOUS; SUBCUTANEOUS at 17:28

## 2020-04-20 RX ADMIN — HYDRALAZINE HYDROCHLORIDE 10 MG: 10 TABLET, FILM COATED ORAL at 21:52

## 2020-04-20 RX ADMIN — OXYCODONE HYDROCHLORIDE AND ACETAMINOPHEN 1000 MG: 500 TABLET ORAL at 08:40

## 2020-04-20 RX ADMIN — AMLODIPINE BESYLATE 5 MG: 5 TABLET ORAL at 08:41

## 2020-04-20 RX ADMIN — LOSARTAN POTASSIUM 100 MG: 50 TABLET, FILM COATED ORAL at 08:40

## 2020-04-20 NOTE — ASSESSMENT & PLAN NOTE
· COVID-19 positive from [facility] course of azithromycin and hydroxychloroquine   Was on ceftriaxone for empiric bacterial pneumonia however given negative procalcitonin has been discontinued   Medically stable for discharge too Chanel Allan    Results from last 7 days   Lab Units 04/15/20  0838   SARS-COV-2  Positive*     Results from last 7 days   Lab Units 04/17/20  1353 04/17/20  0511 04/15/20  1548 04/15/20  0524 04/14/20  2113 04/14/20  1948   D-DIMER QUANTITATIVE ug/ml FEU  --  0 83*  --   --   --  1 12*   CRP mg/L 6 9*  --  6 7*  --   --  6 2*   FERRITIN ng/mL 210  --   --  212 208  --    LD U/L 280*  --  399*  --   --  298*     Results from last 7 days   Lab Units 04/17/20  0511 04/15/20  0524 04/14/20 2113   PROCALCITONIN ng/ml <0 05 <0 05 <0 05

## 2020-04-20 NOTE — ASSESSMENT & PLAN NOTE
Lab Results   Component Value Date    HGBA1C 6 8 (H) 01/14/2020     Recent Labs     04/19/20  1617 04/19/20  2104 04/20/20  0650 04/20/20  1114   POCGLU 185* 177* 155* 256*     · Continue home medication

## 2020-04-20 NOTE — PROGRESS NOTES
Progress Note - Lory Herring 3/24/1929, 80 y o  female MRN: [id number]    Unit/Bed#: -[facility] Encounter: [id number]    Primary Care Provider: JASBIR Vega   Date and time admitted to hospital: 4/14/2020  4:43 PM        * COVID-19 virus detected  Assessment & Plan  · COVID-19 positive from [facility] course of azithromycin and hydroxychloroquine   Was on ceftriaxone for empiric bacterial pneumonia however given negative procalcitonin has been discontinued   Medically stable for discharge to [facility] when confirmed    Results from last 7 days   Lab Units 04/15/20  0838   SARS-COV-2  Positive*     Results from last 7 days   Lab Units 04/17/20  1353 04/17/20  0511 04/15/20  1548 04/15/20  0524 04/14/20  2113 04/14/20  1948   D-DIMER QUANTITATIVE ug/ml FEU  --  0 83*  --   --   --  1 12*   CRP mg/L 6 9*  --  6 7*  --   --  6 2*   FERRITIN ng/mL 210  --   --  212 208  --    LD U/L 280*  --  399*  --   --  298*     Results from last 7 days   Lab Units 04/17/20  0511 04/15/20  0524 04/14/20  2113   PROCALCITONIN ng/ml <0 05 <0 05 <0 05       Hypothyroidism  Assessment & Plan  · Hypothyroidism continue levothyroxine    DM (diabetes mellitus), type 2 [facility])  Assessment & Plan  Lab Results   Component Value Date    HGBA1C 6 8 (H) 01/14/2020     Recent Labs     04/19/20  1617 04/19/20  2104 04/20/20  0650 04/20/20  1114   POCGLU 185* 177* 155* 256*     · Continue home medication    Essential hypertension  Assessment & Plan  · Continue amlodipine hydralazine metoprolol and losartan      VTE Pharmacologic Prophylaxis:   Pharmacologic: Enoxaparin (Lovenox)  Mechanical VTE Prophylaxis in Place: Yes    Patient Centered Rounds: I have performed bedside rounds with nursing staff today  Discussions with Specialists or Other Care Team Provider:     Education and Discussions with Family / Patient: Patient   Time Spent for Care: 20 minutes    More than 50% of total time spent on counseling and coordination of care as described above  Current Length of Stay: 5 day(s)    Current Patient Status: Inpatient   Certification Statement: The patient will continue to require additional inpatient hospital stay due to Acute above conditions    Discharge Plan: depend on placement     Code Status: Level 3 - DNAR and DNI      Subjective:   Patient states she is better as she expressed    Objective:     Vitals:   Temp (24hrs), Av 4 °F (36 9 °C), Min:98 1 °F (36 7 °C), Max:98 6 °F (37 °C)    Temp:  [98 1 °F (36 7 °C)-98 6 °F (37 °C)] 98 1 °F (36 7 °C)  HR:  [60-77] 77  Resp:  [17-18] 18  BP: (142-161)/(56-83) 161/83  SpO2:  [96 %-98 %] 97 %  Body mass index is 31 16 kg/m²  Input and Output Summary (last 24 hours): Intake/Output Summary (Last 24 hours) at 2020 1418  Last data filed at 2020 0504  Gross per 24 hour   Intake 780 ml   Output    Net 780 ml       Physical Exam:     Physical Exam   Constitutional: No distress  Cardiovascular: Regular rhythm, normal heart sounds and intact distal pulses  No murmur heard  Pulmonary/Chest: No stridor  She has no wheezes  She has no rales  Abdominal: She exhibits no distension  There is no tenderness  There is no guarding  Neurological: She is alert  Skin: Skin is warm  She is not diaphoretic  Psychiatric: She has a normal mood and affect         Additional Data:     Labs:    Results from last 7 days   Lab Units 20  1353 20  1544   WBC Thousand/uL 12 38* 18 12*   HEMOGLOBIN g/dL 14 2 13 1   HEMATOCRIT % 43 7 39 7   PLATELETS Thousands/uL 205 211   NEUTROS PCT %  --  86*   LYMPHS PCT %  --  7*   MONOS PCT %  --  6   EOS PCT %  --  0     Results from last 7 days   Lab Units 20  1353   POTASSIUM mmol/L 4 3   CHLORIDE mmol/L 101   CO2 mmol/L 23   BUN mg/dL 28*   CREATININE mg/dL 1 12   CALCIUM mg/dL 8 9   ALK PHOS U/L 105   ALT U/L 32   AST U/L 27     Results from last 7 days   Lab Units 04/15/20  0524   INR  1 00       * I Have Reviewed All Lab Data Listed Above  * Additional Pertinent Lab Tests Reviewed: All Labs Within Last 24 Hours Reviewed    Imaging:    Imaging Reports Reviewed Today Include:   Imaging Personally Reviewed by Myself Includes:      Recent Cultures (last 7 days):     Results from last 7 days   Lab Units 04/14/20 1941   BLOOD CULTURE  No Growth After 5 Days  No Growth After 5 Days         Last 24 Hours Medication List:     Current Facility-Administered Medications:  acetaminophen 650 mg Oral Q6H PRN Lucita [facility], CRNP   aluminum-magnesium hydroxide-simethicone 30 mL Oral Q6H PRN Lucita [facility], CRNP   amLODIPine 5 mg Oral Daily Lucita [facility], CRNP   ascorbic acid 1,000 mg Oral Q12H [facility] Lucita [facility], CRNP   aspirin 81 mg Oral Daily Lucita [facility], CRNP   atorvastatin 40 mg Oral HS Lucita [facility], CRNP   vitamin B-12 1,000 mcg Oral Daily Lucita [facility], CRNP   dextran 70-hypromellose 1 drop Both Eyes Q4H PRN Lucita [facility], CRNP   docusate sodium 100 mg Oral BID PRN Lucita [facility], CRNP   enoxaparin 30 mg Subcutaneous Daily Lucita [facility], CRNP   hydrALAZINE 10 mg Oral TID Lucita [facility], CRNP   insulin glargine 18 Units Subcutaneous HS Quintin Mars MD   insulin lispro 1-6 Units Subcutaneous TID AC Danita Allred, CRNP   insulin lispro 1-6 Units Subcutaneous HS Lucita [facility], CRNP   insulin lispro 6 Units Subcutaneous TID With Meals Lucita [facility], CRNP   latanoprost 1 drop Both Eyes HS Lucita [facility], CRNP   levothyroxine 50 mcg Oral Daily Lucita [facility], CRNP   losartan 100 mg Oral Daily Lucita [facility], CRNP   metoprolol succinate 50 mg Oral Daily [place], CRNP   zinc sulfate 220 mg Oral Daily Lucita [facility], CRJING   Followed by       Mercedes Cox ON 4/22/2020] multivitamin-minerals 1 tablet Oral Daily Danita Allred, JASBIR   ondansetron 4 mg Intravenous Q6H PRN Ulcita [facility], CRNP   pantoprazole 20 mg Oral Early Morning Lucita [facility], CRNP   senna 2 tablet Oral HS JASBIR Leonardo   traMADol 50 mg Oral BID JASBIR Leonardo        Today, Patient Was Seen By: Dale Barbour MD    ** Please Note: Dragon 360 Dictation voice to text software may have been used in the creation of this document   **

## 2020-04-20 NOTE — DISCHARGE SUMMARY
Discharge- Catracho Lynn 3/24/1929, 80 y o  female MRN: [id number]    Unit/Bed#: -01 Encounter: [id number]    Primary Care Provider: JASBIR Nolan   Date and time admitted to hospital: 4/14/2020  4:43 PM        * COVID-19 virus detected  Assessment & Plan  · COVID-19 positive from [facility] course of azithromycin and hydroxychloroquine   Was on ceftriaxone for empiric bacterial pneumonia however given negative procalcitonin has been discontinued   Medically stable for discharge too Dilip Santiago    Results from last 7 days   Lab Units 04/15/20  0838   SARS-COV-2  Positive*     Results from last 7 days   Lab Units 04/17/20  1353 04/17/20  0511 04/15/20  1548 04/15/20  0524 04/14/20  2113 04/14/20  1948   D-DIMER QUANTITATIVE ug/ml FEU  --  0 83*  --   --   --  1 12*   CRP mg/L 6 9*  --  6 7*  --   --  6 2*   FERRITIN ng/mL 210  --   --  212 208  --    LD U/L 280*  --  399*  --   --  298*     Results from last 7 days   Lab Units 04/17/20  0511 04/15/20  0524 04/14/20  2113   PROCALCITONIN ng/ml <0 05 <0 05 <0 05       Hypothyroidism  Assessment & Plan  · Hypothyroidism continue levothyroxine    DM (diabetes mellitus), type 2 [facility])  Assessment & Plan  Lab Results   Component Value Date    HGBA1C 6 8 (H) 01/14/2020     Recent Labs     04/19/20  1617 04/19/20  2104 04/20/20  0650 04/20/20  1114   POCGLU 185* 177* 155* 256*     · Continue home medication    Essential hypertension  Assessment & Plan  · Continue amlodipine hydralazine metoprolol and losartan        Discharging Physician / Practitioner: Cami Esposito MD  PCP: Rosie Baker, [address] [place] [address]  Admission Date:   Admission Orders (From admission, onward)     Ordered        04/15/20 1356  Inpatient Admission  Once         04/1933  Place in Observation  Once                   Discharge Date: 04/20/20    Resolved Problems  Date Reviewed: 4/20/2020    None          Consultations During Hospital Stay:  ·     Procedures Performed:       Significant Findings / Test Results:   · CHEST X-RAY:  New right upper lobe infiltrate in keeping with pneumonia  Incidental Findings:   ·     Test Results Pending at Discharge (will require follow up):   ·      Outpatient Tests Requested:    Complications:      Reason for Admission:     Hospital Course:     Abdulaziz Grover is a 80 y o  female patient who originally presented to the hospital on 4/14/2020 due to fever and cough  Patient was having some symptoms secondary to COVID-19 pneumonia requiring azithromycin hydroxychloroquine which she has completed treatment  Patient was given IV Rocephin for empiric coverage  Patient has been clinically stable or chronic medical condition has been stable  Patient will be discharged pain        Please see above list of diagnoses and related plan for additional information  Condition at Discharge: stable     Discharge Day Visit / Exam:     Subjective:  Patient states he is doing all right  Vitals: Blood Pressure: 161/83 (04/20/20 0845)  Pulse: 77 (04/20/20 0845)  Temperature: 98 1 °F (36 7 °C) (04/20/20 0845)  Temp Source: Oral (04/19/20 1534)  Respirations: 18 (04/20/20 0845)  Height: 5' 1" (154 9 cm) (04/17/20 1006)  Weight - Scale: 74 8 kg (164 lb 14 5 oz) (04/14/20 1645)  SpO2: 97 % (04/20/20 0845)  Exam:   Physical Exam   Constitutional: No distress  Cardiovascular: Regular rhythm, normal heart sounds and intact distal pulses  Pulmonary/Chest: No stridor  No respiratory distress  She has no wheezes  She has no rales  Abdominal: She exhibits no distension  There is no tenderness  There is no guarding  obese   Musculoskeletal: Normal range of motion  Neurological: She is alert  Skin: Skin is warm  Capillary refill takes more than 3 seconds  She is not diaphoretic  Psychiatric: She has a normal mood and affect         Discussion with Family:     Discharge instructions/Information to patient and family:   See after visit summary for information provided to patient and family  Provisions for Follow-Up Care:  See after visit summary for information related to follow-up care and any pertinent home health orders  Disposition:     Home    For Discharges to [facility] SNF:   · Not Applicable to this Patient - Not Applicable to this Patient    Planned Readmission:      Discharge Statement:  I spent 30 minutes discharging the patient  This time was spent on the day of discharge  I had direct contact with the patient on the day of discharge  Greater than 50% of the total time was spent examining patient, answering all patient questions, arranging and discussing plan of care with patient as well as directly providing post-discharge instructions  Additional time then spent on discharge activities  Discharge Medications:  See after visit summary for reconciled discharge medications provided to patient and family        ** Please Note: This note has been constructed using a voice recognition system **

## 2020-04-20 NOTE — ASSESSMENT & PLAN NOTE
· COVID-19 positive from [facility] course of azithromycin and hydroxychloroquine   Was on ceftriaxone for empiric bacterial pneumonia however given negative procalcitonin has been discontinued   Medically stable for discharge to [facility] when confirmed    Results from last 7 days   Lab Units 04/15/20  0838   SARS-COV-2  Positive*     Results from last 7 days   Lab Units 04/17/20  1353 04/17/20  0511 04/15/20  1548 04/15/20  0524 04/14/20 2113 04/14/20  1948   D-DIMER QUANTITATIVE ug/ml FEU  --  0 83*  --   --   --  1 12*   CRP mg/L 6 9*  --  6 7*  --   --  6 2*   FERRITIN ng/mL 210  --   --  212 208  --    LD U/L 280*  --  399*  --   --  298*     Results from last 7 days   Lab Units 04/17/20  0511 04/15/20  0524 04/14/20 2113   PROCALCITONIN ng/ml <0 05 <0 05 <0 05

## 2020-04-21 VITALS
WEIGHT: 164.9 LBS | BODY MASS INDEX: 31.13 KG/M2 | HEART RATE: 60 BPM | RESPIRATION RATE: 22 BRPM | TEMPERATURE: 98.2 F | DIASTOLIC BLOOD PRESSURE: 72 MMHG | SYSTOLIC BLOOD PRESSURE: 165 MMHG | OXYGEN SATURATION: 98 % | HEIGHT: 61 IN

## 2020-04-21 PROBLEM — E11.65 TYPE 2 DIABETES MELLITUS WITH HYPERGLYCEMIA, WITH LONG-TERM CURRENT USE OF INSULIN (HCC): Chronic | Status: ACTIVE | Noted: 2017-12-28

## 2020-04-21 PROBLEM — J12.82 PNEUMONIA DUE TO COVID-19 VIRUS: Status: ACTIVE | Noted: 2020-04-14

## 2020-04-21 PROBLEM — Z79.4 TYPE 2 DIABETES MELLITUS WITH HYPERGLYCEMIA, WITH LONG-TERM CURRENT USE OF INSULIN (HCC): Chronic | Status: ACTIVE | Noted: 2017-12-28

## 2020-04-21 LAB
GLUCOSE SERPL-MCNC: 117 MG/DL (ref 65–140)
GLUCOSE SERPL-MCNC: 200 MG/DL (ref 65–140)

## 2020-04-21 PROCEDURE — 99232 SBSQ HOSP IP/OBS MODERATE 35: CPT | Performed by: FAMILY MEDICINE

## 2020-04-21 PROCEDURE — 82948 REAGENT STRIP/BLOOD GLUCOSE: CPT

## 2020-04-21 RX ADMIN — ZINC SULFATE 220 MG (50 MG) CAPSULE 220 MG: CAPSULE at 08:59

## 2020-04-21 RX ADMIN — AMLODIPINE BESYLATE 5 MG: 5 TABLET ORAL at 08:58

## 2020-04-21 RX ADMIN — METOPROLOL SUCCINATE 50 MG: 50 TABLET, EXTENDED RELEASE ORAL at 08:58

## 2020-04-21 RX ADMIN — TRAMADOL HYDROCHLORIDE 50 MG: 50 TABLET, FILM COATED ORAL at 08:59

## 2020-04-21 RX ADMIN — ENOXAPARIN SODIUM 30 MG: 30 INJECTION SUBCUTANEOUS at 08:58

## 2020-04-21 RX ADMIN — INSULIN LISPRO 6 UNITS: 100 INJECTION, SOLUTION INTRAVENOUS; SUBCUTANEOUS at 08:59

## 2020-04-21 RX ADMIN — INSULIN LISPRO 2 UNITS: 100 INJECTION, SOLUTION INTRAVENOUS; SUBCUTANEOUS at 12:35

## 2020-04-21 RX ADMIN — LOSARTAN POTASSIUM 100 MG: 50 TABLET, FILM COATED ORAL at 08:58

## 2020-04-21 RX ADMIN — HYDRALAZINE HYDROCHLORIDE 10 MG: 10 TABLET, FILM COATED ORAL at 08:59

## 2020-04-21 RX ADMIN — ASPIRIN 81 MG: 81 TABLET, COATED ORAL at 08:58

## 2020-04-21 RX ADMIN — PANTOPRAZOLE SODIUM 20 MG: 20 TABLET, DELAYED RELEASE ORAL at 05:15

## 2020-04-21 RX ADMIN — OXYCODONE HYDROCHLORIDE AND ACETAMINOPHEN 1000 MG: 500 TABLET ORAL at 08:58

## 2020-04-21 RX ADMIN — CYANOCOBALAMIN TAB 500 MCG 1000 MCG: 500 TAB at 08:58

## 2020-04-21 RX ADMIN — LEVOTHYROXINE SODIUM 50 MCG: 50 TABLET ORAL at 08:58

## 2020-04-21 NOTE — ASSESSMENT & PLAN NOTE
Chronic stable condition  Treated as outpatient with levothyroxine  Continue outpatient medication regimen

## 2020-04-21 NOTE — ASSESSMENT & PLAN NOTE
Lab Results   Component Value Date    HGBA1C 6 8 (H) 01/14/2020     Recent Labs     04/20/20  1114 04/20/20  1530 04/20/20  2041 04/21/20  0620   POCGLU 256* 233* 124 117     A1c at goal (less than 7); see above  Continue outpatient insulin regimen and initiate insulin protocol with blood glucose AC and HS during hospitalization  Resume outpatient medication regimen and routine follow up with PCM following discharge

## 2020-04-21 NOTE — DISCHARGE INSTR - OTHER ORDERS
Sliding Scale Coverage    Blood Glucose 150 - 189: 1 Unit of Insulin  Blood Glucose 190 - 229: 2 Units of Insulin  Blood Glucose 230 - 269: 3 Units of Insulin  Blood Glucose 270 - 309: 4 Units of Insulin  Blood Glucose 310 - 349: 5 Units of Insulin  Blood Glucose greater than or equal to 350: 6 Units of Insulin

## 2020-04-21 NOTE — SOCIAL WORK
LOS 6   CM received a call from Edwena Severs from [facility] [phone] who confirmed [address] is prepared to accept patient today and are requesting this CM schedule 1PM transport today  CM phoned [facility] scheduled wheelchair transport for 1PM today  No medical necessity required  CM received call from [address] [place] [address] who confirmed  arrangement for patietn's discharge should be coordinated with Edwena Severs prior to patient's discharge  Herlinda Bell [facility] [place] [address]  Herlinda Bell requested dci be faxed to [phone]  CM notified son-Sanjeev of transport today and informed of IMM and IMM is in the chart  Treatment team informed of above

## 2020-04-21 NOTE — PROGRESS NOTES
Progress Note - Zaynab Johnson 3/24/1929, 80 y o  female MRN: [id number]    Unit/Bed#: -Yolande Encounter: [id number]    Primary Care Provider: JASBIR Almanza   Date and time admitted to hospital: 4/14/2020  4:43 PM        * Pneumonia due to COVID-19 virus  Assessment & Plan  · COVID-19 positive from [facility] course of azithromycin and hydroxychloroquine   Was on ceftriaxone for empiric bacterial pneumonia however given negative procalcitonin has been discontinued   Gradually improved and did not require supplemental oxygen by day prior to discharge   Medically stable for discharge to [facility] when confirmed    Results from last 7 days   Lab Units 04/15/20  0838   SARS-COV-2  Positive*     Results from last 7 days   Lab Units 04/17/20  1353 04/17/20  0511 04/15/20  1548 04/15/20  0524 04/14/20  2113 04/14/20  1948   D-DIMER QUANTITATIVE ug/ml FEU  --  0 83*  --   --   --  1 12*   CRP mg/L 6 9*  --  6 7*  --   --  6 2*   FERRITIN ng/mL 210  --   --  212 208  --    LD U/L 280*  --  399*  --   --  298*     Results from last 7 days   Lab Units 04/17/20  0511 04/15/20  0524 04/14/20  2113   PROCALCITONIN ng/ml <0 05 <0 05 <0 05       History of heart block  Assessment & Plan  History of heart block status post pacemaker placement  Monitor clinically  Hypothyroidism  Assessment & Plan  Chronic stable condition  Treated as outpatient with levothyroxine  Continue outpatient medication regimen  Type 2 diabetes mellitus with hyperglycemia, with long-term current use of insulin [facility])  Assessment & Plan  Lab Results   Component Value Date    HGBA1C 6 8 (H) 01/14/2020     Recent Labs     04/20/20  1114 04/20/20  1530 04/20/20  2041 04/21/20  0620   POCGLU 256* 233* 124 117     A1c at goal (less than 7); see above  Continue outpatient insulin regimen and initiate insulin protocol with blood glucose AC and HS during hospitalization    Resume outpatient medication regimen and routine follow up with PCM following discharge  Essential hypertension  Assessment & Plan  Chronic stable condition  Treated as outpatient with amlodipine, losartan, and metoprolol  Continue outpatient medication regimen  VTE Pharmacologic Prophylaxis:   Pharmacologic: Enoxaparin (Lovenox)  Mechanical VTE Prophylaxis in Place: Yes    Patient Centered Rounds: I have performed bedside rounds with nursing staff today  Discussions with Specialists or Other Care Team Provider: None    Education and Discussions with Family / Patient: Greater than 15 minutes spent with this patient discussing diagnosis, prognosis, and plan of care  Time Spent for Care: 20 minutes  More than 50% of total time spent on counseling and coordination of care as described above  Current Length of Stay: 6 day(s)    Current Patient Status: Inpatient   Certification Statement: Patient medically cleared for discharge back to [facility]    Discharge Plan: Discharge to [facility] today  Patient initially cleared for discharge yesterday (see discharge summary by Dr Wilver Potter) but unable to coordinate transportation until today  Code Status: Level 3 - DNAR and DNI      Subjective:   Patient reports solitary episode of emesis last night after dinner  No subsequent nausea  Tolerated breakfast this morning  Patient feels ready for discharge today  Objective:     Vitals:   Temp (24hrs), Av 2 °F (36 8 °C), Min:97 8 °F (36 6 °C), Max:98 7 °F (37 1 °C)    Temp:  [97 8 °F (36 6 °C)-98 7 °F (37 1 °C)] 98 2 °F (36 8 °C)  HR:  [60] 60  Resp:  [17-22] 22  BP: (152-172)/(52-76) 165/72  SpO2:  [96 %-98 %] 96 %  Body mass index is 31 16 kg/m²  Input and Output Summary (last 24 hours):        Intake/Output Summary (Last 24 hours) at 2020 1056  Last data filed at 2020 0901  Gross per 24 hour   Intake 360 ml   Output    Net 360 ml       Physical Exam:     Physical Exam   Constitutional: She is oriented to person, place, and time  She appears well-developed and well-nourished  No distress  Elderly female lying supine in hospital bed  Appears younger than chronological age  No acute distress  HENT:   Head: Normocephalic and atraumatic  Right Ear: External ear normal    Left Ear: External ear normal    Nose: Nose normal    Eyes: Pupils are equal, round, and reactive to light  Conjunctivae and EOM are normal  Right eye exhibits no discharge  Left eye exhibits no discharge  No scleral icterus  Neck: No JVD present  No tracheal deviation present  No thyromegaly present  Cardiovascular: Normal rate, regular rhythm, normal heart sounds and intact distal pulses  Exam reveals no gallop and no friction rub  No murmur heard  Pulmonary/Chest: Effort normal and breath sounds normal  No stridor  No respiratory distress  She has no wheezes  She has no rales  She exhibits no tenderness  Abdominal: Soft  Bowel sounds are normal  She exhibits no distension and no mass  There is no tenderness  There is no rebound and no guarding  Musculoskeletal: She exhibits no edema, tenderness or deformity  Lymphadenopathy:     She has no cervical adenopathy  Neurological: She is alert and oriented to person, place, and time  Skin: Capillary refill takes less than 2 seconds  No rash noted  She is not diaphoretic  No erythema  No pallor  Psychiatric: She has a normal mood and affect  Her behavior is normal  Judgment and thought content normal    Nursing note and vitals reviewed          Additional Data:     Labs:    Results from last 7 days   Lab Units 04/17/20  1353 04/16/20  1544   WBC Thousand/uL 12 38* 18 12*   HEMOGLOBIN g/dL 14 2 13 1   HEMATOCRIT % 43 7 39 7   PLATELETS Thousands/uL 205 211   NEUTROS PCT %  --  86*   LYMPHS PCT %  --  7*   MONOS PCT %  --  6   EOS PCT %  --  0     Results from last 7 days   Lab Units 04/17/20  1353   SODIUM mmol/L 135*   POTASSIUM mmol/L 4 3   CHLORIDE mmol/L 101   CO2 mmol/L 23   BUN mg/dL 28* CREATININE mg/dL 1 12   ANION GAP mmol/L 11   CALCIUM mg/dL 8 9   ALBUMIN g/dL 2 9*   TOTAL BILIRUBIN mg/dL 0 30   ALK PHOS U/L 105   ALT U/L 32   AST U/L 27   GLUCOSE RANDOM mg/dL 241*     Results from last 7 days   Lab Units 04/15/20  0524   INR  1 00     Results from last 7 days   Lab Units 04/21/20  0620 04/20/20  2041 04/20/20  1530 04/20/20  1114 04/20/20  0650 04/19/20  2104 04/19/20  1617 04/19/20  1214 04/19/20  0808 04/18/20  2114 04/18/20  1644 04/18/20  1233   POC GLUCOSE mg/dl 117 124 233* 256* 155* 177* 185* 127 202* 185* 133 170*         Results from last 7 days   Lab Units 04/17/20  0511 04/15/20  0524 04/14/20 2113 04/14/20  1941   LACTIC ACID mmol/L  --   --   --  1 3   PROCALCITONIN ng/ml <0 05 <0 05 <0 05  --            * I Have Reviewed All Lab Data Listed Above  * Additional Pertinent Lab Tests Reviewed: All Labs Within Last 24 Hours Reviewed    Imaging:    Imaging Reports Reviewed Today Include: None  Imaging Personally Reviewed by Myself Includes:  None    Recent Cultures (last 7 days):     Results from last 7 days   Lab Units 04/14/20 1941   BLOOD CULTURE  No Growth After 5 Days  No Growth After 5 Days         Last 24 Hours Medication List:     Current Facility-Administered Medications:  acetaminophen 650 mg Oral Q6H PRN Fine Whit, CRNP   aluminum-magnesium hydroxide-simethicone 30 mL Oral Q6H PRN Fine Whit, CRNP   amLODIPine 5 mg Oral Daily Fine Whit, CRNP   aspirin 81 mg Oral Daily Fine Whit, CRNP   atorvastatin 40 mg Oral HS Fine Whit, CRNP   vitamin B-12 1,000 mcg Oral Daily Fine Whit, CRNP   dextran 70-hypromellose 1 drop Both Eyes Q4H PRN Fine Whit, CRNP   docusate sodium 100 mg Oral BID PRN Fine Whit, CRNP   enoxaparin 30 mg Subcutaneous Daily Fine Whit, CRNP   hydrALAZINE 10 mg Oral TID Fine Whit, CRNP   insulin glargine 18 Units Subcutaneous HS Morena Kamara MD   insulin lispro 1-6 Units Subcutaneous TID AC JASBIR Chamberlain   insulin lispro 1-6 Units Subcutaneous HS JASBIR Chamberlain   insulin lispro 6 Units Subcutaneous TID With Meals JASBIR Chamberlain   latanoprost 1 drop Both Eyes HS JASBIR Chamberlain   levothyroxine 50 mcg Oral Daily JASBIR Chamberlain   losartan 100 mg Oral Daily JASBIR Chamberlain   metoprolol succinate 50 mg Oral Daily JASBIR Chamberlain   [START ON 4/22/2020] multivitamin-minerals 1 tablet Oral Daily JASBIR Murray   ondansetron 4 mg Intravenous Q6H PRN JASBIR Chamberlain   pantoprazole 20 mg Oral Early Morning JASBIR Chamberlain   senna 2 tablet Oral HS JASBIR Chamberlain   traMADol 50 mg Oral BID JASBIR Chamberlain        Today, Patient Was Seen By: Tracie Hdz MD    ** Please Note: Dictation voice to text software may have been used in the creation of this document   **

## 2020-04-21 NOTE — PLAN OF CARE
Problem: Potential for Falls  Goal: Patient will remain free of falls  Description  INTERVENTIONS:  - Assess patient frequently for physical needs  -  Identify cognitive and physical deficits and behaviors that affect risk of falls  -  Bailey fall precautions as indicated by assessment   - Educate patient/family on patient safety including physical limitations  - Instruct patient to call for assistance with activity based on assessment  - Modify environment to reduce risk of injury  - Consider OT/PT consult to assist with strengthening/mobility  Outcome: Progressing     Problem: INFECTION - ADULT  Goal: Absence or prevention of progression during hospitalization  Description  INTERVENTIONS:  - Assess and monitor for signs and symptoms of infection  - Monitor lab/diagnostic results  - Monitor all insertion sites, i e  indwelling lines, tubes, and drains  - Monitor endotracheal if appropriate and nasal secretions for changes in amount and color  - Bailey appropriate cooling/warming therapies per order  - Administer medications as ordered  - Instruct and encourage patient and family to use good hand hygiene technique  - Identify and instruct in appropriate isolation precautions for identified infection/condition  Outcome: Progressing     Problem: SAFETY ADULT  Goal: Patient will remain free of falls  Description  INTERVENTIONS:  - Assess patient frequently for physical needs  -  Identify cognitive and physical deficits and behaviors that affect risk of falls    -  Bailey fall precautions as indicated by assessment   - Educate patient/family on patient safety including physical limitations  - Instruct patient to call for assistance with activity based on assessment  - Modify environment to reduce risk of injury  - Consider OT/PT consult to assist with strengthening/mobility  Outcome: Progressing  Goal: Maintain or return to baseline ADL function  Description  INTERVENTIONS:  -  Assess patient's ability to carry out ADLs; assess patient's baseline for ADL function and identify physical deficits which impact ability to perform ADLs (bathing, care of mouth/teeth, toileting, grooming, dressing, etc )  - Assess/evaluate cause of self-care deficits   - Assess range of motion  - Assess patient's mobility; develop plan if impaired  - Assess patient's need for assistive devices and provide as appropriate  - Encourage maximum independence but intervene and supervise when necessary  - Involve family in performance of ADLs  - Assess for home care needs following discharge   - Consider OT consult to assist with ADL evaluation and planning for discharge  - Provide patient education as appropriate  Outcome: Progressing  Goal: Maintain or return mobility status to optimal level  Description  INTERVENTIONS:  - Assess patient's baseline mobility status (ambulation, transfers, stairs, etc )    - Identify cognitive and physical deficits and behaviors that affect mobility  - Identify mobility aids required to assist with transfers and/or ambulation (gait belt, sit-to-stand, lift, walker, cane, etc )  - Vanderbilt fall precautions as indicated by assessment  - Record patient progress and toleration of activity level on Mobility SBAR; progress patient to next Phase/Stage  - Instruct patient to call for assistance with activity based on assessment  - Consider rehabilitation consult to assist with strengthening/weightbearing, etc   Outcome: Progressing     Problem: DISCHARGE PLANNING  Goal: Discharge to home or other facility with appropriate resources  Description  INTERVENTIONS:  - Identify barriers to discharge w/patient and caregiver  - Arrange for needed discharge resources and transportation as appropriate  - Identify discharge learning needs (meds, wound care, etc )  - Arrange for interpretive services to assist at discharge as needed  - Refer to Case Management Department for coordinating discharge planning if the patient needs post-hospital services based on physician/advanced practitioner order or complex needs related to functional status, cognitive ability, or social support system  Outcome: Progressing     Problem: Knowledge Deficit  Goal: Patient/family/caregiver demonstrates understanding of disease process, treatment plan, medications, and discharge instructions  Description  Complete learning assessment and assess knowledge base    Interventions:  - Provide teaching at level of understanding  - Provide teaching via preferred learning methods  Outcome: Progressing

## 2020-04-21 NOTE — ASSESSMENT & PLAN NOTE
· COVID-19 positive from [facility] HOSP course of azithromycin and hydroxychloroquine   Was on ceftriaxone for empiric bacterial pneumonia however given negative procalcitonin has been discontinued   Gradually improved and did not require supplemental oxygen by day prior to discharge     Medically stable for discharge to [facility] when confirmed    Results from last 7 days   Lab Units 04/15/20  0838   SARS-COV-2  Positive*     Results from last 7 days   Lab Units 04/17/20  1353 04/17/20  0511 04/15/20  1548 04/15/20  0524 04/14/20 2113 04/14/20 1948   D-DIMER QUANTITATIVE ug/ml FEU  --  0 83*  --   --   --  1 12*   CRP mg/L 6 9*  --  6 7*  --   --  6 2*   FERRITIN ng/mL 210  --   --  212 208  --    LD U/L 280*  --  399*  --   --  298*     Results from last 7 days   Lab Units 04/17/20  0511 04/15/20  0524 04/14/20  2113   PROCALCITONIN ng/ml <0 05 <0 05 <0 05

## 2020-04-21 NOTE — ASSESSMENT & PLAN NOTE
Chronic stable condition  Treated as outpatient with amlodipine, losartan, and metoprolol  Continue outpatient medication regimen

## 2020-05-26 ENCOUNTER — OFFICE VISIT (OUTPATIENT)
Dept: INTERNAL MEDICINE CLINIC | Facility: CLINIC | Age: 85
End: 2020-05-26
Payer: MEDICARE

## 2020-05-26 VITALS
SYSTOLIC BLOOD PRESSURE: 152 MMHG | HEART RATE: 65 BPM | BODY MASS INDEX: 30.4 KG/M2 | HEIGHT: 61 IN | WEIGHT: 161 LBS | TEMPERATURE: 97.9 F | DIASTOLIC BLOOD PRESSURE: 80 MMHG | OXYGEN SATURATION: 97 %

## 2020-05-26 DIAGNOSIS — I10 ESSENTIAL HYPERTENSION: Primary | ICD-10-CM

## 2020-05-26 DIAGNOSIS — E03.9 HYPOTHYROIDISM, UNSPECIFIED TYPE: ICD-10-CM

## 2020-05-26 DIAGNOSIS — E11.65 TYPE 2 DIABETES MELLITUS WITH HYPERGLYCEMIA, WITH LONG-TERM CURRENT USE OF INSULIN (HCC): Chronic | ICD-10-CM

## 2020-05-26 DIAGNOSIS — Z95.0 PACEMAKER: ICD-10-CM

## 2020-05-26 DIAGNOSIS — Z79.4 TYPE 2 DIABETES MELLITUS WITH HYPERGLYCEMIA, WITH LONG-TERM CURRENT USE OF INSULIN (HCC): Chronic | ICD-10-CM

## 2020-05-26 DIAGNOSIS — K21.9 GERD WITHOUT ESOPHAGITIS: ICD-10-CM

## 2020-05-26 PROBLEM — G56.03 BILATERAL CARPAL TUNNEL SYNDROME: Status: ACTIVE | Noted: 2019-05-02

## 2020-05-26 PROBLEM — U07.1 PNEUMONIA DUE TO COVID-19 VIRUS: Status: RESOLVED | Noted: 2020-04-14 | Resolved: 2020-05-26

## 2020-05-26 PROBLEM — E55.9 VITAMIN D DEFICIENCY: Status: ACTIVE | Noted: 2019-03-07

## 2020-05-26 PROBLEM — J12.82 PNEUMONIA DUE TO COVID-19 VIRUS: Status: RESOLVED | Noted: 2020-04-14 | Resolved: 2020-05-26

## 2020-05-26 PROCEDURE — 3077F SYST BP >= 140 MM HG: CPT | Performed by: INTERNAL MEDICINE

## 2020-05-26 PROCEDURE — 3079F DIAST BP 80-89 MM HG: CPT | Performed by: INTERNAL MEDICINE

## 2020-05-26 PROCEDURE — 99204 OFFICE O/P NEW MOD 45 MIN: CPT | Performed by: INTERNAL MEDICINE

## 2020-05-26 PROCEDURE — 1111F DSCHRG MED/CURRENT MED MERGE: CPT | Performed by: INTERNAL MEDICINE

## 2020-05-26 PROCEDURE — 4010F ACE/ARB THERAPY RXD/TAKEN: CPT | Performed by: INTERNAL MEDICINE

## 2020-05-26 PROCEDURE — 4040F PNEUMOC VAC/ADMIN/RCVD: CPT | Performed by: INTERNAL MEDICINE

## 2020-05-26 PROCEDURE — 3008F BODY MASS INDEX DOCD: CPT | Performed by: INTERNAL MEDICINE

## 2020-05-26 PROCEDURE — 1036F TOBACCO NON-USER: CPT | Performed by: INTERNAL MEDICINE

## 2020-05-26 PROCEDURE — 1160F RVW MEDS BY RX/DR IN RCRD: CPT | Performed by: INTERNAL MEDICINE

## 2020-05-26 RX ORDER — AMLODIPINE BESYLATE 5 MG/1
TABLET ORAL
Qty: 45 TABLET | Refills: 5 | Status: SHIPPED | OUTPATIENT
Start: 2020-05-26 | End: 2020-06-09 | Stop reason: ALTCHOICE

## 2020-05-27 ENCOUNTER — TELEPHONE (OUTPATIENT)
Dept: INTERNAL MEDICINE CLINIC | Facility: CLINIC | Age: 85
End: 2020-05-27

## 2020-05-27 DIAGNOSIS — I10 ESSENTIAL HYPERTENSION: Primary | ICD-10-CM

## 2020-05-27 RX ORDER — HYDRALAZINE HYDROCHLORIDE 10 MG/1
TABLET, FILM COATED ORAL
Qty: 90 TABLET | Refills: 5 | Status: SHIPPED | OUTPATIENT
Start: 2020-05-27 | End: 2020-11-06 | Stop reason: SDUPTHER

## 2020-05-28 DIAGNOSIS — H04.129 DRY EYE: ICD-10-CM

## 2020-05-28 DIAGNOSIS — H40.9 GLAUCOMA, UNSPECIFIED GLAUCOMA TYPE, UNSPECIFIED LATERALITY: Primary | ICD-10-CM

## 2020-05-28 DIAGNOSIS — I10 ESSENTIAL HYPERTENSION: ICD-10-CM

## 2020-05-28 DIAGNOSIS — K59.00 CONSTIPATION, UNSPECIFIED CONSTIPATION TYPE: ICD-10-CM

## 2020-05-28 DIAGNOSIS — E11.65 TYPE 2 DIABETES MELLITUS WITH HYPERGLYCEMIA, WITH LONG-TERM CURRENT USE OF INSULIN (HCC): Chronic | ICD-10-CM

## 2020-05-28 DIAGNOSIS — E53.8 B12 DEFICIENCY: Primary | ICD-10-CM

## 2020-05-28 DIAGNOSIS — E11.65 TYPE 2 DIABETES MELLITUS WITH HYPERGLYCEMIA, WITH LONG-TERM CURRENT USE OF INSULIN (HCC): Primary | Chronic | ICD-10-CM

## 2020-05-28 DIAGNOSIS — Z79.4 TYPE 2 DIABETES MELLITUS WITH HYPERGLYCEMIA, WITH LONG-TERM CURRENT USE OF INSULIN (HCC): Primary | Chronic | ICD-10-CM

## 2020-05-28 DIAGNOSIS — Z79.4 TYPE 2 DIABETES MELLITUS WITH HYPERGLYCEMIA, WITH LONG-TERM CURRENT USE OF INSULIN (HCC): Chronic | ICD-10-CM

## 2020-05-28 DIAGNOSIS — K21.9 GERD WITHOUT ESOPHAGITIS: ICD-10-CM

## 2020-05-28 RX ORDER — CYANOCOBALAMIN (VITAMIN B-12) 1000 MCG
TABLET ORAL
Qty: 30 TABLET | Refills: 4 | Status: SHIPPED | OUTPATIENT
Start: 2020-05-28 | End: 2020-11-11 | Stop reason: SDUPTHER

## 2020-05-28 RX ORDER — CARBOXYMETHYLCELLULOSE SODIUM 5 MG/ML
SOLUTION/ DROPS OPHTHALMIC
Qty: 15 ML | Refills: 4 | Status: SHIPPED | OUTPATIENT
Start: 2020-05-28 | End: 2021-03-11 | Stop reason: ALTCHOICE

## 2020-05-28 RX ORDER — LATANOPROST 50 UG/ML
SOLUTION/ DROPS OPHTHALMIC
Qty: 2.5 ML | Refills: 4 | Status: SHIPPED | OUTPATIENT
Start: 2020-05-28

## 2020-05-28 RX ORDER — ASPIRIN 81 MG
TABLET,CHEWABLE ORAL
Qty: 28 TABLET | Refills: 4 | Status: SHIPPED | OUTPATIENT
Start: 2020-05-28 | End: 2020-11-06 | Stop reason: SDUPTHER

## 2020-05-28 RX ORDER — BLOOD-GLUCOSE METER
KIT MISCELLANEOUS
Qty: 100 EACH | Refills: 4 | Status: SHIPPED | OUTPATIENT
Start: 2020-05-28 | End: 2020-11-17 | Stop reason: SDUPTHER

## 2020-05-28 RX ORDER — LOSARTAN POTASSIUM 100 MG/1
TABLET ORAL
Qty: 28 TABLET | Refills: 4 | Status: SHIPPED | OUTPATIENT
Start: 2020-05-28 | End: 2020-11-06 | Stop reason: SDUPTHER

## 2020-05-28 RX ORDER — MAGNESIUM HYDROXIDE 400 MG/5ML
SUSPENSION, ORAL (FINAL DOSE FORM) ORAL
Qty: 118 ML | Refills: 4 | Status: SHIPPED | OUTPATIENT
Start: 2020-05-28 | End: 2020-08-25

## 2020-05-28 RX ORDER — LANCETS 30 GAUGE
EACH MISCELLANEOUS
Qty: 100 EACH | Refills: 4 | Status: SHIPPED | OUTPATIENT
Start: 2020-05-28

## 2020-05-28 RX ORDER — DOCUSATE SODIUM 100 MG/1
CAPSULE, LIQUID FILLED ORAL
Qty: 30 CAPSULE | Refills: 4 | Status: SHIPPED | OUTPATIENT
Start: 2020-05-28 | End: 2020-08-25

## 2020-05-28 RX ORDER — STANDARDIZED SENNA CONCENTRATE 8.6 MG/1
TABLET ORAL
Qty: 60 TABLET | Refills: 4 | Status: SHIPPED | OUTPATIENT
Start: 2020-05-28 | End: 2020-08-25

## 2020-05-28 RX ORDER — ONDANSETRON 4 MG/1
TABLET, FILM COATED ORAL
Qty: 10 TABLET | Refills: 4 | Status: SHIPPED | OUTPATIENT
Start: 2020-05-28 | End: 2020-08-25

## 2020-06-01 ENCOUNTER — TELEPHONE (OUTPATIENT)
Dept: FAMILY MEDICINE CLINIC | Facility: CLINIC | Age: 85
End: 2020-06-01

## 2020-06-02 ENCOUNTER — TELEPHONE (OUTPATIENT)
Dept: INTERNAL MEDICINE CLINIC | Facility: CLINIC | Age: 85
End: 2020-06-02

## 2020-06-02 DIAGNOSIS — K59.01 SLOW TRANSIT CONSTIPATION: Primary | ICD-10-CM

## 2020-06-09 ENCOUNTER — OFFICE VISIT (OUTPATIENT)
Dept: INTERNAL MEDICINE CLINIC | Facility: CLINIC | Age: 85
End: 2020-06-09
Payer: MEDICARE

## 2020-06-09 VITALS
SYSTOLIC BLOOD PRESSURE: 140 MMHG | HEIGHT: 61 IN | HEART RATE: 71 BPM | WEIGHT: 168 LBS | OXYGEN SATURATION: 96 % | TEMPERATURE: 98.4 F | DIASTOLIC BLOOD PRESSURE: 74 MMHG | BODY MASS INDEX: 31.72 KG/M2

## 2020-06-09 DIAGNOSIS — I10 ESSENTIAL HYPERTENSION: Primary | ICD-10-CM

## 2020-06-09 DIAGNOSIS — K59.00 CONSTIPATION, UNSPECIFIED CONSTIPATION TYPE: ICD-10-CM

## 2020-06-09 PROCEDURE — 1160F RVW MEDS BY RX/DR IN RCRD: CPT | Performed by: INTERNAL MEDICINE

## 2020-06-09 PROCEDURE — 1111F DSCHRG MED/CURRENT MED MERGE: CPT | Performed by: INTERNAL MEDICINE

## 2020-06-09 PROCEDURE — 3078F DIAST BP <80 MM HG: CPT | Performed by: INTERNAL MEDICINE

## 2020-06-09 PROCEDURE — 1036F TOBACCO NON-USER: CPT | Performed by: INTERNAL MEDICINE

## 2020-06-09 PROCEDURE — 3077F SYST BP >= 140 MM HG: CPT | Performed by: INTERNAL MEDICINE

## 2020-06-09 PROCEDURE — 3008F BODY MASS INDEX DOCD: CPT | Performed by: INTERNAL MEDICINE

## 2020-06-09 PROCEDURE — 99213 OFFICE O/P EST LOW 20 MIN: CPT | Performed by: INTERNAL MEDICINE

## 2020-06-09 PROCEDURE — 4040F PNEUMOC VAC/ADMIN/RCVD: CPT | Performed by: INTERNAL MEDICINE

## 2020-06-09 PROCEDURE — 4010F ACE/ARB THERAPY RXD/TAKEN: CPT | Performed by: INTERNAL MEDICINE

## 2020-06-09 RX ORDER — AMLODIPINE BESYLATE 10 MG/1
10 TABLET ORAL DAILY
Qty: 30 TABLET | Refills: 5 | Status: SHIPPED | OUTPATIENT
Start: 2020-06-09 | End: 2020-11-06 | Stop reason: SDUPTHER

## 2020-06-20 DIAGNOSIS — E11.65 TYPE 2 DIABETES MELLITUS WITH HYPERGLYCEMIA, WITH LONG-TERM CURRENT USE OF INSULIN (HCC): Primary | Chronic | ICD-10-CM

## 2020-06-20 DIAGNOSIS — Z79.4 TYPE 2 DIABETES MELLITUS WITH HYPERGLYCEMIA, WITH LONG-TERM CURRENT USE OF INSULIN (HCC): Primary | Chronic | ICD-10-CM

## 2020-06-22 DIAGNOSIS — E11.65 TYPE 2 DIABETES MELLITUS WITH HYPERGLYCEMIA, WITH LONG-TERM CURRENT USE OF INSULIN (HCC): Chronic | ICD-10-CM

## 2020-06-22 DIAGNOSIS — Z79.4 TYPE 2 DIABETES MELLITUS WITH HYPERGLYCEMIA, WITH LONG-TERM CURRENT USE OF INSULIN (HCC): Chronic | ICD-10-CM

## 2020-06-24 DIAGNOSIS — E11.65 TYPE 2 DIABETES MELLITUS WITH HYPERGLYCEMIA, WITH LONG-TERM CURRENT USE OF INSULIN (HCC): Primary | Chronic | ICD-10-CM

## 2020-06-24 DIAGNOSIS — K21.9 GERD WITHOUT ESOPHAGITIS: ICD-10-CM

## 2020-06-24 DIAGNOSIS — Z79.4 TYPE 2 DIABETES MELLITUS WITH HYPERGLYCEMIA, WITH LONG-TERM CURRENT USE OF INSULIN (HCC): Primary | Chronic | ICD-10-CM

## 2020-06-24 RX ORDER — ATORVASTATIN CALCIUM 40 MG/1
TABLET, FILM COATED ORAL
Qty: 28 TABLET | Refills: 4 | Status: SHIPPED | OUTPATIENT
Start: 2020-06-24 | End: 2020-12-08 | Stop reason: SDUPTHER

## 2020-06-24 RX ORDER — OMEPRAZOLE 20 MG/1
CAPSULE, DELAYED RELEASE ORAL
Qty: 28 CAPSULE | Refills: 4 | Status: SHIPPED | OUTPATIENT
Start: 2020-06-24 | End: 2020-12-08 | Stop reason: SDUPTHER

## 2020-06-26 ENCOUNTER — TELEPHONE (OUTPATIENT)
Dept: INTERNAL MEDICINE CLINIC | Facility: CLINIC | Age: 85
End: 2020-06-26

## 2020-06-29 ENCOUNTER — TELEPHONE (OUTPATIENT)
Dept: INTERNAL MEDICINE CLINIC | Facility: CLINIC | Age: 85
End: 2020-06-29

## 2020-06-29 NOTE — TELEPHONE ENCOUNTER
Fax number for Rosi Phillips is [phone]  Called in reference to the d/c orders      Any questions can call back at [phone]

## 2020-07-13 ENCOUNTER — TELEPHONE (OUTPATIENT)
Dept: GASTROENTEROLOGY | Facility: CLINIC | Age: 85
End: 2020-07-13

## 2020-07-13 NOTE — TELEPHONE ENCOUNTER
Don Baumgarten pt - Pt is having some trouble with her stomach and would like to speak to the nurse  Please call [phone]   Ty

## 2020-07-14 NOTE — TELEPHONE ENCOUNTER
CESAR: Spoke with patient  History of chronic constipation    Patient states she is doing well but recently started atorvastatin, and she feels this is causing her to become constipated  Patient is having small hard formed BM daily  Taking citrucel daily  She has not utilized miralax  Eating and drinking well  Exercising, and drinking prune juice regularly  Advised patient to utilize miralax 1-2 times daily PRN  She has done this before and reiterated directions  She will call if constipation continues

## 2020-07-15 DIAGNOSIS — K59.09 CHRONIC CONSTIPATION: Primary | ICD-10-CM

## 2020-07-15 RX ORDER — POLYETHYLENE GLYCOL 3350 17 G/17G
17 POWDER, FOR SOLUTION ORAL 2 TIMES DAILY PRN
Qty: 507 G | Refills: 1 | Status: SHIPPED | OUTPATIENT
Start: 2020-07-15 | End: 2020-10-15

## 2020-07-22 DIAGNOSIS — K59.01 SLOW TRANSIT CONSTIPATION: ICD-10-CM

## 2020-07-22 DIAGNOSIS — Z00.00 HEALTHCARE MAINTENANCE: Primary | ICD-10-CM

## 2020-07-22 RX ORDER — MULTIVIT,CALC,MINS/IRON/FOLIC 500-18-0.4
TABLET ORAL
Qty: 28 TABLET | Refills: 4 | Status: SHIPPED | OUTPATIENT
Start: 2020-07-22 | End: 2021-12-08 | Stop reason: SDUPTHER

## 2020-07-28 ENCOUNTER — OFFICE VISIT (OUTPATIENT)
Dept: INTERNAL MEDICINE CLINIC | Facility: CLINIC | Age: 85
End: 2020-07-28
Payer: MEDICARE

## 2020-07-28 VITALS
DIASTOLIC BLOOD PRESSURE: 70 MMHG | SYSTOLIC BLOOD PRESSURE: 140 MMHG | HEIGHT: 61 IN | BODY MASS INDEX: 32.47 KG/M2 | OXYGEN SATURATION: 97 % | TEMPERATURE: 97.9 F | WEIGHT: 172 LBS | HEART RATE: 74 BPM

## 2020-07-28 DIAGNOSIS — K59.01 SLOW TRANSIT CONSTIPATION: ICD-10-CM

## 2020-07-28 DIAGNOSIS — I10 ESSENTIAL HYPERTENSION: Primary | ICD-10-CM

## 2020-07-28 PROCEDURE — 99213 OFFICE O/P EST LOW 20 MIN: CPT | Performed by: INTERNAL MEDICINE

## 2020-07-28 PROCEDURE — 1036F TOBACCO NON-USER: CPT | Performed by: INTERNAL MEDICINE

## 2020-07-28 PROCEDURE — 3078F DIAST BP <80 MM HG: CPT | Performed by: INTERNAL MEDICINE

## 2020-07-28 PROCEDURE — 1160F RVW MEDS BY RX/DR IN RCRD: CPT | Performed by: INTERNAL MEDICINE

## 2020-07-28 PROCEDURE — 3008F BODY MASS INDEX DOCD: CPT | Performed by: INTERNAL MEDICINE

## 2020-07-28 PROCEDURE — 3077F SYST BP >= 140 MM HG: CPT | Performed by: INTERNAL MEDICINE

## 2020-07-28 PROCEDURE — 4040F PNEUMOC VAC/ADMIN/RCVD: CPT | Performed by: INTERNAL MEDICINE

## 2020-07-28 NOTE — PROGRESS NOTES
Assessment/Plan:     Pressure is doing better  Will continue current medications  For her constipation, reviewed with her that she may need to play with the dose to find the optimal relief  Quality Measures:       Return in about 3 months (around 10/28/2020)  No problem-specific Assessment & Plan notes found for this encounter  Diagnoses and all orders for this visit:    Essential hypertension    Slow transit constipation          Subjective:      Patient ID: Raymundo Cockayne is a 80 y o  female  Patient comes in today for follow-up  She is tolerating the blood pressure medicine changes  Her pressure is doing better  She feels fine  No side effects on the medicine  She does reports she is still having trouble with constipation  She had some medication changes made by her GI specialist   Started on MiraLax which started working too well        ALLERGIES:  Allergies   Allergen Reactions    Hydrocodone-Acetaminophen Nausea Only       CURRENT MEDICATIONS:    Current Outpatient Medications:     amLODIPine (NORVASC) 10 mg tablet, Take 1 tablet (10 mg total) by mouth daily, Disp: 30 tablet, Rfl: 5    ASPIRIN LOW DOSE 81 MG chewable tablet, 1 TAB ORALLY EVERY MORNING DX: STROKE PREVENTION, Disp: 28 tablet, Rfl: 4    atorvastatin (LIPITOR) 40 mg tablet, 1 TAB ORALLY AT BEDTIME DX:HYPERLIPIDEMIA, Disp: 28 tablet, Rfl: 4    Cyanocobalamin (B-12) 1000 MCG TABS, 1 TAB ORALLY EVERY MORNING DX: SUPPLEMENT *NOT ON CYCLE-NO REFILL*, Disp: 30 tablet, Rfl: 4     MG capsule, 1 CAP ORALLY TWICE DAILY AS NEEDED FOR CONSTIPATION, Disp: 30 capsule, Rfl: 4    RACHAEL-ZECHARIAH 8 6 MG tablet, 2 TABS ORALLY AT BEDTIME DX: CONSTIPATION *NOT ON CYCLE-NO REFILL*, Disp: 60 tablet, Rfl: 4    GLUCOCARD VITAL TEST test strip, AS DIRECTED FOR BLOOD GLUCOSE TESTING 4 TIMES DAILY AND AS NEEDED FORSIGNS/SYMPTOMS HI/LOW BS, Disp: 100 each, Rfl: 4    hydrALAZINE (APRESOLINE) 10 mg tablet, 1 TAB ORALLY 3 TIMES DAILY DX: HYPERTENSION *NOT ON CYCLE-NO REFILL*, Disp: 90 tablet, Rfl: 5    insulin glargine (LANTUS SOLOSTAR) injection pen 100 units/mL, Inject 20 Units under the skin daily at bedtime , Disp: , Rfl:     Insulin Lispro (HUMALOG KWIKPEN SC), Inject 8 Units under the skin 3 (three) times a day before meals , Disp: , Rfl:     Insulin Pen Needle (NovoFine Autocover) 30G X 8 MM MISC, Inject under the skin 4 (four) times a day, Disp: 100 each, Rfl: 4    latanoprost (XALATAN) 0 005 % ophthalmic solution, INSTILL 1 DROP INTO RIGHT EYE AT BEDTIME DX: GLAUCOMA **DISCARD 6 WK AFTER OPENED**, Disp: 2 5 mL, Rfl: 4    levothyroxine 50 mcg tablet, Take 50 mcg by mouth daily, Disp: , Rfl:     losartan (COZAAR) 100 MG tablet, 1 TAB ORALLY EVERY MORNING DX: HYPERTENSION, Disp: 28 tablet, Rfl: 4    methylcellulose (Citrucel) 500 mg tablet, Take 1 tablet (500 mg total) by mouth daily, Disp: 30 each, Rfl: 4    metoprolol succinate (TOPROL-XL) 50 mg 24 hr tablet, Take 50 mg by mouth daily  , Disp: , Rfl:     Multiple Vitamins-Calcium (ONE-A-DAY WOMENS FORMULA) TABS, 1 TAB ORALLY EVERY MORNING DX: SUPPLEMENT, Disp: 28 tablet, Rfl: 4    Multiple Vitamins-Minerals (CENTRUM SILVER 50+WOMEN PO), Take 1 tablet by mouth daily, Disp: , Rfl:     omeprazole (PriLOSEC) 20 mg delayed release capsule, 1 CAP ORALLY EVERY MORNING DX: GERD, Disp: 28 capsule, Rfl: 4    ondansetron (ZOFRAN) 4 mg tablet, 1 TAB ORALLY EVERY 8 HOURS AS NEEDED FOR NAUSEA/VOMITING, Disp: 10 tablet, Rfl: 4    JUAREZ MILK OF MAGNESIA 400 MG/5ML oral suspension, 15ML ORALLY DAILY AS NEEDED FOR CONSTIPATION OR HEARTBURN, Disp: 118 mL, Rfl: 4    polyethylene glycol (GLYCOLAX) 17 GM/SCOOP powder, Take 17 g by mouth 2 (two) times a day as needed (constipation), Disp: 507 g, Rfl: 1    ReadyLance Safety Lancets MIS, AS DIRECTED FOR BLOOD GLUCOSE TESTING 4 TIMES DAILY AND AS NEEDED FORSIGNS/SYMPTOMS OF HI/LOW BS, Disp: 100 each, Rfl: 4    REFRESH TEARS 0 5 % SOLN, INSTILL 1 DROP INTO AFFECTED EYE(S) EVERY 4 HOURS AS NEEDED FOR DRY EYES, Disp: 15 mL, Rfl: 4    traMADol (ULTRAM) 50 mg tablet, Take 50 mg by mouth 2 (two) times a day, Disp: , Rfl:     acetaminophen (TYLENOL) 325 mg tablet, Take 3 tablets by mouth 3 (three) times a day Take 975mg by mouth three times a day x 1 week then change to 650mg PO q4hr prn for pain (Patient not taking: Reported on 6/9/2020), Disp: 30 tablet, Rfl: 0    ACTIVE PROBLEM LIST:  Patient Active Problem List   Diagnosis    Essential hypertension    Type 2 diabetes mellitus with hyperglycemia, with long-term current use of insulin ([facility])    Leukocytosis    Slow transit constipation    Elevated troponin    Actinic keratosis    Seborrheic keratosis    Screening for skin condition    History of skin cancer    Hypothyroidism    History of heart block    Bilateral carpal tunnel syndrome    Vitamin D deficiency    Pacemaker    GERD without esophagitis       PAST MEDICAL HISTORY:  Past Medical History:   Diagnosis Date    Chronic pain     [facility] mellitus ([facility] [address] [facility]     Hyperlipidemia     Hypertension     Pneumonia due to COVID-19 virus 4/14/2020    Type 2 diabetes mellitus with hyperglycemia, with long-term current use of insulin ([facility] ) 12/28/2017       PAST SURGICAL HISTORY:  Past Surgical History:   Procedure Laterality Date    BACK SURGERY      COLONOSCOPY         FAMILY HISTORY:  Family History   Problem Relation Age of Onset    Colon cancer Mother     Heart attack Father        SOCIAL HISTORY:  Social History     Socioeconomic History    Marital status:       Spouse name: Not on file    Number of children: Not on file    Years of education: Not on file    Highest education level: Not on file   Occupational History    Not on file   Social Needs    Financial resource strain: Not on file    Food insecurity:     Worry: Not on file     Inability: Not on file    Transportation needs:     Medical: Not on file     Non-medical: Not on file   Tobacco Use    Smoking status: Never Smoker    Smokeless tobacco: Never Used   Substance and Sexual Activity    Alcohol use: Not Currently    Drug use: No    Sexual activity: Not on file   Lifestyle    Physical activity:     Days per week: Not on file     Minutes per session: Not on file    Stress: Not on file   Relationships    Social connections:     Talks on phone: Not on file     Gets together: Not on file     Attends L[employer]-day service: Not on file     Active member of club or organization: Not on file     Attends meetings of clubs or organizations: Not on file     Relationship status: Not on file    Intimate partner violence:     Fear of current or ex partner: Not on file     Emotionally abused: Not on file     Physically abused: Not on file     Forced sexual activity: Not on file   Other Topics Concern    Not on file   Social History Narrative    Not on file       Review of Systems   Respiratory: Negative for shortness of breath  Cardiovascular: Negative for chest pain  Gastrointestinal: Negative for abdominal pain  Objective:  Vitals:    07/28/20 1434   BP: 140/70   BP Location: Left arm   Patient Position: Sitting   Cuff Size: Large   Pulse: 74   Temp: 97 9 °F (36 6 °C)   TempSrc: Tympanic   SpO2: 97%   Weight: 78 kg (172 lb)   Height: 5' 1" (1 549 m)     Body mass index is 32 5 kg/m²  Physical Exam   Constitutional: She is oriented to person, place, and time  She appears well-developed and well-nourished  Cardiovascular: Normal rate, regular rhythm and normal heart sounds  Pulmonary/Chest: Effort normal and breath sounds normal    Abdominal: Soft  There is no tenderness  Neurological: She is alert and oriented to person, place, and time  Nursing note and vitals reviewed          RESULTS:    Recent Results (from the past 1008 hour(s))   HEMOGLOBIN A1C    Collection Time: 06/30/20  7:54 AM   Result Value Ref Range    Hemoglobin A1C 7 2 (H) <5 7 %    eAG, EST AVG Glucose 160 (H) <154 mg/dL       This note was created with voice recognition software  Phonic, grammatical and spelling errors may be present within the note as a result

## 2020-08-07 ENCOUNTER — TELEPHONE (OUTPATIENT)
Dept: GASTROENTEROLOGY | Facility: CLINIC | Age: 85
End: 2020-08-07

## 2020-08-07 NOTE — TELEPHONE ENCOUNTER
Pt called having a lot of problems with bowels would like to speak with someone regarding this she will best be available around 2 pm today

## 2020-08-07 NOTE — TELEPHONE ENCOUNTER
CESAR: Spoke with patient  History of chronic constipation    Patient c/o feeling her rectum protruding out of her anus and touching her pad when she is sitting  She states "Its not a lot but I notice it"  She has also been incontinent of stool with no fecal urgency  She is taking miralax daily, and Citrucel  Eating and drinking well  Denies fever, chills, pain, SOB, weakness  Advised patient to stop miralax, continue Citrucel, and follow-up in OV

## 2020-08-11 ENCOUNTER — TELEPHONE (OUTPATIENT)
Dept: GASTROENTEROLOGY | Facility: CLINIC | Age: 85
End: 2020-08-11

## 2020-08-11 NOTE — TELEPHONE ENCOUNTER
CESAR: Spoke with patient  History of chronic constipation    Patient c/o constipation after taking an imodium for diarrhea  Patient will restart her miralax and call us if diarrhea resumes

## 2020-08-11 NOTE — TELEPHONE ENCOUNTER
Lázaro [place] pt- Pt would like to speak to Annie or Dr Sesay [place]  She is having some issues   Ty

## 2020-08-25 ENCOUNTER — OFFICE VISIT (OUTPATIENT)
Dept: GASTROENTEROLOGY | Facility: CLINIC | Age: 85
End: 2020-08-25
Payer: MEDICARE

## 2020-08-25 VITALS
WEIGHT: 163 LBS | BODY MASS INDEX: 30.78 KG/M2 | TEMPERATURE: 97.2 F | HEART RATE: 72 BPM | HEIGHT: 61 IN | SYSTOLIC BLOOD PRESSURE: 118 MMHG | DIASTOLIC BLOOD PRESSURE: 60 MMHG

## 2020-08-25 DIAGNOSIS — K59.00 CONSTIPATION, UNSPECIFIED CONSTIPATION TYPE: Primary | ICD-10-CM

## 2020-08-25 PROCEDURE — 4040F PNEUMOC VAC/ADMIN/RCVD: CPT | Performed by: PHYSICIAN ASSISTANT

## 2020-08-25 PROCEDURE — 1160F RVW MEDS BY RX/DR IN RCRD: CPT | Performed by: PHYSICIAN ASSISTANT

## 2020-08-25 PROCEDURE — 1036F TOBACCO NON-USER: CPT | Performed by: PHYSICIAN ASSISTANT

## 2020-08-25 PROCEDURE — 3078F DIAST BP <80 MM HG: CPT | Performed by: PHYSICIAN ASSISTANT

## 2020-08-25 PROCEDURE — 3074F SYST BP LT 130 MM HG: CPT | Performed by: PHYSICIAN ASSISTANT

## 2020-08-25 PROCEDURE — 3008F BODY MASS INDEX DOCD: CPT | Performed by: PHYSICIAN ASSISTANT

## 2020-08-25 PROCEDURE — 99213 OFFICE O/P EST LOW 20 MIN: CPT | Performed by: PHYSICIAN ASSISTANT

## 2020-08-25 RX ORDER — DOCUSATE SODIUM 100 MG/1
100 CAPSULE, LIQUID FILLED ORAL DAILY PRN
Qty: 30 CAPSULE | Refills: 2 | Status: SHIPPED | OUTPATIENT
Start: 2020-08-25 | End: 2020-10-15

## 2020-08-25 RX ORDER — INSULIN LISPRO 100 [IU]/ML
INJECTION, SOLUTION INTRAVENOUS; SUBCUTANEOUS
COMMUNITY
Start: 2020-08-21 | End: 2021-09-16 | Stop reason: SDUPTHER

## 2020-08-25 RX ORDER — WHEAT DEXTRIN 3 G/3.8 G
POWDER (GRAM) ORAL 2 TIMES DAILY
Qty: 475 G | Refills: 0 | Status: SHIPPED | OUTPATIENT
Start: 2020-08-25 | End: 2020-10-15

## 2020-08-25 NOTE — PROGRESS NOTES
[facility] - Outpatient Follow-up Note  Felisa Portillo 80 y o  female MRN: [id number]  Encounter: [id number]          ASSESSMENT AND PLAN:      1  Constipation, unspecified constipation type  She continues to struggle to control her BMs  Miralax has consistently given her diarrhea even at 1/2 capful daily  Try Benefiber 1 tbsp BID  Stop Miralax  Start Colace 100mg - will start daily and titrate to regular BMs  I have asked her not to allow more than 3 days to go between her BMs    ______________________________________________________________________    SUBJECTIVE:  80year old female presents for follow-up evaluation of constipation  She has been trying to maintain on MiraLax and a Citrucel tablet  Unfortunately MiraLax even at half of a capful as needed gives her diarrhea  She reports that occasionally 7 days will go between her bowel movements at which point she will take a full cap and half of MiraLax  If she does that she will have such bad diarrhea that she is incontinent stool  She denies any severe abdominal pain  There is no rectal bleeding  Although there scripts for in her chart she does not remember being on Colace previously  REVIEW OF SYSTEMS IS OTHERWISE NEGATIVE        Historical Information   Past Medical History:   Diagnosis Date    Chronic pain     Diabetes mellitus ([address] )     Hyperlipidemia     Hypertension     Pneumonia due to COVID-19 virus 4/14/2020    Type 2 diabetes mellitus with hyperglycemia, with long-term current use of insulin ([facility] [address] ) 12/28/2017     Past Surgical History:   Procedure Laterality Date    BACK SURGERY      COLONOSCOPY       Social History   Social History     Substance and Sexual Activity   Alcohol Use Not Currently     Social History     Substance and Sexual Activity   Drug Use No     Social History     Tobacco Use   Smoking Status Never Smoker   Smokeless Tobacco Never Used     Family History   Problem Relation Age of Onset    Colon cancer Mother     Heart attack Father        Meds/Allergies       Current Outpatient Medications:     amLODIPine (NORVASC) 10 mg tablet    ASPIRIN LOW DOSE 81 MG chewable tablet    atorvastatin (LIPITOR) 40 mg tablet    Cyanocobalamin (B-12) 1000 MCG TABS    GLUCOCARD VITAL TEST test strip    HumaLOG KwikPen 100 units/mL injection pen    hydrALAZINE (APRESOLINE) 10 mg tablet    insulin glargine (LANTUS SOLOSTAR) injection pen 100 units/mL    Insulin Lispro (HUMALOG KWIKPEN SC)    Insulin Pen Needle (NovoFine Autocover) 30G X 8 MM MISC    latanoprost (XALATAN) 0 005 % ophthalmic solution    levothyroxine 50 mcg tablet    losartan (COZAAR) 100 MG tablet    metoprolol succinate (TOPROL-XL) 50 mg 24 hr tablet    Multiple Vitamins-Calcium (ONE-A-DAY WOMENS FORMULA) TABS    Multiple Vitamins-Minerals (CENTRUM SILVER 50+WOMEN PO)    omeprazole (PriLOSEC) 20 mg delayed release capsule    ReadyLance Safety Lancets MISC    REFRESH TEARS 0 5 % SOLN    traMADol (ULTRAM) 50 mg tablet    acetaminophen (TYLENOL) 325 mg tablet    docusate sodium (COLACE) 100 mg capsule    polyethylene glycol (GLYCOLAX) 17 GM/SCOOP powder    Wheat Dextrin (Benefiber) POWD    Allergies   Allergen Reactions    Hydrocodone-Acetaminophen Nausea Only           Objective     Blood pressure 118/60, pulse 72, temperature (!) 97 2 °F (36 2 °C), height 5' 1" (1 549 m), weight 73 9 kg (163 lb)  Body mass index is 30 8 kg/m²  PHYSICAL EXAM:      General Appearance:   Alert, cooperative, no distress   HEENT:   Normocephalic, atraumatic, anicteric      Neck:  Supple, symmetrical, trachea midline   Lungs:   Clear to auscultation bilaterally; no rales, rhonchi or wheezing; respirations unlabored    Heart[de-identified]   Regular rate and rhythm; no murmur, rub, or gallop     Abdomen:   Soft, non-tender, non-distended; normal bowel sounds; no masses, no organomegaly    Genitalia:   Deferred    Rectal:   Deferred    Extremities:  No cyanosis, clubbing or edema    Pulses:  2+ and symmetric    Skin:  No jaundice, rashes, or lesions    Lymph nodes:  No palpable cervical lymphadenopathy        Lab Results:   No visits with results within 1 Day(s) from this visit  Latest known visit with results is:   No results displayed because visit has over 200 results  Radiology Results:   No results found

## 2020-08-27 ENCOUNTER — TELEPHONE (OUTPATIENT)
Dept: GASTROENTEROLOGY | Facility: CLINIC | Age: 85
End: 2020-08-27

## 2020-08-27 NOTE — TELEPHONE ENCOUNTER
Spoke to patient  Even with colace she was having frequent loose stools  I have asked her to stop this and only do the fiber supplement    She will call me Monday with an update

## 2020-09-01 ENCOUNTER — TELEPHONE (OUTPATIENT)
Dept: GASTROENTEROLOGY | Facility: CLINIC | Age: 85
End: 2020-09-01

## 2020-09-02 NOTE — TELEPHONE ENCOUNTER
Spoke to patient  She has been taking Benefiber 1x daily and Citrucel tablets 1 x daily  She continued with loose stools about 3-4 times a day until yesterday she didn't have any BM  She has not had a BM today yet  I have asked her to stop Citrucel and take Benefiber BID      She will call back in a few days with an update

## 2020-09-09 NOTE — TELEPHONE ENCOUNTER
Erlin العلي - patient called is still having on and off again constipation or diarrhea  Not happy in between   Please call Pray Senters at [phone] ty Will be back in room after 3 pm today, 09/09/20

## 2020-10-01 ENCOUNTER — OFFICE VISIT (OUTPATIENT)
Dept: INTERNAL MEDICINE CLINIC | Facility: CLINIC | Age: 85
End: 2020-10-01
Payer: MEDICARE

## 2020-10-01 VITALS
OXYGEN SATURATION: 97 % | SYSTOLIC BLOOD PRESSURE: 118 MMHG | HEART RATE: 77 BPM | BODY MASS INDEX: 30.96 KG/M2 | DIASTOLIC BLOOD PRESSURE: 78 MMHG | TEMPERATURE: 98.1 F | WEIGHT: 164 LBS | HEIGHT: 61 IN

## 2020-10-01 DIAGNOSIS — R59.0 CERVICAL ADENOPATHY: Primary | ICD-10-CM

## 2020-10-01 PROCEDURE — 99213 OFFICE O/P EST LOW 20 MIN: CPT | Performed by: INTERNAL MEDICINE

## 2020-10-01 RX ORDER — AZITHROMYCIN 250 MG/1
TABLET, FILM COATED ORAL
Qty: 6 TABLET | Refills: 0 | Status: SHIPPED | OUTPATIENT
Start: 2020-10-01 | End: 2020-10-05

## 2020-10-15 ENCOUNTER — OFFICE VISIT (OUTPATIENT)
Dept: GASTROENTEROLOGY | Facility: CLINIC | Age: 85
End: 2020-10-15
Payer: MEDICARE

## 2020-10-15 VITALS
TEMPERATURE: 97.1 F | WEIGHT: 163.4 LBS | DIASTOLIC BLOOD PRESSURE: 70 MMHG | BODY MASS INDEX: 30.85 KG/M2 | SYSTOLIC BLOOD PRESSURE: 138 MMHG | HEART RATE: 96 BPM | HEIGHT: 61 IN

## 2020-10-15 DIAGNOSIS — R19.7 DIARRHEA, UNSPECIFIED TYPE: ICD-10-CM

## 2020-10-15 DIAGNOSIS — K59.00 CONSTIPATION, UNSPECIFIED CONSTIPATION TYPE: Primary | ICD-10-CM

## 2020-10-15 PROCEDURE — 99213 OFFICE O/P EST LOW 20 MIN: CPT | Performed by: PHYSICIAN ASSISTANT

## 2020-10-15 RX ORDER — POLYETHYLENE GLYCOL 3350 17 G/17G
9 POWDER, FOR SOLUTION ORAL EVERY OTHER DAY
Qty: 507 G | Refills: 1 | Status: SHIPPED | OUTPATIENT
Start: 2020-10-15 | End: 2021-03-11 | Stop reason: ALTCHOICE

## 2020-10-22 ENCOUNTER — TELEPHONE (OUTPATIENT)
Dept: GASTROENTEROLOGY | Facility: CLINIC | Age: 85
End: 2020-10-22

## 2020-11-06 DIAGNOSIS — E11.65 TYPE 2 DIABETES MELLITUS WITH HYPERGLYCEMIA, WITH LONG-TERM CURRENT USE OF INSULIN (HCC): Chronic | ICD-10-CM

## 2020-11-06 DIAGNOSIS — I10 ESSENTIAL HYPERTENSION: ICD-10-CM

## 2020-11-06 DIAGNOSIS — Z79.4 TYPE 2 DIABETES MELLITUS WITH HYPERGLYCEMIA, WITH LONG-TERM CURRENT USE OF INSULIN (HCC): Chronic | ICD-10-CM

## 2020-11-06 RX ORDER — ASPIRIN 81 MG/1
81 TABLET, CHEWABLE ORAL EVERY MORNING
Qty: 28 TABLET | Refills: 4 | Status: SHIPPED | OUTPATIENT
Start: 2020-11-06 | End: 2021-03-26 | Stop reason: SDUPTHER

## 2020-11-06 RX ORDER — AMLODIPINE BESYLATE 10 MG/1
10 TABLET ORAL DAILY
Qty: 30 TABLET | Refills: 5 | Status: SHIPPED | OUTPATIENT
Start: 2020-11-06 | End: 2021-04-21 | Stop reason: SDUPTHER

## 2020-11-06 RX ORDER — HYDRALAZINE HYDROCHLORIDE 10 MG/1
10 TABLET, FILM COATED ORAL 3 TIMES DAILY
Qty: 90 TABLET | Refills: 5 | Status: SHIPPED | OUTPATIENT
Start: 2020-11-06 | End: 2021-03-11 | Stop reason: ALTCHOICE

## 2020-11-06 RX ORDER — LOSARTAN POTASSIUM 100 MG/1
100 TABLET ORAL EVERY MORNING
Qty: 28 TABLET | Refills: 4 | Status: SHIPPED | OUTPATIENT
Start: 2020-11-06 | End: 2021-03-29 | Stop reason: SDUPTHER

## 2020-11-10 ENCOUNTER — TELEMEDICINE (OUTPATIENT)
Dept: INTERNAL MEDICINE CLINIC | Facility: CLINIC | Age: 85
End: 2020-11-10
Payer: MEDICARE

## 2020-11-10 VITALS — TEMPERATURE: 98 F | WEIGHT: 158 LBS | HEIGHT: 61 IN | BODY MASS INDEX: 29.83 KG/M2

## 2020-11-10 DIAGNOSIS — E11.65 TYPE 2 DIABETES MELLITUS WITH HYPERGLYCEMIA, WITH LONG-TERM CURRENT USE OF INSULIN (HCC): Chronic | ICD-10-CM

## 2020-11-10 DIAGNOSIS — E03.9 HYPOTHYROIDISM, UNSPECIFIED TYPE: ICD-10-CM

## 2020-11-10 DIAGNOSIS — Z79.4 TYPE 2 DIABETES MELLITUS WITH HYPERGLYCEMIA, WITH LONG-TERM CURRENT USE OF INSULIN (HCC): Chronic | ICD-10-CM

## 2020-11-10 DIAGNOSIS — K59.01 SLOW TRANSIT CONSTIPATION: ICD-10-CM

## 2020-11-10 DIAGNOSIS — I10 ESSENTIAL HYPERTENSION: Primary | ICD-10-CM

## 2020-11-10 PROCEDURE — 99213 OFFICE O/P EST LOW 20 MIN: CPT | Performed by: INTERNAL MEDICINE

## 2020-11-11 DIAGNOSIS — E53.8 B12 DEFICIENCY: ICD-10-CM

## 2020-11-11 RX ORDER — CYANOCOBALAMIN (VITAMIN B-12) 1000 MCG
1000 TABLET ORAL DAILY
Qty: 30 TABLET | Refills: 11 | Status: SHIPPED | OUTPATIENT
Start: 2020-11-11 | End: 2021-09-21 | Stop reason: SDUPTHER

## 2020-11-17 DIAGNOSIS — E11.65 TYPE 2 DIABETES MELLITUS WITH HYPERGLYCEMIA, WITH LONG-TERM CURRENT USE OF INSULIN (HCC): Chronic | ICD-10-CM

## 2020-11-17 DIAGNOSIS — Z79.4 TYPE 2 DIABETES MELLITUS WITH HYPERGLYCEMIA, WITH LONG-TERM CURRENT USE OF INSULIN (HCC): Chronic | ICD-10-CM

## 2020-11-17 RX ORDER — BLOOD-GLUCOSE METER
KIT MISCELLANEOUS
Qty: 100 EACH | Refills: 4 | Status: SHIPPED | OUTPATIENT
Start: 2020-11-17

## 2020-12-08 DIAGNOSIS — K21.9 GERD WITHOUT ESOPHAGITIS: ICD-10-CM

## 2020-12-08 DIAGNOSIS — E11.65 TYPE 2 DIABETES MELLITUS WITH HYPERGLYCEMIA, WITH LONG-TERM CURRENT USE OF INSULIN (HCC): Chronic | ICD-10-CM

## 2020-12-08 DIAGNOSIS — Z79.4 TYPE 2 DIABETES MELLITUS WITH HYPERGLYCEMIA, WITH LONG-TERM CURRENT USE OF INSULIN (HCC): Chronic | ICD-10-CM

## 2020-12-08 RX ORDER — OMEPRAZOLE 20 MG/1
20 CAPSULE, DELAYED RELEASE ORAL EVERY MORNING
Qty: 28 CAPSULE | Refills: 11 | Status: SHIPPED | OUTPATIENT
Start: 2020-12-08 | End: 2020-12-14 | Stop reason: SDUPTHER

## 2020-12-08 RX ORDER — ATORVASTATIN CALCIUM 40 MG/1
40 TABLET, FILM COATED ORAL
Qty: 28 TABLET | Refills: 11 | Status: SHIPPED | OUTPATIENT
Start: 2020-12-08 | End: 2020-12-14 | Stop reason: SDUPTHER

## 2020-12-10 DIAGNOSIS — K59.00 CONSTIPATION, UNSPECIFIED CONSTIPATION TYPE: Primary | ICD-10-CM

## 2020-12-10 RX ORDER — METHYLCELLULOSE 500 MG/1
500 TABLET ORAL DAILY
Qty: 30 EACH | Refills: 3 | Status: SHIPPED | OUTPATIENT
Start: 2020-12-10 | End: 2021-04-09 | Stop reason: SDUPTHER

## 2020-12-14 DIAGNOSIS — E11.65 TYPE 2 DIABETES MELLITUS WITH HYPERGLYCEMIA, WITH LONG-TERM CURRENT USE OF INSULIN (HCC): Chronic | ICD-10-CM

## 2020-12-14 DIAGNOSIS — Z79.4 TYPE 2 DIABETES MELLITUS WITH HYPERGLYCEMIA, WITH LONG-TERM CURRENT USE OF INSULIN (HCC): Chronic | ICD-10-CM

## 2020-12-14 DIAGNOSIS — K21.9 GERD WITHOUT ESOPHAGITIS: ICD-10-CM

## 2020-12-14 RX ORDER — OMEPRAZOLE 20 MG/1
20 CAPSULE, DELAYED RELEASE ORAL EVERY MORNING
Qty: 28 CAPSULE | Refills: 11 | Status: SHIPPED | OUTPATIENT
Start: 2020-12-14 | End: 2021-03-11 | Stop reason: ALTCHOICE

## 2020-12-14 RX ORDER — ATORVASTATIN CALCIUM 40 MG/1
40 TABLET, FILM COATED ORAL
Qty: 28 TABLET | Refills: 11 | Status: SHIPPED | OUTPATIENT
Start: 2020-12-14 | End: 2022-01-03

## 2021-02-12 DIAGNOSIS — Z23 ENCOUNTER FOR IMMUNIZATION: ICD-10-CM

## 2021-03-02 ENCOUNTER — OFFICE VISIT (OUTPATIENT)
Dept: DERMATOLOGY | Facility: CLINIC | Age: 86
End: 2021-03-02
Payer: MEDICARE

## 2021-03-02 DIAGNOSIS — L98.9 UNKNOWN SKIN LESION: Primary | ICD-10-CM

## 2021-03-02 DIAGNOSIS — Z85.828 HISTORY OF SKIN CANCER: ICD-10-CM

## 2021-03-02 DIAGNOSIS — L82.1 SEBORRHEIC KERATOSIS: ICD-10-CM

## 2021-03-02 DIAGNOSIS — L82.1 VERRUCOUS KERATOSIS: ICD-10-CM

## 2021-03-02 DIAGNOSIS — Z13.89 SCREENING FOR SKIN CONDITION: ICD-10-CM

## 2021-03-02 PROCEDURE — 99213 OFFICE O/P EST LOW 20 MIN: CPT | Performed by: DERMATOLOGY

## 2021-03-02 PROCEDURE — 11103 TANGNTL BX SKIN EA SEP/ADDL: CPT | Performed by: DERMATOLOGY

## 2021-03-02 PROCEDURE — 11102 TANGNTL BX SKIN SINGLE LES: CPT | Performed by: DERMATOLOGY

## 2021-03-02 PROCEDURE — 88305 TISSUE EXAM BY PATHOLOGIST: CPT | Performed by: STUDENT IN AN ORGANIZED HEALTH CARE EDUCATION/TRAINING PROGRAM

## 2021-03-02 NOTE — PROGRESS NOTES
[facility] DERMATOLOGY  [address] [place] [id number]  [phone]  [phone]     MRN: [id number] : 3/24/1929  Encounter: [id number]  Patient Information: Piero Dixon  Chief complaint:  Yearly checkup    History of present illness:  35-year-old female with previous history of skin cancer presents and verrucous keratosis presents for overall checkup patient has been annoyed with lesions on her lower legs and has been using Neosporin on that no other concerns noted lesion on her left forearm we noted has been present recently  Past Medical History:   Diagnosis Date    Chronic pain     [facility] mellitus ([facility] [address] [facility]     Hyperlipidemia     Hypertension     Pneumonia due to COVID-19 virus 2020    Type 2 diabetes mellitus with hyperglycemia, with long-term current use of insulin ([facility] ) 2017     Past Surgical History:   Procedure Laterality Date    BACK SURGERY      COLONOSCOPY       Social History   Social History     Substance and Sexual Activity   Alcohol Use Not Currently     Social History     Substance and Sexual Activity   Drug Use No     Social History     Tobacco Use   Smoking Status Never Smoker   Smokeless Tobacco Never Used     Family History   Problem Relation Age of Onset    Colon cancer Mother     Heart attack Father      Meds/Allergies   Allergies   Allergen Reactions    Hydrocodone-Acetaminophen Nausea Only       Meds:  Prior to Admission medications    Medication Sig Start Date End Date Taking?  Authorizing Provider   amLODIPine (NORVASC) 10 mg tablet Take 1 tablet (10 mg total) by mouth daily 20  Yes Belle Voss MD   aspirin (Aspirin Low Dose) 81 mg chewable tablet Chew 1 tablet (81 mg total) every morning Dx: Stroke Prevention 20  Yes Belle Voss MD   atorvastatin (LIPITOR) 40 mg tablet Take 1 tablet (40 mg total) by mouth daily at bedtime Hyperlipidemia 20  Yes Belle Voss MD   Cyanocobalamin (B-12) 1000 MCG TABS Take 1,000 mcg by mouth daily 11/11/20  Yes Simon Avila MD   glucose blood (Glucocard Vital Test) test strip Check sugar up to 4 times a day 11/17/20  Yes Simon Avila MD   HumaLOG KwikPen 100 units/mL injection pen  8/21/20  Yes Historical Provider, MD   hydrALAZINE (APRESOLINE) 10 mg tablet Take 1 tablet (10 mg total) by mouth 3 (three) times a day 11/6/20  Yes Simon Avila MD   insulin glargine (LANTUS SOLOSTAR) injection pen 100 units/mL Inject 20 Units under the skin daily at bedtime  2/11/14  Yes Historical Provider, MD   Insulin Lispro (HUMALOG KWIKPEN SC) Inject 8 Units under the skin 3 (three) times a day before meals    Yes Historical Provider, MD   Insulin Pen Needle (NovoFine Autocover) 30G X 8 MM MISC Inject under the skin 4 (four) times a day 6/22/20  Yes Simon Avila MD   latanoprost (XALATAN) 0 005 % ophthalmic solution INSTILL 1 DROP INTO RIGHT EYE AT BEDTIME DX: GLAUCOMA **DISCARD 6 WK AFTER OPENED** 5/28/20  Yes Simon Avila MD   losartan (COZAAR) 100 MG tablet Take 1 tablet (100 mg total) by mouth every morning Hypertension 11/6/20  Yes Simon Avila MD   methylcellulose (Citrucel) 500 mg tablet Take 1 tablet (500 mg total) by mouth daily 12/10/20  Yes Graciela Gonzalez PA-C   metoprolol succinate (TOPROL-XL) 50 mg 24 hr tablet Take 50 mg by mouth daily   7/30/14  Yes Historical Provider, MD   Multiple Vitamins-Calcium (ONE-A-DAY WOMENS FORMULA) TABS 1 TAB ORALLY EVERY MORNING DX: SUPPLEMENT 7/22/20  Yes Rodney Espino PA-C   Multiple Vitamins-Minerals (CENTRUM SILVER 50+WOMEN PO) Take 1 tablet by mouth daily   Yes Historical Provider, MD   omeprazole (PriLOSEC) 20 mg delayed release capsule Take 1 capsule (20 mg total) by mouth every morning GERD 12/14/20  Yes Simon Avila MD   polyethylene glycol [facility]) 17 GM/SCOOP powder Take 9 g by mouth every other day 10/15/20  Yes Graciela Gonzalez PA-C   ReadyLance Safety Lancets [facility] AS DIRECTED FOR BLOOD GLUCOSE TESTING 4 TIMES DAILY AND AS NEEDED FORSIGNS/SYMPTOMS OF HI/LOW BS 5/28/20  Yes Celso Phoenix, MD   REFRESH TEARS 0 5 % SOLN INSTILL 1 DROP INTO AFFECTED EYE(S) EVERY 4 HOURS AS NEEDED FOR DRY EYES 5/28/20  Yes Celso Phoenix, MD   levothyroxine 50 mcg tablet Take 50 mcg by mouth daily 2/4/20 2/3/21  Historical Provider, MD       Subjective:     Review of Systems:    General: negative for - chills, fatigue, fever,  weight gain or weight loss  Psychological: negative for - anxiety, behavioral disorder, concentration difficulties, decreased libido, depression, irritability, memory difficulties, mood swings, sleep disturbances or suicidal ideation  ENT: negative for - hearing difficulties , nasal congestion, nasal discharge, oral lesions, sinus pain, sneezing, sore throat  Allergy and Immunology: negative for - hives, insect bite sensitivity,  Hematological and Lymphatic: negative for - bleeding problems, blood clots,bruising, swollen lymph nodes  Endocrine: negative for - hair pattern changes, hot flashes, malaise/lethargy, mood swings, palpitations, polydipsia/polyuria, skin changes, temperature intolerance or unexpected weight change  Respiratory: negative for - cough, hemoptysis, orthopnea, shortness of breath, or wheezing  Cardiovascular: negative for - chest pain, dyspnea on exertion, edema,  Gastrointestinal: negative for - abdominal pain, nausea/vomiting  Genito-Urinary: negative for - dysuria, incontinence, irregular/heavy menses or urinary frequency/urgency  Musculoskeletal: negative for - gait disturbance, joint pain, joint stiffness, joint swelling, muscle pain, muscular weakness  Dermatological:  As in HPI  Neurological: negative for confusion, dizziness, headaches, impaired coordination/balance, memory loss, numbness/tingling, seizures, speech problems, tremors or weakness       Objective: There were no vitals taken for this visit      Physical Exam:    General Appearance:    Alert, cooperative, no distress   Head:    Normocephalic, without obvious abnormality, atraumatic           Skin:   A full skin exam was performed including scalp, head scalp, eyes, ears, nose, lips, neck, chest, axilla, abdomen, back, buttocks, bilateral upper extremities, bilateral lower extremities, hands, feet, fingers, toes, fingernails, and toenails numerous verrucous keratotic papules noted on the lower legs and feet on more indurated 13 mm keratotic area noted on the left lower leg and more indurated 4 mm papule noted on the left forearm numerous keratotic papules greasy stuck on appearance previous sites skin cancer well healed without recurrence nothing else atypical noted on exam          Shave Biopsy Procedure Note    Pre-operative Diagnosis:  Rule out squamous cell carcinoma    Plan:  1  Instructed to keep the wound dry and covered for 24 and clean thereafter  2  Warning signs of infection were reviewed  3  Recommended that the patient use OTC acetaminophen as needed for pain  4  Return  Pending results of biopsy(ies)    Locations:  Left forearm and left lower leg  Indications:  Suspicious lesions    Anesthesia: Lidocaine 1% with epinephrine without added sodium bicarbonate    Procedure Details     Patient informed of the risks (including bleeding and infection) and benefits of the   procedure and Verbal informed consent obtained  The lesion and surrounding area were given a sterile prep using alcohol and draped in the usual sterile fashion  A Blue blade razor was used to obtain a specimen  Hemostasis achieved with aluminum chloride  Petrolatum and a sterile dressing applied  The specimen was sent for pathologic examination  The patient tolerated the procedure(s) well  Complications:  none  Assessment:     1  Unknown skin lesion     2  Verrucous keratosis     3  Seborrheic keratosis     4  Screening for skin condition     5   History of skin cancer           Plan:   Skin lesions question squamous cell carcinoma versus verrucous keratosis await results of biopsy further treatment needed may be excision  Verrucous keratotic areas on the legs at present hold off on treatment patient probably she just use plain Vaseline on the area to see if this will help advised Neosporin can cause reactions and will not help this process  Seborrheic keratosis patient reassured these are normal growths we acquire with age no treatment needed  History of skin cancer in no recurrence nothing else atypical sunblock recommended follow-up in 1 year  Screening for dermatologic disorders nothing else of concern noted on complete exam follow-up in 1 year      Chapin Worthy MD  3/2/2021,11:26 AM    Portions of the record may have been created with voice recognition software   Occasional wrong word or "sound a like" substitutions may have occurred due to the inherent limitations of voice recognition software   Read the chart carefully and recognize, using context, where substitutions have occurred

## 2021-03-02 NOTE — PATIENT INSTRUCTIONS
Skin lesions question squamous cell carcinoma versus verrucous keratosis await results of biopsy further treatment needed may be excision  Verrucous keratotic areas on the legs at present hold off on treatment patient probably she just use plain Vaseline on the area to see if this will help advised Neosporin can cause reactions and will not help this process  Seborrheic keratosis patient reassured these are normal growths we acquire with age no treatment needed  History of skin cancer in no recurrence nothing else atypical sunblock recommended follow-up in 1 year  Screening for dermatologic disorders nothing else of concern noted on complete exam follow-up in 1 year  Wound care instructions given to patient

## 2021-03-11 ENCOUNTER — OFFICE VISIT (OUTPATIENT)
Dept: INTERNAL MEDICINE CLINIC | Facility: CLINIC | Age: 86
End: 2021-03-11
Payer: MEDICARE

## 2021-03-11 VITALS
SYSTOLIC BLOOD PRESSURE: 118 MMHG | BODY MASS INDEX: 30.58 KG/M2 | OXYGEN SATURATION: 97 % | TEMPERATURE: 98.8 F | RESPIRATION RATE: 16 BRPM | DIASTOLIC BLOOD PRESSURE: 70 MMHG | WEIGHT: 162 LBS | HEIGHT: 61 IN | HEART RATE: 80 BPM

## 2021-03-11 DIAGNOSIS — E11.65 TYPE 2 DIABETES MELLITUS WITH HYPERGLYCEMIA, WITH LONG-TERM CURRENT USE OF INSULIN (HCC): Chronic | ICD-10-CM

## 2021-03-11 DIAGNOSIS — Z79.4 TYPE 2 DIABETES MELLITUS WITH HYPERGLYCEMIA, WITH LONG-TERM CURRENT USE OF INSULIN (HCC): Chronic | ICD-10-CM

## 2021-03-11 DIAGNOSIS — I10 ESSENTIAL HYPERTENSION: ICD-10-CM

## 2021-03-11 DIAGNOSIS — Z00.00 MEDICARE ANNUAL WELLNESS VISIT, SUBSEQUENT: Primary | ICD-10-CM

## 2021-03-11 DIAGNOSIS — E03.9 HYPOTHYROIDISM, UNSPECIFIED TYPE: ICD-10-CM

## 2021-03-11 DIAGNOSIS — L08.9 FOOT INFECTION: ICD-10-CM

## 2021-03-11 PROCEDURE — 1123F ACP DISCUSS/DSCN MKR DOCD: CPT | Performed by: INTERNAL MEDICINE

## 2021-03-11 PROCEDURE — 99214 OFFICE O/P EST MOD 30 MIN: CPT | Performed by: INTERNAL MEDICINE

## 2021-03-11 PROCEDURE — G0439 PPPS, SUBSEQ VISIT: HCPCS | Performed by: INTERNAL MEDICINE

## 2021-03-11 RX ORDER — CEPHALEXIN 250 MG/1
250 CAPSULE ORAL 3 TIMES DAILY
Qty: 21 CAPSULE | Refills: 0 | Status: SHIPPED | OUTPATIENT
Start: 2021-03-11 | End: 2021-03-18

## 2021-03-11 NOTE — PATIENT INSTRUCTIONS
Medicare Preventive Visit Patient Instructions  Thank you for completing your Welcome to Medicare Visit or Medicare Annual Wellness Visit today  Your next wellness visit will be due in one year (3/12/2022)  The screening/preventive services that you may require over the next 5-10 years are detailed below  Some tests may not apply to you based off risk factors and/or age  Screening tests ordered at today's visit but not completed yet may show as past due  Also, please note that scanned in results may not display below  Preventive Screenings:  Service Recommendations Previous Testing/Comments   Colorectal Cancer Screening  * Colonoscopy    * Fecal Occult Blood Test (FOBT)/Fecal Immunochemical Test (FIT)  * Fecal DNA/Cologuard Test  * Flexible Sigmoidoscopy Age: 54-65 years old   Colonoscopy: every 10 years (may be performed more frequently if at higher risk)  OR  FOBT/FIT: every 1 year  OR  Cologuard: every 3 years  OR  Sigmoidoscopy: every 5 years  Screening may be recommended earlier than age 48 if at higher risk for colorectal cancer  Also, an individualized decision between you and your healthcare provider will decide whether screening between the ages of 74-80 would be appropriate  Colonoscopy: Not on file  FOBT/FIT: Not on file  Cologuard: Not on file  Sigmoidoscopy: Not on file    Screening Not Indicated     Breast Cancer Screening Age: 36 years old  Frequency: every 1-2 years  Not required if history of left and right mastectomy Mammogram: Not on file        Cervical Cancer Screening Between the ages of 21-29, pap smear recommended once every 3 years  Between the ages of 33-67, can perform pap smear with HPV co-testing every 5 years     Recommendations may differ for women with a history of total hysterectomy, cervical cancer, or abnormal pap smears in past  Pap Smear: Not on file    Screening Not Indicated   Hepatitis C Screening Once for adults born between 1945 and 1965  More frequently in patients at high risk for Hepatitis C Hep C Antibody: Not on file        Diabetes Screening 1-2 times per year if you're at risk for diabetes or have pre-diabetes Fasting glucose: No results in last 5 years   A1C: 7 0 %    Screening Not Indicated  History Diabetes   Cholesterol Screening Once every 5 years if you don't have a lipid disorder  May order more often based on risk factors  Lipid panel: 10/27/2020    Screening Current     Other Preventive Screenings Covered by Medicare:  1  Abdominal Aortic Aneurysm (AAA) Screening: covered once if your at risk  You're considered to be at risk if you have a family history of AAA  2  Lung Cancer Screening: covers low dose CT scan once per year if you meet all of the following conditions: (1) Age 50-69; (2) No signs or symptoms of lung cancer; (3) Current smoker or have quit smoking within the last 15 years; (4) You have a tobacco smoking history of at least 30 pack years (packs per day multiplied by number of years you smoked); (5) You get a written order from a healthcare provider  3  Glaucoma Screening: covered annually if you're considered high risk: (1) You have diabetes OR (2) Family history of glaucoma OR (3)  aged 48 and older OR (3)  American aged 72 and older  3  Osteoporosis Screening: covered every 2 years if you meet one of the following conditions: (1) You're estrogen deficient and at risk for osteoporosis based off medical history and other findings; (2) Have a vertebral abnormality; (3) On glucocorticoid therapy for more than 3 months; (4) Have primary hyperparathyroidism; (5) On osteoporosis medications and need to assess response to drug therapy  · Last bone density test (DXA Scan): Not on file  5  HIV Screening: covered annually if you're between the age of 12-76  Also covered annually if you are younger than 13 and older than 72 with risk factors for HIV infection   For pregnant patients, it is covered up to 3 times per pregnancy  Immunizations:  Immunization Recommendations   Influenza Vaccine Annual influenza vaccination during flu season is recommended for all persons aged >= 6 months who do not have contraindications   Pneumococcal Vaccine (Prevnar and Pneumovax)  * Prevnar = PCV13  * Pneumovax = PPSV23   Adults 25-60 years old: 1-3 doses may be recommended based on certain risk factors  Adults 72 years old: Prevnar (PCV13) vaccine recommended followed by Pneumovax (PPSV23) vaccine  If already received PPSV23 since turning 65, then PCV13 recommended at least one year after PPSV23 dose  Hepatitis B Vaccine 3 dose series if at intermediate or high risk (ex: diabetes, end stage renal disease, liver disease)   Tetanus (Td) Vaccine - COST NOT COVERED BY MEDICARE PART B Following completion of primary series, a booster dose should be given every 10 years to maintain immunity against tetanus  Td may also be given as tetanus wound prophylaxis  Tdap Vaccine - COST NOT COVERED BY MEDICARE PART B Recommended at least once for all adults  For pregnant patients, recommended with each pregnancy  Shingles Vaccine (Shingrix) - COST NOT COVERED BY MEDICARE PART B  2 shot series recommended in those aged 48 and above     Health Maintenance Due:  There are no preventive care reminders to display for this patient  Immunizations Due:      Topic Date Due    COVID-19 Vaccine (1 of 2) 03/24/1945    DTaP,Tdap,and Td Vaccines (1 - Tdap) 03/24/1950    Pneumococcal Vaccine: 65+ Years (2 of 2 - PPSV23) 12/31/2018     Advance Directives   What are advance directives? Advance directives are legal documents that state your wishes and plans for medical care  These plans are made ahead of time in case you lose your ability to make decisions for yourself  Advance directives can apply to any medical decision, such as the treatments you want, and if you want to donate organs  What are the types of advance directives?   There are many types of advance directives, and each state has rules about how to use them  You may choose a combination of any of the following:  · Living will: This is a written record of the treatment you want  You can also choose which treatments you do not want, which to limit, and which to stop at a certain time  This includes surgery, medicine, IV fluid, and tube feedings  · Durable power of  for healthcare [facility]): This is a written record that states who you want to make healthcare choices for you when you are unable to make them for yourself  This person, called a proxy, is usually a family member or a friend  You may choose more than 1 proxy  · Do not resuscitate (DNR) order:  A DNR order is used in case your heart stops beating or you stop breathing  It is a request not to have certain forms of treatment, such as CPR  A DNR order may be included in other types of advance directives  · Medical directive: This covers the care that you want if you are in a coma, near death, or unable to make decisions for yourself  You can list the treatments you want for each condition  Treatment may include pain medicine, surgery, blood transfusions, dialysis, IV or tube feedings, and a ventilator (breathing machine)  · Values history: This document has questions about your views, beliefs, and how you feel and think about life  This information can help others choose the care that you would choose  Why are advance directives important? An advance directive helps you control your care  Although spoken wishes may be used, it is better to have your wishes written down  Spoken wishes can be misunderstood, or not followed  Treatments may be given even if you do not want them  An advance directive may make it easier for your family to make difficult choices about your care  Urinary Incontinence   Urinary incontinence (UI)  is when you lose control of your bladder   UI develops because your bladder cannot store or empty urine properly  The 3 most common types of UI are stress incontinence, urge incontinence, or both  Medicines:   · May be given to help strengthen your bladder control  Report any side effects of medication to your healthcare provider  Do pelvic muscle exercises often:  Your pelvic muscles help you stop urinating  Squeeze these muscles tight for 5 seconds, then relax for 5 seconds  Gradually work up to squeezing for 10 seconds  Do 3 sets of 15 repetitions a day, or as directed  This will help strengthen your pelvic muscles and improve bladder control  Train your bladder:  Go to the bathroom at set times, such as every 2 hours, even if you do not feel the urge to go  You can also try to hold your urine when you feel the urge to go  For example, hold your urine for 5 minutes when you feel the urge to go  As that becomes easier, hold your urine for 10 minutes  Self-care:   · Keep a UI record  Write down how often you leak urine and how much you leak  Make a note of what you were doing when you leaked urine  · Drink liquids as directed  You may need to limit the amount of liquid you drink to help control your urine leakage  Do not drink any liquid right before you go to bed  Limit or do not have drinks that contain caffeine or alcohol  · Prevent constipation  Eat a variety of high-fiber foods  Good examples are high-fiber cereals, beans, vegetables, and whole-grain breads  Walking is the best way to trigger your intestines to have a bowel movement  · Exercise regularly and maintain a healthy weight  Weight loss and exercise will decrease pressure on your bladder and help you control your leakage  · Use a catheter as directed  to help empty your bladder  A catheter is a tiny, plastic tube that is put into your bladder to drain your urine  · Go to behavior therapy as directed  Behavior therapy may be used to help you learn to control your urge to urinate      Weight Management   Why it is important to manage your weight:  Being overweight increases your risk of health conditions such as heart disease, high blood pressure, type 2 diabetes, and certain types of cancer  It can also increase your risk for osteoarthritis, sleep apnea, and other respiratory problems  Aim for a slow, steady weight loss  Even a small amount of weight loss can lower your risk of health problems  How to lose weight safely:  A safe and healthy way to lose weight is to eat fewer calories and get regular exercise  You can lose up about 1 pound a week by decreasing the number of calories you eat by 500 calories each day  Healthy meal plan for weight management:  A healthy meal plan includes a variety of foods, contains fewer calories, and helps you stay healthy  A healthy meal plan includes the following:  · Eat whole-grain foods more often  A healthy meal plan should contain fiber  Fiber is the part of grains, fruits, and vegetables that is not broken down by your body  Whole-grain foods are healthy and provide extra fiber in your diet  Some examples of whole-grain foods are whole-wheat breads and pastas, oatmeal, brown rice, and bulgur  · Eat a variety of vegetables every day  Include dark, leafy greens such as spinach, kale, sarah greens, and mustard greens  Eat yellow and orange vegetables such as carrots, sweet potatoes, and winter squash  · Eat a variety of fruits every day  Choose fresh or canned fruit (canned in its own juice or light syrup) instead of juice  Fruit juice has very little or no fiber  · Eat low-fat dairy foods  Drink fat-free (skim) milk or 1% milk  Eat fat-free yogurt and low-fat cottage cheese  Try low-fat cheeses such as mozzarella and other reduced-fat cheeses  · Choose meat and other protein foods that are low in fat  Choose beans or other legumes such as split peas or lentils  Choose fish, skinless poultry (chicken or turkey), or lean cuts of red meat (beef or pork)   Before you cook meat or poultry, cut off any visible fat  · Use less fat and oil  Try baking foods instead of frying them  Add less fat, such as margarine, sour cream, regular salad dressing and mayonnaise to foods  Eat fewer high-fat foods  Some examples of high-fat foods include french fries, doughnuts, ice cream, and cakes  · Eat fewer sweets  Limit foods and drinks that are high in sugar  This includes candy, cookies, regular soda, and sweetened drinks  Exercise:  Exercise at least 30 minutes per day on most days of the week  Some examples of exercise include walking, biking, dancing, and swimming  You can also fit in more physical activity by taking the stairs instead of the elevator or parking farther away from stores  Ask your healthcare provider about the best exercise plan for you  © Copyright [employer] 2018 Information is for End User's use only and may not be sold, redistributed or otherwise used for commercial purposes  All illustrations and images included in CareNotes® are the copyrighted property of [employer]  or [facility] Preventive Visit Patient Instructions  Thank you for completing your Welcome to Medicare Visit or Medicare Annual Wellness Visit today  Your next wellness visit will be due in one year (3/12/2022)  The screening/preventive services that you may require over the next 5-10 years are detailed below  Some tests may not apply to you based off risk factors and/or age  Screening tests ordered at today's visit but not completed yet may show as past due  Also, please note that scanned in results may not display below    Preventive Screenings:  Service Recommendations Previous Testing/Comments   Colorectal Cancer Screening  * Colonoscopy    * Fecal Occult Blood Test (FOBT)/Fecal Immunochemical Test (FIT)  * Fecal DNA/Cologuard Test  * Flexible Sigmoidoscopy Age: 54-65 years old   Colonoscopy: every 10 years (may be performed more frequently if at higher risk)  OR  FOBT/FIT: every 1 year OR  Cologuard: every 3 years  OR  Sigmoidoscopy: every 5 years  Screening may be recommended earlier than age 48 if at higher risk for colorectal cancer  Also, an individualized decision between you and your healthcare provider will decide whether screening between the ages of 74-80 would be appropriate  Colonoscopy: Not on file  FOBT/FIT: Not on file  Cologuard: Not on file  Sigmoidoscopy: Not on file    Screening Not Indicated     Breast Cancer Screening Age: 36 years old  Frequency: every 1-2 years  Not required if history of left and right mastectomy Mammogram: Not on file        Cervical Cancer Screening Between the ages of 21-29, pap smear recommended once every 3 years  Between the ages of 33-67, can perform pap smear with HPV co-testing every 5 years  Recommendations may differ for women with a history of total hysterectomy, cervical cancer, or abnormal pap smears in past  Pap Smear: Not on file    Screening Not Indicated   Hepatitis C Screening Once for adults born between 1945 and 1965  More frequently in patients at high risk for Hepatitis C Hep C Antibody: Not on file        Diabetes Screening 1-2 times per year if you're at risk for diabetes or have pre-diabetes Fasting glucose: No results in last 5 years   A1C: 7 0 %    Screening Not Indicated  History Diabetes   Cholesterol Screening Once every 5 years if you don't have a lipid disorder  May order more often based on risk factors  Lipid panel: 10/27/2020    Screening Current     Other Preventive Screenings Covered by Medicare:  6  Abdominal Aortic Aneurysm (AAA) Screening: covered once if your at risk  You're considered to be at risk if you have a family history of AAA    7  Lung Cancer Screening: covers low dose CT scan once per year if you meet all of the following conditions: (1) Age 50-69; (2) No signs or symptoms of lung cancer; (3) Current smoker or have quit smoking within the last 15 years; (4) You have a tobacco smoking history of at least 30 pack years (packs per day multiplied by number of years you smoked); (5) You get a written order from a healthcare provider  8  Glaucoma Screening: covered annually if you're considered high risk: (1) You have diabetes OR (2) Family history of glaucoma OR (3)  aged 48 and older OR (3)  American aged 72 and older  5  Osteoporosis Screening: covered every 2 years if you meet one of the following conditions: (1) You're estrogen deficient and at risk for osteoporosis based off medical history and other findings; (2) Have a vertebral abnormality; (3) On glucocorticoid therapy for more than 3 months; (4) Have primary hyperparathyroidism; (5) On osteoporosis medications and need to assess response to drug therapy  · Last bone density test (DXA Scan): Not on file  10  HIV Screening: covered annually if you're between the age of 12-76  Also covered annually if you are younger than 13 and older than 72 with risk factors for HIV infection  For pregnant patients, it is covered up to 3 times per pregnancy  Immunizations:  Immunization Recommendations   Influenza Vaccine Annual influenza vaccination during flu season is recommended for all persons aged >= 6 months who do not have contraindications   Pneumococcal Vaccine (Prevnar and Pneumovax)  * Prevnar = PCV13  * Pneumovax = PPSV23   Adults 25-60 years old: 1-3 doses may be recommended based on certain risk factors  Adults 72 years old: Prevnar (PCV13) vaccine recommended followed by Pneumovax (PPSV23) vaccine  If already received PPSV23 since turning 65, then PCV13 recommended at least one year after PPSV23 dose  Hepatitis B Vaccine 3 dose series if at intermediate or high risk (ex: diabetes, end stage renal disease, liver disease)   Tetanus (Td) Vaccine - COST NOT COVERED BY MEDICARE PART B Following completion of primary series, a booster dose should be given every 10 years to maintain immunity against tetanus   Td may also be given as tetanus wound prophylaxis  Tdap Vaccine - COST NOT COVERED BY MEDICARE PART B Recommended at least once for all adults  For pregnant patients, recommended with each pregnancy  Shingles Vaccine (Shingrix) - COST NOT COVERED BY MEDICARE PART B  2 shot series recommended in those aged 48 and above     Health Maintenance Due:  There are no preventive care reminders to display for this patient  Immunizations Due:      Topic Date Due    COVID-19 Vaccine (1 of 2) [birth year]1945    DTaP,Tdap,and Td Vaccines (1 - Tdap) 03/24/1950    Pneumococcal Vaccine: 65+ Years (2 of 2 - PPSV23) 12/31/2018     Advance Directives   What are advance directives? Advance directives are legal documents that state your wishes and plans for medical care  These plans are made ahead of time in case you lose your ability to make decisions for yourself  Advance directives can apply to any medical decision, such as the treatments you want, and if you want to donate organs  What are the types of advance directives? There are many types of advance directives, and each state has rules about how to use them  You may choose a combination of any of the following:  · Living will: This is a written record of the treatment you want  You can also choose which treatments you do not want, which to limit, and which to stop at a certain time  This includes surgery, medicine, IV fluid, and tube feedings  · Durable power of  for healthcare [place] [facility]): This is a written record that states who you want to make healthcare choices for you when you are unable to make them for yourself  This person, called a proxy, is usually a family member or a friend  You may choose more than 1 proxy  · Do not resuscitate (DNR) order:  A DNR order is used in case your heart stops beating or you stop breathing  It is a request not to have certain forms of treatment, such as CPR  A DNR order may be included in other types of advance directives    · Medical directive: This covers the care that you want if you are in a coma, near death, or unable to make decisions for yourself  You can list the treatments you want for each condition  Treatment may include pain medicine, surgery, blood transfusions, dialysis, IV or tube feedings, and a ventilator (breathing machine)  · Values history: This document has questions about your views, beliefs, and how you feel and think about life  This information can help others choose the care that you would choose  Why are advance directives important? An advance directive helps you control your care  Although spoken wishes may be used, it is better to have your wishes written down  Spoken wishes can be misunderstood, or not followed  Treatments may be given even if you do not want them  An advance directive may make it easier for your family to make difficult choices about your care  Urinary Incontinence   Urinary incontinence (UI)  is when you lose control of your bladder  UI develops because your bladder cannot store or empty urine properly  The 3 most common types of UI are stress incontinence, urge incontinence, or both  Medicines:   · May be given to help strengthen your bladder control  Report any side effects of medication to your healthcare provider  Do pelvic muscle exercises often:  Your pelvic muscles help you stop urinating  Squeeze these muscles tight for 5 seconds, then relax for 5 seconds  Gradually work up to squeezing for 10 seconds  Do 3 sets of 15 repetitions a day, or as directed  This will help strengthen your pelvic muscles and improve bladder control  Train your bladder:  Go to the bathroom at set times, such as every 2 hours, even if you do not feel the urge to go  You can also try to hold your urine when you feel the urge to go  For example, hold your urine for 5 minutes when you feel the urge to go  As that becomes easier, hold your urine for 10 minutes  Self-care:   · Keep a UI record    Write down how often you leak urine and how much you leak  Make a note of what you were doing when you leaked urine  · Drink liquids as directed  You may need to limit the amount of liquid you drink to help control your urine leakage  Do not drink any liquid right before you go to bed  Limit or do not have drinks that contain caffeine or alcohol  · Prevent constipation  Eat a variety of high-fiber foods  Good examples are high-fiber cereals, beans, vegetables, and whole-grain breads  Walking is the best way to trigger your intestines to have a bowel movement  · Exercise regularly and maintain a healthy weight  Weight loss and exercise will decrease pressure on your bladder and help you control your leakage  · Use a catheter as directed  to help empty your bladder  A catheter is a tiny, plastic tube that is put into your bladder to drain your urine  · Go to behavior therapy as directed  Behavior therapy may be used to help you learn to control your urge to urinate  Weight Management   Why it is important to manage your weight:  Being overweight increases your risk of health conditions such as heart disease, high blood pressure, type 2 diabetes, and certain types of cancer  It can also increase your risk for osteoarthritis, sleep apnea, and other respiratory problems  Aim for a slow, steady weight loss  Even a small amount of weight loss can lower your risk of health problems  How to lose weight safely:  A safe and healthy way to lose weight is to eat fewer calories and get regular exercise  You can lose up about 1 pound a week by decreasing the number of calories you eat by 500 calories each day  Healthy meal plan for weight management:  A healthy meal plan includes a variety of foods, contains fewer calories, and helps you stay healthy  A healthy meal plan includes the following:  · Eat whole-grain foods more often  A healthy meal plan should contain fiber   Fiber is the part of grains, fruits, and vegetables that is not broken down by your body  Whole-grain foods are healthy and provide extra fiber in your diet  Some examples of whole-grain foods are whole-wheat breads and pastas, oatmeal, brown rice, and bulgur  · Eat a variety of vegetables every day  Include dark, leafy greens such as spinach, kale, sarah greens, and mustard greens  Eat yellow and orange vegetables such as carrots, sweet potatoes, and winter squash  · Eat a variety of fruits every day  Choose fresh or canned fruit (canned in its own juice or light syrup) instead of juice  Fruit juice has very little or no fiber  · Eat low-fat dairy foods  Drink fat-free (skim) milk or 1% milk  Eat fat-free yogurt and low-fat cottage cheese  Try low-fat cheeses such as mozzarella and other reduced-fat cheeses  · Choose meat and other protein foods that are low in fat  Choose beans or other legumes such as split peas or lentils  Choose fish, skinless poultry (chicken or turkey), or lean cuts of red meat (beef or pork)  Before you cook meat or poultry, cut off any visible fat  · Use less fat and oil  Try baking foods instead of frying them  Add less fat, such as margarine, sour cream, regular salad dressing and mayonnaise to foods  Eat fewer high-fat foods  Some examples of high-fat foods include french fries, doughnuts, ice cream, and cakes  · Eat fewer sweets  Limit foods and drinks that are high in sugar  This includes candy, cookies, regular soda, and sweetened drinks  Exercise:  Exercise at least 30 minutes per day on most days of the week  Some examples of exercise include walking, biking, dancing, and swimming  You can also fit in more physical activity by taking the stairs instead of the elevator or parking farther away from stores  Ask your healthcare provider about the best exercise plan for you  © Copyright [employer] 2018 Information is for End User's use only and may not be sold, redistributed or otherwise used for commercial purposes   All illustrations and images included in CareNotes® are the copyrighted property of A D A M [employer] Inc  or Kelly Le

## 2021-03-11 NOTE — PROGRESS NOTES
Assessment and Plan:     Problem List Items Addressed This Visit     None        BMI Counseling: Body mass index is 30 61 kg/m²  The BMI is above normal  Nutrition recommendations include encouraging healthy choices of fruits and vegetables  Preventive health issues were discussed with patient, and age appropriate screening tests were ordered as noted in patient's After Visit Summary  Personalized health advice and appropriate referrals for health education or preventive services given if needed, as noted in patient's After Visit Summary  History of Present Illness:     Patient presents for Welcome to Medicare visit  Patient Care Team:  Marissa Rowley MD as PCP - General (Internal Medicine)  MD Charity Soto MD (Gastroenterology)  Enriqueta Martell PA-C as Physician Assistant (Gastroenterology)  Reed Adame PA-C as Physician Assistant (Physician Assistant)     Review of Systems:     Review of Systems   Respiratory: Negative for shortness of breath  Cardiovascular: Negative for chest pain  Gastrointestinal: Negative for abdominal pain        Problem List:     Patient Active Problem List   Diagnosis    Essential hypertension    Type 2 diabetes mellitus with hyperglycemia, with long-term current use of insulin (HCC)    Leukocytosis    Slow transit constipation    Elevated troponin    Actinic keratosis    Seborrheic keratosis    Screening for skin condition    History of skin cancer    Hypothyroidism    History of heart block    Bilateral carpal tunnel syndrome    Vitamin D deficiency    Pacemaker    GERD without esophagitis      Past Medical and Surgical History:     Past Medical History:   Diagnosis Date    Chronic pain     Diabetes mellitus ([address] )     Hyperlipidemia     Hypertension     Pneumonia due to COVID-19 virus 4/14/2020    Type 2 diabetes mellitus with hyperglycemia, with long-term current use of insulin ([address] ) 12/28/2017     Past Surgical History:   Procedure Laterality Date    BACK SURGERY      COLONOSCOPY        Family History:     Family History   Problem Relation Age of Onset    Colon cancer Mother     Heart attack Father       Social History:     E-Cigarette/Vaping    E-Cigarette Use Never User      E-Cigarette/Vaping Substances    Nicotine No     THC No     CBD No     Flavoring No     Other No     Unknown No      Social History     Socioeconomic History    Marital status:       Spouse name: None    Number of children: None    Years of education: None    Highest education level: None   Occupational History    None   Social Needs    Financial resource strain: None    Food insecurity     Worry: None     Inability: None    Transportation needs     Medical: None     Non-medical: None   Tobacco Use    Smoking status: Never Smoker    Smokeless tobacco: Never Used   Substance and Sexual Activity    Alcohol use: Not Currently    Drug use: No    Sexual activity: None   Lifestyle    Physical activity     Days per week: None     Minutes per session: None    Stress: None   Relationships    Social connections     Talks on phone: None     Gets together: None     Attends Rastafari service: None     Active member of club or organization: None     Attends meetings of clubs or organizations: None     Relationship status: None    Intimate partner violence     Fear of current or ex partner: None     Emotionally abused: None     Physically abused: None     Forced sexual activity: None   Other Topics Concern    None   Social History Narrative    None      Medications and Allergies:     Current Outpatient Medications   Medication Sig Dispense Refill    amLODIPine (NORVASC) 10 mg tablet Take 1 tablet (10 mg total) by mouth daily 30 tablet 5    aspirin (Aspirin Low Dose) 81 mg chewable tablet Chew 1 tablet (81 mg total) every morning Dx: Stroke Prevention 28 tablet 4    atorvastatin (LIPITOR) 40 mg tablet Take 1 tablet (40 mg total) by mouth daily at bedtime Hyperlipidemia 28 tablet 11    Cyanocobalamin (B-12) 1000 MCG TABS Take 1,000 mcg by mouth daily 30 tablet 11    glucose blood (Glucocard Vital Test) test strip Check sugar up to 4 times a day 100 each 4    HumaLOG KwikPen 100 units/mL injection pen       insulin glargine (LANTUS SOLOSTAR) injection pen 100 units/mL Inject 20 Units under the skin daily at bedtime       Insulin Lispro (HUMALOG KWIKPEN SC) Inject 8 Units under the skin 3 (three) times a day before meals       Insulin Pen Needle (NovoFine Autocover) 30G X 8 MM MISC Inject under the skin 4 (four) times a day 100 each 4    latanoprost (XALATAN) 0 005 % ophthalmic solution INSTILL 1 DROP INTO RIGHT EYE AT BEDTIME DX: GLAUCOMA **DISCARD 6 WK AFTER OPENED** 2 5 mL 4    losartan (COZAAR) 100 MG tablet Take 1 tablet (100 mg total) by mouth every morning Hypertension 28 tablet 4    methylcellulose (Citrucel) 500 mg tablet Take 1 tablet (500 mg total) by mouth daily 30 each 3    metoprolol succinate (TOPROL-XL) 50 mg 24 hr tablet Take 50 mg by mouth daily        Multiple Vitamins-Calcium (ONE-A-DAY WOMENS FORMULA) TABS 1 TAB ORALLY EVERY MORNING DX: SUPPLEMENT 28 tablet 4    ReadyLance Safety Lancets MISC AS DIRECTED FOR BLOOD GLUCOSE TESTING 4 TIMES DAILY AND AS NEEDED FORSIGNS/SYMPTOMS OF HI/LOW  each 4    hydrALAZINE (APRESOLINE) 10 mg tablet Take 1 tablet (10 mg total) by mouth 3 (three) times a day (Patient not taking: Reported on 3/11/2021) 90 tablet 5    levothyroxine 50 mcg tablet Take 50 mcg by mouth daily      Multiple Vitamins-Minerals (CENTRUM SILVER 50+WOMEN PO) Take 1 tablet by mouth daily      omeprazole (PriLOSEC) 20 mg delayed release capsule Take 1 capsule (20 mg total) by mouth every morning GERD (Patient not taking: Reported on 3/11/2021) 28 capsule 11    polyethylene glycol (GLYCOLAX) 17 GM/SCOOP powder Take 9 g by mouth every other day (Patient not taking: Reported on 3/11/2021) 507 g 1    REFRESH TEARS 0 5 % SOLN INSTILL 1 DROP INTO AFFECTED EYE(S) EVERY 4 HOURS AS NEEDED FOR DRY EYES (Patient not taking: Reported on 3/11/2021) 15 mL 4     No current facility-administered medications for this visit  Allergies   Allergen Reactions    Hydrocodone-Acetaminophen Nausea Only      Immunizations:     Immunization History   Administered Date(s) Administered    INFLUENZA 09/24/2015, 10/27/2018, 09/06/2019    Influenza Split 10/11/2012, 10/27/2014    Influenza Split High Dose Preservative Free IM 10/10/2016, 09/06/2019    Influenza, high dose seasonal 0 7 mL 09/15/2020    Influenza, seasonal, injectable, preservative free 11/19/2013, 09/18/2017    Pneumococcal Conjugate 13-Valent 10/10/2016, 12/31/2017      Health Maintenance: There are no preventive care reminders to display for this patient  Topic Date Due    COVID-19 Vaccine (1 of 2) 03/24/1945    DTaP,Tdap,and Td Vaccines (1 - Tdap) 03/24/1950    Pneumococcal Vaccine: 65+ Years (2 of 2 - PPSV23) 12/31/2018      Medicare Screening Tests and Risk Assessments:         Health Risk Assessment:   Patient rates overall health as fair  Patient feels that their physical health rating is same  Patient is satisfied with their life  Eyesight was rated as same  Hearing was rated as same  Patient feels that their emotional and mental health rating is same  Patients states they are sometimes angry  Patient states they are sometimes unusually tired/fatigued  Pain experienced in the last 7 days has been some  Patient's pain rating has been 4/10  Patient states that she has experienced no weight loss or gain in last 6 months  Depression Screening:   PHQ-2 Score: 0      Fall Risk Screening: In the past year, patient has experienced: no history of falling in past year      Urinary Incontinence Screening:   Patient has not leaked urine accidently in the last six months       Home Safety:  Patient does not have trouble with stairs inside or outside of their home  Patient has working smoke alarms and has working carbon monoxide detector  Home safety hazards include: none  Nutrition:   Current diet is Regular  Medications:   Patient is currently taking over-the-counter supplements  OTC medications include: see medication list  Patient is able to manage medications  Activities of Daily Living (ADLs)/Instrumental Activities of Daily Living (IADLs):   Walk and transfer into and out of bed and chair?: Yes  Dress and groom yourself?: Yes    Bathe or shower yourself?: Yes    Feed yourself? Yes  Do your laundry/housekeeping?: No  Manage your money, pay your bills and track your expenses?: No  Make your own meals?: No    Do your own shopping?: Yes    Previous Hospitalizations:   Any hospitalizations or ED visits within the last 12 months?: No      Advance Care Planning:   Living will: Yes    Advanced directive: Yes    Advanced directive counseling given: Yes      Cognitive Screening:   Provider or family/friend/caregiver concerned regarding cognition?: No    PREVENTIVE SCREENINGS      Cardiovascular Screening:    General: Screening Current      Diabetes Screening:     General: Screening Not Indicated and History Diabetes      Colorectal Cancer Screening:     General: Screening Not Indicated      Breast Cancer Screening:     General: Screening Not Indicated      Cervical Cancer Screening:    General: Screening Not Indicated      Osteoporosis Screening:    General: Screening Not Indicated      Lung Cancer Screening:     General: Screening Not Indicated      Hepatitis C Screening:    General: Screening Not Indicated    Screening, Brief Intervention, and Referral to Treatment (SBIRT)    Screening  Typical number of drinks in a day: 0  Typical number of drinks in a week: 0  Interpretation: Low risk drinking behavior      AUDIT-C Screenin) How often did you have a drink containing alcohol in the past year? never  2) How many drinks did you have on a typical day when you were drinking in the past year? never  3) How often did you have 6 or more drinks on one occasion in the past year? never    AUDIT-C Score: 0  Interpretation: Score 0-2 (female): Negative screen for alcohol misuse    Single Item Drug Screening:  How often have you used an illegal drug (including marijuana) or a prescription medication for non-medical reasons in the past year? never    Single Item Drug Screen Score: 0  Interpretation: Negative screen for possible drug use disorder    No exam data present     Physical Exam:     /70 (BP Location: Left arm, Patient Position: Sitting, Cuff Size: Standard)   Pulse 80   Temp 98 8 °F (37 1 °C) (Tympanic)   Resp 16   Ht 5' 1" (1 549 m)   Wt 73 5 kg (162 lb)   SpO2 97%   BMI 30 61 kg/m²     Physical Exam  Vitals signs and nursing note reviewed  Constitutional:       Appearance: She is well-developed  Neurological:      Mental Status: She is alert and oriented to person, place, and time  Psychiatric:         Behavior: Behavior normal          Thought Content:  Thought content normal          Judgment: Judgment normal           Shilpi Flores MD

## 2021-03-11 NOTE — PROGRESS NOTES
Assessment/Plan:       Chronic problems appear stable  Continue current medications  Continue follow-up with her multiple specialists  She has her blood work through endocrinology so we can just look at that  There does not appear to be any foreign body in the bottom of her foot  Will cover with an antibiotic  Quality Measures: BMI Counseling: Body mass index is 30 61 kg/m²  The BMI is above normal  Nutrition recommendations include encouraging healthy choices of fruits and vegetables  Return in about 6 months (around 9/11/2021)  No problem-specific Assessment & Plan notes found for this encounter  Diagnoses and all orders for this visit:    Medicare annual wellness visit, subsequent    Type 2 diabetes mellitus with hyperglycemia, with long-term current use of insulin ([facility])    Hypothyroidism, unspecified type    Essential hypertension    Foot infection  -     cephalexin (KEFLEX) 250 mg capsule; Take 1 capsule (250 mg total) by mouth 3 (three) times a day for 7 days          Subjective:      Patient ID: Emmanuel Ferrari is a 80 y o  female  Patient comes in today for routine follow-up and Medicare wellness  She has been seeing her specialists that she had fallen behind on during the pandemic her earlier  Her heart disease is stable  Her diabetes is controlled  She saw Dermatology  Had a lesion removed and still having some pain in the left calf with some redness at the biopsy site  She has been cleaning it with peroxide as instructed  This morning, she stepped on something and caused a small cut on her right foot  She states it bled heavily  She thought she may have stepped on glass but did not find anything  It stopped on its own  She has a Band-Aid on it  No pain at the site  No pain when she steps on it        ALLERGIES:  Allergies   Allergen Reactions    Hydrocodone-Acetaminophen Nausea Only       CURRENT MEDICATIONS:    Current Outpatient Medications:     amLODIPine (NORVASC) 10 mg tablet, Take 1 tablet (10 mg total) by mouth daily, Disp: 30 tablet, Rfl: 5    aspirin (Aspirin Low Dose) 81 mg chewable tablet, Chew 1 tablet (81 mg total) every morning Dx: Stroke Prevention, Disp: 28 tablet, Rfl: 4    atorvastatin (LIPITOR) 40 mg tablet, Take 1 tablet (40 mg total) by mouth daily at bedtime Hyperlipidemia, Disp: 28 tablet, Rfl: 11    Cyanocobalamin (B-12) 1000 MCG TABS, Take 1,000 mcg by mouth daily, Disp: 30 tablet, Rfl: 11    glucose blood (Glucocard Vital Test) test strip, Check sugar up to 4 times a day, Disp: 100 each, Rfl: 4    HumaLOG KwikPen 100 units/mL injection pen, , Disp: , Rfl:     insulin glargine (LANTUS SOLOSTAR) injection pen 100 units/mL, Inject 20 Units under the skin daily at bedtime , Disp: , Rfl:     Insulin Lispro (HUMALOG KWIKPEN SC), Inject 8 Units under the skin 3 (three) times a day before meals , Disp: , Rfl:     Insulin Pen Needle (NovoFine Autocover) 30G X 8 MM MISC, Inject under the skin 4 (four) times a day, Disp: 100 each, Rfl: 4    latanoprost (XALATAN) 0 005 % ophthalmic solution, INSTILL 1 DROP INTO RIGHT EYE AT BEDTIME DX: GLAUCOMA **DISCARD 6 WK AFTER OPENED**, Disp: 2 5 mL, Rfl: 4    losartan (COZAAR) 100 MG tablet, Take 1 tablet (100 mg total) by mouth every morning Hypertension, Disp: 28 tablet, Rfl: 4    methylcellulose (Citrucel) 500 mg tablet, Take 1 tablet (500 mg total) by mouth daily, Disp: 30 each, Rfl: 3    metoprolol succinate (TOPROL-XL) 50 mg 24 hr tablet, Take 50 mg by mouth daily  , Disp: , Rfl:     Multiple Vitamins-Calcium (ONE-A-DAY WOMENS FORMULA) TABS, 1 TAB ORALLY EVERY MORNING DX: SUPPLEMENT, Disp: 28 tablet, Rfl: 4    ReadyLance Safety Lancets MISC, AS DIRECTED FOR BLOOD GLUCOSE TESTING 4 TIMES DAILY AND AS NEEDED FORSIGNS/SYMPTOMS OF HI/LOW BS, Disp: 100 each, Rfl: 4    cephalexin (KEFLEX) 250 mg capsule, Take 1 capsule (250 mg total) by mouth 3 (three) times a day for 7 days, Disp: 21 capsule, Rfl: 0    levothyroxine 50 mcg tablet, Take 50 mcg by mouth daily, Disp: , Rfl:     ACTIVE PROBLEM LIST:  Patient Active Problem List   Diagnosis    Essential hypertension    Type 2 diabetes mellitus with hyperglycemia, with long-term current use of insulin (HCC)    Leukocytosis    Slow transit constipation    Elevated troponin    Actinic keratosis    Seborrheic keratosis    Screening for skin condition    History of skin cancer    Hypothyroidism    History of heart block    Bilateral carpal tunnel syndrome    Vitamin D deficiency    Pacemaker    GERD without esophagitis       PAST MEDICAL HISTORY:  Past Medical History:   Diagnosis Date    Chronic pain     Diabetes mellitus ([facility] )     Hyperlipidemia     Hypertension     Pneumonia due to COVID-19 virus 4/14/2020    Type 2 diabetes mellitus with hyperglycemia, with long-term current use of insulin ([facility] ) 12/28/2017       PAST SURGICAL HISTORY:  Past Surgical History:   Procedure Laterality Date    BACK SURGERY      COLONOSCOPY         FAMILY HISTORY:  Family History   Problem Relation Age of Onset    Colon cancer Mother     Heart attack Father        SOCIAL HISTORY:  Social History     Socioeconomic History    Marital status:       Spouse name: Not on file    Number of children: Not on file    Years of education: Not on file    Highest education level: Not on file   Occupational History    Not on file   Social Needs    Financial resource strain: Not on file    Food insecurity     Worry: Not on file     Inability: Not on file    Transportation needs     Medical: Not on file     Non-medical: Not on file   Tobacco Use    Smoking status: Never Smoker    Smokeless tobacco: Never Used   Substance and Sexual Activity    Alcohol use: Not Currently     Frequency: Never    Drug use: No    Sexual activity: Not on file   Lifestyle    Physical activity     Days per week: Not on file     Minutes per session: Not on file    Stress: Not on file   Relationships    Social connections     Talks on phone: Not on file     Gets together: Not on file     Attends Mormonism service: Not on file     Active member of club or organization: Not on file     Attends meetings of clubs or organizations: Not on file     Relationship status: Not on file    Intimate partner violence     Fear of current or ex partner: Not on file     Emotionally abused: Not on file     Physically abused: Not on file     Forced sexual activity: Not on file   Other Topics Concern    Not on file   Social History Narrative    Not on file       Review of Systems   Constitutional: Negative for fever  Respiratory: Negative for shortness of breath  Cardiovascular: Negative for chest pain  Gastrointestinal: Negative for abdominal pain  Objective:  Vitals:    03/11/21 1036   BP: 118/70   BP Location: Left arm   Patient Position: Sitting   Cuff Size: Standard   Pulse: 80   Resp: 16   Temp: 98 8 °F (37 1 °C)   TempSrc: Tympanic   SpO2: 97%   Weight: 73 5 kg (162 lb)   Height: 5' 1" (1 549 m)     Body mass index is 30 61 kg/m²  Physical Exam  Vitals signs and nursing note reviewed  Constitutional:       Appearance: She is well-developed  Cardiovascular:      Rate and Rhythm: Normal rate and regular rhythm  Heart sounds: Normal heart sounds  Pulmonary:      Effort: Pulmonary effort is normal       Breath sounds: Normal breath sounds  Abdominal:      Palpations: Abdomen is soft  Tenderness: There is no abdominal tenderness  Skin:     Comments:   Superficial laceration bottom of the right foot  No active bleeding  No evidence of foreign body  There is some surrounding erythema at the biopsy site on her left shin  No discharge  Neurological:      Mental Status: She is alert and oriented to person, place, and time             RESULTS:    Recent Results (from the past 1008 hour(s))   HEMOGLOBIN A1C    Collection Time: 02/23/21  8:20 AM   Result Value Ref Range Hemoglobin A1C 7 0 (H) <5 7 %    eAG, EST AVG Glucose 154 (H) <154 mg/dL   Tissue Exam    Collection Time: 03/02/21 11:41 AM   Result Value Ref Range    Case Report       Surgical Pathology Report                         Case: [id number]                                   Authorizing Provider:  Glo Min MD          Collected:           03/02/2021 1141              Ordering Location:     [facility]           Received:            03/02/2021 1 [facility]                                                           Pathologist:           Hayley Hsieh MD                                                           Specimens:   A) - Arm, Left, Left Forearm                                                                        B) - Leg, Left, Left Lower Leg                                                             Final Diagnosis       A  Skin, left forearm, shave biopsy:    Consistent with focal SQUAMOUS CELL CARCINOMA IN SITU arising in association with a proliferative actinic keratosis  B  Skin, left lower leg, shave biopsy:    Consistent with focal SQUAMOUS CELL CARCINOMA IN SITU arising in association with a proliferative actinic keratosis  Additional Information       All reported additional testing was performed with appropriately reactive controls  These tests were developed and their performance characteristics determined by [address] [place] [address] [facility] or appropriate performing facility, though some tests may be performed on tissues which have not been validated for performance characteristics (such as staining performed on alcohol exposed cell blocks and decalcified tissues)  Results should be interpreted with caution and in the context of the patients clinical condition  These tests may not be cleared or approved by the [place]   Food and Drug Administration, though the FDA has determined that such clearance or approval is not necessary  These tests are used for clinical purposes and they should not be regarded as investigational or for research  This laboratory has been approved by CLIA 88, designated as a high-complexity laboratory and is qualified to perform these tests  Fabricio Aiken Description           A  The specimen is received in formalin, labeled with the patient's name and hospital number, and is designated " left forearm"  The specimen consists of a tan skin shave measuring 0 7 x 0 5 x 0 1 cm  The epithelial surface exhibits a tan macule measuring 0 6 x 0 3 cm which abuts the nearest peripheral margin  The apparent margin of resection is inked green  Bisected lengthwise and entirely submitted between sponges in 1 cassette  B  The specimen is received in formalin, labeled with the patient's name and hospital number, and is designated "Left lower leg"  The specimen consists of a tan skin shave measuring 1 2 x 1 0 x 0 1 cm  The epithelial surface exhibits a heterogeneous white gray tan papule measuring 1 0 x 1 0 x 0 2 cm which abuts the nearest peripheral margin  Serially sectioned and entirely submitted between sponges in 1 cassette  Note: The estimated total formalin fixation time based upon information provided by the submitting clinician and the standard processing schedule is under 72 hours  Rosalino         Clinical Information Rule Out Squamous Cell Carcinoma        This note was created with voice recognition software  Phonic, grammatical and spelling errors may be present within the note as a result

## 2021-03-15 ENCOUNTER — TELEPHONE (OUTPATIENT)
Dept: INTERNAL MEDICINE CLINIC | Facility: CLINIC | Age: 86
End: 2021-03-15

## 2021-03-15 NOTE — TELEPHONE ENCOUNTER
Pt is having diarrhea on the keflex for her foot  Is there any methods to help this,  Or stop med she asks, or continue med?      Her foot is doing ok so far

## 2021-03-26 DIAGNOSIS — Z79.4 TYPE 2 DIABETES MELLITUS WITH HYPERGLYCEMIA, WITH LONG-TERM CURRENT USE OF INSULIN (HCC): Chronic | ICD-10-CM

## 2021-03-26 DIAGNOSIS — E11.65 TYPE 2 DIABETES MELLITUS WITH HYPERGLYCEMIA, WITH LONG-TERM CURRENT USE OF INSULIN (HCC): Chronic | ICD-10-CM

## 2021-03-26 RX ORDER — ASPIRIN 81 MG/1
81 TABLET, CHEWABLE ORAL EVERY MORNING
Qty: 28 TABLET | Refills: 4 | Status: SHIPPED | OUTPATIENT
Start: 2021-03-26 | End: 2021-09-16 | Stop reason: ALTCHOICE

## 2021-03-29 DIAGNOSIS — I10 ESSENTIAL HYPERTENSION: ICD-10-CM

## 2021-03-30 RX ORDER — LOSARTAN POTASSIUM 100 MG/1
100 TABLET ORAL EVERY MORNING
Qty: 28 TABLET | Refills: 4 | Status: SHIPPED | OUTPATIENT
Start: 2021-03-30 | End: 2021-08-25

## 2021-04-06 ENCOUNTER — OFFICE VISIT (OUTPATIENT)
Dept: DERMATOLOGY | Facility: CLINIC | Age: 86
End: 2021-04-06
Payer: MEDICARE

## 2021-04-06 VITALS — TEMPERATURE: 96.1 F

## 2021-04-06 DIAGNOSIS — D04.72 SQUAMOUS CELL CARCINOMA IN SITU (SCCIS) OF SKIN OF LEFT LOWER LEG: Primary | ICD-10-CM

## 2021-04-06 DIAGNOSIS — D04.62 SQUAMOUS CELL CARCINOMA IN SITU (SCCIS) OF SKIN OF LEFT FOREARM: ICD-10-CM

## 2021-04-06 PROCEDURE — 17261 DSTRJ MAL LES T/A/L .6-1.0CM: CPT | Performed by: DERMATOLOGY

## 2021-04-06 PROCEDURE — 17260 DSTRJ MAL LES T/A/L 0.5 CM/<: CPT | Performed by: DERMATOLOGY

## 2021-04-06 NOTE — PATIENT INSTRUCTIONS
Wound care instructions given to mary garcia keep an eye on lesion under her left breast if this does not resolved body her next visit we will plan on biopsy

## 2021-04-06 NOTE — PROGRESS NOTES
[facility]  [address] [place] [id number]  [phone]  [phone]     MRN: [id number] : 3/24/1929  Encounter: [id number]  Patient Information: Nikhil Monae    Subjective:     72-year-old female presents for planned removal of previously biopsied squamous cell carcinoma situ left forearm and left lower leg  Patient also concerned regarding area that is been bleeding under left breast was irritated by her bra     Objective:   Temp (!) 96 1 °F (35 6 °C) (Temporal)     Physical Exam:    General Appearance:    Alert, cooperative, no distress   Skin:   Previous sites of biopsies noted in left inframammary area shows a slightly eroded area which is bleeding  Procedure: Curettage & Electrodessication squamous cell carcinoma situ  The reasons for the procedure were explained to the patient  The benefits and risks of the procedure were explained to the patient, including bleeding, infection, incomplete removal, prolonged anesthesia (weeks to months) and rarely nerve damage  The patient is aware that a scar will result from the procedure  The consent for the procedure was obtained verbally and in writing  Lesion Site:  Left forearm Curetted Area Size (mm):4                       Left lower leg                                         9    After alcohol prep 1% lidocaine with epinephrine anesthesia  Using a sterile curette, the appropriate area was curetted  The area was curetted and electrodesiccated x3  Final defect size: 5mm/10mm respectively    The wound was left to heal by secondary intention  The wound was cleansed then covered with a dressing  Wound care instructions were verbally given and in writing  I performed the entire procedure  Patient tolerated procedure well  Assessment:     1  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  Authorizing Provider   amLODIPine (NORVASC) 10 mg tablet Take 1 tablet (10 mg total) by mouth daily 11/6/20   Jerlene Bernheim, MD   aspirin (Aspirin Low Dose) 81 mg chewable tablet Chew 1 tablet (81 mg total) every morning Dx: Stroke Prevention 3/26/21   Jerlene Bernheim, MD   atorvastatin (LIPITOR) 40 mg tablet Take 1 tablet (40 mg total) by mouth daily at bedtime Hyperlipidemia 12/14/20   Jerlene Bernheim, MD   Cyanocobalamin (B-12) 1000 MCG TABS Take 1,000 mcg by mouth daily 11/11/20   Jerlene Bernheim, MD   glucose blood (Glucocard Vital Test) test strip Check sugar up to 4 times a day 11/17/20   Jerlene Bernheim, MD   HumaLOG KwikPen 100 units/mL injection pen  8/21/20   Historical Provider, MD   insulin glargine (LANTUS SOLOSTAR) injection pen 100 units/mL Inject 20 Units under the skin daily at bedtime  2/11/14   Historical Provider, MD   Insulin Lispro (HUMALOG KWIKPEN SC) Inject 8 Units under the skin 3 (three) times a day before meals     Historical Provider, MD   Insulin Pen Needle (NovoFine Autocover) 30G X 8 MM MISC Inject under the skin 4 (four) times a day 6/22/20   Jerlene Bernheim, MD   latanoprost (XALATAN) 0 005 % ophthalmic solution INSTILL 1 DROP INTO RIGHT EYE AT BEDTIME DX: GLAUCOMA **DISCARD 6 WK AFTER OPENED** 5/28/20   Jerlene Bernheim, MD   levothyroxine 50 mcg tablet Take 50 mcg by mouth daily 2/4/20 2/3/21  Historical Provider, MD   losartan (COZAAR) 100 MG tablet Take 1 tablet (100 mg total) by mouth every morning Hypertension 3/30/21   Jerlene Bernheim, MD   methylcellulose (Citrucel) 500 mg tablet Take 1 tablet (500 mg total) by mouth daily 12/10/20   Wale Benitez PA-C   metoprolol succinate (TOPROL-XL) 50 mg 24 hr tablet Take 50 mg by mouth daily   7/30/14   Historical Provider, MD   Multiple Vitamins-Calcium (ONE-A-DAY WOMENS FORMULA) TABS 1 TAB ORALLY EVERY MORNING DX: SUPPLEMENT 7/22/20   Nathanael Mendez PA-C   ReadyLance Safety Lancets MISC AS DIRECTED FOR BLOOD GLUCOSE TESTING 4 TIMES DAILY AND AS NEEDED FORSIGNS/SYMPTOMS OF HI/LOW BS 5/28/20   Jakub Hays MD     Allergies   Allergen Reactions    Hydrocodone-Acetaminophen Nausea Only       Sergei Dutton MD  4/6/2021,12:00 PM    Portions of the record may have been created with voice recognition software   Occasional wrong word or "sound a like" substitutions may have occurred due to the inherent limitations of voice recognition software   Read the chart carefully and recognize, using context, where substitutions have occurred

## 2021-04-08 ENCOUNTER — TELEPHONE (OUTPATIENT)
Dept: GASTROENTEROLOGY | Facility: CLINIC | Age: 86
End: 2021-04-08

## 2021-04-09 DIAGNOSIS — K59.00 CONSTIPATION, UNSPECIFIED CONSTIPATION TYPE: ICD-10-CM

## 2021-04-09 RX ORDER — METHYLCELLULOSE 500 MG/1
500 TABLET ORAL DAILY
Qty: 90 EACH | Refills: 3 | Status: SHIPPED | OUTPATIENT
Start: 2021-04-09

## 2021-04-21 DIAGNOSIS — I10 ESSENTIAL HYPERTENSION: ICD-10-CM

## 2021-04-21 RX ORDER — AMLODIPINE BESYLATE 10 MG/1
10 TABLET ORAL DAILY
Qty: 30 TABLET | Refills: 5 | Status: SHIPPED | OUTPATIENT
Start: 2021-04-21 | End: 2021-09-21 | Stop reason: SDUPTHER

## 2021-05-13 ENCOUNTER — OFFICE VISIT (OUTPATIENT)
Dept: DERMATOLOGY | Facility: CLINIC | Age: 86
End: 2021-05-13
Payer: MEDICARE

## 2021-05-13 DIAGNOSIS — D04.62 SQUAMOUS CELL CARCINOMA IN SITU (SCCIS) OF SKIN OF LEFT FOREARM: ICD-10-CM

## 2021-05-13 DIAGNOSIS — L98.9 UNKNOWN SKIN LESION: Primary | ICD-10-CM

## 2021-05-13 DIAGNOSIS — D04.72 SQUAMOUS CELL CARCINOMA IN SITU (SCCIS) OF SKIN OF LEFT LOWER LEG: ICD-10-CM

## 2021-05-13 DIAGNOSIS — D48.9 NEOPLASM OF UNCERTAIN BEHAVIOR: ICD-10-CM

## 2021-05-13 PROCEDURE — 88305 TISSUE EXAM BY PATHOLOGIST: CPT | Performed by: STUDENT IN AN ORGANIZED HEALTH CARE EDUCATION/TRAINING PROGRAM

## 2021-05-13 PROCEDURE — 99213 OFFICE O/P EST LOW 20 MIN: CPT | Performed by: DERMATOLOGY

## 2021-05-13 PROCEDURE — 11102 TANGNTL BX SKIN SINGLE LES: CPT | Performed by: DERMATOLOGY

## 2021-05-13 NOTE — PATIENT INSTRUCTIONS
Skin lesion possible skin cancer if positive may require complete excision  Squamous cell carcinoma in situ left forearm no further treatment needed left lower leg continue same therapy and we will recheck in 1 month  Wound care instructions given to patient

## 2021-05-13 NOTE — PROGRESS NOTES
[facility]  [address] [place] [id number]  [phone]  [phone]     MRN: [id number] : 3/24/1929  Encounter: [id number]  Patient Information: Farzana Montgomery    Subjective:     27-year-old female presents for follow-up for previously curetted squamous cell carcinoma situ left lower leg and left forearm patient notes the area on the arm has healed  We also wanted to recheck the lesion we noted previously on the left inframammary area with that was bleeding     Objective: There were no vitals taken for this visit  Physical Exam:    General Appearance:    Alert, cooperative, no distress   Skin:   Previous sites skin cancer on the left forearm well-healed without evidence of disease the 1 on the left lower leg still present with a sizable defect  15 mm pearly pigmented lesion noted on the left inframammary area      Shave Biopsy Procedure Note    Pre-operative Diagnosis:  Rule out pigmented basal cell carcinoma    Plan:  1  Instructed to keep the wound dry and covered for 24 and clean thereafter  2  Warning signs of infection were reviewed  3  Recommended that the patient use OTC acetaminophen as needed for pain  4  Return  Pending results of biopsy(ies)    Locations:  Left inframammary area    Indications:  Suspicious lesion    Anesthesia: Lidocaine 1% with epinephrine without added sodium bicarbonate    Procedure Details     Patient informed of the risks (including bleeding and infection) and benefits of the   procedure and Verbal informed consent obtained  The lesion and surrounding area were given a sterile prep using alcohol and draped in the usual sterile fashion  A Blue blade razor was used to obtain a specimen  Hemostasis achieved with aluminum chloride  Petrolatum and a sterile dressing applied  The specimen was sent for pathologic examination  The patient tolerated the procedure(s) well  Complications:  none  Assessment:     1  Unknown skin lesion     2  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  Authorizing Provider   amLODIPine (NORVASC) 10 mg tablet Take 1 tablet (10 mg total) by mouth daily 4/21/21  Yes Adolfo London MD   aspirin (Aspirin Low Dose) 81 mg chewable tablet Chew 1 tablet (81 mg total) every morning Dx: Stroke Prevention 3/26/21  Yes Adolfo London MD   atorvastatin (LIPITOR) 40 mg tablet Take 1 tablet (40 mg total) by mouth daily at bedtime Hyperlipidemia 12/14/20  Yes Adolfo London MD   Cyanocobalamin (B-12) 1000 MCG TABS Take 1,000 mcg by mouth daily 11/11/20  Yes Adolfo London MD   glucose blood (Glucocard Vital Test) test strip Check sugar up to 4 times a day 11/17/20  Yes Adolfo London MD   HumaLOG KwikPen 100 units/mL injection pen  8/21/20  Yes Historical Provider, MD   insulin glargine (LANTUS SOLOSTAR) injection pen 100 units/mL Inject 20 Units under the skin daily at bedtime  2/11/14  Yes Historical Provider, MD   Insulin Lispro (HUMALOG KWIKPEN SC) Inject 8 Units under the skin 3 (three) times a day before meals    Yes Historical Provider, MD   Insulin Pen Needle (NovoFine Autocover) 30G X 8 MM MISC Inject under the skin 4 (four) times a day 6/22/20  Yes Adolfo London MD   latanoprost (XALATAN) 0 005 % ophthalmic solution INSTILL 1 DROP INTO RIGHT EYE AT BEDTIME DX: GLAUCOMA **DISCARD 6 WK AFTER OPENED** 5/28/20  Yes Adolfo London MD   losartan (COZAAR) 100 MG tablet Take 1 tablet (100 mg total) by mouth every morning Hypertension 3/30/21  Yes Adolfo London MD   methylcellulose (Citrucel) 500 mg tablet Take 1 tablet (500 mg total) by mouth daily 4/9/21  Yes Olman Murray PA-C   metoprolol succinate (TOPROL-XL) 50 mg 24 hr tablet Take 50 mg by mouth daily   7/30/14  Yes Historical Provider, MD   Multiple Vitamins-Calcium (ONE-A-DAY WOMENS FORMULA) TABS 1 TAB ORALLY EVERY MORNING DX: SUPPLEMENT 7/22/20  Yes Alex Sam PA-C   ReadyLance Safety Lancets MISC AS DIRECTED FOR BLOOD GLUCOSE TESTING 4 TIMES DAILY AND AS NEEDED FORSIGNS/SYMPTOMS OF HI/LOW BS 5/28/20  Yes Valeria Gutiérrez MD   levothyroxine 50 mcg tablet Take 50 mcg by mouth daily 2/4/20 2/3/21  Historical Provider, MD     Allergies   Allergen Reactions    Hydrocodone-Acetaminophen Nausea Only       Stephanie Hawley MD  5/13/2021,11:28 AM    Portions of the record may have been created with voice recognition software   Occasional wrong word or "sound a like" substitutions may have occurred due to the inherent limitations of voice recognition software   Read the chart carefully and recognize, using context, where substitutions have occurred

## 2021-05-17 ENCOUNTER — TELEPHONE (OUTPATIENT)
Dept: DERMATOLOGY | Facility: CLINIC | Age: 86
End: 2021-05-17

## 2021-05-17 NOTE — TELEPHONE ENCOUNTER
Patient made aware of normal pathology results  Patient called back requesting lab results   Left message for patient to call back to advise of normal pathology results    ----- Message from Jeovanny Lawrence MD sent at 5/17/2021  1:58 PM EDT -----  Please call her of normal pathology

## 2021-06-18 ENCOUNTER — TELEPHONE (OUTPATIENT)
Dept: INTERNAL MEDICINE CLINIC | Facility: CLINIC | Age: 86
End: 2021-06-18

## 2021-06-18 NOTE — TELEPHONE ENCOUNTER
Patient said that her right ear has been clogged for 4 days and wanted to know what she can use for this? When calling patient back if she is not able to answer the phone a message can be left  Please advise    Олег Gonzalez

## 2021-06-21 ENCOUNTER — TELEPHONE (OUTPATIENT)
Dept: INTERNAL MEDICINE CLINIC | Facility: CLINIC | Age: 86
End: 2021-06-21

## 2021-06-21 NOTE — TELEPHONE ENCOUNTER
Please see if we can get patient an appt with Dr Bette Chavez   She is scheduled with Dr Thompson Heart, but see's Dr Amos Go

## 2021-06-22 ENCOUNTER — OFFICE VISIT (OUTPATIENT)
Dept: INTERNAL MEDICINE CLINIC | Facility: CLINIC | Age: 86
End: 2021-06-22
Payer: MEDICARE

## 2021-06-22 VITALS
BODY MASS INDEX: 30.58 KG/M2 | OXYGEN SATURATION: 97 % | TEMPERATURE: 98 F | HEIGHT: 61 IN | DIASTOLIC BLOOD PRESSURE: 60 MMHG | RESPIRATION RATE: 16 BRPM | HEART RATE: 98 BPM | SYSTOLIC BLOOD PRESSURE: 126 MMHG | WEIGHT: 162 LBS

## 2021-06-22 DIAGNOSIS — H61.23 BILATERAL IMPACTED CERUMEN: Primary | ICD-10-CM

## 2021-06-22 PROCEDURE — 69210 REMOVE IMPACTED EAR WAX UNI: CPT | Performed by: INTERNAL MEDICINE

## 2021-06-22 NOTE — PROGRESS NOTES
Ear cerumen removal    Date/Time: 6/22/2021 1:28 PM  Performed by: Valeria Gutiérrez MD  Authorized by: Valeria Gutiérrez MD   Universal Protocol:  Consent: Verbal consent obtained  Consent given by: patient  Timeout called at: 6/22/2021 1:00 PM   Patient understanding: patient states understanding of the procedure being performed  Patient identity confirmed: verbally with patient      Patient location:  Clinic  Indications / Diagnosis:  Impacted cerumen  Procedure details:     Location:  L ear and R ear    Procedure type: irrigation with instrumentation      Instrumentation: curette      Approach:  External    Visualization (free text): Impacted cerumen bilateral, right greater than left  Post-procedure details:     Complication:  None    Hearing quality:  Improved    Patient tolerance of procedure: Tolerated well, no immediate complications  Comments: Instructed to continue using Debrox but may need second procedure, if symptoms persist   Told to call back if still having problems  HPI:  Patient came in today because the Debrox she was using was not working  She states her ears pop every morning but they are popping less and less now  She notes less hearing in the right ear  No discharge  No fevers  No sinus complaints  On physical exam she has impacted cerumen bilaterally right worse than left

## 2021-09-16 ENCOUNTER — OFFICE VISIT (OUTPATIENT)
Dept: INTERNAL MEDICINE CLINIC | Facility: CLINIC | Age: 86
End: 2021-09-16
Payer: MEDICARE

## 2021-09-16 VITALS
BODY MASS INDEX: 31.04 KG/M2 | DIASTOLIC BLOOD PRESSURE: 62 MMHG | OXYGEN SATURATION: 97 % | WEIGHT: 164.4 LBS | HEART RATE: 72 BPM | HEIGHT: 61 IN | SYSTOLIC BLOOD PRESSURE: 126 MMHG | RESPIRATION RATE: 18 BRPM | TEMPERATURE: 98.1 F

## 2021-09-16 DIAGNOSIS — M54.32 SCIATICA OF LEFT SIDE: ICD-10-CM

## 2021-09-16 DIAGNOSIS — I10 ESSENTIAL HYPERTENSION: ICD-10-CM

## 2021-09-16 DIAGNOSIS — E11.65 TYPE 2 DIABETES MELLITUS WITH HYPERGLYCEMIA, WITH LONG-TERM CURRENT USE OF INSULIN (HCC): Primary | Chronic | ICD-10-CM

## 2021-09-16 DIAGNOSIS — E03.9 HYPOTHYROIDISM, UNSPECIFIED TYPE: ICD-10-CM

## 2021-09-16 DIAGNOSIS — Z79.4 TYPE 2 DIABETES MELLITUS WITH HYPERGLYCEMIA, WITH LONG-TERM CURRENT USE OF INSULIN (HCC): Primary | Chronic | ICD-10-CM

## 2021-09-16 DIAGNOSIS — I48.0 PAROXYSMAL ATRIAL FIBRILLATION (HCC): ICD-10-CM

## 2021-09-16 PROCEDURE — 99214 OFFICE O/P EST MOD 30 MIN: CPT | Performed by: INTERNAL MEDICINE

## 2021-09-16 RX ORDER — HYDRALAZINE HYDROCHLORIDE 25 MG/1
25 TABLET, FILM COATED ORAL DAILY
COMMUNITY
End: 2021-10-25 | Stop reason: HOSPADM

## 2021-09-16 RX ORDER — DORZOLAMIDE HCL 20 MG/ML
SOLUTION/ DROPS OPHTHALMIC
COMMUNITY
Start: 2021-08-10

## 2021-09-16 RX ORDER — OMEPRAZOLE 20 MG/1
20 CAPSULE, DELAYED RELEASE ORAL DAILY
COMMUNITY
Start: 2021-09-13 | End: 2022-01-03

## 2021-09-16 RX ORDER — APIXABAN 5 MG/1
5 TABLET, FILM COATED ORAL 2 TIMES DAILY
COMMUNITY
Start: 2021-09-14

## 2021-09-16 RX ORDER — ACETAMINOPHEN 325 MG/1
325 TABLET ORAL EVERY 6 HOURS PRN
COMMUNITY
End: 2022-01-19 | Stop reason: ALTCHOICE

## 2021-09-16 RX ORDER — LEVOTHYROXINE SODIUM 0.05 MG/1
TABLET ORAL
COMMUNITY
Start: 2021-09-13

## 2021-09-16 RX ORDER — INSULIN LISPRO 100 [IU]/ML
INJECTION, SOLUTION INTRAVENOUS; SUBCUTANEOUS
COMMUNITY
Start: 2021-04-23

## 2021-09-16 NOTE — PROGRESS NOTES
Patient's shoes and socks removed  Right Foot/Ankle   Right Foot Inspection  Skin Exam: skin normal, skin intact and dry skin no warmth, no callus, no erythema, no maceration, no abnormal color, no pre-ulcer, no ulcer and no callus                          Toe Exam: ROM and strength within normal limits  Sensory       Monofilament testing: intact  Vascular  Capillary refills: < 3 seconds  The right DP pulse is 2+  Right Toe  - Comprehensive Exam  Ecchymosis: none  Swelling: none         Left Foot/Ankle  Left Foot Inspection  Skin Exam: skin normal, skin intact and dry skinno warmth, no erythema, no maceration, normal color, no pre-ulcer, no ulcer and no callus                         Toe Exam: ROM and strength within normal limits                   Sensory       Monofilament: intact  Vascular  Capillary refills: < 3 seconds  The left DP pulse is 2+     Left Toe  - Comprehensive Exam  Ecchymosis: none  Swelling: none       Assign Risk Category:  ; No loss of protective sensation;        Risk: 0

## 2021-09-16 NOTE — PROGRESS NOTES
Assessment/Plan:       Chronic problems of care stable  Continue follow-up with Cardiology for the Afib  Her left leg is probably sciatica  She can get physical therapy at her facility so I ordered that  Continue follow-up with endocrinology  They order her blood work  Quality Measures:       Return in about 6 months (around 3/16/2022) for Recheck  No problem-specific Assessment & Plan notes found for this encounter  Diagnoses and all orders for this visit:    Type 2 diabetes mellitus with hyperglycemia, with long-term current use of insulin ([facility])    Hypothyroidism, unspecified type    Essential hypertension    Paroxysmal atrial fibrillation ([address] )    Sciatica of left side  -     Ambulatory referral to Physical Therapy; Future    Other orders  -     acetaminophen (TYLENOL) 325 mg tablet; Take 325 mg by mouth every 6 (six) hours as needed  -     Eliquis 5 MG; Take 5 mg by mouth 2 (two) times a day   -     dorzolamide (TRUSOPT) 2 % ophthalmic solution  -     omeprazole (PriLOSEC) 20 mg delayed release capsule; Take 20 mg by mouth daily   -     Discontinue: cyanocobalamin (VITAMIN B-12) 100 MCG tablet; Take 100 mcg by mouth daily (Patient not taking: Reported on 9/16/2021)  -     insulin lispro (HumaLOG KwikPen) 100 units/mL injection pen; INJECT 8 UNITS SUB-Q 3 TIMES DAILY BEFORE MEALS DX: DIABETES (IDDM) *DISCARD OPENED PEN UNIT 28-DAYS AFTER FIRST USE* SSI; 151-200=2U,201-250=4U,251 -300=6U,301-350=8U,351-40 0=10U,>401=12U **DISCARD  -     levothyroxine 50 mcg tablet; 1 TAB ORALLY EVERY MORNING DX: HYPOTHYROIDISM  -     hydrALAZINE (APRESOLINE) 25 mg tablet; Take 25 mg by mouth daily Pt states 40MG QD          Subjective:      Patient ID: Rima Nieves is a 80 y o  female  Patient comes in today for routine follow-up  Since her last visit she was diagnosed with atrial fibrillation with Cardiology and is now on Eliquis  Her rate has been controlled  Her blood pressure is controlled    Sugars are doing okay  Thyroid is doing okay  She is current with endocrinology  She lives in a personal care facility  They have been keeping them under lock and key because of COVID  Although she did have COVID last year  Today, she is complaining of left leg pain on the side of her thigh  She has known back problems but does not usually have thigh pain        ALLERGIES:  Allergies   Allergen Reactions    Hydrocodone-Acetaminophen Nausea Only       CURRENT MEDICATIONS:    Current Outpatient Medications:     acetaminophen (TYLENOL) 325 mg tablet, Take 325 mg by mouth every 6 (six) hours as needed, Disp: , Rfl:     amLODIPine (NORVASC) 10 mg tablet, Take 1 tablet (10 mg total) by mouth daily, Disp: 30 tablet, Rfl: 5    atorvastatin (LIPITOR) 40 mg tablet, Take 1 tablet (40 mg total) by mouth daily at bedtime Hyperlipidemia, Disp: 28 tablet, Rfl: 11    Cyanocobalamin (B-12) 1000 MCG TABS, Take 1,000 mcg by mouth daily, Disp: 30 tablet, Rfl: 11    dorzolamide (TRUSOPT) 2 % ophthalmic solution, , Disp: , Rfl:     Eliquis 5 MG, Take 5 mg by mouth 2 (two) times a day , Disp: , Rfl:     glucose blood (Glucocard Vital Test) test strip, Check sugar up to 4 times a day, Disp: 100 each, Rfl: 4    hydrALAZINE (APRESOLINE) 25 mg tablet, Take 25 mg by mouth daily Pt states 40MG QD, Disp: , Rfl:     insulin glargine (LANTUS SOLOSTAR) injection pen 100 units/mL, Inject 20 Units under the skin daily at bedtime , Disp: , Rfl:     insulin lispro (HumaLOG KwikPen) 100 units/mL injection pen, INJECT 8 UNITS SUB-Q 3 TIMES DAILY BEFORE MEALS DX: DIABETES (IDDM) *DISCARD OPENED PEN UNIT 28-DAYS AFTER FIRST USE* SSI; 151-200=2U,201-250=4U,251 -300=6U,301-350=8U,351-40 0=10U,>401=12U **DISCARD, Disp: , Rfl:     Insulin Pen Needle (NovoFine Autocover) 30G X 8 MM MISC, Inject under the skin 4 (four) times a day, Disp: 100 each, Rfl: 4    latanoprost (XALATAN) 0 005 % ophthalmic solution, INSTILL 1 DROP INTO RIGHT EYE AT BEDTIME DX:  GLAUCOMA **DISCARD 6 WK AFTER OPENED**, Disp: 2 5 mL, Rfl: 4    levothyroxine 50 mcg tablet, 1 TAB ORALLY EVERY MORNING DX: HYPOTHYROIDISM, Disp: , Rfl:     losartan (COZAAR) 100 MG tablet, 1 TAB ORALLY EVERY MORNING DX: HYPERTENSION, Disp: 28 tablet, Rfl: 4    methylcellulose (Citrucel) 500 mg tablet, Take 1 tablet (500 mg total) by mouth daily, Disp: 90 each, Rfl: 3    metoprolol succinate (TOPROL-XL) 50 mg 24 hr tablet, Take 50 mg by mouth daily  , Disp: , Rfl:     Multiple Vitamins-Calcium (ONE-A-DAY WOMENS FORMULA) TABS, 1 TAB ORALLY EVERY MORNING DX: SUPPLEMENT, Disp: 28 tablet, Rfl: 4    omeprazole (PriLOSEC) 20 mg delayed release capsule, Take 20 mg by mouth daily , Disp: , Rfl:     ReadyLance Safety Lancets MISC, AS DIRECTED FOR BLOOD GLUCOSE TESTING 4 TIMES DAILY AND AS NEEDED FORSIGNS/SYMPTOMS OF HI/LOW BS, Disp: 100 each, Rfl: 4    ACTIVE PROBLEM LIST:  Patient Active Problem List   Diagnosis    Essential hypertension    Type 2 diabetes mellitus with hyperglycemia, with long-term current use of insulin (HCC)    Leukocytosis    Slow transit constipation    Elevated troponin    Actinic keratosis    Seborrheic keratosis    Screening for skin condition    History of skin cancer    Hypothyroidism    History of heart block    Bilateral carpal tunnel syndrome    Vitamin D deficiency    Pacemaker    GERD without esophagitis    Paroxysmal atrial fibrillation (HCC)       PAST MEDICAL HISTORY:  Past Medical History:   Diagnosis Date    Atrial fibrillation (HCC)     Chronic pain     Diabetes mellitus ([facility] [address] )     Hyperlipidemia     Hypertension     Pacemaker     Pneumonia due to COVID-19 virus 04/14/2020    Squamous cell skin cancer     Type 2 diabetes mellitus with hyperglycemia, with long-term current use of insulin ([email or web address] ) 12/28/2017       PAST SURGICAL HISTORY:  Past Surgical History:   Procedure Laterality Date    BACK SURGERY      CARDIAC PACEMAKER PLACEMENT      COLONOSCOPY FAMILY HISTORY:  Family History   Problem Relation Age of Onset    Colon cancer Mother     Heart attack Father        SOCIAL HISTORY:  Social History     Socioeconomic History    Marital status:      Spouse name: Not on file    Number of children: Not on file    Years of education: Not on file    Highest education level: Not on file   Occupational History    Not on file   Tobacco Use    Smoking status: Never Smoker    Smokeless tobacco: Never Used   Vaping Use    Vaping Use: Never used   Substance and Sexual Activity    Alcohol use: Not Currently    Drug use: No    Sexual activity: Not on file   Other Topics Concern    Not on file   Social History Narrative    Not on file     Social Determinants of Health     Financial Resource Strain:     Difficulty of Paying Living Expenses:    Food Insecurity:     Worried About Running Out of Food in the Last Year:     [address] in the Last Year:    Transportation Needs:     Lack of Transportation (Medical):  Lack of Transportation (Non-Medical):    Physical Activity:     Days of Exercise per Week:     Minutes of Exercise per Session:    Stress:     Feeling of Stress :    Social Connections:     Frequency of Communication with Friends and Family:     Frequency of Social Gatherings with Friends and Family:     Attends Confucianist Services:     Active Member of Clubs or Organizations:     Attends Club or Organization Meetings:     Marital Status:    Intimate Partner Violence:     Fear of Current or Ex-Partner:     Emotionally Abused:     Physically Abused:     Sexually Abused:        Review of Systems   Respiratory: Negative for shortness of breath  Cardiovascular: Negative for chest pain  Gastrointestinal: Negative for abdominal pain           Objective:  Vitals:    09/16/21 1024   BP: 126/62   BP Location: Left arm   Patient Position: Sitting   Cuff Size: Adult   Pulse: 72   Resp: 18   Temp: 98 1 °F (36 7 °C)   TempSrc: Tympanic   SpO2: 97%   Weight: 74 6 kg (164 lb 6 4 oz)   Height: 5' 1" (1 549 m)     Body mass index is 31 06 kg/m²  Physical Exam  Vitals and nursing note reviewed  Constitutional:       Appearance: She is well-developed  Cardiovascular:      Rate and Rhythm: Normal rate and regular rhythm  Heart sounds: Normal heart sounds  Pulmonary:      Effort: Pulmonary effort is normal       Breath sounds: Normal breath sounds  Abdominal:      Palpations: Abdomen is soft  Tenderness: There is no abdominal tenderness  Neurological:      Mental Status: She is alert and oriented to person, place, and time  RESULTS:    Recent Results (from the past 1008 hour(s))   HEMOGLOBIN A1C    Collection Time: 08/31/21  6:31 AM   Result Value Ref Range    Hemoglobin A1C 7 1 (H) <5 7 %    eAG, EST AVG Glucose 157 (H) <154 mg/dL       This note was created with voice recognition software  Phonic, grammatical and spelling errors may be present within the note as a result

## 2021-09-21 DIAGNOSIS — I10 ESSENTIAL HYPERTENSION: ICD-10-CM

## 2021-09-21 DIAGNOSIS — E53.8 B12 DEFICIENCY: ICD-10-CM

## 2021-09-21 RX ORDER — AMLODIPINE BESYLATE 10 MG/1
10 TABLET ORAL DAILY
Qty: 30 TABLET | Refills: 5 | Status: SHIPPED | OUTPATIENT
Start: 2021-09-21 | End: 2021-10-25 | Stop reason: HOSPADM

## 2021-09-21 RX ORDER — CYANOCOBALAMIN (VITAMIN B-12) 1000 MCG
1000 TABLET ORAL DAILY
Qty: 30 TABLET | Refills: 11 | Status: SHIPPED | OUTPATIENT
Start: 2021-09-21

## 2021-10-22 ENCOUNTER — APPOINTMENT (EMERGENCY)
Dept: RADIOLOGY | Facility: HOSPITAL | Age: 86
DRG: 291 | End: 2021-10-22
Payer: MEDICARE

## 2021-10-22 ENCOUNTER — HOSPITAL ENCOUNTER (INPATIENT)
Facility: HOSPITAL | Age: 86
LOS: 3 days | Discharge: HOME HEALTH | DRG: 291 | End: 2021-10-25
Attending: EMERGENCY MEDICINE | Admitting: STUDENT IN AN ORGANIZED HEALTH CARE EDUCATION/TRAINING PROGRAM
Payer: MEDICARE

## 2021-10-22 DIAGNOSIS — I50.9 CHF (CONGESTIVE HEART FAILURE) (HCC): Primary | ICD-10-CM

## 2021-10-22 PROBLEM — N18.30 CKD (CHRONIC KIDNEY DISEASE) STAGE 3, GFR 30-59 ML/MIN (HCC): Status: ACTIVE | Noted: 2021-10-22

## 2021-10-22 LAB
ALBUMIN SERPL BCP-MCNC: 3.1 G/DL (ref 3.5–5)
ALP SERPL-CCNC: 115 U/L (ref 46–116)
ALT SERPL W P-5'-P-CCNC: 83 U/L (ref 12–78)
ANION GAP SERPL CALCULATED.3IONS-SCNC: 12 MMOL/L (ref 4–13)
AST SERPL W P-5'-P-CCNC: 30 U/L (ref 5–45)
ATRIAL RATE: 68 BPM
BASOPHILS # BLD AUTO: 0.06 THOUSANDS/ΜL (ref 0–0.1)
BASOPHILS NFR BLD AUTO: 1 % (ref 0–1)
BILIRUB SERPL-MCNC: 0.49 MG/DL (ref 0.2–1)
BUN SERPL-MCNC: 23 MG/DL (ref 5–25)
CALCIUM ALBUM COR SERPL-MCNC: 9.5 MG/DL (ref 8.3–10.1)
CALCIUM SERPL-MCNC: 8.8 MG/DL (ref 8.3–10.1)
CHLORIDE SERPL-SCNC: 110 MMOL/L (ref 100–108)
CO2 SERPL-SCNC: 23 MMOL/L (ref 21–32)
CREAT SERPL-MCNC: 1.37 MG/DL (ref 0.6–1.3)
EOSINOPHIL # BLD AUTO: 0.25 THOUSAND/ΜL (ref 0–0.61)
EOSINOPHIL NFR BLD AUTO: 3 % (ref 0–6)
ERYTHROCYTE [DISTWIDTH] IN BLOOD BY AUTOMATED COUNT: 14.6 % (ref 11.6–15.1)
GFR SERPL CREATININE-BSD FRML MDRD: 34 ML/MIN/1.73SQ M
GLUCOSE SERPL-MCNC: 216 MG/DL (ref 65–140)
GLUCOSE SERPL-MCNC: 234 MG/DL (ref 65–140)
GLUCOSE SERPL-MCNC: 99 MG/DL (ref 65–140)
HCT VFR BLD AUTO: 38.9 % (ref 34.8–46.1)
HGB BLD-MCNC: 12.4 G/DL (ref 11.5–15.4)
IMM GRANULOCYTES # BLD AUTO: 0.04 THOUSAND/UL (ref 0–0.2)
IMM GRANULOCYTES NFR BLD AUTO: 1 % (ref 0–2)
LYMPHOCYTES # BLD AUTO: 1.53 THOUSANDS/ΜL (ref 0.6–4.47)
LYMPHOCYTES NFR BLD AUTO: 18 % (ref 14–44)
MCH RBC QN AUTO: 29.7 PG (ref 26.8–34.3)
MCHC RBC AUTO-ENTMCNC: 31.9 G/DL (ref 31.4–37.4)
MCV RBC AUTO: 93 FL (ref 82–98)
MONOCYTES # BLD AUTO: 0.64 THOUSAND/ΜL (ref 0.17–1.22)
MONOCYTES NFR BLD AUTO: 8 % (ref 4–12)
NEUTROPHILS # BLD AUTO: 5.78 THOUSANDS/ΜL (ref 1.85–7.62)
NEUTS SEG NFR BLD AUTO: 69 % (ref 43–75)
NRBC BLD AUTO-RTO: 0 /100 WBCS
NT-PROBNP SERPL-MCNC: 1341 PG/ML
PLATELET # BLD AUTO: 281 THOUSANDS/UL (ref 149–390)
PMV BLD AUTO: 9.6 FL (ref 8.9–12.7)
POTASSIUM SERPL-SCNC: 4.3 MMOL/L (ref 3.5–5.3)
PROT SERPL-MCNC: 6.5 G/DL (ref 6.4–8.2)
QRS AXIS: -80 DEGREES
QRSD INTERVAL: 156 MS
QT INTERVAL: 482 MS
QTC INTERVAL: 520 MS
RBC # BLD AUTO: 4.18 MILLION/UL (ref 3.81–5.12)
SODIUM SERPL-SCNC: 145 MMOL/L (ref 136–145)
T WAVE AXIS: 91 DEGREES
TROPONIN I SERPL-MCNC: <0.02 NG/ML
TROPONIN I SERPL-MCNC: <0.02 NG/ML
VENTRICULAR RATE: 70 BPM
WBC # BLD AUTO: 8.3 THOUSAND/UL (ref 4.31–10.16)

## 2021-10-22 PROCEDURE — 99285 EMERGENCY DEPT VISIT HI MDM: CPT

## 2021-10-22 PROCEDURE — 96374 THER/PROPH/DIAG INJ IV PUSH: CPT

## 2021-10-22 PROCEDURE — 84484 ASSAY OF TROPONIN QUANT: CPT | Performed by: STUDENT IN AN ORGANIZED HEALTH CARE EDUCATION/TRAINING PROGRAM

## 2021-10-22 PROCEDURE — 80053 COMPREHEN METABOLIC PANEL: CPT | Performed by: EMERGENCY MEDICINE

## 2021-10-22 PROCEDURE — 71045 X-RAY EXAM CHEST 1 VIEW: CPT

## 2021-10-22 PROCEDURE — 84484 ASSAY OF TROPONIN QUANT: CPT | Performed by: EMERGENCY MEDICINE

## 2021-10-22 PROCEDURE — 93005 ELECTROCARDIOGRAM TRACING: CPT

## 2021-10-22 PROCEDURE — 82948 REAGENT STRIP/BLOOD GLUCOSE: CPT

## 2021-10-22 PROCEDURE — 99223 1ST HOSP IP/OBS HIGH 75: CPT | Performed by: STUDENT IN AN ORGANIZED HEALTH CARE EDUCATION/TRAINING PROGRAM

## 2021-10-22 PROCEDURE — 36415 COLL VENOUS BLD VENIPUNCTURE: CPT | Performed by: EMERGENCY MEDICINE

## 2021-10-22 PROCEDURE — 85025 COMPLETE CBC W/AUTO DIFF WBC: CPT | Performed by: EMERGENCY MEDICINE

## 2021-10-22 PROCEDURE — 99285 EMERGENCY DEPT VISIT HI MDM: CPT | Performed by: EMERGENCY MEDICINE

## 2021-10-22 PROCEDURE — 93010 ELECTROCARDIOGRAM REPORT: CPT | Performed by: INTERNAL MEDICINE

## 2021-10-22 PROCEDURE — 83880 ASSAY OF NATRIURETIC PEPTIDE: CPT | Performed by: EMERGENCY MEDICINE

## 2021-10-22 RX ORDER — ATORVASTATIN CALCIUM 40 MG/1
40 TABLET, FILM COATED ORAL
Status: DISCONTINUED | OUTPATIENT
Start: 2021-10-22 | End: 2021-10-25 | Stop reason: HOSPADM

## 2021-10-22 RX ORDER — INSULIN GLARGINE 100 [IU]/ML
10 INJECTION, SOLUTION SUBCUTANEOUS
Status: DISCONTINUED | OUTPATIENT
Start: 2021-10-22 | End: 2021-10-25 | Stop reason: HOSPADM

## 2021-10-22 RX ORDER — PANTOPRAZOLE SODIUM 40 MG/1
40 TABLET, DELAYED RELEASE ORAL
Status: DISCONTINUED | OUTPATIENT
Start: 2021-10-23 | End: 2021-10-25 | Stop reason: HOSPADM

## 2021-10-22 RX ORDER — AMLODIPINE BESYLATE 10 MG/1
10 TABLET ORAL DAILY
Status: DISCONTINUED | OUTPATIENT
Start: 2021-10-23 | End: 2021-10-25 | Stop reason: HOSPADM

## 2021-10-22 RX ORDER — METOPROLOL SUCCINATE 50 MG/1
50 TABLET, EXTENDED RELEASE ORAL DAILY
Status: DISCONTINUED | OUTPATIENT
Start: 2021-10-23 | End: 2021-10-25 | Stop reason: HOSPADM

## 2021-10-22 RX ORDER — ACETAMINOPHEN 325 MG/1
650 TABLET ORAL EVERY 8 HOURS PRN
Status: DISCONTINUED | OUTPATIENT
Start: 2021-10-22 | End: 2021-10-25 | Stop reason: HOSPADM

## 2021-10-22 RX ORDER — FUROSEMIDE 10 MG/ML
40 INJECTION INTRAMUSCULAR; INTRAVENOUS DAILY
Status: DISCONTINUED | OUTPATIENT
Start: 2021-10-23 | End: 2021-10-24

## 2021-10-22 RX ORDER — FUROSEMIDE 10 MG/ML
40 INJECTION INTRAMUSCULAR; INTRAVENOUS ONCE
Status: COMPLETED | OUTPATIENT
Start: 2021-10-22 | End: 2021-10-22

## 2021-10-22 RX ORDER — LEVOTHYROXINE SODIUM 0.05 MG/1
50 TABLET ORAL EVERY MORNING
Status: DISCONTINUED | OUTPATIENT
Start: 2021-10-23 | End: 2021-10-25 | Stop reason: HOSPADM

## 2021-10-22 RX ADMIN — INSULIN LISPRO 2 UNITS: 100 INJECTION, SOLUTION INTRAVENOUS; SUBCUTANEOUS at 16:33

## 2021-10-22 RX ADMIN — INSULIN GLARGINE 10 UNITS: 100 INJECTION, SOLUTION SUBCUTANEOUS at 22:45

## 2021-10-22 RX ADMIN — APIXABAN 5 MG: 5 TABLET, FILM COATED ORAL at 18:52

## 2021-10-22 RX ADMIN — FUROSEMIDE 40 MG: 10 INJECTION, SOLUTION INTRAMUSCULAR; INTRAVENOUS at 11:36

## 2021-10-22 RX ADMIN — INSULIN LISPRO 5 UNITS: 100 INJECTION, SOLUTION INTRAVENOUS; SUBCUTANEOUS at 18:50

## 2021-10-22 RX ADMIN — ATORVASTATIN CALCIUM 40 MG: 40 TABLET, FILM COATED ORAL at 22:45

## 2021-10-23 LAB
GLUCOSE SERPL-MCNC: 122 MG/DL (ref 65–140)
GLUCOSE SERPL-MCNC: 170 MG/DL (ref 65–140)
GLUCOSE SERPL-MCNC: 263 MG/DL (ref 65–140)

## 2021-10-23 PROCEDURE — 99232 SBSQ HOSP IP/OBS MODERATE 35: CPT | Performed by: STUDENT IN AN ORGANIZED HEALTH CARE EDUCATION/TRAINING PROGRAM

## 2021-10-23 PROCEDURE — 82948 REAGENT STRIP/BLOOD GLUCOSE: CPT

## 2021-10-23 RX ADMIN — LEVOTHYROXINE SODIUM 50 MCG: 50 TABLET ORAL at 08:30

## 2021-10-23 RX ADMIN — FUROSEMIDE 40 MG: 10 INJECTION, SOLUTION INTRAMUSCULAR; INTRAVENOUS at 08:30

## 2021-10-23 RX ADMIN — AMLODIPINE BESYLATE 10 MG: 10 TABLET ORAL at 08:30

## 2021-10-23 RX ADMIN — PANTOPRAZOLE SODIUM 40 MG: 40 TABLET, DELAYED RELEASE ORAL at 05:40

## 2021-10-23 RX ADMIN — INSULIN LISPRO 5 UNITS: 100 INJECTION, SOLUTION INTRAVENOUS; SUBCUTANEOUS at 12:34

## 2021-10-23 RX ADMIN — APIXABAN 5 MG: 5 TABLET, FILM COATED ORAL at 08:30

## 2021-10-23 RX ADMIN — APIXABAN 5 MG: 5 TABLET, FILM COATED ORAL at 17:29

## 2021-10-23 RX ADMIN — ATORVASTATIN CALCIUM 40 MG: 40 TABLET, FILM COATED ORAL at 22:01

## 2021-10-23 RX ADMIN — INSULIN LISPRO 5 UNITS: 100 INJECTION, SOLUTION INTRAVENOUS; SUBCUTANEOUS at 08:29

## 2021-10-23 RX ADMIN — INSULIN LISPRO 1 UNITS: 100 INJECTION, SOLUTION INTRAVENOUS; SUBCUTANEOUS at 12:30

## 2021-10-23 RX ADMIN — METOPROLOL SUCCINATE 50 MG: 50 TABLET, EXTENDED RELEASE ORAL at 08:30

## 2021-10-23 RX ADMIN — INSULIN LISPRO 1 UNITS: 100 INJECTION, SOLUTION INTRAVENOUS; SUBCUTANEOUS at 08:00

## 2021-10-23 RX ADMIN — INSULIN GLARGINE 10 UNITS: 100 INJECTION, SOLUTION SUBCUTANEOUS at 22:01

## 2021-10-24 ENCOUNTER — APPOINTMENT (INPATIENT)
Dept: NON INVASIVE DIAGNOSTICS | Facility: HOSPITAL | Age: 86
DRG: 291 | End: 2021-10-24
Payer: MEDICARE

## 2021-10-24 LAB
ANION GAP SERPL CALCULATED.3IONS-SCNC: 10 MMOL/L (ref 4–13)
ANION GAP SERPL CALCULATED.3IONS-SCNC: 10 MMOL/L (ref 4–13)
AORTIC ROOT: 3.2 CM
APICAL FOUR CHAMBER EJECTION FRACTION: 55 %
AV LVOT MEAN GRADIENT: 2 MMHG
AV LVOT PEAK GRADIENT: 4 MMHG
AV PEAK GRADIENT: 12 MMHG
BUN SERPL-MCNC: 20 MG/DL (ref 5–25)
BUN SERPL-MCNC: 20 MG/DL (ref 5–25)
CALCIUM SERPL-MCNC: 9.7 MG/DL (ref 8.3–10.1)
CALCIUM SERPL-MCNC: 9.9 MG/DL (ref 8.3–10.1)
CHLORIDE SERPL-SCNC: 107 MMOL/L (ref 100–108)
CHLORIDE SERPL-SCNC: 108 MMOL/L (ref 100–108)
CO2 SERPL-SCNC: 26 MMOL/L (ref 21–32)
CO2 SERPL-SCNC: 27 MMOL/L (ref 21–32)
CREAT SERPL-MCNC: 1.09 MG/DL (ref 0.6–1.3)
CREAT SERPL-MCNC: 1.11 MG/DL (ref 0.6–1.3)
DOP CALC LVOT PEAK VEL VTI: 23.5 CM
DOP CALC LVOT PEAK VEL: 0.97 M/S
DOP CALC MV VTI: 30.23 CM
E WAVE DECELERATION TIME: 264 MS
ERYTHROCYTE [DISTWIDTH] IN BLOOD BY AUTOMATED COUNT: 14 % (ref 11.6–15.1)
FRACTIONAL SHORTENING: 33 % (ref 28–44)
GFR SERPL CREATININE-BSD FRML MDRD: 43 ML/MIN/1.73SQ M
GFR SERPL CREATININE-BSD FRML MDRD: 44 ML/MIN/1.73SQ M
GLUCOSE SERPL-MCNC: 117 MG/DL (ref 65–140)
GLUCOSE SERPL-MCNC: 154 MG/DL (ref 65–140)
GLUCOSE SERPL-MCNC: 158 MG/DL (ref 65–140)
GLUCOSE SERPL-MCNC: 161 MG/DL (ref 65–140)
GLUCOSE SERPL-MCNC: 180 MG/DL (ref 65–140)
GLUCOSE SERPL-MCNC: 255 MG/DL (ref 65–140)
HCT VFR BLD AUTO: 44.6 % (ref 34.8–46.1)
HGB BLD-MCNC: 14.6 G/DL (ref 11.5–15.4)
INTERVENTRICULAR SEPTUM IN DIASTOLE (PARASTERNAL SHORT AXIS VIEW): 1.3 CM
LEFT INTERNAL DIMENSION IN SYSTOLE: 2.4 CM (ref 2.1–4)
LEFT VENTRICULAR INTERNAL DIMENSION IN DIASTOLE: 3.6 CM (ref 4.21–6.27)
LEFT VENTRICULAR POSTERIOR WALL IN END DIASTOLE: 1.2 CM
LEFT VENTRICULAR STROKE VOLUME: 33 ML
LV EF: 57 %
MCH RBC QN AUTO: 29.9 PG (ref 26.8–34.3)
MCHC RBC AUTO-ENTMCNC: 32.7 G/DL (ref 31.4–37.4)
MCV RBC AUTO: 91 FL (ref 82–98)
MV MEAN GRADIENT: 3 MMHG
MV PEAK A VEL: 0.46 M/S
MV PEAK E VEL: 140 CM/S
MV PEAK GRADIENT: 9 MMHG
MV STENOSIS PRESSURE HALF TIME: 0 MS
PA SYSTOLIC PRESSURE: 45 MMHG
PLATELET # BLD AUTO: 310 THOUSANDS/UL (ref 149–390)
PMV BLD AUTO: 9.4 FL (ref 8.9–12.7)
POTASSIUM SERPL-SCNC: 3.9 MMOL/L (ref 3.5–5.3)
POTASSIUM SERPL-SCNC: 3.9 MMOL/L (ref 3.5–5.3)
RBC # BLD AUTO: 4.89 MILLION/UL (ref 3.81–5.12)
RIGHT VENTRICLE ID DIMENSION: 3.4 CM
SL CV LV EF: 60
SL CV PED ECHO LEFT VENTRICLE DIASTOLIC VOLUME (MOD BIPLANE) 2D: 54 ML
SL CV PED ECHO LEFT VENTRICLE SYSTOLIC VOLUME (MOD BIPLANE) 2D: 21 ML
SODIUM SERPL-SCNC: 143 MMOL/L (ref 136–145)
SODIUM SERPL-SCNC: 145 MMOL/L (ref 136–145)
TR PEAK VELOCITY: 3.4 M/S
TRICUSPID VALVE PEAK REGURGITATION VELOCITY: 3.35 M/S
TRICUSPID VALVE S': 1 CM/S
TROPONIN I SERPL-MCNC: <0.02 NG/ML
TSH SERPL DL<=0.05 MIU/L-ACNC: 3.68 UIU/ML (ref 0.36–3.74)
TV PEAK GRADIENT: 45 MMHG
WBC # BLD AUTO: 8.04 THOUSAND/UL (ref 4.31–10.16)
Z-SCORE OF LEFT VENTRICULAR DIMENSION IN END SYSTOLE: -3.56

## 2021-10-24 PROCEDURE — 85027 COMPLETE CBC AUTOMATED: CPT | Performed by: STUDENT IN AN ORGANIZED HEALTH CARE EDUCATION/TRAINING PROGRAM

## 2021-10-24 PROCEDURE — 99232 SBSQ HOSP IP/OBS MODERATE 35: CPT | Performed by: STUDENT IN AN ORGANIZED HEALTH CARE EDUCATION/TRAINING PROGRAM

## 2021-10-24 PROCEDURE — 93306 TTE W/DOPPLER COMPLETE: CPT

## 2021-10-24 PROCEDURE — 80048 BASIC METABOLIC PNL TOTAL CA: CPT | Performed by: STUDENT IN AN ORGANIZED HEALTH CARE EDUCATION/TRAINING PROGRAM

## 2021-10-24 PROCEDURE — 82948 REAGENT STRIP/BLOOD GLUCOSE: CPT

## 2021-10-24 PROCEDURE — 84443 ASSAY THYROID STIM HORMONE: CPT | Performed by: STUDENT IN AN ORGANIZED HEALTH CARE EDUCATION/TRAINING PROGRAM

## 2021-10-24 PROCEDURE — 84484 ASSAY OF TROPONIN QUANT: CPT | Performed by: STUDENT IN AN ORGANIZED HEALTH CARE EDUCATION/TRAINING PROGRAM

## 2021-10-24 PROCEDURE — 93306 TTE W/DOPPLER COMPLETE: CPT | Performed by: INTERNAL MEDICINE

## 2021-10-24 RX ORDER — FUROSEMIDE 20 MG/1
20 TABLET ORAL
Status: DISCONTINUED | OUTPATIENT
Start: 2021-10-24 | End: 2021-10-25 | Stop reason: HOSPADM

## 2021-10-24 RX ADMIN — APIXABAN 5 MG: 5 TABLET, FILM COATED ORAL at 09:12

## 2021-10-24 RX ADMIN — AMLODIPINE BESYLATE 10 MG: 10 TABLET ORAL at 09:12

## 2021-10-24 RX ADMIN — INSULIN LISPRO 2 UNITS: 100 INJECTION, SOLUTION INTRAVENOUS; SUBCUTANEOUS at 11:41

## 2021-10-24 RX ADMIN — INSULIN GLARGINE 10 UNITS: 100 INJECTION, SOLUTION SUBCUTANEOUS at 23:31

## 2021-10-24 RX ADMIN — INSULIN LISPRO 5 UNITS: 100 INJECTION, SOLUTION INTRAVENOUS; SUBCUTANEOUS at 09:11

## 2021-10-24 RX ADMIN — INSULIN LISPRO 5 UNITS: 100 INJECTION, SOLUTION INTRAVENOUS; SUBCUTANEOUS at 17:20

## 2021-10-24 RX ADMIN — LEVOTHYROXINE SODIUM 50 MCG: 50 TABLET ORAL at 09:12

## 2021-10-24 RX ADMIN — PANTOPRAZOLE SODIUM 40 MG: 40 TABLET, DELAYED RELEASE ORAL at 05:12

## 2021-10-24 RX ADMIN — ATORVASTATIN CALCIUM 40 MG: 40 TABLET, FILM COATED ORAL at 23:31

## 2021-10-24 RX ADMIN — FUROSEMIDE 20 MG: 20 TABLET ORAL at 16:20

## 2021-10-24 RX ADMIN — INSULIN LISPRO 1 UNITS: 100 INJECTION, SOLUTION INTRAVENOUS; SUBCUTANEOUS at 09:11

## 2021-10-24 RX ADMIN — METOPROLOL SUCCINATE 50 MG: 50 TABLET, EXTENDED RELEASE ORAL at 09:12

## 2021-10-24 RX ADMIN — APIXABAN 5 MG: 5 TABLET, FILM COATED ORAL at 17:21

## 2021-10-24 RX ADMIN — INSULIN LISPRO 5 UNITS: 100 INJECTION, SOLUTION INTRAVENOUS; SUBCUTANEOUS at 11:41

## 2021-10-24 RX ADMIN — FUROSEMIDE 20 MG: 20 TABLET ORAL at 09:12

## 2021-10-25 VITALS
RESPIRATION RATE: 17 BRPM | BODY MASS INDEX: 31.91 KG/M2 | TEMPERATURE: 98.1 F | OXYGEN SATURATION: 93 % | HEIGHT: 61 IN | DIASTOLIC BLOOD PRESSURE: 63 MMHG | HEART RATE: 70 BPM | SYSTOLIC BLOOD PRESSURE: 135 MMHG | WEIGHT: 169 LBS

## 2021-10-25 LAB
FLUAV RNA RESP QL NAA+PROBE: NEGATIVE
FLUBV RNA RESP QL NAA+PROBE: NEGATIVE
GLUCOSE SERPL-MCNC: 100 MG/DL (ref 65–140)
GLUCOSE SERPL-MCNC: 118 MG/DL (ref 65–140)
GLUCOSE SERPL-MCNC: 245 MG/DL (ref 65–140)
RSV RNA RESP QL NAA+PROBE: NEGATIVE
SARS-COV-2 RNA RESP QL NAA+PROBE: NEGATIVE

## 2021-10-25 PROCEDURE — 99239 HOSP IP/OBS DSCHRG MGMT >30: CPT | Performed by: STUDENT IN AN ORGANIZED HEALTH CARE EDUCATION/TRAINING PROGRAM

## 2021-10-25 PROCEDURE — 97166 OT EVAL MOD COMPLEX 45 MIN: CPT

## 2021-10-25 PROCEDURE — 97163 PT EVAL HIGH COMPLEX 45 MIN: CPT

## 2021-10-25 PROCEDURE — 0241U HB NFCT DS VIR RESP RNA 4 TRGT: CPT | Performed by: STUDENT IN AN ORGANIZED HEALTH CARE EDUCATION/TRAINING PROGRAM

## 2021-10-25 PROCEDURE — 82948 REAGENT STRIP/BLOOD GLUCOSE: CPT

## 2021-10-25 RX ORDER — MECLIZINE HYDROCHLORIDE 25 MG/1
25 TABLET ORAL EVERY 8 HOURS SCHEDULED
Status: DISCONTINUED | OUTPATIENT
Start: 2021-10-25 | End: 2021-10-25 | Stop reason: HOSPADM

## 2021-10-25 RX ORDER — POTASSIUM CHLORIDE 1.5 G/1.77G
20 POWDER, FOR SOLUTION ORAL DAILY
Qty: 30 PACKET | Refills: 0 | Status: SHIPPED | OUTPATIENT
Start: 2021-10-25 | End: 2021-10-28 | Stop reason: SDUPTHER

## 2021-10-25 RX ORDER — FUROSEMIDE 20 MG/1
20 TABLET ORAL DAILY
Qty: 60 TABLET | Refills: 0 | Status: SHIPPED | OUTPATIENT
Start: 2021-10-25 | End: 2021-10-29 | Stop reason: SDUPTHER

## 2021-10-25 RX ADMIN — APIXABAN 5 MG: 5 TABLET, FILM COATED ORAL at 08:25

## 2021-10-25 RX ADMIN — AMLODIPINE BESYLATE 10 MG: 10 TABLET ORAL at 08:25

## 2021-10-25 RX ADMIN — FUROSEMIDE 20 MG: 20 TABLET ORAL at 08:24

## 2021-10-25 RX ADMIN — INSULIN LISPRO 2 UNITS: 100 INJECTION, SOLUTION INTRAVENOUS; SUBCUTANEOUS at 11:49

## 2021-10-25 RX ADMIN — INSULIN LISPRO 5 UNITS: 100 INJECTION, SOLUTION INTRAVENOUS; SUBCUTANEOUS at 11:50

## 2021-10-25 RX ADMIN — ACETAMINOPHEN 650 MG: 325 TABLET, FILM COATED ORAL at 01:40

## 2021-10-25 RX ADMIN — LEVOTHYROXINE SODIUM 50 MCG: 50 TABLET ORAL at 08:25

## 2021-10-25 RX ADMIN — PANTOPRAZOLE SODIUM 40 MG: 40 TABLET, DELAYED RELEASE ORAL at 05:17

## 2021-10-25 RX ADMIN — INSULIN LISPRO 5 UNITS: 100 INJECTION, SOLUTION INTRAVENOUS; SUBCUTANEOUS at 08:22

## 2021-10-25 RX ADMIN — MECLIZINE HYDROCHLORIDE 25 MG: 25 TABLET ORAL at 13:32

## 2021-10-25 RX ADMIN — METOPROLOL SUCCINATE 50 MG: 50 TABLET, EXTENDED RELEASE ORAL at 08:24

## 2021-10-26 ENCOUNTER — TRANSITIONAL CARE MANAGEMENT (OUTPATIENT)
Dept: INTERNAL MEDICINE CLINIC | Facility: CLINIC | Age: 86
End: 2021-10-26

## 2021-10-27 ENCOUNTER — PATIENT OUTREACH (OUTPATIENT)
Dept: INTERNAL MEDICINE CLINIC | Facility: CLINIC | Age: 86
End: 2021-10-27

## 2021-10-27 ENCOUNTER — TELEPHONE (OUTPATIENT)
Dept: INTERNAL MEDICINE CLINIC | Facility: CLINIC | Age: 86
End: 2021-10-27

## 2021-10-28 ENCOUNTER — TELEPHONE (OUTPATIENT)
Dept: INTERNAL MEDICINE CLINIC | Facility: CLINIC | Age: 86
End: 2021-10-28

## 2021-10-28 DIAGNOSIS — I50.813 ACUTE ON CHRONIC RIGHT-SIDED CONGESTIVE HEART FAILURE (HCC): Primary | ICD-10-CM

## 2021-10-28 RX ORDER — POTASSIUM CHLORIDE 20 MEQ/1
20 TABLET, EXTENDED RELEASE ORAL DAILY
Qty: 30 TABLET | Refills: 3 | Status: SHIPPED | OUTPATIENT
Start: 2021-10-28 | End: 2022-01-25

## 2021-10-29 DIAGNOSIS — I50.9 CHF (CONGESTIVE HEART FAILURE) (HCC): ICD-10-CM

## 2021-10-29 RX ORDER — FUROSEMIDE 20 MG/1
20 TABLET ORAL 2 TIMES DAILY
Qty: 60 TABLET | Refills: 3 | Status: SHIPPED | OUTPATIENT
Start: 2021-10-29 | End: 2021-12-01 | Stop reason: SDUPTHER

## 2021-11-04 ENCOUNTER — PATIENT OUTREACH (OUTPATIENT)
Dept: INTERNAL MEDICINE CLINIC | Facility: CLINIC | Age: 86
End: 2021-11-04

## 2021-11-04 ENCOUNTER — OFFICE VISIT (OUTPATIENT)
Dept: INTERNAL MEDICINE CLINIC | Facility: CLINIC | Age: 86
End: 2021-11-04
Payer: MEDICARE

## 2021-11-04 VITALS
HEIGHT: 61 IN | SYSTOLIC BLOOD PRESSURE: 126 MMHG | OXYGEN SATURATION: 98 % | TEMPERATURE: 98.3 F | RESPIRATION RATE: 16 BRPM | BODY MASS INDEX: 30.21 KG/M2 | HEART RATE: 97 BPM | DIASTOLIC BLOOD PRESSURE: 72 MMHG | WEIGHT: 160 LBS

## 2021-11-04 DIAGNOSIS — I50.33 ACUTE ON CHRONIC DIASTOLIC CONGESTIVE HEART FAILURE (HCC): Primary | ICD-10-CM

## 2021-11-04 PROCEDURE — 99495 TRANSJ CARE MGMT MOD F2F 14D: CPT | Performed by: INTERNAL MEDICINE

## 2021-11-04 RX ORDER — INSULIN GLARGINE 100 [IU]/ML
INJECTION, SOLUTION SUBCUTANEOUS
COMMUNITY
Start: 2021-09-29

## 2021-11-04 RX ORDER — FUROSEMIDE 20 MG/1
20 TABLET ORAL DAILY
COMMUNITY
Start: 2021-10-20 | End: 2021-11-04 | Stop reason: SDUPTHER

## 2021-11-04 RX ORDER — ISOSORBIDE MONONITRATE 30 MG/1
30 TABLET, EXTENDED RELEASE ORAL DAILY
COMMUNITY
Start: 2021-10-18 | End: 2021-11-04 | Stop reason: ALTCHOICE

## 2021-11-04 RX ORDER — METOPROLOL SUCCINATE 50 MG/1
TABLET, EXTENDED RELEASE ORAL
COMMUNITY
Start: 2021-09-20

## 2021-11-04 RX ORDER — INSULIN LISPRO 100 [IU]/ML
INJECTION, SOLUTION INTRAVENOUS; SUBCUTANEOUS
COMMUNITY
Start: 2021-09-29 | End: 2021-11-04 | Stop reason: SDUPTHER

## 2021-11-30 ENCOUNTER — OFFICE VISIT (OUTPATIENT)
Dept: CARDIOLOGY CLINIC | Facility: CLINIC | Age: 86
End: 2021-11-30
Payer: MEDICARE

## 2021-11-30 VITALS
WEIGHT: 158 LBS | BODY MASS INDEX: 29.83 KG/M2 | HEIGHT: 61 IN | SYSTOLIC BLOOD PRESSURE: 148 MMHG | HEART RATE: 71 BPM | RESPIRATION RATE: 16 BRPM | DIASTOLIC BLOOD PRESSURE: 86 MMHG | OXYGEN SATURATION: 94 %

## 2021-11-30 DIAGNOSIS — I48.0 PAROXYSMAL ATRIAL FIBRILLATION (HCC): Primary | ICD-10-CM

## 2021-11-30 DIAGNOSIS — I10 ESSENTIAL HYPERTENSION: ICD-10-CM

## 2021-11-30 DIAGNOSIS — Z95.0 PACEMAKER: ICD-10-CM

## 2021-11-30 DIAGNOSIS — I50.9 CHF (CONGESTIVE HEART FAILURE) (HCC): ICD-10-CM

## 2021-11-30 PROCEDURE — 99204 OFFICE O/P NEW MOD 45 MIN: CPT | Performed by: INTERNAL MEDICINE

## 2021-11-30 RX ORDER — LOSARTAN POTASSIUM 100 MG/1
25 TABLET ORAL DAILY
COMMUNITY

## 2021-11-30 RX ORDER — ISOSORBIDE MONONITRATE 30 MG/1
TABLET, EXTENDED RELEASE ORAL
COMMUNITY
Start: 2021-11-08 | End: 2021-11-30 | Stop reason: SDDI

## 2021-11-30 RX ORDER — AMLODIPINE BESYLATE 10 MG/1
10 TABLET ORAL DAILY
COMMUNITY

## 2021-12-01 DIAGNOSIS — I50.9 CHF (CONGESTIVE HEART FAILURE) (HCC): ICD-10-CM

## 2021-12-01 RX ORDER — FUROSEMIDE 20 MG/1
TABLET ORAL
Qty: 270 TABLET | Refills: 1 | Status: SHIPPED | OUTPATIENT
Start: 2021-12-01 | End: 2022-05-05

## 2021-12-02 ENCOUNTER — PATIENT OUTREACH (OUTPATIENT)
Dept: INTERNAL MEDICINE CLINIC | Facility: CLINIC | Age: 86
End: 2021-12-02

## 2021-12-08 DIAGNOSIS — Z00.00 HEALTHCARE MAINTENANCE: ICD-10-CM

## 2021-12-08 RX ORDER — MULTIVIT,CALC,MINS/IRON/FOLIC 500-18-0.4
1 TABLET ORAL EVERY MORNING
Qty: 30 TABLET | Refills: 2 | Status: SHIPPED | OUTPATIENT
Start: 2021-12-08

## 2021-12-13 ENCOUNTER — TELEPHONE (OUTPATIENT)
Dept: INTERNAL MEDICINE CLINIC | Facility: CLINIC | Age: 86
End: 2021-12-13

## 2021-12-17 DIAGNOSIS — I50.9 CONGESTIVE HEART FAILURE, UNSPECIFIED HF CHRONICITY, UNSPECIFIED HEART FAILURE TYPE (HCC): Primary | ICD-10-CM

## 2021-12-17 RX ORDER — POTASSIUM CHLORIDE 1.5 G/1.77G
20 POWDER, FOR SOLUTION ORAL DAILY
Qty: 30 PACKET | Refills: 5 | OUTPATIENT
Start: 2021-12-17

## 2022-01-06 ENCOUNTER — TELEPHONE (OUTPATIENT)
Dept: INTERNAL MEDICINE CLINIC | Facility: CLINIC | Age: 87
End: 2022-01-06

## 2022-01-06 NOTE — TELEPHONE ENCOUNTER
Patient states she is not taking isosorbide 30 mg  She states she was not to take this anymore  She said that we must contact [address] [place] [address] to stop this  She would like this taken care of  She is paying for something she doesn't need  (I do not see this on her med list)      Call back #[phone]

## 2022-01-06 NOTE — TELEPHONE ENCOUNTER
Called patient and spoke to her that medication was d/c  Spoke to [facility] at [facility] and made them aware

## 2022-01-12 ENCOUNTER — TELEMEDICINE (OUTPATIENT)
Dept: INTERNAL MEDICINE CLINIC | Facility: CLINIC | Age: 87
End: 2022-01-12

## 2022-01-12 DIAGNOSIS — R50.9 FEVER, UNSPECIFIED FEVER CAUSE: ICD-10-CM

## 2022-01-12 DIAGNOSIS — R68.83 CHILLS: ICD-10-CM

## 2022-01-12 DIAGNOSIS — J06.9 UPPER RESPIRATORY TRACT INFECTION, UNSPECIFIED TYPE: Primary | ICD-10-CM

## 2022-01-12 DIAGNOSIS — J11.1 FLU: ICD-10-CM

## 2022-01-12 PROCEDURE — 99441 PR PHYS/QHP TELEPHONE EVALUATION 5-10 MIN: CPT | Performed by: INTERNAL MEDICINE

## 2022-01-12 RX ORDER — OSELTAMIVIR PHOSPHATE 75 MG/1
75 CAPSULE ORAL EVERY 12 HOURS SCHEDULED
Qty: 10 CAPSULE | Refills: 0 | Status: SHIPPED | OUTPATIENT
Start: 2022-01-12 | End: 2022-01-17

## 2022-01-12 NOTE — PROGRESS NOTES
COVID-19 Outpatient Progress Note    Assessment/Plan:    Pt is a resident of an assisted living facility  She is covid negative but the flu is going around the home  Several residents are flu positive with same symptoms  All covid neg  Will treat for presumed influenza given the above  Problem List Items Addressed This Visit     None      Visit Diagnoses     Upper respiratory tract infection, unspecified type    -  Primary    Relevant Medications    oseltamivir (TAMIFLU) 75 mg capsule    Chills        Relevant Medications    oseltamivir (TAMIFLU) 75 mg capsule    Fever, unspecified fever cause        Relevant Medications    oseltamivir (TAMIFLU) 75 mg capsule    Flu        Relevant Medications    oseltamivir (TAMIFLU) 75 mg capsule         Disposition:     I have spent 10 minutes directly with the patient  Greater than 50% of this time was spent in counseling/coordination of care regarding: instructions for management  Encounter provider Javier Vera MD    Provider located at [address] [place] [address]  [facility] [address]  [address] [place] [id number] [phone]    Recent Visits  Date Type Provider Dept   01/06/22 Telephone Elena Sarabia [facility] [place]   Showing recent visits within past 7 days and meeting all other requirements  Today's Visits  Date Type Provider Dept   01/12/22 Jan Gamble MD [facility] [place]   Showing today's visits and meeting all other requirements  Future Appointments  No visits were found meeting these conditions  Showing future appointments within next 150 days and meeting all other requirements     This virtual check-in was done via telephone and she agrees to proceed  Patient agrees to participate in a virtual check in via telephone or video visit instead of presenting to the office to address urgent/immediate medical needs  Patient is aware this is a billable service      After connecting through Telephone, the patient was identified by name and date of birth  Denisse Mena was informed that this was a telemedicine visit and that the exam was being conducted confidentially over secure lines  My office door was closed  No one else was in the room  Denisse Mena acknowledged consent and understanding of privacy and security of the telemedicine visit  I informed the patient that I have reviewed her record in Epic and presented the opportunity for her to ask any questions regarding the visit today  The patient agreed to participate  It was my intent to perform this visit via video technology but the patient was not able to do a video connection so the visit was completed via audio telephone only  Verification of patient location:  Patient is located in the following state in which I hold an active license: [place]    Subjective:   Denisse Mena is a 80 y o  female who is concerned about COVID-19  Patient's symptoms include chills, fatigue, malaise, cough and nausea       - Date of symptom onset: 1/7/2022      COVID-19 vaccination status: Fully vaccinated with booster    Exposure:   Contact with a person who is under investigation (PUI) for or who is positive for COVID-19 within the last 14 days?: No    Hospitalized recently for fever and/or lower respiratory symptoms?: No      Currently a healthcare worker that is involved in direct patient care?: No      Works in a special setting where the risk of COVID-19 transmission may be high? (this may include long-term care, correctional and longterm facilities; homeless shelters; assisted-living facilities and group homes ): No      Resident in a special setting where the risk of COVID-19 transmission may be high? (this may include long-term care, correctional and longterm facilities; homeless shelters; assisted-living facilities and group homes ): Yes      Lab Results   Component Value Date    SARSCOV2 Negative 10/25/2021     Past Medical History:   Diagnosis Date    Atrial fibrillation (HCC)     CHF (congestive heart failure) (HCC)     Chronic pain     [facility]     Hyperlipidemia     Hypertension     Pacemaker     Pneumonia due to COVID-19 virus 04/14/2020    Squamous cell skin cancer     Type 2 diabetes mellitus with hyperglycemia, with long-term current use of insulin ([facility] ) 12/28/2017     Past Surgical History:   Procedure Laterality Date    BACK SURGERY      CARDIAC PACEMAKER PLACEMENT      COLONOSCOPY       Current Outpatient Medications   Medication Sig Dispense Refill    acetaminophen (TYLENOL) 325 mg tablet Take 325 mg by mouth every 6 (six) hours as needed  (Patient not taking: Reported on 11/30/2021 )      amLODIPine (NORVASC) 10 mg tablet Take 10 mg by mouth daily      atorvastatin (LIPITOR) 40 mg tablet 1 TAB ORALLY AT BEDTIME DX:HYPERLIPIDEMIA 30 tablet 3    Cyanocobalamin (B-12) 1000 MCG TABS Take 1,000 mcg by mouth daily 30 tablet 11    dorzolamide (TRUSOPT) 2 % ophthalmic solution       Eliquis 5 MG Take 5 mg by mouth 2 (two) times a day       furosemide (LASIX) 20 mg tablet Take 1-1/2 tablets in the AM and take 1-1/2 tablets in the PM  270 tablet 1    glucose blood (Glucocard Vital Test) test strip Check sugar up to 4 times a day 100 each 4    insulin glargine (Lantus SoloStar) 100 units/mL injection pen INJECT 20UNITS SUB-Q AT BEDTIME DX: DIABETES (IDDM) *DISCARD OPENED PEN UNIT 28-DAYS AFTER FIRST USE*      insulin lispro (HumaLOG KwikPen) 100 units/mL injection pen INJECT 8 UNITS SUB-Q 3 TIMES DAILY BEFORE MEALS DX: DIABETES (IDDM) *DISCARD OPENED PEN UNIT 28-DAYS AFTER FIRST USE* SSI; 151-200=2U,201-250=4U,251 -300=6U,301-350=8U,351-40 0=10U,>401=12U **DISCARD      Insulin Pen Needle (NovoFine Autocover) 30G X 8 MM MISC Inject under the skin 4 (four) times a day 100 each 4    latanoprost (XALATAN) 0 005 % ophthalmic solution INSTILL 1 DROP INTO RIGHT EYE AT BEDTIME DX: GLAUCOMA **DISCARD 6 WK AFTER OPENED** 2 5 mL 4    levothyroxine 50 mcg tablet 1 TAB ORALLY EVERY MORNING DX: HYPOTHYROIDISM      losartan (COZAAR) 100 MG tablet Take 25 mg by mouth daily      methylcellulose (Citrucel) 500 mg tablet Take 1 tablet (500 mg total) by mouth daily 90 each 3    metoprolol succinate (TOPROL-XL) 50 mg 24 hr tablet 1 TAB ORALLY TWICE DAILY DX: HYPERTENSION      Multiple Vitamins-Calcium (One-A-Day Womens Formula) TABS Take 1 tablet by mouth every morning Dx: Supplement 30 tablet 2    omeprazole (PriLOSEC) 20 mg delayed release capsule 1 CAP ORALLY EVERY MORNING DX: GERD 30 capsule 3    oseltamivir (TAMIFLU) 75 mg capsule Take 1 capsule (75 mg total) by mouth every 12 (twelve) hours for 5 days 10 capsule 0    potassium chloride (K-DUR,KLOR-CON) 20 mEq tablet Take 1 tablet (20 mEq total) by mouth daily 30 tablet 3    ReadyLance Safety Lancets MISC AS DIRECTED FOR BLOOD GLUCOSE TESTING 4 TIMES DAILY AND AS NEEDED FORSIGNS/SYMPTOMS OF HI/LOW  each 4     No current facility-administered medications for this visit  Allergies   Allergen Reactions    Hydrocodone-Acetaminophen Nausea Only       Review of Systems   Constitutional: Positive for appetite change, chills and fatigue  Respiratory: Positive for cough  Gastrointestinal: Positive for nausea  All other systems reviewed and are negative  Objective: There were no vitals filed for this visit  Physical Exam  Vitals reviewed  Pulmonary:      Effort: Pulmonary effort is normal    Neurological:      Mental Status: She is alert and oriented to person, place, and time  Psychiatric:         Mood and Affect: Mood normal          VIRTUAL VISIT DISCLAIMER    Claudia Keene verbally agrees to participate in [employer]   Pt is aware that [employer] could be limited without vital signs or the ability to perform a full hands-on physical Mitali Radha understands she or the provider may request at any time to terminate the video visit and request the patient to seek care or treatment in person

## 2022-01-18 ENCOUNTER — TELEPHONE (OUTPATIENT)
Dept: INTERNAL MEDICINE CLINIC | Facility: CLINIC | Age: 87
End: 2022-01-18

## 2022-01-18 NOTE — TELEPHONE ENCOUNTER
Called patient, she stated she will be seeing the facility nurse then calling us back  I explained that she could do a virtual visit or be seen at an urgent care because we aren't currently bringing actively sick patients into the office  Patient understood, stated she will talk with the nurse at [facility] then give us a call

## 2022-01-18 NOTE — TELEPHONE ENCOUNTER
Patient was seen virtually about a week ago by Dr Dewayne Mcclellan  Patient was diagnosed with the Flu (through [facility]) and said she is not getting any better but getting worse  Patient said she has the heart issue, had Covid and now the Flu  Please advise on what patient should do?

## 2022-01-19 ENCOUNTER — TELEMEDICINE (OUTPATIENT)
Dept: INTERNAL MEDICINE CLINIC | Facility: CLINIC | Age: 87
End: 2022-01-19
Payer: MEDICARE

## 2022-01-19 VITALS — HEIGHT: 61 IN | WEIGHT: 158 LBS | BODY MASS INDEX: 29.83 KG/M2

## 2022-01-19 DIAGNOSIS — K21.9 GERD WITHOUT ESOPHAGITIS: Primary | ICD-10-CM

## 2022-01-19 DIAGNOSIS — Z79.4 TYPE 2 DIABETES MELLITUS WITH HYPERGLYCEMIA, WITH LONG-TERM CURRENT USE OF INSULIN (HCC): ICD-10-CM

## 2022-01-19 DIAGNOSIS — J11.1 FLU: Primary | ICD-10-CM

## 2022-01-19 DIAGNOSIS — E11.65 TYPE 2 DIABETES MELLITUS WITH HYPERGLYCEMIA, WITH LONG-TERM CURRENT USE OF INSULIN (HCC): ICD-10-CM

## 2022-01-19 PROCEDURE — 99214 OFFICE O/P EST MOD 30 MIN: CPT | Performed by: INTERNAL MEDICINE

## 2022-01-19 NOTE — PROGRESS NOTES
Virtual Regular Visit    Verification of patient location:    Patient is located in the following state in which I hold an active license [place]      Assessment/Plan:    Problem List Items Addressed This Visit     None      Visit Diagnoses     Flu    -  Primary               Reason for visit is   Chief Complaint   Patient presents with    Fatigue    Virtual Regular Visit        Encounter provider Eitan Tirado MD    Provider located at [address] [address]  Critical access hospital 2-3  [address] [facility] [address] [phone]      Recent Visits  Date Type Provider Dept   01/18/22 Telephone [facility] [place]   01/12/22 Angela Toscano MD [facility] [place]   Showing recent visits within past 7 days and meeting all other requirements  Today's Visits  Date Type Provider Dept   01/19/22 Telemedicine Eitan Tirado MD [facility] [address] today's visits and meeting all other requirements  Future Appointments  No visits were found meeting these conditions  Showing future appointments within next 150 days and meeting all other requirements       The patient was identified by name and date of birth  Henri Hernnádez was informed that this is a telemedicine visit and that the visit is being conducted through [facility] and patient was informed this is a secure, HIPAA-complaint platform  She agrees to proceed     My office door was closed  The patient was notified the following individuals were present in the room: no one  She acknowledged consent and understanding of privacy and security of the video platform  The patient has agreed to participate and understands they can discontinue the visit at any time  Patient is aware this is a billable service  Subjective  Henri Hernández is a 80 y o  female   Who is being seen virtually because of COVID restrictions  She is still having some lingering issues from flu  [facility] Patient is being seen today virtually because lingering issues with flu  But at the time of the appointment she feels she is getting better  She is actually just asking if ensure could be ordered for her at the facility  She was treated with Tamiflu  She is on about day 7 of her illness  No fevers  Not really complaining of congestion  Just exhausted         Past Medical History:   Diagnosis Date    Atrial fibrillation ([facility] )     [facility]congest[facility] heart failure) ([facility])     Chronic pain     Diabetes mellitus ([place] [address] )     Hyperlipidemia     Hypertension     Pacemaker     Pneumonia due to COVID-19 virus 04/14/2020    Squamous cell skin cancer     Type 2 diabetes mellitus with hyperglycemia, with long-term current use of insulin ([facility] ) 12/28/2017       Past Surgical History:   Procedure Laterality Date    BACK SURGERY      CARDIAC PACEMAKER PLACEMENT      COLONOSCOPY         Current Outpatient Medications   Medication Sig Dispense Refill    amLODIPine (NORVASC) 10 mg tablet Take 10 mg by mouth daily      atorvastatin (LIPITOR) 40 mg tablet 1 TAB ORALLY AT BEDTIME DX:HYPERLIPIDEMIA 30 tablet 3    Cyanocobalamin (B-12) 1000 MCG TABS Take 1,000 mcg by mouth daily 30 tablet 11    dorzolamide (TRUSOPT) 2 % ophthalmic solution       Eliquis 5 MG Take 5 mg by mouth 2 (two) times a day       furosemide (LASIX) 20 mg tablet Take 1-1/2 tablets in the AM and take 1-1/2 tablets in the PM  270 tablet 1    glucose blood (Glucocard Vital Test) test strip Check sugar up to 4 times a day 100 each 4    insulin glargine (Lantus SoloStar) 100 units/mL injection pen INJECT 20UNITS SUB-Q AT BEDTIME DX: DIABETES (IDDM) *DISCARD OPENED PEN UNIT 28-DAYS AFTER FIRST USE*      insulin lispro (HumaLOG KwikPen) 100 units/mL injection pen INJECT 8 UNITS SUB-Q 3 TIMES DAILY BEFORE MEALS DX: DIABETES (IDDM) *DISCARD OPENED PEN UNIT 28-DAYS AFTER FIRST USE* SSI; 151-200=2U,201-250=4U,251 -300=6U,301-350=8U,351-40 0=10U,>401=12U **DISCARD      Insulin Pen Needle (NovoFine Autocover) 30G X 8 MM MISC Inject under the skin 4 (four) times a day 100 each 4    latanoprost (XALATAN) 0 005 % ophthalmic solution INSTILL 1 DROP INTO RIGHT EYE AT BEDTIME DX: GLAUCOMA **DISCARD 6 WK AFTER OPENED** 2 5 mL 4    levothyroxine 50 mcg tablet 1 TAB ORALLY EVERY MORNING DX: HYPOTHYROIDISM      losartan (COZAAR) 100 MG tablet Take 25 mg by mouth daily      methylcellulose (Citrucel) 500 mg tablet Take 1 tablet (500 mg total) by mouth daily 90 each 3    metoprolol succinate (TOPROL-XL) 50 mg 24 hr tablet 1 TAB ORALLY TWICE DAILY DX: HYPERTENSION      Multiple Vitamins-Calcium (One-A-Day Womens Formula) TABS Take 1 tablet by mouth every morning Dx: Supplement 30 tablet 2    omeprazole (PriLOSEC) 20 mg delayed release capsule 1 CAP ORALLY EVERY MORNING DX: GERD 30 capsule 3    potassium chloride (K-DUR,KLOR-CON) 20 mEq tablet Take 1 tablet (20 mEq total) by mouth daily 30 tablet 3    ReadyLance Safety Lancets MISC AS DIRECTED FOR BLOOD GLUCOSE TESTING 4 TIMES DAILY AND AS NEEDED FORSIGNS/SYMPTOMS OF HI/LOW  each 4    Nutritional Supplements (ENSURE COMPLETE SHAKE PO) Take by mouth       No current facility-administered medications for this visit  Allergies   Allergen Reactions    Hydrocodone-Acetaminophen Nausea Only       Review of Systems   Constitutional: Positive for fatigue  Negative for fever  HENT: Negative for congestion  Respiratory: Negative for shortness of breath  Video Exam    Vitals:    01/19/22 1303   Weight: 71 7 kg (158 lb)   Height: 5' 1" (1 549 m)       Physical Exam  Vitals and nursing note reviewed  Constitutional:       General: She is not in acute distress  Appearance: Normal appearance  She is well-developed  She is not ill-appearing  Pulmonary:      Effort: No respiratory distress  Neurological:      Mental Status: She is alert and oriented to person, place, and time            I spent 10 minutes directly with the patient during this visit    VIRTUAL VISIT DISCLAIMER      Henri Hernández verbally agrees to participate in [employer]  Pt is aware that [employer] could be limited without vital signs or the ability to perform a full hands-on physical Debora Quiles understands she or the provider may request at any time to terminate the video visit and request the patient to seek care or treatment in person

## 2022-01-24 ENCOUNTER — PATIENT OUTREACH (OUTPATIENT)
Dept: INTERNAL MEDICINE CLINIC | Facility: CLINIC | Age: 87
End: 2022-01-24

## 2022-02-07 DIAGNOSIS — E55.9 VITAMIN D DEFICIENCY: Primary | ICD-10-CM

## 2022-03-11 ENCOUNTER — RA CDI HCC (OUTPATIENT)
Dept: OTHER | Facility: HOSPITAL | Age: 87
End: 2022-03-11

## 2022-03-17 ENCOUNTER — OFFICE VISIT (OUTPATIENT)
Dept: INTERNAL MEDICINE CLINIC | Facility: CLINIC | Age: 87
End: 2022-03-17
Payer: MEDICARE

## 2022-03-17 VITALS
HEIGHT: 61 IN | HEART RATE: 76 BPM | TEMPERATURE: 98 F | DIASTOLIC BLOOD PRESSURE: 80 MMHG | WEIGHT: 160 LBS | OXYGEN SATURATION: 96 % | BODY MASS INDEX: 30.21 KG/M2 | SYSTOLIC BLOOD PRESSURE: 134 MMHG

## 2022-03-17 DIAGNOSIS — E11.65 TYPE 2 DIABETES MELLITUS WITH HYPERGLYCEMIA, WITH LONG-TERM CURRENT USE OF INSULIN (HCC): Chronic | ICD-10-CM

## 2022-03-17 DIAGNOSIS — E03.9 HYPOTHYROIDISM, UNSPECIFIED TYPE: ICD-10-CM

## 2022-03-17 DIAGNOSIS — R74.01 TRANSAMINITIS: ICD-10-CM

## 2022-03-17 DIAGNOSIS — I48.0 PAROXYSMAL ATRIAL FIBRILLATION (HCC): ICD-10-CM

## 2022-03-17 DIAGNOSIS — Z79.4 TYPE 2 DIABETES MELLITUS WITH HYPERGLYCEMIA, WITH LONG-TERM CURRENT USE OF INSULIN (HCC): Chronic | ICD-10-CM

## 2022-03-17 DIAGNOSIS — I10 ESSENTIAL HYPERTENSION: Primary | ICD-10-CM

## 2022-03-17 PROCEDURE — 99214 OFFICE O/P EST MOD 30 MIN: CPT | Performed by: INTERNAL MEDICINE

## 2022-03-17 NOTE — PROGRESS NOTES
Assessment/Plan:       If sugars remain low at night, would consider decreasing her short-acting insulin before dinner  But also discuss with her endocrinologist   Ranjit Schreiber other medications  Continue follow-up with her specialists  For the slightly elevated liver enzymes, will repeat in a few weeks, nonfasting  Quality Measures:       Return in about 6 months (around 9/17/2022)  No problem-specific Assessment & Plan notes found for this encounter  Diagnoses and all orders for this visit:    Essential hypertension    Hypothyroidism, unspecified type    Type 2 diabetes mellitus with hyperglycemia, with long-term current use of insulin (HCC)    Paroxysmal atrial fibrillation (HCC)    Transaminitis  -     Hepatic function panel; Future  -     Gamma GT; Future          Subjective:      Patient ID: Warnell Boeck is a 80 y o  female  Patient comes in today for routine follow-up  She states she is doing okay  Her blood pressure is controlled  She admits she is having trouble with her sugar running low at night  She states it is because she does not like the food at the facility she is at  Her endocrinologist has already reduced her insulin because of this  Taking her other medicines as directed  Her blood work showed her liver enzymes were slightly elevated  She has not been taking Tylenol  No new medicines otherwise  Does not drink alcohol        ALLERGIES:  Allergies   Allergen Reactions    Hydrocodone-Acetaminophen Nausea Only       CURRENT MEDICATIONS:    Current Outpatient Medications:     amLODIPine (NORVASC) 10 mg tablet, Take 10 mg by mouth daily, Disp: , Rfl:     atorvastatin (LIPITOR) 40 mg tablet, 1 TAB ORALLY AT BEDTIME DX:HYPERLIPIDEMIA, Disp: 30 tablet, Rfl: 3    Cyanocobalamin (B-12) 1000 MCG TABS, Take 1,000 mcg by mouth daily, Disp: 30 tablet, Rfl: 11    dorzolamide (TRUSOPT) 2 % ophthalmic solution, , Disp: , Rfl:     Eliquis 5 MG, Take 5 mg by mouth 2 (two) times a day , Disp: , Rfl:     furosemide (LASIX) 20 mg tablet, Take 1-1/2 tablets in the AM and take 1-1/2 tablets in the PM , Disp: 270 tablet, Rfl: 1    glucose blood (Glucocard Vital Test) test strip, Check sugar up to 4 times a day, Disp: 100 each, Rfl: 4    insulin glargine (Lantus SoloStar) 100 units/mL injection pen, INJECT 20UNITS SUB-Q AT BEDTIME DX: DIABETES (IDDM) *DISCARD OPENED PEN UNIT 28-DAYS AFTER FIRST USE*, Disp: , Rfl:     insulin lispro (HumaLOG KwikPen) 100 units/mL injection pen, INJECT 8 UNITS SUB-Q 3 TIMES DAILY BEFORE MEALS DX: DIABETES (IDDM) *DISCARD OPENED PEN UNIT 28-DAYS AFTER FIRST USE* SSI; 151-200=2U,201-250=4U,251 -300=6U,301-350=8U,351-40 0=10U,>401=12U **DISCARD, Disp: , Rfl:     Insulin Pen Needle (NovoFine Autocover) 30G X 8 MM MISC, Inject under the skin 4 (four) times a day, Disp: 100 each, Rfl: 4    latanoprost (XALATAN) 0 005 % ophthalmic solution, INSTILL 1 DROP INTO RIGHT EYE AT BEDTIME DX: GLAUCOMA **DISCARD 6 WK AFTER OPENED**, Disp: 2 5 mL, Rfl: 4    levothyroxine 50 mcg tablet, 1 TAB ORALLY EVERY MORNING DX: HYPOTHYROIDISM, Disp: , Rfl:     losartan (COZAAR) 100 MG tablet, Take 25 mg by mouth daily, Disp: , Rfl:     methylcellulose (Citrucel) 500 mg tablet, Take 1 tablet (500 mg total) by mouth daily, Disp: 90 each, Rfl: 3    metoprolol succinate (TOPROL-XL) 50 mg 24 hr tablet, 1 TAB ORALLY TWICE DAILY DX: HYPERTENSION, Disp: , Rfl:     Multiple Vitamins-Calcium (One-A-Day Womens Formula) TABS, Take 1 tablet by mouth every morning Dx: Supplement, Disp: 30 tablet, Rfl: 2    Multiple Vitamins-Minerals (One-A-Day Womens) tablet, 1 TAB ORALLY EVERY MORNING DX: SUPPLEMENT, Disp: 28 tablet, Rfl: 4    Nutritional Supplements (Ensure Complete Shake) LIQD, Take 1 each by mouth 3 (three) times a day, Disp: 711 mL, Rfl: 3    omeprazole (PriLOSEC) 20 mg delayed release capsule, 1 CAP ORALLY EVERY MORNING DX: GERD, Disp: 30 capsule, Rfl: 3    potassium chloride (K-DUR,KLOR-CON) 20 mEq tablet, 1 TAB ORALLY EVERY MORNING DX:ELECTROLYTE DEFICIENCY, Disp: 28 tablet, Rfl: 4    ReadyLance Safety Lancets [facility], AS DIRECTED FOR BLOOD GLUCOSE TESTING 4 TIMES DAILY AND AS NEEDED FORSIGNS/SYMPTOMS OF HI/LOW BS, Disp: 100 each, Rfl: 4    ACTIVE PROBLEM LIST:  Patient Active Problem List   Diagnosis    Essential hypertension    Type 2 diabetes mellitus with hyperglycemia, with long-term current use of insulin ([facility])    Leukocytosis    Slow transit constipation    Elevated troponin    Actinic keratosis    Seborrheic keratosis    Screening for skin condition    History of skin cancer    Hypothyroidism    History of heart block    Bilateral carpal tunnel syndrome    Vitamin D deficiency    Pacemaker    GERD without esophagitis    Paroxysmal atrial fibrillation (HCC)    CKD (chronic kidney disease) stage 3, GFR 30-59 ml/min ([facility])    Acute exacerbation of CHF (congestive heart failure) ([facility])       PAST MEDICAL HISTORY:  Past Medical History:   Diagnosis Date    Atrial fibrillation (HCC)     CHF (congestive heart failure) ([facility])     Chronic pain     [facility]     Hyperlipidemia     Hypertension     Pacemaker     Pneumonia due to COVID-19 virus 04/14/2020    Squamous cell skin cancer     Type 2 diabetes mellitus with hyperglycemia, with long-term current use of insulin [facility] ) 12/28/2017       PAST SURGICAL HISTORY:  Past Surgical History:   Procedure Laterality Date    BACK SURGERY      CARDIAC PACEMAKER PLACEMENT      COLONOSCOPY         FAMILY HISTORY:  Family History   Problem Relation Age of Onset    Colon cancer Mother     Heart attack Father        SOCIAL HISTORY:  Social History     Socioeconomic History    Marital status:       Spouse name: Not on file    Number of children: Not on file    Years of education: Not on file    Highest education level: Not on file   Occupational History    Not on file   Tobacco Use    Smoking status: Never Smoker    Smokeless tobacco: Never Used   Vaping Use    Vaping Use: Never used   Substance and Sexual Activity    Alcohol use: Not Currently     Alcohol/week: 0 0 standard drinks    Drug use: No    Sexual activity: Not on file   Other Topics Concern    Not on file   Social History Narrative    Not on file     Social Determinants of Health     Financial Resource Strain: Not on file   Food Insecurity: No Food Insecurity    Worried About Running Out of Food in the Last Year: Never true    Zaida of Food in the Last Year: Never true   Transportation Needs: No Transportation Needs    Lack of Transportation (Medical): No    Lack of Transportation (Non-Medical): No   Physical Activity: Not on file   Stress: Not on file   Social Connections: Not on file   Intimate Partner Violence: Not on file   Housing Stability: Low Risk     Unable to Pay for Housing in the Last Year: No    Number of Places Lived in the Last Year: 1    Unstable Housing in the Last Year: No       Review of Systems   Respiratory: Negative for shortness of breath  Cardiovascular: Negative for chest pain  Gastrointestinal: Negative for abdominal pain  Objective:  Vitals:    03/17/22 1337   BP: 134/80   BP Location: Left arm   Patient Position: Sitting   Cuff Size: Adult   Pulse: 76   Temp: 98 °F (36 7 °C)   TempSrc: Temporal   SpO2: 96%   Weight: 72 6 kg (160 lb)   Height: 5' 1" (1 549 m)     Body mass index is 30 23 kg/m²  Physical Exam  Vitals and nursing note reviewed  Constitutional:       Appearance: She is well-developed  Cardiovascular:      Rate and Rhythm: Normal rate and regular rhythm  Heart sounds: Normal heart sounds  Pulmonary:      Effort: Pulmonary effort is normal       Breath sounds: Normal breath sounds  Abdominal:      Palpations: Abdomen is soft  Tenderness: There is no abdominal tenderness  Neurological:      Mental Status: She is alert and oriented to person, place, and time  RESULTS:    Recent Results (from the past 1008 hour(s))   HEMOGLOBIN A1C    Collection Time: 03/03/22  8:10 AM   Result Value Ref Range    Hemoglobin A1C 8 3 (H) <5 7 %    eAG, EST AVG Glucose 192 (H) <154 mg/dL       This note was created with voice recognition software  Phonic, grammatical and spelling errors may be present within the note as a result

## 2022-04-11 ENCOUNTER — TELEPHONE (OUTPATIENT)
Dept: GASTROENTEROLOGY | Facility: CLINIC | Age: 87
End: 2022-04-11

## 2022-04-11 NOTE — TELEPHONE ENCOUNTER
João Camacho called for refill of Fiber Therapy 1 pill every morning to be refilled   Please call Karen Ferrer at [phone] ty

## 2022-05-18 DIAGNOSIS — K21.9 GERD WITHOUT ESOPHAGITIS: ICD-10-CM

## 2022-05-19 RX ORDER — OMEPRAZOLE 20 MG/1
CAPSULE, DELAYED RELEASE ORAL
Qty: 28 CAPSULE | Refills: 4 | Status: SHIPPED | OUTPATIENT
Start: 2022-05-19

## 2022-06-27 DIAGNOSIS — I50.813 ACUTE ON CHRONIC RIGHT-SIDED CONGESTIVE HEART FAILURE (HCC): ICD-10-CM

## 2022-06-27 RX ORDER — POTASSIUM CHLORIDE 20 MEQ/1
TABLET, EXTENDED RELEASE ORAL
Qty: 28 TABLET | Refills: 4 | Status: SHIPPED | OUTPATIENT
Start: 2022-06-27

## 2022-07-14 DIAGNOSIS — E11.65 TYPE 2 DIABETES MELLITUS WITH HYPERGLYCEMIA, WITH LONG-TERM CURRENT USE OF INSULIN (HCC): Primary | ICD-10-CM

## 2022-07-14 DIAGNOSIS — Z79.4 TYPE 2 DIABETES MELLITUS WITH HYPERGLYCEMIA, WITH LONG-TERM CURRENT USE OF INSULIN (HCC): Primary | ICD-10-CM

## 2022-07-14 RX ORDER — BLOOD-GLUCOSE METER
KIT MISCELLANEOUS 4 TIMES DAILY
Qty: 1 KIT | Refills: 1 | Status: SHIPPED | OUTPATIENT
Start: 2022-07-14

## 2022-07-14 NOTE — TELEPHONE ENCOUNTER
Glucocard Vital Monitor-Patient said taht she needs a new Gluco Meter and this is what she has now  Patient said that the one she has is not working

## 2022-08-10 DIAGNOSIS — E55.9 VITAMIN D DEFICIENCY: ICD-10-CM

## 2022-08-29 ENCOUNTER — TELEPHONE (OUTPATIENT)
Dept: INTERNAL MEDICINE CLINIC | Facility: CLINIC | Age: 87
End: 2022-08-29

## 2022-08-31 DIAGNOSIS — E53.8 B12 DEFICIENCY: ICD-10-CM

## 2022-08-31 RX ORDER — CYANOCOBALAMIN (VITAMIN B-12) 1000 MCG
1000 TABLET ORAL DAILY
Qty: 90 TABLET | Refills: 3 | Status: SHIPPED | OUTPATIENT
Start: 2022-08-31

## 2022-09-16 ENCOUNTER — RA CDI HCC (OUTPATIENT)
Dept: OTHER | Facility: HOSPITAL | Age: 87
End: 2022-09-16

## 2022-09-16 NOTE — PROGRESS NOTES
[address]  coding opportunities     I13 0 and E11 22     Chart Reviewed number of suggestions sent to Provider: 2     Patients Insurance     Medicare Insurance: Medicare

## 2022-09-19 ENCOUNTER — TELEPHONE (OUTPATIENT)
Dept: INTERNAL MEDICINE CLINIC | Facility: CLINIC | Age: 87
End: 2022-09-19

## 2022-09-19 NOTE — TELEPHONE ENCOUNTER
Pt advised  Patient stated she is not feeling well and has been having headaches  Pt stated she has an appt with Dr Ira Wright on Thursday  Pt advised to go to ER is symptoms worsens and to tell one of the staff at [facility] where she resides

## 2022-09-19 NOTE — TELEPHONE ENCOUNTER
Patient called with concerns that her BP was elevated   183/98 patient is not having any symptoms except for weakness  Please advise    Karrie Nissen

## 2022-09-20 ENCOUNTER — HOSPITAL ENCOUNTER (INPATIENT)
Facility: HOSPITAL | Age: 87
LOS: 5 days | Discharge: HOME | DRG: 638 | End: 2022-09-26
Attending: EMERGENCY MEDICINE | Admitting: STUDENT IN AN ORGANIZED HEALTH CARE EDUCATION/TRAINING PROGRAM
Payer: MEDICARE

## 2022-09-20 DIAGNOSIS — I65.29 INTERNAL CAROTID ARTERY STENOSIS: ICD-10-CM

## 2022-09-20 DIAGNOSIS — R42 DIZZINESS: ICD-10-CM

## 2022-09-20 DIAGNOSIS — I16.0 HYPERTENSIVE URGENCY: ICD-10-CM

## 2022-09-20 DIAGNOSIS — Z79.4 TYPE 2 DIABETES MELLITUS WITH HYPERGLYCEMIA, WITH LONG-TERM CURRENT USE OF INSULIN (HCC): Chronic | ICD-10-CM

## 2022-09-20 DIAGNOSIS — I10 UNCONTROLLED HYPERTENSION: ICD-10-CM

## 2022-09-20 DIAGNOSIS — R26.2 AMBULATORY DYSFUNCTION: ICD-10-CM

## 2022-09-20 DIAGNOSIS — I50.9 CHF (CONGESTIVE HEART FAILURE) (HCC): ICD-10-CM

## 2022-09-20 DIAGNOSIS — I10 HYPERTENSION: Primary | ICD-10-CM

## 2022-09-20 DIAGNOSIS — E11.65 TYPE 2 DIABETES MELLITUS WITH HYPERGLYCEMIA, WITH LONG-TERM CURRENT USE OF INSULIN (HCC): Chronic | ICD-10-CM

## 2022-09-20 LAB
2HR DELTA HS TROPONIN: 0 NG/L
ALBUMIN SERPL BCP-MCNC: 3.1 G/DL (ref 3.5–5)
ALP SERPL-CCNC: 108 U/L (ref 46–116)
ALT SERPL W P-5'-P-CCNC: 68 U/L (ref 12–78)
ANION GAP SERPL CALCULATED.3IONS-SCNC: 10 MMOL/L (ref 4–13)
AST SERPL W P-5'-P-CCNC: 47 U/L (ref 5–45)
BASOPHILS # BLD AUTO: 0.08 THOUSANDS/ΜL (ref 0–0.1)
BASOPHILS NFR BLD AUTO: 1 % (ref 0–1)
BILIRUB SERPL-MCNC: 0.39 MG/DL (ref 0.2–1)
BUN SERPL-MCNC: 20 MG/DL (ref 5–25)
CALCIUM ALBUM COR SERPL-MCNC: 9.9 MG/DL (ref 8.3–10.1)
CALCIUM SERPL-MCNC: 9.2 MG/DL (ref 8.3–10.1)
CARDIAC TROPONIN I PNL SERPL HS: 24 NG/L
CARDIAC TROPONIN I PNL SERPL HS: 24 NG/L
CHLORIDE SERPL-SCNC: 104 MMOL/L (ref 96–108)
CO2 SERPL-SCNC: 26 MMOL/L (ref 21–32)
CREAT SERPL-MCNC: 1.08 MG/DL (ref 0.6–1.3)
EOSINOPHIL # BLD AUTO: 0.19 THOUSAND/ΜL (ref 0–0.61)
EOSINOPHIL NFR BLD AUTO: 3 % (ref 0–6)
ERYTHROCYTE [DISTWIDTH] IN BLOOD BY AUTOMATED COUNT: 14.1 % (ref 11.6–15.1)
GFR SERPL CREATININE-BSD FRML MDRD: 44 ML/MIN/1.73SQ M
GLUCOSE SERPL-MCNC: 103 MG/DL (ref 65–140)
GLUCOSE SERPL-MCNC: 104 MG/DL (ref 65–140)
GLUCOSE SERPL-MCNC: 116 MG/DL (ref 65–140)
GLUCOSE SERPL-MCNC: 137 MG/DL (ref 65–140)
GLUCOSE SERPL-MCNC: 140 MG/DL (ref 65–140)
HCT VFR BLD AUTO: 46 % (ref 34.8–46.1)
HGB BLD-MCNC: 15.1 G/DL (ref 11.5–15.4)
IMM GRANULOCYTES # BLD AUTO: 0.02 THOUSAND/UL (ref 0–0.2)
IMM GRANULOCYTES NFR BLD AUTO: 0 % (ref 0–2)
INR PPP: 1.21 (ref 0.84–1.19)
LYMPHOCYTES # BLD AUTO: 2.03 THOUSANDS/ΜL (ref 0.6–4.47)
LYMPHOCYTES NFR BLD AUTO: 27 % (ref 14–44)
MCH RBC QN AUTO: 31 PG (ref 26.8–34.3)
MCHC RBC AUTO-ENTMCNC: 32.8 G/DL (ref 31.4–37.4)
MCV RBC AUTO: 95 FL (ref 82–98)
MONOCYTES # BLD AUTO: 0.6 THOUSAND/ΜL (ref 0.17–1.22)
MONOCYTES NFR BLD AUTO: 8 % (ref 4–12)
NEUTROPHILS # BLD AUTO: 4.72 THOUSANDS/ΜL (ref 1.85–7.62)
NEUTS SEG NFR BLD AUTO: 61 % (ref 43–75)
NRBC BLD AUTO-RTO: 0 /100 WBCS
PLATELET # BLD AUTO: 232 THOUSANDS/UL (ref 149–390)
PMV BLD AUTO: 9.6 FL (ref 8.9–12.7)
POTASSIUM SERPL-SCNC: 3.6 MMOL/L (ref 3.5–5.3)
PROT SERPL-MCNC: 7 G/DL (ref 6.4–8.4)
PROTHROMBIN TIME: 15 SECONDS (ref 11.6–14.5)
RBC # BLD AUTO: 4.87 MILLION/UL (ref 3.81–5.12)
SODIUM SERPL-SCNC: 140 MMOL/L (ref 135–147)
WBC # BLD AUTO: 7.64 THOUSAND/UL (ref 4.31–10.16)

## 2022-09-20 PROCEDURE — 99285 EMERGENCY DEPT VISIT HI MDM: CPT

## 2022-09-20 PROCEDURE — 84484 ASSAY OF TROPONIN QUANT: CPT | Performed by: EMERGENCY MEDICINE

## 2022-09-20 PROCEDURE — 85025 COMPLETE CBC W/AUTO DIFF WBC: CPT | Performed by: EMERGENCY MEDICINE

## 2022-09-20 PROCEDURE — 82948 REAGENT STRIP/BLOOD GLUCOSE: CPT

## 2022-09-20 PROCEDURE — 99285 EMERGENCY DEPT VISIT HI MDM: CPT | Performed by: EMERGENCY MEDICINE

## 2022-09-20 PROCEDURE — 85610 PROTHROMBIN TIME: CPT | Performed by: EMERGENCY MEDICINE

## 2022-09-20 PROCEDURE — 36415 COLL VENOUS BLD VENIPUNCTURE: CPT | Performed by: EMERGENCY MEDICINE

## 2022-09-20 PROCEDURE — 80053 COMPREHEN METABOLIC PANEL: CPT | Performed by: EMERGENCY MEDICINE

## 2022-09-20 PROCEDURE — 93005 ELECTROCARDIOGRAM TRACING: CPT

## 2022-09-20 RX ORDER — METOPROLOL TARTRATE 50 MG/1
50 TABLET, FILM COATED ORAL ONCE
Status: COMPLETED | OUTPATIENT
Start: 2022-09-20 | End: 2022-09-20

## 2022-09-20 RX ADMIN — METOPROLOL TARTRATE 50 MG: 50 TABLET, FILM COATED ORAL at 21:11

## 2022-09-20 NOTE — ED PROVIDER NOTES
History  Chief Complaint   Patient presents with    Hypoglycemia - Symptomatic     Reports pale diaphoretic upon EMS arrival, 51 given oral glucose, recheck 55 given D10    Hypertension     Reports SBP 170s at [facility], denies current headache, change in vision      80year old female patient presents emergency department for evaluation of transient hypoglycemia  The patient had 911 called for hypertension hour the nursing facility in which she lives gave the patient her insulin prior to feeding her  The patient is found with a blood glucose of 51, was given oral glucose without resolution, was given IV glucose with resolution of her symptoms  Currently she is not hypotensive nor is she hypertensive, and she is currently without complaint  Basic labs will be done, EKG will be done, to assess for any causes for the patient's hypoglycemia if none are present the patient will be discharged back to her facility  History provided by:  Patient   used: No    Medical Problem  Severity:  Mild  Onset quality:  Gradual  Timing:  Constant  Progression:  Worsening  Chronicity:  New  Associated symptoms: no diarrhea, no rash and no shortness of breath        Prior to Admission Medications   Prescriptions Last Dose Informant Patient Reported? Taking?    Blood Glucose Monitoring Suppl (Glucocard Vital Monitor) w/Device KIT   No No   Sig: Use 4 (four) times a day E11 65   Cyanocobalamin (B-12) 1000 MCG TABS 9/20/2022 at Unknown time  No Yes   Sig: Take 1,000 mcg by mouth daily   Eliquis 5 MG 9/20/2022 at Unknown time Self Yes Yes   Sig: Take 5 mg by mouth 2 (two) times a day    Insulin Pen Needle (NovoFine Autocover) 30G X 8 MM MISC 9/20/2022 at Unknown time Self No Yes   Sig: Inject under the skin 4 (four) times a day   Multiple Vitamins-Calcium (One-A-Day Womens Formula) TABS   No No   Sig: Take 1 tablet by mouth every morning Dx: Supplement   Multiple Vitamins-Minerals (One-A-Day Womens) tablet   No No Sig: Take 1 tablet by mouth every morning Dx: Supplement   Nutritional Supplements (Ensure Complete Shake) LIQD   No No   Sig: Take 1 each by mouth 3 (three) times a day   ReadyLance Safety Lancets MISC  Self No No   Sig: AS DIRECTED FOR BLOOD GLUCOSE TESTING 4 TIMES DAILY AND AS NEEDED FORSIGNS/SYMPTOMS OF HI/LOW BS   amLODIPine (NORVASC) 10 mg tablet 2022 at Unknown time Self Yes Yes   Sig: Take 10 mg by mouth daily   atorvastatin (LIPITOR) 40 mg tablet 2022 at Unknown time  No Yes   Si TAB ORALLY AT BEDTIME DX:HYPERLIPIDEMIA   calcium polycarbophil (FIBERCON) 625 mg tablet 2022 at Unknown time  No Yes   Sig: Take 1 tablet (625 mg total) by mouth daily   dorzolamide (TRUSOPT) 2 % ophthalmic solution 2022 at Unknown time Self Yes Yes   furosemide (LASIX) 20 mg tablet 2022 at Unknown time  No Yes   Si 5 TAB (30MG) ORALLY TWICE DAILY AT 8AM & 5PM DX: EDEMA   glucose blood (Glucocard Vital Test) test strip  Self No No   Sig: Check sugar up to 4 times a day   insulin glargine (Lantus SoloStar) 100 units/mL injection pen 2022 at Unknown time Self Yes Yes   Sig: INJECT 20UNITS SUB-Q AT BEDTIME DX: DIABETES (IDDM) *DISCARD OPENED PEN UNIT 28-DAYS AFTER FIRST USE*   insulin lispro (HumaLOG) 100 units/mL injection pen 2022 at Unknown time Self Yes Yes   Sig: INJECT 8 UNITS SUB-Q 3 TIMES DAILY BEFORE MEALS DX: DIABETES (IDDM) *DISCARD OPENED PEN UNIT 28-DAYS AFTER FIRST USE* SSI; 151-200=2U,201-250=4U,251 -300=6U,301-350=8U,351-40 0=10U,>401=12U **DISCARD   latanoprost (XALATAN) 0 005 % ophthalmic solution 2022 at Unknown time Self No Yes   Sig: INSTILL 1 DROP INTO RIGHT EYE AT BEDTIME DX: GLAUCOMA **DISCARD 6 WK AFTER OPENED**   levothyroxine 50 mcg tablet 2022 at Unknown time Self Yes Yes   Si TAB ORALLY EVERY MORNING DX: HYPOTHYROIDISM   losartan (COZAAR) 100 MG tablet 2022 at Unknown time Self Yes Yes   Sig: Take 25 mg by mouth daily   methylcellulose (Citrucel) 500 mg tablet 2022 at Unknown time Self No Yes   Sig: Take 1 tablet (500 mg total) by mouth daily   metoprolol succinate (TOPROL-XL) 50 mg 24 hr tablet 2022 at Unknown time Self Yes Yes   Si TAB ORALLY TWICE DAILY DX: HYPERTENSION   omeprazole (PriLOSEC) 20 mg delayed release capsule 2022 at Unknown time  No Yes   Si CAP ORALLY EVERY MORNING DX: GERD   potassium chloride (K-DUR,KLOR-CON) 20 mEq tablet 2022 at Unknown time  No Yes   Si TAB ORALLY EVERY MORNING DX:ELECTROLYTE DEFICIENCY      Facility-Administered Medications: None       Past Medical History:   Diagnosis Date    Atrial fibrillation (HCC)     CHF (congestive heart failure) (HCC)     Chronic pain     [facility]     Hyperlipidemia     Hypertension     Pacemaker     Pneumonia due to COVID-19 virus 2020    Squamous cell skin cancer     Type 2 diabetes mellitus with hyperglycemia, with long-term current use of insulin ([facility] ) 2017       Past Surgical History:   Procedure Laterality Date    BACK SURGERY      CARDIAC PACEMAKER PLACEMENT      COLONOSCOPY         Family History   Problem Relation Age of Onset    Colon cancer Mother     Heart attack Father      I have reviewed and agree with the history as documented  E-Cigarette/Vaping    E-Cigarette Use Never User      E-Cigarette/Vaping Substances    Nicotine No     THC No     CBD No     Flavoring No     Other No     Unknown No      Social History     Tobacco Use    Smoking status: Never Smoker    Smokeless tobacco: Never Used   Vaping Use    Vaping Use: Never used   Substance Use Topics    Alcohol use: Not Currently     Alcohol/week: 0 0 standard drinks    Drug use: No       Review of Systems   Respiratory: Negative for shortness of breath  Gastrointestinal: Negative for diarrhea  Skin: Negative for rash  All other systems reviewed and are negative        Physical Exam  Physical Exam  Vitals and nursing note reviewed  Constitutional:       Appearance: She is well-developed  HENT:      Head: Normocephalic and atraumatic  Right Ear: External ear normal       Left Ear: External ear normal    Eyes:      Conjunctiva/sclera: Conjunctivae normal    Neck:      Thyroid: No thyromegaly  Vascular: No JVD  Trachea: No tracheal deviation  Cardiovascular:      Rate and Rhythm: Normal rate  Pulmonary:      Effort: Pulmonary effort is normal       Breath sounds: Normal breath sounds  No stridor  Abdominal:      General: There is no distension  Palpations: Abdomen is soft  There is no mass  Tenderness: There is no abdominal tenderness  There is no guarding  Hernia: No hernia is present  Musculoskeletal:         General: No tenderness or deformity  Normal range of motion  Lymphadenopathy:      Cervical: No cervical adenopathy  Skin:     General: Skin is warm  Coloration: Skin is not pale  Findings: No erythema or rash  Neurological:      Mental Status: She is alert and oriented to person, place, and time     Psychiatric:         Behavior: Behavior normal          Vital Signs  ED Triage Vitals   Temperature Pulse Respirations Blood Pressure SpO2   09/20/22 1812 09/20/22 1801 09/20/22 1801 09/20/22 1801 09/20/22 1758   97 7 °F (36 5 °C) 70 22 162/70 95 %      Temp Source Heart Rate Source Patient Position - Orthostatic VS BP Location FiO2 (%)   09/20/22 1812 09/21/22 0800 09/21/22 0800 09/21/22 0800 --   Oral Monitor Lying Right arm       Pain Score       09/21/22 1717       No Pain           Vitals:    09/22/22 1406 09/22/22 1408 09/22/22 1505 09/22/22 1848   BP: 108/52 138/63  142/72   Pulse: 71 70 70 72   Patient Position - Orthostatic VS: Sitting Standing           Visual Acuity      ED Medications  Medications   amLODIPine (NORVASC) tablet 10 mg (10 mg Oral Given 9/22/22 0827)   atorvastatin (LIPITOR) tablet 40 mg (40 mg Oral Given 9/22/22 2123)   cyanocobalamin (VITAMIN B-12) tablet 1,000 mcg (1,000 mcg Oral Given 9/22/22 0827)   apixaban (ELIQUIS) tablet 5 mg (5 mg Oral Given 9/22/22 1714)   levothyroxine tablet 50 mcg (50 mcg Oral Given 9/22/22 0827)   metoprolol succinate (TOPROL-XL) 24 hr tablet 50 mg (50 mg Oral Given 9/22/22 0827)   pantoprazole (PROTONIX) EC tablet 40 mg (40 mg Oral Given 9/22/22 0509)   potassium chloride (K-DUR,KLOR-CON) CR tablet 20 mEq (20 mEq Oral Given 9/22/22 0827)   hydrALAZINE (APRESOLINE) injection 5 mg (has no administration in time range)   losartan (COZAAR) tablet 50 mg (50 mg Oral Given 9/22/22 0827)   insulin lispro (HumaLOG) 100 units/mL subcutaneous injection 1-5 Units (2 Units Subcutaneous Given 9/22/22 1716)   insulin lispro (HumaLOG) 100 units/mL subcutaneous injection 1-5 Units (2 Units Subcutaneous Given 9/22/22 2124)   insulin glargine (LANTUS) subcutaneous injection 15 Units 0 15 mL (15 Units Subcutaneous Given 9/22/22 2123)   metoprolol tartrate (LOPRESSOR) tablet 50 mg (50 mg Oral Given 9/20/22 2111)   ondansetron (ZOFRAN) injection 4 mg (4 mg Intravenous Given 9/21/22 0643)   amLODIPine (NORVASC) tablet 10 mg (10 mg Oral Given 9/21/22 0643)   losartan (COZAAR) tablet 25 mg (25 mg Oral Given 9/21/22 0645)   meclizine (ANTIVERT) tablet 25 mg (25 mg Oral Given 9/22/22 1408)   furosemide (LASIX) injection 30 mg (30 mg Intravenous Given 9/22/22 1409)       Diagnostic Studies  Results Reviewed     Procedure Component Value Units Date/Time    Basic metabolic panel [[id number]]  (Abnormal) Collected: 09/22/22 0434    Lab Status: Final result Specimen: Blood from Arm, Right Updated: 09/22/22 0523     Sodium 139 mmol/L      Potassium 4 1 mmol/L      Chloride 104 mmol/L      CO2 29 mmol/L      ANION GAP 6 mmol/L      BUN 21 mg/dL      Creatinine 1 20 mg/dL      Glucose 202 mg/dL      Calcium 9 2 mg/dL      eGFR 39 ml/min/1 73sq m     Narrative:      [place] guidelines for Chronic Kidney Disease (CKD):     Stage 1 with normal or high GFR (GFR > 90 mL/min/1 73 square meters)    Stage 2 Mild CKD (GFR = 60-89 mL/min/1 73 square meters)    Stage 3A Moderate CKD (GFR = 45-59 mL/min/1 73 square meters)    Stage 3B Moderate CKD (GFR = 30-44 mL/min/1 73 square meters)    Stage 4 Severe CKD (GFR = 15-29 mL/min/1 73 square meters)    Stage 5 End Stage CKD (GFR <15 mL/min/1 73 square meters)  Note: GFR calculation is accurate only with a steady state creatinine    CBC (With Platelets) [[id number]]  (Normal) Collected: 09/22/22 0434    Lab Status: Final result Specimen: Blood from Arm, Right Updated: 09/22/22 0508     WBC 8 75 Thousand/uL      RBC 4 62 Million/uL      Hemoglobin 14 3 g/dL      Hematocrit 44 1 %      MCV 96 fL      MCH 31 0 pg      MCHC 32 4 g/dL      RDW 14 1 %      Platelets 841 Thousands/uL      MPV 9 9 fL     Fingerstick Glucose (POCT) [[id number]]  (Abnormal) Collected: 09/21/22 1538    Lab Status: Final result Updated: 09/21/22 1539     POC Glucose 241 mg/dl     Basic metabolic panel [[id number]]  (Abnormal) Collected: 09/21/22 1028    Lab Status: Final result Specimen: Blood from Arm, Left Updated: 09/21/22 1104     Sodium 137 mmol/L      Potassium 4 7 mmol/L      Chloride 104 mmol/L      CO2 27 mmol/L      ANION GAP 6 mmol/L      BUN 20 mg/dL      Creatinine 1 16 mg/dL      Glucose 326 mg/dL      Calcium 9 5 mg/dL      eGFR 40 ml/min/1 73sq m     Narrative:      [place] guidelines for Chronic Kidney Disease (CKD):     Stage 1 with normal or high GFR (GFR > 90 mL/min/1 73 square meters)    Stage 2 Mild CKD (GFR = 60-89 mL/min/1 73 square meters)    Stage 3A Moderate CKD (GFR = 45-59 mL/min/1 73 square meters)    Stage 3B Moderate CKD (GFR = 30-44 mL/min/1 73 square meters)    Stage 4 Severe CKD (GFR = 15-29 mL/min/1 73 square meters)    Stage 5 End Stage CKD (GFR <15 mL/min/1 73 square meters)  Note: GFR calculation is accurate only with a steady state creatinine    Fingerstick Glucose (POCT) [[id number]]  (Abnormal) Collected: 09/21/22 1057    Lab Status: Final result Updated: 09/21/22 1058     POC Glucose 290 mg/dl     CBC [[id number]]  (Abnormal) Collected: 09/21/22 1028    Lab Status: Final result Specimen: Blood from Arm, Left Updated: 09/21/22 1038     WBC 9 98 Thousand/uL      RBC 4 98 Million/uL      Hemoglobin 15 6 g/dL      Hematocrit 47 8 %      MCV 96 fL      MCH 31 3 pg      MCHC 32 6 g/dL      RDW 14 0 %      Platelets 071 Thousands/uL      MPV 9 8 fL     Fingerstick Glucose (POCT) [[id number]]  (Abnormal) Collected: 09/21/22 0609    Lab Status: Final result Updated: 09/21/22 0611     POC Glucose 214 mg/dl     Fingerstick Glucose (POCT) [[id number]]  (Abnormal) Collected: 09/21/22 0012    Lab Status: Final result Updated: 09/21/22 0012     POC Glucose 194 mg/dl     HS Troponin I 2hr [[id number]]  (Normal) Collected: 09/20/22 2035    Lab Status: Final result Specimen: Blood from Arm, Left Updated: 09/20/22 2104     hs TnI 2hr 24 ng/L      Delta 2hr hsTnI 0 ng/L     Fingerstick Glucose (POCT) [[id number]]  (Normal) Collected: 09/20/22 1947    Lab Status: Final result Updated: 09/20/22 1948     POC Glucose 137 mg/dl     Fingerstick Glucose (POCT) [[id number]]  (Normal) Collected: 09/20/22 1915    Lab Status: Final result Updated: 09/20/22 1917     POC Glucose 140 mg/dl     Comprehensive metabolic panel [[id number]]  (Abnormal) Collected: 09/20/22 1809    Lab Status: Final result Specimen: Blood from Arm, Left Updated: 09/20/22 1852     Sodium 140 mmol/L      Potassium 3 6 mmol/L      Chloride 104 mmol/L      CO2 26 mmol/L      ANION GAP 10 mmol/L      BUN 20 mg/dL      Creatinine 1 08 mg/dL      Glucose 103 mg/dL      Calcium 9 2 mg/dL      Corrected Calcium 9 9 mg/dL      AST 47 U/L      ALT 68 U/L      Alkaline Phosphatase 108 U/L      Total Protein 7 0 g/dL      Albumin 3 1 g/dL      Total Bilirubin 0 39 mg/dL      eGFR 44 ml/min/1 73sq m     Narrative:      [employer] guidelines for Chronic Kidney Disease (CKD):     Stage 1 with normal or high GFR (GFR > 90 mL/min/1 73 square meters)    Stage 2 Mild CKD (GFR = 60-89 mL/min/1 73 square meters)    Stage 3A Moderate CKD (GFR = 45-59 mL/min/1 73 square meters)    Stage 3B Moderate CKD (GFR = 30-44 mL/min/1 73 square meters)    Stage 4 Severe CKD (GFR = 15-29 mL/min/1 73 square meters)    Stage 5 End Stage CKD (GFR <15 mL/min/1 73 square meters)  Note: GFR calculation is accurate only with a steady state creatinine    HS Troponin 0hr (reflex protocol) [[id number]]  (Normal) Collected: 09/20/22 1809    Lab Status: Final result Specimen: Blood from Arm, Left Updated: 09/20/22 1842     hs TnI 0hr 24 ng/L     Fingerstick Glucose (POCT) [[id number]]  (Normal) Collected: 09/20/22 1838    Lab Status: Final result Updated: 09/20/22 1839     POC Glucose 116 mg/dl     Protime-INR [[id number]]  (Abnormal) Collected: 09/20/22 1809    Lab Status: Final result Specimen: Blood from Arm, Left Updated: 09/20/22 1835     Protime 15 0 seconds      INR 1 21    CBC and differential [[id number]] Collected: 09/20/22 1809    Lab Status: Final result Specimen: Blood from Arm, Left Updated: 09/20/22 1817     WBC 7 64 Thousand/uL      RBC 4 87 Million/uL      Hemoglobin 15 1 g/dL      Hematocrit 46 0 %      MCV 95 fL      MCH 31 0 pg      MCHC 32 8 g/dL      RDW 14 1 %      MPV 9 6 fL      Platelets 194 Thousands/uL      nRBC 0 /100 WBCs      Neutrophils Relative 61 %      Immat GRANS % 0 %      Lymphocytes Relative 27 %      Monocytes Relative 8 %      Eosinophils Relative 3 %      Basophils Relative 1 %      Neutrophils Absolute 4 72 Thousands/µL      Immature Grans Absolute 0 02 Thousand/uL      Lymphocytes Absolute 2 03 Thousands/µL      Monocytes Absolute 0 60 Thousand/µL      Eosinophils Absolute 0 19 Thousand/µL      Basophils Absolute 0 08 Thousands/µL     Fingerstick Glucose (POCT) [[id number]]  (Normal) Collected: 09/20/22 1811    Lab Status: Final result Updated: 09/20/22 1813     POC Glucose 104 mg/dl     UA w Reflex to Microscopic w Reflex to Culture [[id number]]     Lab Status: No result Specimen: Urine                  XR chest pa & lateral   Final Result by Barb Saucedo MD (09/21 1022)      Mild pulmonary vascular congestive changes appear be slightly improved from previous exam, though this improvement could simply reflect improved inspiratory effort  Patchy increased parenchymal density in the lateral left base on the frontal view is suspicious for atelectasis or pneumonia in the lingula  Continued radiographic follow-up is recommended  Workstation performed: [id number]         CT head wo contrast   Final Result by Andrew Castillo MD (09/21 0890)      No acute intracranial abnormality  Chronic microangiopathic changes  Workstation performed: [id number]         XR chest pa & lateral    (Results Pending)              Procedures  Procedures         ED Course               Identification of Seniors at [address] [place] [address] Most Recent Value   (ISAR) Identification of Seniors at Risk    Before the illness or injury that brought you to the Emergency, did you need someone to help you on a regular basis? 1 Filed at: 09/20/2022 1803   In the last 24 hours, have you needed more help than usual? 0 Filed at: 09/20/2022 1803   Have you been hospitalized for one or more nights during the past 6 months? 0 Filed at: 09/20/2022 1803   In general, do you see well? 1 Filed at: 09/20/2022 1803   In general, do you have serious problems with your memory? 0 Filed at: 09/20/2022 1803   Do you take more than three different medications every day? 1 Filed at: 09/20/2022 1803   ISAR Score 3 Filed at: 09/20/2022 1803                      SBIRT 22yo+    Flowsheet Row Most Recent Value   SBIRT (25 yo +)    In order to provide better care to our patients, we are screening all of our patients for alcohol and drug use   Would it be okay to ask you these screening questions? Yes Filed at: 09/20/2022 1805   Initial Alcohol Screen: US AUDIT-C     1  How often do you have a drink containing alcohol? 0 Filed at: 09/20/2022 1805   2  How many drinks containing alcohol do you have on a typical day you are drinking? 0 Filed at: 09/20/2022 1805   3b  FEMALE Any Age, or MALE 65+: How often do you have 4 or more drinks on one occassion? 0 Filed at: 09/20/2022 1805   Audit-C Score 0 Filed at: 09/20/2022 1805   JAISON: How many times in the past year have you    Used an illegal drug or used a prescription medication for non-medical reasons?  Never Filed at: 09/20/2022 1805                    MDM  Number of Diagnoses or Management Options  Ambulatory dysfunction: new and requires workup  Dizziness: new and requires workup  Hypertension: new and requires workup  Uncontrolled hypertension: new and requires workup     Amount and/or Complexity of Data Reviewed  Clinical lab tests: ordered and reviewed  Tests in the radiology section of CPT®: ordered and reviewed  Independent visualization of images, tracings, or specimens: yes    Patient Progress  Patient progress: stable      Disposition  Final diagnoses:   Hypertension   Uncontrolled hypertension   Dizziness   Ambulatory dysfunction     Time reflects when diagnosis was documented in both MDM as applicable and the Disposition within this note     Time User Action Codes Description Comment    9/20/2022  9:07 PM Azeem Weaver Add [I10] Hypertension     9/21/2022  8:04 AM Gume Miser E Add [I10] Uncontrolled hypertension     9/21/2022  8:04 AM Gume Miser E Add [R42] Dizziness     9/21/2022  8:04 AM Gume Miser E Add [R26 2] Ambulatory dysfunction       ED Disposition     ED Disposition   Admit    Condition   Stable    Date/Time   Wed Sep 21, 2022  8:05 AM    Comment   Case was discussed with KURT and the patient's admission status was agreed to be Admission Status: observation status to the service of Dr Anthony Moroe             Follow-up Information     Follow up With Specialties Details Why Ancelmo Ivan MD Internal Medicine   [id number]  Ancelmo Schofield  [phone]            Current Discharge Medication List      CONTINUE these medications which have NOT CHANGED    Details   amLODIPine (NORVASC) 10 mg tablet Take 10 mg by mouth daily      atorvastatin (LIPITOR) 40 mg tablet 1 TAB ORALLY AT BEDTIME DX:HYPERLIPIDEMIA  Qty: 30 tablet, Refills: 3    Associated Diagnoses: Type 2 diabetes mellitus with hyperglycemia, with long-term current use of insulin ([facility])      calcium polycarbophil (FIBERCON) 625 mg tablet Take 1 tablet (625 mg total) by mouth daily  Qty: 90 tablet, Refills: 3    Associated Diagnoses: Constipation, unspecified constipation type      Cyanocobalamin (B-12) 1000 MCG TABS Take 1,000 mcg by mouth daily  Qty: 90 tablet, Refills: 3    Associated Diagnoses: B12 deficiency      dorzolamide (TRUSOPT) 2 % ophthalmic solution       Eliquis 5 MG Take 5 mg by mouth 2 (two) times a day       furosemide (LASIX) 20 mg tablet 1 5 TAB (30MG) ORALLY TWICE DAILY AT 8AM & 5PM DX: EDEMA  Qty: 84 tablet, Refills: 4    Associated Diagnoses: CHF (congestive heart failure) ([facility])      insulin glargine (Lantus SoloStar) 100 units/mL injection pen INJECT 20UNITS SUB-Q AT BEDTIME DX: DIABETES (IDDM) *DISCARD OPENED PEN UNIT 28-DAYS AFTER FIRST USE*      insulin lispro (HumaLOG) 100 units/mL injection pen INJECT 8 UNITS SUB-Q 3 TIMES DAILY BEFORE MEALS DX: DIABETES (IDDM) *DISCARD OPENED PEN UNIT 28-DAYS AFTER FIRST USE* SSI; 151-200=2U,201-250=4U,251 -300=6U,301-350=8U,351-40 0=10U,>401=12U **DISCARD      Insulin Pen Needle (NovoFine Autocover) 30G X 8 MM MISC Inject under the skin 4 (four) times a day  Qty: 100 each, Refills: 4    Associated Diagnoses: Type 2 diabetes mellitus with hyperglycemia, with long-term current use of insulin ([facility])      latanoprost (XALATAN) 0 005 % ophthalmic solution INSTILL 1 DROP INTO RIGHT EYE AT BEDTIME DX: GLAUCOMA **DISCARD 6 WK AFTER OPENED**  Qty: 2 5 mL, Refills: 4    Associated Diagnoses: Glaucoma, unspecified glaucoma type, unspecified laterality      levothyroxine 50 mcg tablet 1 TAB ORALLY EVERY MORNING DX: HYPOTHYROIDISM      losartan (COZAAR) 100 MG tablet Take 25 mg by mouth daily      methylcellulose (Citrucel) 500 mg tablet Take 1 tablet (500 mg total) by mouth daily  Qty: 90 each, Refills: 3    Associated Diagnoses: Constipation, unspecified constipation type      metoprolol succinate (TOPROL-XL) 50 mg 24 hr tablet 1 TAB ORALLY TWICE DAILY DX: HYPERTENSION      omeprazole (PriLOSEC) 20 mg delayed release capsule 1 CAP ORALLY EVERY MORNING DX: GERD  Qty: 28 capsule, Refills: 4    Associated Diagnoses: GERD without esophagitis      potassium chloride (K-DUR,KLOR-CON) 20 mEq tablet 1 TAB ORALLY EVERY MORNING DX:ELECTROLYTE DEFICIENCY  Qty: 28 tablet, Refills: 4    Associated Diagnoses: Acute on chronic right-sided congestive heart failure (HCC)      Blood Glucose Monitoring Suppl (Glucocard Vital Monitor) w/Device KIT Use 4 (four) times a day E11 65  Qty: 1 kit, Refills: 1    Associated Diagnoses: Type 2 diabetes mellitus with hyperglycemia, with long-term current use of insulin ([facility])      glucose blood (Glucocard Vital Test) test strip Check sugar up to 4 times a day  Qty: 100 each, Refills: 4    Associated Diagnoses: Type 2 diabetes mellitus with hyperglycemia, with long-term current use of insulin ([facility])      Multiple Vitamins-Calcium (One-A-Day Womens Formula) TABS Take 1 tablet by mouth every morning Dx: Supplement  Qty: 30 tablet, Refills: 2    Associated Diagnoses: Healthcare maintenance      Multiple Vitamins-Minerals (One-A-Day Womens) tablet Take 1 tablet by mouth every morning Dx: Supplement  Qty: 30 tablet, Refills: 5    Associated Diagnoses: Vitamin D deficiency      Nutritional Supplements (Ensure Complete Shake) LIQD Take 1 each by mouth 3 (three) times a day  Qty: 711 mL, Refills: 3    Associated Diagnoses: Type 2 diabetes mellitus with hyperglycemia, with long-term current use of insulin ([facility])      ReadyLance Safety Lancets MISC AS DIRECTED FOR BLOOD GLUCOSE TESTING 4 TIMES DAILY AND AS NEEDED FORSIGNS/SYMPTOMS OF HI/LOW BS  Qty: 100 each, Refills: 4    Associated Diagnoses: Type 2 diabetes mellitus with hyperglycemia, with long-term current use of insulin ([facility]             No discharge procedures on file      PDMP Review     None          ED Provider  Electronically Signed by           Natividad De Souza DO  09/22/22 8825

## 2022-09-21 ENCOUNTER — APPOINTMENT (OUTPATIENT)
Dept: RADIOLOGY | Facility: HOSPITAL | Age: 87
DRG: 638 | End: 2022-09-21
Payer: MEDICARE

## 2022-09-21 ENCOUNTER — APPOINTMENT (OUTPATIENT)
Dept: CT IMAGING | Facility: HOSPITAL | Age: 87
DRG: 638 | End: 2022-09-21
Payer: MEDICARE

## 2022-09-21 PROBLEM — I16.0 HYPERTENSIVE URGENCY: Status: ACTIVE | Noted: 2017-12-28

## 2022-09-21 PROBLEM — R09.02 HYPOXIA: Status: ACTIVE | Noted: 2022-09-21

## 2022-09-21 PROBLEM — R42 DIZZINESS: Status: ACTIVE | Noted: 2022-09-21

## 2022-09-21 LAB
ANION GAP SERPL CALCULATED.3IONS-SCNC: 6 MMOL/L (ref 4–13)
ATRIAL RATE: 56 BPM
BUN SERPL-MCNC: 20 MG/DL (ref 5–25)
CALCIUM SERPL-MCNC: 9.5 MG/DL (ref 8.3–10.1)
CHLORIDE SERPL-SCNC: 104 MMOL/L (ref 96–108)
CO2 SERPL-SCNC: 27 MMOL/L (ref 21–32)
CREAT SERPL-MCNC: 1.16 MG/DL (ref 0.6–1.3)
ERYTHROCYTE [DISTWIDTH] IN BLOOD BY AUTOMATED COUNT: 14 % (ref 11.6–15.1)
GFR SERPL CREATININE-BSD FRML MDRD: 40 ML/MIN/1.73SQ M
GLUCOSE SERPL-MCNC: 194 MG/DL (ref 65–140)
GLUCOSE SERPL-MCNC: 214 MG/DL (ref 65–140)
GLUCOSE SERPL-MCNC: 241 MG/DL (ref 65–140)
GLUCOSE SERPL-MCNC: 290 MG/DL (ref 65–140)
GLUCOSE SERPL-MCNC: 326 MG/DL (ref 65–140)
GLUCOSE SERPL-MCNC: 329 MG/DL (ref 65–140)
HCT VFR BLD AUTO: 47.8 % (ref 34.8–46.1)
HGB BLD-MCNC: 15.6 G/DL (ref 11.5–15.4)
MCH RBC QN AUTO: 31.3 PG (ref 26.8–34.3)
MCHC RBC AUTO-ENTMCNC: 32.6 G/DL (ref 31.4–37.4)
MCV RBC AUTO: 96 FL (ref 82–98)
PLATELET # BLD AUTO: 224 THOUSANDS/UL (ref 149–390)
PMV BLD AUTO: 9.8 FL (ref 8.9–12.7)
POTASSIUM SERPL-SCNC: 4.7 MMOL/L (ref 3.5–5.3)
QRS AXIS: -85 DEGREES
QRSD INTERVAL: 170 MS
QT INTERVAL: 520 MS
QTC INTERVAL: 561 MS
RBC # BLD AUTO: 4.98 MILLION/UL (ref 3.81–5.12)
SODIUM SERPL-SCNC: 137 MMOL/L (ref 135–147)
T WAVE AXIS: 78 DEGREES
VENTRICULAR RATE: 70 BPM
WBC # BLD AUTO: 9.98 THOUSAND/UL (ref 4.31–10.16)

## 2022-09-21 PROCEDURE — 85027 COMPLETE CBC AUTOMATED: CPT | Performed by: STUDENT IN AN ORGANIZED HEALTH CARE EDUCATION/TRAINING PROGRAM

## 2022-09-21 PROCEDURE — 82948 REAGENT STRIP/BLOOD GLUCOSE: CPT

## 2022-09-21 PROCEDURE — 71046 X-RAY EXAM CHEST 2 VIEWS: CPT

## 2022-09-21 PROCEDURE — 80048 BASIC METABOLIC PNL TOTAL CA: CPT | Performed by: STUDENT IN AN ORGANIZED HEALTH CARE EDUCATION/TRAINING PROGRAM

## 2022-09-21 PROCEDURE — 96374 THER/PROPH/DIAG INJ IV PUSH: CPT

## 2022-09-21 PROCEDURE — 93010 ELECTROCARDIOGRAM REPORT: CPT | Performed by: INTERNAL MEDICINE

## 2022-09-21 PROCEDURE — 36415 COLL VENOUS BLD VENIPUNCTURE: CPT | Performed by: STUDENT IN AN ORGANIZED HEALTH CARE EDUCATION/TRAINING PROGRAM

## 2022-09-21 PROCEDURE — 70450 CT HEAD/BRAIN W/O DYE: CPT

## 2022-09-21 PROCEDURE — 99219 PR INITIAL OBSERVATION CARE/DAY 50 MINUTES: CPT | Performed by: STUDENT IN AN ORGANIZED HEALTH CARE EDUCATION/TRAINING PROGRAM

## 2022-09-21 PROCEDURE — G1004 CDSM NDSC: HCPCS

## 2022-09-21 RX ORDER — INSULIN GLARGINE 100 [IU]/ML
10 INJECTION, SOLUTION SUBCUTANEOUS
Status: DISCONTINUED | OUTPATIENT
Start: 2022-09-21 | End: 2022-09-22

## 2022-09-21 RX ORDER — LOSARTAN POTASSIUM 25 MG/1
25 TABLET ORAL ONCE
Status: COMPLETED | OUTPATIENT
Start: 2022-09-21 | End: 2022-09-21

## 2022-09-21 RX ORDER — POTASSIUM CHLORIDE 20 MEQ/1
20 TABLET, EXTENDED RELEASE ORAL EVERY MORNING
Status: DISCONTINUED | OUTPATIENT
Start: 2022-09-21 | End: 2022-09-26 | Stop reason: HOSPADM

## 2022-09-21 RX ORDER — FUROSEMIDE 20 MG/1
30 TABLET ORAL
Status: DISCONTINUED | OUTPATIENT
Start: 2022-09-21 | End: 2022-09-22

## 2022-09-21 RX ORDER — LOSARTAN POTASSIUM 25 MG/1
25 TABLET ORAL DAILY
Status: DISCONTINUED | OUTPATIENT
Start: 2022-09-22 | End: 2022-09-21

## 2022-09-21 RX ORDER — INSULIN LISPRO 100 [IU]/ML
1-5 INJECTION, SOLUTION INTRAVENOUS; SUBCUTANEOUS
Status: DISCONTINUED | OUTPATIENT
Start: 2022-09-21 | End: 2022-09-26 | Stop reason: HOSPADM

## 2022-09-21 RX ORDER — FUROSEMIDE 40 MG/1
20 TABLET ORAL
Status: DISCONTINUED | OUTPATIENT
Start: 2022-09-21 | End: 2022-09-21

## 2022-09-21 RX ORDER — LEVOTHYROXINE SODIUM 0.05 MG/1
50 TABLET ORAL EVERY MORNING
Status: DISCONTINUED | OUTPATIENT
Start: 2022-09-21 | End: 2022-09-26 | Stop reason: HOSPADM

## 2022-09-21 RX ORDER — HYDRALAZINE HYDROCHLORIDE 20 MG/ML
5 INJECTION INTRAMUSCULAR; INTRAVENOUS EVERY 6 HOURS PRN
Status: DISCONTINUED | OUTPATIENT
Start: 2022-09-21 | End: 2022-09-26 | Stop reason: HOSPADM

## 2022-09-21 RX ORDER — ATORVASTATIN CALCIUM 40 MG/1
40 TABLET, FILM COATED ORAL
Status: DISCONTINUED | OUTPATIENT
Start: 2022-09-21 | End: 2022-09-23

## 2022-09-21 RX ORDER — LOSARTAN POTASSIUM 50 MG/1
50 TABLET ORAL DAILY
Status: DISCONTINUED | OUTPATIENT
Start: 2022-09-22 | End: 2022-09-26 | Stop reason: HOSPADM

## 2022-09-21 RX ORDER — PANTOPRAZOLE SODIUM 40 MG/1
40 TABLET, DELAYED RELEASE ORAL
Status: DISCONTINUED | OUTPATIENT
Start: 2022-09-21 | End: 2022-09-26 | Stop reason: HOSPADM

## 2022-09-21 RX ORDER — AMLODIPINE BESYLATE 10 MG/1
10 TABLET ORAL DAILY
Status: DISCONTINUED | OUTPATIENT
Start: 2022-09-22 | End: 2022-09-26 | Stop reason: HOSPADM

## 2022-09-21 RX ORDER — ONDANSETRON 2 MG/ML
4 INJECTION INTRAMUSCULAR; INTRAVENOUS ONCE
Status: COMPLETED | OUTPATIENT
Start: 2022-09-21 | End: 2022-09-21

## 2022-09-21 RX ORDER — METOPROLOL SUCCINATE 50 MG/1
50 TABLET, EXTENDED RELEASE ORAL DAILY
Status: DISCONTINUED | OUTPATIENT
Start: 2022-09-22 | End: 2022-09-26 | Stop reason: HOSPADM

## 2022-09-21 RX ORDER — AMLODIPINE BESYLATE 5 MG/1
10 TABLET ORAL ONCE
Status: COMPLETED | OUTPATIENT
Start: 2022-09-21 | End: 2022-09-21

## 2022-09-21 RX ADMIN — CYANOCOBALAMIN TAB 500 MCG 1000 MCG: 500 TAB at 09:08

## 2022-09-21 RX ADMIN — INSULIN LISPRO 3 UNITS: 100 INJECTION, SOLUTION INTRAVENOUS; SUBCUTANEOUS at 11:26

## 2022-09-21 RX ADMIN — AMLODIPINE BESYLATE 10 MG: 5 TABLET ORAL at 06:43

## 2022-09-21 RX ADMIN — LOSARTAN POTASSIUM 25 MG: 25 TABLET, FILM COATED ORAL at 06:45

## 2022-09-21 RX ADMIN — LEVOTHYROXINE SODIUM 50 MCG: 0.05 TABLET ORAL at 09:09

## 2022-09-21 RX ADMIN — INSULIN LISPRO 3 UNITS: 100 INJECTION, SOLUTION INTRAVENOUS; SUBCUTANEOUS at 21:10

## 2022-09-21 RX ADMIN — INSULIN LISPRO 2 UNITS: 100 INJECTION, SOLUTION INTRAVENOUS; SUBCUTANEOUS at 18:19

## 2022-09-21 RX ADMIN — APIXABAN 5 MG: 5 TABLET, FILM COATED ORAL at 18:19

## 2022-09-21 RX ADMIN — ATORVASTATIN CALCIUM 40 MG: 40 TABLET, FILM COATED ORAL at 21:09

## 2022-09-21 RX ADMIN — FUROSEMIDE 30 MG: 20 TABLET ORAL at 18:19

## 2022-09-21 RX ADMIN — PANTOPRAZOLE SODIUM 40 MG: 40 TABLET, DELAYED RELEASE ORAL at 09:10

## 2022-09-21 RX ADMIN — POTASSIUM CHLORIDE 20 MEQ: 1500 TABLET, EXTENDED RELEASE ORAL at 09:10

## 2022-09-21 RX ADMIN — APIXABAN 5 MG: 5 TABLET, FILM COATED ORAL at 09:10

## 2022-09-21 RX ADMIN — INSULIN GLARGINE 10 UNITS: 100 INJECTION, SOLUTION SUBCUTANEOUS at 21:10

## 2022-09-21 RX ADMIN — ONDANSETRON 4 MG: 2 INJECTION INTRAMUSCULAR; INTRAVENOUS at 06:43

## 2022-09-21 NOTE — H&P
[facility]  Progress Note Miryam Allred 3/24/1929, 80 y o  female MRN: [id number]  Unit/Bed#: ED 25 Encounter: [id number]  Primary Care Provider: Haley Sainz MD   Date and time admitted to hospital: 9/20/2022  5:54 PM    Dizziness  Assessment & Plan  Per daughter and patient ongoing 2 week history weakness and dizziness  Denies focal deficits  No focal neurological deficit, exam   Difficulty with ambulation secondary to dizziness  Differential include secondary to hypertensive urgency versus vertigo (per chart review has previous history) versus less likely posterior stroke  · Head CT reveals chronic microangiopathic changes  No acute changes noted  · Follow-up on echocardiogram  · Orthostatic blood pressure  · Blood pressure management as highlighted above  · Consider trial of meclizine versus further workup with MRI if symptoms do not improve with normotension  · PT/OT      Hypoxia  Assessment & Plan  New onset hypoxia  de satting to the mid [de-identified] on room air  Currently on 2 L nasal cannula satting 94%  Denies shortness of breath  No wheezing appreciated  Etiology deconditioning versus atelectasis versus underlying lung process  · Follow-up on chest x-ray PA and lateral  · Continue to wean off oxygen as tolerated to maintain SpO2 sats greater than 90%    CKD (chronic kidney disease) stage 3, GFR 30-59 ml/min [facility])  Assessment & Plan  Lab Results   Component Value Date    EGFR 44 09/20/2022    EGFR 44 10/24/2021    EGFR 43 10/24/2021    CREATININE 1 08 09/20/2022    CREATININE 1 09 10/24/2021    CREATININE 1 11 10/24/2021   Creatinine appears to be at baseline  Intake and output  Monitor with a m  BMP    Paroxysmal atrial fibrillation ([facility] [address] )  Assessment & Plan  Currently heart rate well controlled    Denies chest pain or palpitations  · Metoprolol succinate 50 mg b i d   · Eliquis 5 mg b i d   · Monitor on telemetry    GERD without esophagitis  Assessment & Plan  No acute complaints at this time  Continue Protonix while inpatient    History of heart block  Assessment & Plan  Status post permanent pacemaker    Hypothyroidism  Assessment & Plan  Continue home dose levothyroxine 50 daily  Follow-up on TSH    Type 2 diabetes mellitus with hyperglycemia, with long-term current use of insulin [facility])  Assessment & Plan  Lab Results   Component Value Date    HGBA1C 8 0 (H) 09/15/2022       Recent Labs     09/20/22  1915 09/20/22  1947 09/21/22  0012 09/21/22  0609   POCGLU 140 137 194* 214*       Blood Sugar Average: Last 72 hrs:  (P) 913 [id number]     Endorses compliance with home regimen of Lantus 16 units q h s  And 11 units t i d  Of Humalog  Initially referred to ED with hypoglycemic episode, received her insulin prior to feeding per nursing facility  Most recent blood glucose 214  · Will initiate on Lantus 10 units q h s  + sliding scale insulin for now      * Hypertensive urgency  Assessment & Plan  Initially normotensive, noted to be hypertensive with SBP in the 200s early morning  Endorses compliance with medications  Med rec with pharmacy confirm that patient has not been receiving amlodipine or losartan  · Continue home regimen of:  Lasix 30 mg b i d  And metoprolol succinate 50 mg b i d  · Restart on amlodipine 10 mg daily and losartan 50 mg daily  · IV hydralazine for SBP greater than 180  · Monitor vitals closely    VTE Pharmacologic Prophylaxis: VTE Score: 5 High Risk (Score >/= 5) - Pharmacological DVT Prophylaxis Ordered: apixaban (Eliquis)  Sequential Compression Devices Ordered  Code Status: Level 3 - DNAR and DNI   Discussion with family: Updated  (daughter) via phone  Anticipated Length of Stay: Patient will be admitted on an observation basis with an anticipated length of stay of less than 2 midnights secondary to Hypertensive urgency      Total Time for Visit, including Counseling / Coordination of Care: 45 minutes Greater than 50% of this total time spent on direct patient counseling and coordination of care  Chief Complaint:  Dizziness    History of Present Illness:  Marizol Corea is a 80 y o  female with a PMH of type 2 diabetes, heart failure, heart block status post pacemaker, hypertension who initially presented to the ED due to hypoglycemia  Per nursing facility she was given insulin prior to feeding, blood glucose noted to be 51 at facility  Resolution with oral glucose  Upon arrival to the ED blood pressure was stable, blood glucose 106  Shortly thereafter blood pressure noted to be in the 200  Patient remained asymptomatic troponin negative  Endorses compliance with home regimen  Upon med rec with pharmacy noted that patient had not been taking amlodipine or losartan, these appear to have discontinue in April  Patient denies chest pain or shortness of breath  Endorses generalized fatigue and weakness as well as dizziness  Dizziness worse with ambulation, feels unsteady on her feet  Otherwise ROS negative  Updated daughter via telephone  Patient confirmed level 3 DNR/DNI code status  Review of Systems:  Review of Systems   Constitutional: Positive for activity change  Neurological: Positive for dizziness and weakness  All other systems reviewed and are negative  Past Medical and Surgical History:   Past Medical History:   Diagnosis Date    Atrial fibrillation ([facility] [address] )     CHF (congestive heart failure) ([facility] [address] )     Chronic pain     Diabetes mellitus ([facility] [address] )     Hyperlipidemia     Hypertension     Pacemaker     Pneumonia due to COVID-19 virus 04/14/2020    Squamous cell skin cancer     Type 2 diabetes mellitus with hyperglycemia, with long-term current use of insulin ([facility] [address] ) 12/28/2017       Past Surgical History:   Procedure Laterality Date    BACK SURGERY      CARDIAC PACEMAKER PLACEMENT      COLONOSCOPY         Meds/Allergies:  Prior to Admission medications    Medication Sig Start Date End Date Taking? Authorizing Provider   amLODIPine (NORVASC) 10 mg tablet Take 10 mg by mouth daily   Yes Historical Provider, MD   atorvastatin (LIPITOR) 40 mg tablet 1 TAB ORALLY AT BEDTIME DX:HYPERLIPIDEMIA 4/27/22  Yes Sheeba Garrett MD   calcium polycarbophil (FIBERCON) 625 mg tablet Take 1 tablet (625 mg total) by mouth daily 4/12/22  Yes Kaaren Kanner, PA-C   Cyanocobalamin (B-12) 1000 MCG TABS Take 1,000 mcg by mouth daily 8/31/22  Yes Sheeba Garrett MD   dorzolamide (TRUSOPT) 2 % ophthalmic solution  8/10/21  Yes Historical Provider, MD   Eliquis 5 MG Take 5 mg by mouth 2 (two) times a day  9/14/21  Yes Historical Provider, MD   furosemide (LASIX) 20 mg tablet 1 5 TAB (30MG) ORALLY TWICE DAILY AT 8AM & 5PM DX: EDEMA 5/5/22  Yes Sheeba Garrett MD   insulin glargine (Lantus SoloStar) 100 units/mL injection pen INJECT 20UNITS SUB-Q AT BEDTIME DX: DIABETES (IDDM) *DISCARD OPENED PEN UNIT 28-DAYS AFTER FIRST USE* 9/29/21  Yes Historical Provider, MD   insulin lispro (HumaLOG) 100 units/mL injection pen INJECT 8 UNITS SUB-Q 3 TIMES DAILY BEFORE MEALS DX: DIABETES (IDDM) *DISCARD OPENED PEN UNIT 28-DAYS AFTER FIRST USE* SSI; 151-200=2U,201-250=4U,251 -300=6U,301-350=8U,351-40 0=10U,>401=12U **DISCARD 4/23/21  Yes Historical Provider, MD   Insulin Pen Needle (NovoFine Autocover) 30G X 8 MM MISC Inject under the skin 4 (four) times a day 6/22/20  Yes Sheeba Garrett MD   latanoprost (XALATAN) 0 005 % ophthalmic solution INSTILL 1 DROP INTO RIGHT EYE AT BEDTIME DX: GLAUCOMA **DISCARD 6 WK AFTER OPENED** 5/28/20  Yes Sheeba Garrett MD   levothyroxine 50 mcg tablet 1 TAB ORALLY EVERY MORNING DX: HYPOTHYROIDISM 9/13/21  Yes Historical Provider, MD   losartan (COZAAR) 100 MG tablet Take 25 mg by mouth daily   Yes Historical Provider, MD   methylcellulose (Citrucel) 500 mg tablet Take 1 tablet (500 mg total) by mouth daily 4/9/21  Yes Kaaren Kanner, PA-C   metoprolol succinate (TOPROL-XL) 50 mg 24 hr tablet 1 TAB ORALLY TWICE DAILY DX: HYPERTENSION 9/20/21  Yes Historical Provider, MD   omeprazole (PriLOSEC) 20 mg delayed release capsule 1 CAP ORALLY EVERY MORNING DX: GERD 5/19/22  Yes Adolfo London MD   potassium chloride (K-DUR,KLOR-CON) 20 mEq tablet 1 TAB ORALLY EVERY MORNING DX:ELECTROLYTE DEFICIENCY 6/27/22  Yes Javier Vera MD   Blood Glucose Monitoring Suppl (Glucocard Vital Monitor) w/Device KIT Use 4 (four) times a day E11 65 7/14/22   Adolfo London MD   glucose blood (Glucocard Vital Test) test strip Check sugar up to 4 times a day 11/17/20   Adolfo London MD   Multiple Vitamins-Calcium (One-A-Day Womens Formula) TABS Take 1 tablet by mouth every morning Dx: Supplement 12/8/21   Adolfo London MD   Multiple Vitamins-Minerals (One-A-Day Womens) tablet Take 1 tablet by mouth every morning Dx: Supplement 8/10/22   Adolfo London MD   Nutritional Supplements (Ensure Complete Shake) LIQD Take 1 each by mouth 3 (three) times a day 1/19/22   Adolfo London MD   ReadyLance Safety Lancets MISC AS DIRECTED FOR BLOOD GLUCOSE TESTING 4 TIMES DAILY AND AS NEEDED FORSIGNS/SYMPTOMS OF HI/LOW BS 5/28/20   Adolfo London MD     I have reviewed home medications with a medical source (PCP, Pharmacy, other)  Allergies: Allergies   Allergen Reactions    Hydrocodone-Acetaminophen Nausea Only       Social History:  Marital Status:     Occupation: n/a  Patient Pre-hospital Living Situation: Assisted Living  Patient Pre-hospital Level of Mobility: walks with walker  Patient Pre-hospital Diet Restrictions: n/a  Substance Use History:   Social History     Substance and Sexual Activity   Alcohol Use Not Currently    Alcohol/week: 0 0 standard drinks     Social History     Tobacco Use   Smoking Status Never Smoker   Smokeless Tobacco Never Used     Social History     Substance and Sexual Activity   Drug Use No       Family History:  Family History   Problem Relation Age of Onset    Colon cancer Mother     Heart attack Father        Physical Exam:     Vitals:   Blood Pressure: (!) 194/81 (09/21/22 1475)  Pulse: 68 (09/21/22 0918)  Temperature: 97 7 °F (36 5 °C) (09/20/22 1812)  Temp Source: Oral (09/20/22 1812)  Respirations: (!) 30 (09/21/22 0918)  Height: 5' 2" (157 5 cm) (09/20/22 1801)  Weight - Scale: 69 4 kg (153 lb) (09/20/22 1801)  SpO2: 97 % (09/21/22 9560)    Physical Exam  Vitals reviewed  Constitutional:       Appearance: She is not diaphoretic  HENT:      Head: Normocephalic and atraumatic  Eyes:      General: No scleral icterus  Cardiovascular:      Rate and Rhythm: Normal rate and regular rhythm  Heart sounds: No murmur heard  No gallop  Pulmonary:      Breath sounds: Normal breath sounds  No wheezing  Abdominal:      Palpations: Abdomen is soft  Tenderness: There is no abdominal tenderness  Musculoskeletal:      Right lower leg: No edema  Left lower leg: No edema  Skin:     Coloration: Skin is pale  Neurological:      Motor: Weakness present  Comments: Oriented to person and place   No focal deficit    Psychiatric:         Mood and Affect: Mood normal          Behavior: Behavior normal           Additional Data:     Lab Results:  Results from last 7 days   Lab Units 09/20/22  1809   WBC Thousand/uL 7 64   HEMOGLOBIN g/dL 15 1   HEMATOCRIT % 46 0   PLATELETS Thousands/uL 232   NEUTROS PCT % 61   LYMPHS PCT % 27   MONOS PCT % 8   EOS PCT % 3     Results from last 7 days   Lab Units 09/20/22  1809   SODIUM mmol/L 140   POTASSIUM mmol/L 3 6   CHLORIDE mmol/L 104   CO2 mmol/L 26   BUN mg/dL 20   CREATININE mg/dL 1 08   ANION GAP mmol/L 10   CALCIUM mg/dL 9 2   ALBUMIN g/dL 3 1*   TOTAL BILIRUBIN mg/dL 0 39   ALK PHOS U/L 108   ALT U/L 68   AST U/L 47*   GLUCOSE RANDOM mg/dL 103     Results from last 7 days   Lab Units 09/20/22  1809   INR  1 21*     Results from last 7 days   Lab Units 09/21/22  0609 09/21/22  0012 09/20/22  1947 09/20/22  1915 09/20/22  1838 09/20/22  1811   POC GLUCOSE mg/dl 214* 194* 137 140 116 104 Results from last 7 days   Lab Units 09/15/22  0713   HEMOGLOBIN A1C % 8 0*           Imaging: Reviewed radiology reports from this admission including: CT head  CT head wo contrast   Final Result by Rupali Gallagher MD (09/21 6003)      No acute intracranial abnormality  Chronic microangiopathic changes  Workstation performed: [id number]         XR chest pa & lateral    (Results Pending)       EKG and Other Studies Reviewed on Admission:   · EKG: NSR  HR 76     ** Please Note: This note has been constructed using a voice recognition system   **

## 2022-09-21 NOTE — PLAN OF CARE
Problem: Potential for Falls  Goal: Patient will remain free of falls  Description: INTERVENTIONS:  - Educate patient/family on patient safety including physical limitations  - Instruct patient to call for assistance with activity   - Consult OT/PT to assist with strengthening/mobility   - Keep Call bell within reach  - Keep bed low and locked with side rails adjusted as appropriate  - Keep care items and personal belongings within reach  - Initiate and maintain comfort rounds  - Make Fall Risk Sign visible to staff  - Offer Toileting every 2 Hours, in advance of need  - Initiate/Maintain alarm  - Obtain necessary fall risk management equipment:   - Apply yellow socks and bracelet for high fall risk patients  - Consider moving patient to room near nurses station  Outcome: Progressing     Problem: PAIN - ADULT  Goal: Verbalizes/displays adequate comfort level or baseline comfort level  Description: Interventions:  - Encourage patient to monitor pain and request assistance  - Assess pain using appropriate pain scale  - Administer analgesics based on type and severity of pain and evaluate response  - Implement non-pharmacological measures as appropriate and evaluate response  - Consider cultural and social influences on pain and pain management  - Notify physician/advanced practitioner if interventions unsuccessful or patient reports new pain  Outcome: Progressing     Problem: INFECTION - ADULT  Goal: Absence or prevention of progression during hospitalization  Description: INTERVENTIONS:  - Assess and monitor for signs and symptoms of infection  - Monitor lab/diagnostic results  - Monitor all insertion sites, i e  indwelling lines, tubes, and drains  - Monitor endotracheal if appropriate and nasal secretions for changes in amount and color  - [place] appropriate cooling/warming therapies per order  - Administer medications as ordered  - Instruct and encourage patient and family to use good hand hygiene technique  - Identify and instruct in appropriate isolation precautions for identified infection/condition  Outcome: Progressing  Goal: Absence of fever/infection during neutropenic period  Description: INTERVENTIONS:  - Monitor WBC    Outcome: Progressing     Problem: SAFETY ADULT  Goal: Patient will remain free of falls  Description: INTERVENTIONS:  - Educate patient/family on patient safety including physical limitations  - Instruct patient to call for assistance with activity   - Consult OT/PT to assist with strengthening/mobility   - Keep Call bell within reach  - Keep bed low and locked with side rails adjusted as appropriate  - Keep care items and personal belongings within reach  - Initiate and maintain comfort rounds  - Make Fall Risk Sign visible to staff  - Offer Toileting every 2 Hours, in advance of need  - Initiate/Maintain alarm  - Obtain necessary fall risk management equipment:   - Apply yellow socks and bracelet for high fall risk patients  - Consider moving patient to room near nurses station  Outcome: Progressing  Goal: Maintain or return to baseline ADL function  Description: INTERVENTIONS:  -  Assess patient's ability to carry out ADLs; assess patient's baseline for ADL function and identify physical deficits which impact ability to perform ADLs (bathing, care of mouth/teeth, toileting, grooming, dressing, etc )  - Assess/evaluate cause of self-care deficits   - Assess range of motion  - Assess patient's mobility; develop plan if impaired  - Assess patient's need for assistive devices and provide as appropriate  - Encourage maximum independence but intervene and supervise when necessary  - Involve family in performance of ADLs  - Assess for home care needs following discharge   - Consider OT consult to assist with ADL evaluation and planning for discharge  - Provide patient education as appropriate  Outcome: Progressing  Goal: Maintains/Returns to pre admission functional level  Description: INTERVENTIONS:  - Perform BMAT or MOVE assessment daily    - Set and communicate daily mobility goal to care team and patient/family/caregiver  - Collaborate with rehabilitation services on mobility goals if consulted  - Perform Range of Motion 3 times a day  - Reposition patient every 2 hours  - Dangle patient 3 times a day  - Stand patient 3 times a day  - Ambulate patient 3 times a day  - Out of bed to chair 3 times a day   - Out of bed for meals 3 times a day  - Out of bed for toileting  - Record patient progress and toleration of activity level   Outcome: Progressing     Problem: DISCHARGE PLANNING  Goal: Discharge to home or other facility with appropriate resources  Description: INTERVENTIONS:  - Identify barriers to discharge w/patient and caregiver  - Arrange for needed discharge resources and transportation as appropriate  - Identify discharge learning needs (meds, wound care, etc )  - Arrange for interpretive services to assist at discharge as needed  - Refer to Case Management Department for coordinating discharge planning if the patient needs post-hospital services based on physician/advanced practitioner order or complex needs related to functional status, cognitive ability, or social support system  Outcome: Progressing     Problem: Knowledge Deficit  Goal: Patient/family/caregiver demonstrates understanding of disease process, treatment plan, medications, and discharge instructions  Description: Complete learning assessment and assess knowledge base    Interventions:  - Provide teaching at level of understanding  - Provide teaching via preferred learning methods  Outcome: Progressing

## 2022-09-21 NOTE — DISCHARGE INSTRUCTIONS
Pls take meclizine 25 mg upto three times a day as needed when you feel dizzy     Please follow-up with primary care doctor, Neurology, Neurosurgery as mentioned above

## 2022-09-21 NOTE — ASSESSMENT & PLAN NOTE
Currently heart rate well controlled    Denies chest pain or palpitations  · Metoprolol succinate 50 mg b i d   · Eliquis 5 mg b i d   · Monitor on telemetry

## 2022-09-21 NOTE — ASSESSMENT & PLAN NOTE
Initially normotensive, noted to be hypertensive with SBP in the 200s early morning  Endorses compliance with medications  Med rec with pharmacy confirm that patient has not been receiving amlodipine or losartan  · Continue home regimen of:  Lasix 30 mg b i d  And metoprolol succinate 50 mg b i d    · Restart on amlodipine 10 mg daily and losartan 50 mg daily  · IV hydralazine for SBP greater than 180  · Monitor vitals closely

## 2022-09-21 NOTE — ASSESSMENT & PLAN NOTE
Per daughter and patient ongoing 2 week history weakness and dizziness  Denies focal deficits  No focal neurological deficit, exam   Difficulty with ambulation secondary to dizziness  Differential include secondary to hypertensive urgency versus vertigo (per chart review has previous history) versus less likely posterior stroke  · Head CT reveals chronic microangiopathic changes    No acute changes noted  · Follow-up on echocardiogram  · Orthostatic blood pressure  · Blood pressure management as highlighted above  · Consider trial of meclizine versus further workup with MRI if symptoms do not improve with normotension  · PT/OT

## 2022-09-21 NOTE — ED NOTES
Contact [facility] for transport to [facility]         Meche Espinosa, KIERRA  09/20/22 5642

## 2022-09-21 NOTE — ASSESSMENT & PLAN NOTE
Lab Results   Component Value Date    HGBA1C 8 0 (H) 09/15/2022       Recent Labs     09/20/22  1915 09/20/22  1947 09/21/22  0012 09/21/22  0609   POCGLU 140 137 194* 214*       Blood Sugar Average: Last 72 hrs:  (P) 283 [id number]     Endorses compliance with home regimen of Lantus 16 units q h s  And 11 units t i d  Of Humalog  Initially referred to ED with hypoglycemic episode, received her insulin prior to feeding per nursing facility    Most recent blood glucose 214  · Will initiate on Lantus 10 units q h s  + sliding scale insulin for now

## 2022-09-21 NOTE — ED NOTES
Per WANDA, waiting for transportation information  May not be available until morning        Angela Capellan RN  09/20/22 7979

## 2022-09-21 NOTE — ASSESSMENT & PLAN NOTE
New onset hypoxia  de satting to the mid [de-identified] on room air  Currently on 2 L nasal cannula satting 94%  Denies shortness of breath  No wheezing appreciated    Etiology deconditioning versus atelectasis versus underlying lung process  · Follow-up on chest x-ray PA and lateral  · Continue to wean off oxygen as tolerated to maintain SpO2 sats greater than 90%

## 2022-09-22 LAB
ANION GAP SERPL CALCULATED.3IONS-SCNC: 6 MMOL/L (ref 4–13)
BUN SERPL-MCNC: 21 MG/DL (ref 5–25)
CALCIUM SERPL-MCNC: 9.2 MG/DL (ref 8.3–10.1)
CHLORIDE SERPL-SCNC: 104 MMOL/L (ref 96–108)
CO2 SERPL-SCNC: 29 MMOL/L (ref 21–32)
CREAT SERPL-MCNC: 1.2 MG/DL (ref 0.6–1.3)
ERYTHROCYTE [DISTWIDTH] IN BLOOD BY AUTOMATED COUNT: 14.1 % (ref 11.6–15.1)
GFR SERPL CREATININE-BSD FRML MDRD: 39 ML/MIN/1.73SQ M
GLUCOSE SERPL-MCNC: 190 MG/DL (ref 65–140)
GLUCOSE SERPL-MCNC: 202 MG/DL (ref 65–140)
GLUCOSE SERPL-MCNC: 220 MG/DL (ref 65–140)
GLUCOSE SERPL-MCNC: 256 MG/DL (ref 65–140)
GLUCOSE SERPL-MCNC: 266 MG/DL (ref 65–140)
HCT VFR BLD AUTO: 44.1 % (ref 34.8–46.1)
HGB BLD-MCNC: 14.3 G/DL (ref 11.5–15.4)
MCH RBC QN AUTO: 31 PG (ref 26.8–34.3)
MCHC RBC AUTO-ENTMCNC: 32.4 G/DL (ref 31.4–37.4)
MCV RBC AUTO: 96 FL (ref 82–98)
PLATELET # BLD AUTO: 223 THOUSANDS/UL (ref 149–390)
PMV BLD AUTO: 9.9 FL (ref 8.9–12.7)
POTASSIUM SERPL-SCNC: 4.1 MMOL/L (ref 3.5–5.3)
RBC # BLD AUTO: 4.62 MILLION/UL (ref 3.81–5.12)
SODIUM SERPL-SCNC: 139 MMOL/L (ref 135–147)
TSH SERPL DL<=0.05 MIU/L-ACNC: 1.65 UIU/ML (ref 0.45–4.5)
WBC # BLD AUTO: 8.75 THOUSAND/UL (ref 4.31–10.16)

## 2022-09-22 PROCEDURE — 84443 ASSAY THYROID STIM HORMONE: CPT | Performed by: STUDENT IN AN ORGANIZED HEALTH CARE EDUCATION/TRAINING PROGRAM

## 2022-09-22 PROCEDURE — 85027 COMPLETE CBC AUTOMATED: CPT | Performed by: STUDENT IN AN ORGANIZED HEALTH CARE EDUCATION/TRAINING PROGRAM

## 2022-09-22 PROCEDURE — 99232 SBSQ HOSP IP/OBS MODERATE 35: CPT | Performed by: STUDENT IN AN ORGANIZED HEALTH CARE EDUCATION/TRAINING PROGRAM

## 2022-09-22 PROCEDURE — 82948 REAGENT STRIP/BLOOD GLUCOSE: CPT

## 2022-09-22 PROCEDURE — 97110 THERAPEUTIC EXERCISES: CPT

## 2022-09-22 PROCEDURE — 97167 OT EVAL HIGH COMPLEX 60 MIN: CPT

## 2022-09-22 PROCEDURE — 97163 PT EVAL HIGH COMPLEX 45 MIN: CPT

## 2022-09-22 PROCEDURE — 80048 BASIC METABOLIC PNL TOTAL CA: CPT | Performed by: STUDENT IN AN ORGANIZED HEALTH CARE EDUCATION/TRAINING PROGRAM

## 2022-09-22 RX ORDER — INSULIN GLARGINE 100 [IU]/ML
15 INJECTION, SOLUTION SUBCUTANEOUS
Status: DISCONTINUED | OUTPATIENT
Start: 2022-09-22 | End: 2022-09-26 | Stop reason: HOSPADM

## 2022-09-22 RX ORDER — MECLIZINE HYDROCHLORIDE 25 MG/1
25 TABLET ORAL ONCE
Status: COMPLETED | OUTPATIENT
Start: 2022-09-22 | End: 2022-09-22

## 2022-09-22 RX ORDER — FUROSEMIDE 10 MG/ML
30 INJECTION INTRAMUSCULAR; INTRAVENOUS ONCE
Status: COMPLETED | OUTPATIENT
Start: 2022-09-22 | End: 2022-09-22

## 2022-09-22 RX ADMIN — FUROSEMIDE 30 MG: 10 INJECTION, SOLUTION INTRAMUSCULAR; INTRAVENOUS at 14:09

## 2022-09-22 RX ADMIN — MECLIZINE HYDROCHLORIDE 25 MG: 25 TABLET ORAL at 14:08

## 2022-09-22 RX ADMIN — LOSARTAN POTASSIUM 50 MG: 50 TABLET, FILM COATED ORAL at 08:27

## 2022-09-22 RX ADMIN — INSULIN LISPRO 2 UNITS: 100 INJECTION, SOLUTION INTRAVENOUS; SUBCUTANEOUS at 21:24

## 2022-09-22 RX ADMIN — POTASSIUM CHLORIDE 20 MEQ: 1500 TABLET, EXTENDED RELEASE ORAL at 08:27

## 2022-09-22 RX ADMIN — LEVOTHYROXINE SODIUM 50 MCG: 0.05 TABLET ORAL at 08:27

## 2022-09-22 RX ADMIN — PANTOPRAZOLE SODIUM 40 MG: 40 TABLET, DELAYED RELEASE ORAL at 05:09

## 2022-09-22 RX ADMIN — INSULIN LISPRO 2 UNITS: 100 INJECTION, SOLUTION INTRAVENOUS; SUBCUTANEOUS at 17:16

## 2022-09-22 RX ADMIN — APIXABAN 5 MG: 5 TABLET, FILM COATED ORAL at 17:14

## 2022-09-22 RX ADMIN — INSULIN LISPRO 2 UNITS: 100 INJECTION, SOLUTION INTRAVENOUS; SUBCUTANEOUS at 12:07

## 2022-09-22 RX ADMIN — AMLODIPINE BESYLATE 10 MG: 10 TABLET ORAL at 08:27

## 2022-09-22 RX ADMIN — INSULIN LISPRO 1 UNITS: 100 INJECTION, SOLUTION INTRAVENOUS; SUBCUTANEOUS at 08:24

## 2022-09-22 RX ADMIN — ATORVASTATIN CALCIUM 40 MG: 40 TABLET, FILM COATED ORAL at 21:23

## 2022-09-22 RX ADMIN — INSULIN GLARGINE 15 UNITS: 100 INJECTION, SOLUTION SUBCUTANEOUS at 21:23

## 2022-09-22 RX ADMIN — METOPROLOL SUCCINATE 50 MG: 50 TABLET, EXTENDED RELEASE ORAL at 08:27

## 2022-09-22 RX ADMIN — APIXABAN 5 MG: 5 TABLET, FILM COATED ORAL at 08:27

## 2022-09-22 RX ADMIN — FUROSEMIDE 30 MG: 20 TABLET ORAL at 08:27

## 2022-09-22 RX ADMIN — CYANOCOBALAMIN TAB 500 MCG 1000 MCG: 500 TAB at 08:27

## 2022-09-22 NOTE — PROGRESS NOTES
[facility]  Progress Note Loren Abdullahi 3/24/1929, 80 y o  female MRN: [id number]  Unit/Bed#: -01 Encounter: [id number]  Primary Care Provider: Esha Zamora MD   Date and time admitted to hospital: 9/20/2022  5:54 PM    Dizziness  Assessment & Plan  Per daughter and patient ongoing 2 week history weakness and dizziness  Denies focal deficits  No focal neurological deficit, exam   Difficulty with ambulation secondary to dizziness  Differential include secondary to hypertensive urgency versus vertigo (per chart review has previous history) versus less likely posterior stroke  · Head CT reveals chronic microangiopathic changes  No acute changes noted  · Follow-up on echocardiogram  · Orthostatic blood pressure  · Blood pressure management as highlighted above  · Will trial meclizine, consider further workup with MRI if symptoms do not improve with normotension  · PT/OT recommending rehab      Hypoxia  Assessment & Plan  New onset hypoxia  de satting to the mid [de-identified] on room air  Currently on 2 L nasal cannula satting 94%  Denies shortness of breath  No wheezing appreciated  Etiology deconditioning versus atelectasis versus underlying lung process  · Chest x-ray PA lateral 9/21 mild pulmonary vascular congestive changes slightly improved from previous exam   Patchy increased parenchymal density in left lateral base on full view suspicious for atelectasis or pneumonia and lingula  Continued radiographic follow-up recommended  · No productive cough or acute signs of infection at this time, monitor off antibiotics  · Trial dose of IV Lasix today  · Repeat chest x-ray tomorrow a m    · Continue to wean off oxygen as tolerated to maintain SpO2 sats greater than 90%    CKD (chronic kidney disease) stage 3, GFR 30-59 ml/min [facility])  Assessment & Plan  Lab Results   Component Value Date    EGFR 39 09/22/2022    EGFR 40 09/21/2022    EGFR 44 09/20/2022    CREATININE 1 20 09/22/2022 CREATININE 1 16 09/21/2022    CREATININE 1 08 09/20/2022   Creatinine appears to be at baseline  Intake and output  Monitor with a m  HERMILO    Paroxysmal atrial fibrillation ([address] )  Assessment & Plan  Currently heart rate well controlled  Denies chest pain or palpitations  · Metoprolol succinate 50 mg b i d   · Eliquis 5 mg b i d   · Monitor on telemetry    GERD without esophagitis  Assessment & Plan  No acute complaints at this time  Continue Protonix while inpatient    History of heart block  Assessment & Plan  Status post permanent pacemaker    Hypothyroidism  Assessment & Plan  Continue home dose levothyroxine 50 daily  TSH pending    Type 2 diabetes mellitus with hyperglycemia, with long-term current use of insulin [facility])  Assessment & Plan  Lab Results   Component Value Date    HGBA1C 8 0 (H) 09/15/2022       Recent Labs     09/21/22  1538 09/21/22  2039 09/22/22  0609 09/22/22  1101   POCGLU 241* 329* 190* 220*       Blood Sugar Average: Last 72 hrs:  (P) 197 [id number]     Endorses compliance with home regimen of Lantus 16 units q h s  And 11 units t i d  Of Humalog  Initially referred to ED with hypoglycemic episode, received her insulin prior to feeding per nursing facility  Most recent blood glucose 214  · Lantus 15 units q h s  + sliding scale insulin for now      * Hypertensive urgency  Assessment & Plan  Initially normotensive, noted to be hypertensive with SBP in the 200s early morning  Endorses compliance with medications  Med rec with pharmacy confirm that patient has not been receiving amlodipine or losartan  Now improved  · Continue home regimen of:  Lasix 30 mg b i d  And metoprolol succinate 50 mg b i d   · Continue with amlodipine 10 mg daily and losartan 50 mg daily  · IV hydralazine for SBP greater than 180  · Monitor vitals closely      VTE Pharmacologic Prophylaxis: VTE Score: 5 Moderate Risk (Score 3-4) - Pharmacological DVT Prophylaxis Ordered: apixaban (Eliquis)      Patient Centered Rounds: I performed bedside rounds with nursing staff today  Discussions with Specialists or Other Care Team Provider:  None    Education and Discussions with Family / Patient: Updated  (daughter) via phone  Time Spent for Care: 30 minutes  More than 50% of total time spent on counseling and coordination of care as described above  Current Length of Stay: 1 day(s)  Current Patient Status: Inpatient   Certification Statement: The patient will continue to require additional inpatient hospital stay due to Dizziness and hypoxia  Discharge Plan: Anticipate discharge in 24-48 hrs to discharge location to be determined pending rehab evaluations  Code Status: Level 3 - DNAR and DNI    Subjective:   Patient still endorses intermittent dizziness, at rest and with exertion as well as positional change  Denies room spinning  Feels off  Slight improvement compared to yesterday  Endorses some mild nausea, not as severe as yesterday  All other review of systems negative at this time  Objective:     Vitals:   Temp (24hrs), Av 7 °F (36 5 °C), Min:97 4 °F (36 3 °C), Max:98 °F (36 7 °C)    Temp:  [97 4 °F (36 3 °C)-98 °F (36 7 °C)] 97 7 °F (36 5 °C)  HR:  [69-77] 70  Resp:  [15-22] 18  BP: (148-169)/() 148/114  SpO2:  [93 %-98 %] 95 %  Body mass index is 27 98 kg/m²  Input and Output Summary (last 24 hours): Intake/Output Summary (Last 24 hours) at 2022 1345  Last data filed at 2022 0901  Gross per 24 hour   Intake 240 ml   Output --   Net 240 ml       Physical Exam:   Physical Exam  Vitals reviewed  Constitutional:       General: She is not in acute distress  Appearance: She is well-developed  She is not diaphoretic  HENT:      Head: Normocephalic and atraumatic  Nose: Nose normal       Mouth/Throat:      Pharynx: No oropharyngeal exudate  Eyes:      General: No scleral icterus  Right eye: No discharge  Left eye: No discharge  Conjunctiva/sclera: Conjunctivae normal    Cardiovascular:      Rate and Rhythm: Normal rate and regular rhythm  Heart sounds: Normal heart sounds  No murmur heard  No friction rub  No gallop  Pulmonary:      Effort: Pulmonary effort is normal  No respiratory distress  Breath sounds: No wheezing or rales  Comments: 2 L nasal cannula, crackles appreciated bilateral bases  Abdominal:      General: Bowel sounds are normal  There is no distension  Palpations: Abdomen is soft  Tenderness: There is no abdominal tenderness  There is no guarding  Musculoskeletal:      Cervical back: Normal range of motion and neck supple  Skin:     General: Skin is warm and dry  Findings: No erythema  Neurological:      Mental Status: She is alert and oriented to person, place, and time  Psychiatric:         Behavior: Behavior normal          Judgment: Judgment normal           Additional Data:     Labs:  Results from last 7 days   Lab Units 09/22/22 0434 09/21/22 1028 09/20/22  1809   WBC Thousand/uL 8 75   < > 7 64   HEMOGLOBIN g/dL 14 3   < > 15 1   HEMATOCRIT % 44 1   < > 46 0   PLATELETS Thousands/uL 223   < > 232   NEUTROS PCT %  --   --  61   LYMPHS PCT %  --   --  27   MONOS PCT %  --   --  8   EOS PCT %  --   --  3    < > = values in this interval not displayed  Results from last 7 days   Lab Units 09/22/22 0434 09/21/22 1028 09/20/22  1809   SODIUM mmol/L 139   < > 140   POTASSIUM mmol/L 4 1   < > 3 6   CHLORIDE mmol/L 104   < > 104   CO2 mmol/L 29   < > 26   BUN mg/dL 21   < > 20   CREATININE mg/dL 1 20   < > 1 08   ANION GAP mmol/L 6   < > 10   CALCIUM mg/dL 9 2   < > 9 2   ALBUMIN g/dL  --   --  3 1*   TOTAL BILIRUBIN mg/dL  --   --  0 39   ALK PHOS U/L  --   --  108   ALT U/L  --   --  68   AST U/L  --   --  47*   GLUCOSE RANDOM mg/dL 202*   < > 103    < > = values in this interval not displayed       Results from last 7 days   Lab Units 09/20/22  1809   INR  1 21*     Results from last 7 days   Lab Units 09/22/22  1101 09/22/22  0609 09/21/22  2039 09/21/22  1538 09/21/22  1057 09/21/22  0609 09/21/22  0012 09/20/22  1947 09/20/22  1915 09/20/22  1838 09/20/22  1811   POC GLUCOSE mg/dl 220* 190* 329* 241* 290* 214* 194* 137 140 116 104               Lines/Drains:  Invasive Devices  Report    Peripheral Intravenous Line  Duration           Peripheral IV 09/20/22 Left Antecubital 1 day                  Telemetry:  Telemetry Orders (From admission, onward)             24 Hour Telemetry Monitoring  Continuous x 24 Hours (Telem)        References:    Telemetry Guidelines   Question:  Reason for 24 Hour Telemetry  Answer:  Metabolic/Electrolyte Disturbance with High Probability of Dysrhythmia (K level <3 or >6, or KCL infusion >=10mEq/hr)                 Telemetry Reviewed: Atrial fibrillation  HR averaging 80  Indication for Continued Telemetry Use: No indication for continued use  Will discontinue                Imaging: Reviewed radiology reports from this admission including: chest xray    Recent Cultures (last 7 days):         Last 24 Hours Medication List:   Current Facility-Administered Medications   Medication Dose Route Frequency Provider Last Rate    amLODIPine  10 mg Oral Daily [facility], [facility]      apixaban  5 mg Oral BID [facility], DO      atorvastatin  40 [facility]      cyanocobalamin  1,000 mcg Oral Daily [facility]      furosemide  30 mg Intravenous Once [place], DO      hydrALAZINE  5 mg Intravenous Q6H PRN [facility], [facility]      insulin glargine  15 Units Subcutaneous HS [facility] Saraiya, DO      insulin lispro  1-5 Units Subcutaneous TID AC [facility] Saraiya, DO      insulin lispro  1-5 Units Subcutaneous HS [facility], DO      levothyroxine  50 mcg Oral QAM [facility], DO      losartan  50 mg Oral Daily [facility], DO      meclizine  25 mg Oral Once Shital Janette Huitron,       metoprolol succinate  50 mg Oral Daily Shitalgil Huitron,       pantoprazole  40 mg Oral Early Morning Shtialgil Huitron,       potassium chloride  20 mEq Oral QAM Shital Pierre DO          Today, Patient Was Seen By: Keegan Torres DO    **Please Note: This note may have been constructed using a voice recognition system  **

## 2022-09-22 NOTE — ASSESSMENT & PLAN NOTE
Lab Results   Component Value Date    HGBA1C 8 0 (H) 09/15/2022       Recent Labs     09/21/22  1538 09/21/22  2039 09/22/22  0609 09/22/22  1101   POCGLU 241* 329* 190* 220*       Blood Sugar Average: Last 72 hrs:  (P) 197 [id number]     Endorses compliance with home regimen of Lantus 16 units q h s  And 11 units t i d  Of Humalog  Initially referred to ED with hypoglycemic episode, received her insulin prior to feeding per nursing facility    Most recent blood glucose 214  · Lantus 15 units q h s  + sliding scale insulin for now

## 2022-09-22 NOTE — CASE MANAGEMENT
Case Management Assessment & Discharge Planning Note    Patient name Kalyani Veliz  Location [address] [id number] MRN [id number]  : 3/24/1929 Date 2022       Current Admission Date: 2022  Current Admission Diagnosis:Hypertensive urgency   Patient Active Problem List    Diagnosis Date Noted    Hypoxia 2022    Dizziness 2022    CKD (chronic kidney disease) stage 3, GFR 30-59 ml/min ([facility]) 10/22/2021    CHF (congestive heart failure) [facility] ) 10/22/2021    [facility] 2021    Pacemaker 2020    GERD without esophagitis 2020    Hypothyroidism 2020    History of heart block 2020    Bilateral carpal tunnel syndrome 2019    Vitamin D deficiency 2019    Screening for skin condition 2018    History of skin cancer 2018    Hypertensive urgency 2017    Type 2 diabetes mellitus with hyperglycemia, with long-term current use of insulin ([facility] ) 2017    Leukocytosis 2017    Slow transit constipation 2017    Elevated troponin 2017    Actinic keratosis 2015    Seborrheic keratosis 2015      LOS (days): 1  Geometric Mean LOS (GMLOS) (days): 2 50  Days to GMLOS:1 6     OBJECTIVE:    Risk of Unplanned Readmission Score: 13 83      Current admission status: Inpatient       Preferred Pharmacy:   [address] [place] [address]  [facility]   [facility] [address]  Phone: [phone] Fax: [phone]    Primary Care Provider: Danuta Carrillo MD    Primary Insurance: MEDICARE  Secondary Insurance: [facility]    ASSESSMENT:  [address]   Primary Phone: [phone] (Home)               Advance Directives  Does patient have a [address]?: Yes  Does patient have Advance Directives?: Yes  Advance Directives: Living will    Readmission Root Cause  30 Day Readmission: No    Patient Information  Admitted from[de-identified] [facility])  Mental Status: Alert  During Assessment patient was accompanied by: Not accompanied during assessment  Assessment information provided by[de-identified] Patient  Primary Caregiver: Self  Support Systems: Self, Family members  [place] of Residence: [place] [address] do you live in?: [place]  Type of Current Residence: Facility  Upon entering residence, is there a bedroom on the main floor (no further steps)?: Yes  Upon entering residence, is there a bathroom on the main floor (no further steps)?: Yes  Living Arrangements: Lives Alone  Is patient a ?: No    Activities of Daily Living Prior to Admission  Functional Status: Independent  Completes ADLs independently?: Yes  Ambulates independently?: Yes  Does patient use assisted devices?: Yes  Assisted Devices (DME) used: [employer]elia Chase  Does patient currently own DME?: Yes  What DME does the patient currently own?: Amelia Chase  Does patient have a history of Outpatient Therapy (PT/OT)?: No  Does the patient have a history of Short-Term Rehab?: No  Does patient have a history of HHC?: No  Does patient currently have [facility]?: No    Patient Information Continued  Does patient have prescription coverage?: Yes  Does patient have a history of substance abuse?: No  Does patient have a history of Mental Health Diagnosis?: No    Means of Transportation  [facility] Other (Comment) (A  at the facility)    DISCHARGE DETAILS:    Discharge planning discussed with[de-identified] The patient  Freedom of Choice: Yes  Comments - Freedom of Choice: CM met with the patient at the bedside to complete the initial assessment  The patient was admitted to the hospital for dizziness and HTN  The patient is oriented x4 able to make needs known to staff  The patients demographic sheet was verified  The patient lives in at [facility] living alone and able to complete her ADLs and IADLs   The patient has received her COVID Vaccines x 2 doses and 2 johnathan Silver  The patients discharge disposition will be to return to [facility]  The patients son will provide transportation home at discharge  CM will continue to follow    CM contacted family/caregiver?: No- see comments (The patient is independent)  Were Treatment Team discharge recommendations reviewed with patient/caregiver?: Yes  Did patient/caregiver verbalize understanding of patient care needs?: Yes     Contacts  Patient Contacts: Edgar Peterson  Relationship to Patient[de-identified] Family  Contact Method: Phone  Phone Number: [phone]  Reason/Outcome: Continuity of Care, Emergency [address]         Is the patient interested in [facility] at discharge?: No    DME Referral Provided  Referral made for DME?: No    Treatment Team Recommendation: Facility Return  Discharge Destination Plan[de-identified] Facility Return

## 2022-09-22 NOTE — ASSESSMENT & PLAN NOTE
Per daughter and patient ongoing 2 week history weakness and dizziness  Denies focal deficits  No focal neurological deficit, exam   Difficulty with ambulation secondary to dizziness  Differential include secondary to hypertensive urgency versus vertigo (per chart review has previous history) versus less likely posterior stroke  · Head CT reveals chronic microangiopathic changes    No acute changes noted  · Follow-up on echocardiogram  · Orthostatic blood pressure  · Blood pressure management as highlighted above  · Will trial meclizine, consider further workup with MRI if symptoms do not improve with normotension  · PT/OT recommending rehab

## 2022-09-22 NOTE — ASSESSMENT & PLAN NOTE
Initially normotensive, noted to be hypertensive with SBP in the 200s early morning  Endorses compliance with medications  Med rec with pharmacy confirm that patient has not been receiving amlodipine or losartan  Now improved  · Continue home regimen of:  Lasix 30 mg b i d   And metoprolol succinate 50 mg b i d   · Continue with amlodipine 10 mg daily and losartan 50 mg daily  · IV hydralazine for SBP greater than 180  · Monitor vitals closely

## 2022-09-22 NOTE — PLAN OF CARE
Problem: OCCUPATIONAL THERAPY ADULT  Goal: Performs self-care activities at highest level of function for planned discharge setting  See evaluation for individualized goals  Description: Treatment Interventions: ADL retraining, Functional transfer training, Endurance training, UE strengthening/ROM, Equipment evaluation/education, Patient/family training, Compensatory technique education, Continued evaluation, Energy conservation, Activityengagement          See flowsheet documentation for full assessment, interventions and recommendations  Note: Limitation: Decreased ADL status, Decreased UE strength, Decreased endurance, Decreased self-care trans, Decreased high-level ADLs  Prognosis: Good  Assessment: Patient is a 80 y o  female seen for OT evaluation s/p admit to [address] [place] [address] on 9/20/2022 w/Hypertensive urgency  Commorbidities affecting patient's functional performance at time of assessment include: Dizziness, hypoxia, CKD, A fib, GERD, Permanent pacemaker, hypothyroidism, type 2 DM, hypertensive urgency  Orders placed for OT evaluation and treatment  Performed at least two patient identifiers during session including name and wristband  Prior to admission, Patient reported indepndent with ADLs, and ambulatory with a Rollator  patient lives at [facility], was independnet with showers and ambulation to/ from [address] [place] [address],[address] room with a RW  Personal factors affecting patient at time of initial evaluation include: decreased initiation and engagement and difficulty performing ADLs  Upon evaluation, patient requires minimal  assist for UB ADLs, moderate and maximal assist for LB ADLs   Occupational performance is affected by the following deficits: decreased muscle strength, degenerative arthritic joint changes, impaired gross motor coordination, dynamic sit/ stand balance deficit with poor standing tolerance time for self care and functional mobility, decreased activity tolerance, decreased safety awareness and postural control and postural alignment deficit, requiring external assistance to complete transitional movements  Patient to benefit from continued Occupational Therapy treatment while in the hospital to address deficits as defined above and maximize level of functional independence with ADLs and functional mobility  Occupational Performance areas to address include: bathing/ shower, dressing, toilet hygiene, transfer to all surfaces, functional mobility, health maintenance, medication routine/ management, IADLs: safety procedures, IADLs: meal prep/ clean up and Leisure Participation  From OT standpoint, recommendation at time of d/c would be Short Term Rehab       OT Discharge Recommendation: Post acute rehabilitation services

## 2022-09-22 NOTE — PHYSICAL THERAPY NOTE
Physical Therapy Evaluation     Patient's Name: Maia Combs    Admitting Diagnosis  Dizziness [R42]  Hypoglycemia [E16 2]  Hypertension [I10]  Uncontrolled hypertension [I10]  Ambulatory dysfunction [R26 2]    Problem List  Patient Active Problem List   Diagnosis    Hypertensive urgency    Type 2 diabetes mellitus with hyperglycemia, with long-term current use of insulin (HCC)    Leukocytosis    Slow transit constipation    Elevated troponin    Actinic keratosis    Seborrheic keratosis    Screening for skin condition    History of skin cancer    Hypothyroidism    History of heart block    Bilateral carpal tunnel syndrome    Vitamin D deficiency    Pacemaker    GERD without esophagitis    Paroxysmal atrial fibrillation (HCC)    CKD (chronic kidney disease) stage 3, GFR 30-59 ml/min ([facility])    CHF (congestive heart failure) ([facility] [address] )    Hypoxia    Dizziness     Past Medical History  Past Medical History:   Diagnosis Date    Atrial fibrillation ([facility] )     CHF (congestive heart failure) ([facility] )     Chronic pain     Diabetes mellitus ([facility] )     Hyperlipidemia     Hypertension     Pacemaker     Pneumonia due to COVID-19 virus 04/14/2020    Squamous cell skin cancer     Type 2 diabetes mellitus with hyperglycemia, with long-term current use of insulin ([facility] ) 12/28/2017     Past Surgical History  Past Surgical History:   Procedure Laterality Date    BACK SURGERY      CARDIAC PACEMAKER PLACEMENT      COLONOSCOPY        09/22/22 0922   PT Last Visit   PT Visit Date 09/22/22   Note Type   Note type Evaluation and Treatment   Pain Assessment   Pain Assessment Tool 0-10   Pain Score No Pain   Restrictions/Precautions   Weight Bearing Precautions Per Order No   Braces or Orthoses Other (Comment)  (none per patient)   Other Precautions Chair Alarm; Bed Alarm; Fall Risk  (intermittent dizziness)   Home Living   Type of Home Assisted living  [facility])   Home Layout One level  (No ANTHONY)   Bathroom Shower/Tub Walk-in shower   Bathroom Toilet Standard   Bathroom Equipment Grab bars in shower; Shower chair;Grab bars around toilet   [facility]  (rollator)   Additional Comments Pt ambulates with a rollator  Prior Function   Level of [place] Independent with ADLs and functional mobility   Lives With Facility staff; Family   Receives Help From Family   ADL Assistance Independent   IADLs Needs assistance   Falls in the last 6 months 0   Vocational Retired   General   Family/Caregiver Present No   Cognition   Overall Cognitive Status WFL   Arousal/Participation Alert   Attention Within functional limits   Orientation Level Oriented X4   Memory Decreased short term memory;Decreased recall of recent events   Following Commands Follows all commands and directions without difficulty   Comments Pt agreeable to PT  Subjective   Subjective "I get dizzy and then I can't walk "   RUE Assessment   RUE Assessment   (defer to OT assessment)   LUE Assessment   LUE Assessment   (defer to OT assessment)   RLE Assessment   RLE Assessment X   Strength RLE   RLE Overall Strength 4-/5   LLE Assessment   LLE Assessment X   Strength LLE   LLE Overall Strength 4-/5   Light Touch   RLE Light Touch Impaired   RLE Light Touch Comments neuropathy   LLE Light Touch Impaired   LLE Light Touch Comments neuropathy   Bed Mobility   Additional Comments Pt was received seated in recliner in NAD  Transfers   Sit to Stand 4  Minimal assistance   Additional items Assist x 1; Armrests; Increased time required;Verbal cues   Stand to Sit 4  Minimal assistance   Additional items Assist x 1; Armrests; Increased time required;Verbal cues   Ambulation/Elevation   Gait pattern Decreased toe off;Decreased heel strike;Decreased hip extension; Excessively slow; Short stride; Shuffling   Gait Assistance 4  Minimal assist   Additional items Assist x 1;Verbal cues   Assistive Device Rolling walker   Distance 30 feet   Stair Management Assistance Not tested  (pt does not need to negotiate stairs at [facility] [place])   Ambulation/Elevation Additional Comments Pt's ambulation distance limited secondary to complaints of dizziness   Balance   Static Sitting Fair +   Dynamic Sitting Fair   Static Standing Fair -   Dynamic Standing Poor +   Ambulatory Poor +   Endurance Deficit   Endurance Deficit Yes   Endurance Deficit Description decreased activity tolerance   Activity Tolerance   Activity Tolerance Patient tolerated treatment well; Other (Comment)  (limited secondary to intermittent complaints of dizziness)   Medical Staff Made Aware OT Kanika Lou; CM Jude Libman   Assessment   Prognosis Good   Problem List Decreased strength;Decreased endurance; Impaired balance;Decreased mobility; Impaired sensation   Assessment Pt is 80year old female seen for PT evaluation s/p admit to [facility] on 9/20/2022 with Hypertensive urgency  PT consulted to assess pt's functional mobility and d/c needs  Order placed for PT eval and tx, with up and OOB as tolerated order  Comorbidities affecting pt's physical performance at time of assessment include type 2 DM, hypothyroidism, history of heart block, GERD, paroxsymal atrial fibrillation, CKD stage 3, hypoxia, and dizziness  PTA, pt was independent with all functional mobility with a rollator  Pt [facility] a [facility]  Personal factors affecting pt at time of IE include inability to ambulate household distances, inability to navigate level surfaces without external assistance, limited home support, inability to perform IADLs, and difficulty performing ADLs  Please find objective findings from PT assessment regarding body systems outlined above with impairments and limitations including weakness, impaired balance, decreased endurance, gait deviations, decreased activity tolerance, decreased functional mobility tolerance, altered sensation, and fall risk   The following objective measures performed on IE also reveal limitations: Barthel Index: 50/100, Modified Cher: 4 (moderate/severe disability) and AM-PAC 6-Clicks: 03/41  Pt's clinical presentation is currently unstable/unpredictable seen in pt's presentation of need for ongoing medical management/monitoring, pt is a fall risk, pt has intermittent complaints of dizziness limiting functional mobility, and pt requires cues and assist for safety with functional mobility  Pt to benefit from continued PT tx to address deficits as defined above and maximize level of functional independent mobility and consistency  From PT/mobility standpoint, recommendation at time of d/c would be STR pending progress in order to facilitate return to PLOF  Barriers to Discharge Other (Comment)  (decline in functional status)   Goals   STG Expiration Date 10/02/22   Short Term Goal #1 In 10 days: Increase bilateral LE strength 1/2 grade to facilitate independent mobility, Perform all bed mobility tasks modified independent to decrease caregiver burden, Perform all transfers modified independent to improve independence, Ambulate > 150 ft  with RW modified independent w/o LOB and w/ normalized gait pattern 100% of the time and Increase all balance 1/2 grade to decrease risk for falls   Plan   Treatment/Interventions Functional transfer training;LE strengthening/ROM; Therapeutic exercise; Endurance training;Patient/family training;Bed mobility;Gait training;Spoke to case management;OT   PT Frequency 3-5x/wk   Recommendation   PT Discharge Recommendation Post acute rehabilitation services   AM-PAC Basic Mobility Inpatient   Turning in Bed Without Bedrails 3   Lying on Back to Sitting on Edge of Flat Bed 3   Moving Bed to Chair 3   Standing Up From Chair 3   Walk in Room 3   Climb 3-5 Stairs 1   Basic Mobility Inpatient Raw Score 16   Basic Mobility Standardized Score 38 32   Highest Level Of Mobility   -HLM Goal 5: Stand one or more mins   JH-HLM Achieved 7: Walk 25 feet or more   Modified Cher Scale Modified Cher Scale 4   Barthel Index   Feeding 10   Bathing 0   Grooming Score 0   Dressing Score 5   Bladder Score 10   Bowels Score 10   Toilet Use Score 5   Transfers (Bed/Chair) Score 10   Mobility (Level Surface) Score 0   Stairs Score 0   Barthel Index Score 50   Additional Treatment Session   Start Time 0936   End Time 3542   Treatment Assessment Pt agreeable to PT treatment session following PT evaluation  Pt performed seated therapeutic exercise as indicated below  Pt required verbal cues for correct technique and form  Pt tolerated therapeutic exercise well without complaints of pain  Pt continues to exhibit decreased lower extremity strength, impaired balance, decreased endurance, gait deviations, and decreased functional mobility  PT to continue to recommend STR  PT to continue to follow and treat as appropriate  Exercises   Hip Flexion Sitting;20 reps;AROM; Bilateral   Hip Abduction Sitting;20 reps;AROM; Bilateral  (long sitting)   Hip Adduction Sitting;20 reps;AROM; Bilateral   Knee AROM Long Arc Quad Sitting;20 reps;AROM; Bilateral   Ankle Pumps Sitting;20 reps;AROM; Bilateral   End of Consult   Patient Position at End of Consult Bedside chair;Bed/Chair alarm activated; All needs within reach     PT Evaluation Time: 0922-0935    PT Treatment Time: 3049-1609  11 minutes  Pallavi Diggs, PT, DPT

## 2022-09-22 NOTE — ASSESSMENT & PLAN NOTE
Lab Results   Component Value Date    EGFR 39 09/22/2022    EGFR 40 09/21/2022    EGFR 44 09/20/2022    CREATININE 1 20 09/22/2022    CREATININE 1 16 09/21/2022    CREATININE 1 08 09/20/2022   Creatinine appears to be at baseline  Intake and output  Monitor with a m   BMP

## 2022-09-22 NOTE — PLAN OF CARE
Problem: Potential for Falls  Goal: Patient will remain free of falls  Description: INTERVENTIONS:  - Educate patient/family on patient safety including physical limitations  - Instruct patient to call for assistance with activity   - Consult OT/PT to assist with strengthening/mobility   - Keep Call bell within reach  - Keep bed low and locked with side rails adjusted as appropriate  - Keep care items and personal belongings within reach  - Initiate and maintain comfort rounds  - Make Fall Risk Sign visible to staff  - Offer Toileting every 2 Hours, in advance of need  - Initiate/Maintain bed alarm  - Obtain necessary fall risk management equipment: socks  - Apply yellow socks and bracelet for high fall risk patients  - Consider moving patient to room near nurses station  Outcome: Progressing     Problem: PAIN - ADULT  Goal: Verbalizes/displays adequate comfort level or baseline comfort level  Description: Interventions:  - Encourage patient to monitor pain and request assistance  - Assess pain using appropriate pain scale  - Administer analgesics based on type and severity of pain and evaluate response  - Implement non-pharmacological measures as appropriate and evaluate response  - Consider cultural and social influences on pain and pain management  - Notify physician/advanced practitioner if interventions unsuccessful or patient reports new pain  Outcome: Progressing     Problem: INFECTION - ADULT  Goal: Absence or prevention of progression during hospitalization  Description: INTERVENTIONS:  - Assess and monitor for signs and symptoms of infection  - Monitor lab/diagnostic results  - Monitor all insertion sites, i e  indwelling lines, tubes, and drains  - Monitor endotracheal if appropriate and nasal secretions for changes in amount and color  - [place] appropriate cooling/warming therapies per order  - Administer medications as ordered  - Instruct and encourage patient and family to use good hand hygiene technique  - Identify and instruct in appropriate isolation precautions for identified infection/condition  Outcome: Progressing  Goal: Absence of fever/infection during neutropenic period  Description: INTERVENTIONS:  - Monitor WBC    Outcome: Progressing     Problem: SAFETY ADULT  Goal: Patient will remain free of falls  Description: INTERVENTIONS:  - Educate patient/family on patient safety including physical limitations  - Instruct patient to call for assistance with activity   - Consult OT/PT to assist with strengthening/mobility   - Keep Call bell within reach  - Keep bed low and locked with side rails adjusted as appropriate  - Keep care items and personal belongings within reach  - Initiate and maintain comfort rounds  - Make Fall Risk Sign visible to staff  - Offer Toileting every 2 Hours, in advance of need  - Initiate/Maintain bed alarm  - Obtain necessary fall risk management equipment: socks  - Apply yellow socks and bracelet for high fall risk patients  - Consider moving patient to room near nurses station  Outcome: Progressing  Goal: Maintain or return to baseline ADL function  Description: INTERVENTIONS:  -  Assess patient's ability to carry out ADLs; assess patient's baseline for ADL function and identify physical deficits which impact ability to perform ADLs (bathing, care of mouth/teeth, toileting, grooming, dressing, etc )  - Assess/evaluate cause of self-care deficits   - Assess range of motion  - Assess patient's mobility; develop plan if impaired  - Assess patient's need for assistive devices and provide as appropriate  - Encourage maximum independence but intervene and supervise when necessary  - Involve family in performance of ADLs  - Assess for home care needs following discharge   - Consider OT consult to assist with ADL evaluation and planning for discharge  - Provide patient education as appropriate  Outcome: Progressing  Goal: Maintains/Returns to pre admission functional level  Description: INTERVENTIONS:  - Perform BMAT or MOVE assessment daily    - Set and communicate daily mobility goal to care team and patient/family/caregiver  - Collaborate with rehabilitation services on mobility goals if consulted  - Perform Range of Motion 2 times a day  - Reposition patient every 2 hours  - Dangle patient 2 times a day  - Stand patient 2 times a day  - Ambulate patient 2 times a day  - Out of bed to chair 2 times a day   - Out of bed for meals 2 times a day  - Out of bed for toileting  - Record patient progress and toleration of activity level   Outcome: Progressing     Problem: DISCHARGE PLANNING  Goal: Discharge to home or other facility with appropriate resources  Description: INTERVENTIONS:  - Identify barriers to discharge w/patient and caregiver  - Arrange for needed discharge resources and transportation as appropriate  - Identify discharge learning needs (meds, wound care, etc )  - Arrange for interpretive services to assist at discharge as needed  - Refer to Case Management Department for coordinating discharge planning if the patient needs post-hospital services based on physician/advanced practitioner order or complex needs related to functional status, cognitive ability, or social support system  Outcome: Progressing     Problem: Knowledge Deficit  Goal: Patient/family/caregiver demonstrates understanding of disease process, treatment plan, medications, and discharge instructions  Description: Complete learning assessment and assess knowledge base    Interventions:  - Provide teaching at level of understanding  - Provide teaching via preferred learning methods  Outcome: Progressing     Problem: MOBILITY - ADULT  Goal: Maintain or return to baseline ADL function  Description: INTERVENTIONS:  -  Assess patient's ability to carry out ADLs; assess patient's baseline for ADL function and identify physical deficits which impact ability to perform ADLs (bathing, care of mouth/teeth, toileting, grooming, dressing, etc )  - Assess/evaluate cause of self-care deficits   - Assess range of motion  - Assess patient's mobility; develop plan if impaired  - Assess patient's need for assistive devices and provide as appropriate  - Encourage maximum independence but intervene and supervise when necessary  - Involve family in performance of ADLs  - Assess for home care needs following discharge   - Consider OT consult to assist with ADL evaluation and planning for discharge  - Provide patient education as appropriate  Outcome: Progressing  Goal: Maintains/Returns to pre admission functional level  Description: INTERVENTIONS:  - Perform BMAT or MOVE assessment daily    - Set and communicate daily mobility goal to care team and patient/family/caregiver  - Collaborate with rehabilitation services on mobility goals if consulted  - Perform Range of Motion 2 times a day  - Reposition patient every 2 hours    - Dangle patient 2 times a day  - Stand patient 2 times a day  - Ambulate patient 2 times a day  - Out of bed to chair 2 times a day   - Out of bed for meals 2 times a day  - Out of bed for toileting  - Record patient progress and toleration of activity level   Outcome: Progressing     Problem: Prexisting or High Potential for Compromised Skin Integrity  Goal: Skin integrity is maintained or improved  Description: INTERVENTIONS:  - Identify patients at risk for skin breakdown  - Assess and monitor skin integrity  - Assess and monitor nutrition and hydration status  - Monitor labs   - Assess for incontinence   - Turn and reposition patient  - Assist with mobility/ambulation  - Relieve pressure over bony prominences  - Avoid friction and shearing  - Provide appropriate hygiene as needed including keeping skin clean and dry  - Evaluate need for skin moisturizer/barrier cream  - Collaborate with interdisciplinary team   - Patient/family teaching  - Consider wound care consult   Outcome: Progressing

## 2022-09-22 NOTE — ASSESSMENT & PLAN NOTE
New onset hypoxia  de satting to the mid [de-identified] on room air  Currently on 2 L nasal cannula satting 94%  Denies shortness of breath  No wheezing appreciated  Etiology deconditioning versus atelectasis versus underlying lung process  · Chest x-ray PA lateral 9/21 mild pulmonary vascular congestive changes slightly improved from previous exam   Patchy increased parenchymal density in left lateral base on full view suspicious for atelectasis or pneumonia and lingula  Continued radiographic follow-up recommended  · No productive cough or acute signs of infection at this time, monitor off antibiotics  · Trial dose of IV Lasix today  · Repeat chest x-ray tomorrow a m    · Continue to wean off oxygen as tolerated to maintain SpO2 sats greater than 90%

## 2022-09-22 NOTE — OCCUPATIONAL THERAPY NOTE
Occupational Therapy Evaluation        Patient Name: Brando PARADAI Date: 9/22/2022 09/22/22 [id number]   OT Last Visit   OT Visit Date 09/22/22   Note Type   Note type Evaluation   Restrictions/Precautions   Weight Bearing Precautions Per Order No   Braces or Orthoses Other (Comment)  (none reported)   Other Precautions Chair Alarm; Bed Alarm; Fall Risk  (intermittent dizziness)   Pain Assessment   Pain Assessment Tool 0-10   Pain Score No Pain   Home Living   Type of Home Other (Comment)  ([address] [facility])   Home Layout One level  (no ANTHONY)   Bathroom Shower/Tub Walk-in shower   Bathroom Toilet Standard   Bathroom Equipment Grab bars in shower; Shower chair;Grab bars around toilet   [facility]   Additional Comments ambulates with a Rollator   Prior Function   Level of [place] Independent with ADLs and functional mobility   Lives With Facility staff; Family   Receives Help From Family   ADL Assistance Independent   IADLs Needs assistance   Falls in the last 6 months 0   Vocational Retired   Lifestyle   Autonomy Patient reported indepndent with ADLs, and ambulatory with a Rollator  patient lives at [facility], was independnet with showers and ambulation to/ from [address] [facility] room with a RW  Reciprocal Relationships Supportive family   Service to Others Retired   ADL   Eating Assistance 6  Modified independent   Grooming Assistance 5  Supervision/Setup   [address] 5  Supervision/Setup   LB [address] 3  Moderate [address] 3  Moderate [address] [facility] 4  Minimal Assistance   Bed Mobility   Additional Comments received patient sitting aOOB to recliner chair   Transfers   Sit to Stand 4  Minimal assistance   Additional items Assist x 1; Armrests; Increased time required;Verbal cues   Stand to Sit 4  Minimal assistance   Additional items Assist x 1; Increased time required;Verbal cues;Armrests   Functional Mobility   Functional Mobility 4  Minimal assistance   Additional Comments persistent intermittent dizziness affecting functional mobility   Balance   Static Sitting Fair +   Dynamic Sitting Fair   Static Standing Fair -   Dynamic Standing Poor +   Activity Tolerance   Activity Tolerance Patient limited by fatigue; Other (Comment); Treatment limited secondary to medical complications (Comment)  (persistent intermittent compalins of dizziness)   RUE Assessment   RUE Assessment WFL  (overall strength ~ 3+/5)   LUE Assessment   LUE Assessment WFL  (overall strength ~ 3+/5)   Hand Function   Gross Motor Coordination Impaired   Fine Motor Coordination Impaired   Sensation   Light Touch No apparent deficits  (BUEs)   Vision-Basic Assessment   Current Vision Wears glasses only for reading   Perception   Inattention/Neglect Appears intact   Cognition   Overall Cognitive Status WFL   Arousal/Participation Alert; Responsive; Cooperative   Attention Within functional limits   Orientation Level Oriented to person;Oriented to place;Oriented to time;Oriented to situation   Memory Decreased short term memory;Decreased recall of recent events   Following Commands Follows all commands and directions without difficulty   Assessment   Limitation Decreased ADL status; Decreased UE strength;Decreased endurance;Decreased self-care trans;Decreased high-level ADLs   Prognosis Good   Assessment Patient is a 80 y o  female seen for OT evaluation s/p admit to [address] [facility] on 9/20/2022 w/Hypertensive urgency  Commorbidities affecting patient's functional performance at time of assessment include: Dizziness, hypoxia, CKD, A fib, GERD, Permanent pacemaker, hypothyroidism, type 2 DM, hypertensive urgency  Orders placed for OT evaluation and treatment    Performed at least two patient identifiers during session including name and wristband  Prior to admission, Patient reported indepndent with ADLs, and ambulatory with a Rollator  patient lives at [facility], was independnet with showers and ambulation to/ from [address] room with a RW  Personal factors affecting patient at time of initial evaluation include: decreased initiation and engagement and difficulty performing ADLs  Upon evaluation, patient requires minimal  assist for UB ADLs, moderate and maximal assist for LB ADLs  Occupational performance is affected by the following deficits: decreased muscle strength, degenerative arthritic joint changes, impaired gross motor coordination, dynamic sit/ stand balance deficit with poor standing tolerance time for self care and functional mobility, decreased activity tolerance, decreased safety awareness and postural control and postural alignment deficit, requiring external assistance to complete transitional movements  Patient to benefit from continued Occupational Therapy treatment while in the hospital to address deficits as defined above and maximize level of functional independence with ADLs and functional mobility  Occupational Performance areas to address include: bathing/ shower, dressing, toilet hygiene, transfer to all surfaces, functional mobility, health maintenance, medication routine/ management, IADLs: safety procedures, IADLs: meal prep/ clean up and Leisure Participation  From OT standpoint, recommendation at time of d/c would be Short Term Rehab  Plan   Treatment Interventions ADL retraining;Functional transfer training; Endurance training;UE strengthening/ROM; Equipment evaluation/education;Patient/family training; Compensatory technique education;Continued evaluation; Energy conservation; Activityengagement   Goal Expiration Date 10/06/22   OT Frequency 3-5x/wk   Recommendation   OT Discharge Recommendation Post acute rehabilitation services   AM-PAC Daily Activity Inpatient   Lower Body Dressing 2   Bathing 2   Toileting 2   Upper Body Dressing 3   Grooming 3   Eating 4   Daily Activity Raw Score 16   Daily Activity Standardized Score (Calc for Raw Score >=11) 35 96   AM-PAC Applied Cognition Inpatient   Following a Speech/Presentation 4   Understanding Ordinary Conversation 3   Taking Medications 2   Remembering Where Things Are Placed or Put Away 2   Remembering List of 4-5 Errands 2   Taking Care of Complicated Tasks 2   Applied Cognition Raw Score 15   Applied Cognition Standardized Score 33 54   Barthel Index   Feeding 10   Bathing 0   Grooming Score 0   Dressing Score 5   Bladder Score 10   Bowels Score 10   Toilet Use Score 5   Transfers (Bed/Chair) Score 10   Mobility (Level Surface) Score 0   Stairs Score 0   Barthel Index Score 50       1 - Patient will verbalize and demonstrate use of energy conservation/ deep breathing technique and work simplification skills during functional activity with no verbal cues  2 - Patient will verbalize and demonstrate good body mechanics and joint protection techniques during  ADLs/ IADLs with no verbal cues  3 - Patient will increase OOB/ sitting tolerance to 2-4 hours per day for increased participation in self care and leisure tasks with no s/s of exertion  4 - Patient will increase standing tolerance time to 5  minutes with unilateral UE support to complete sink level ADLs@ mod I level  5 - Patient will increase sitting tolerance at edge of bed to 20 minutes to complete UB ADLs @ set up assist level  6 - Patient will transfer bed to Chair / toilet at Set up assist level with AD as indicated  7 - Patient will complete UB ADLs with set up assist      8 - Patient will complete LB ADLs with min assist with the use of adaptive equipment       9 - Patient will complete toileting hygiene with set up assist/ supervision for thoroughness    10 - Patient/ Family  will demonstrate competency with UE Home Exercise Program

## 2022-09-22 NOTE — PLAN OF CARE
Problem: PHYSICAL THERAPY ADULT  Goal: Performs mobility at highest level of function for planned discharge setting  See evaluation for individualized goals  Description: Treatment/Interventions: Functional transfer training, LE strengthening/ROM, Therapeutic exercise, Endurance training, Patient/family training, Bed mobility, Gait training, Spoke to case management, OT          See flowsheet documentation for full assessment, interventions and recommendations  Note: Prognosis: Good  Problem List: Decreased strength, Decreased endurance, Impaired balance, Decreased mobility, Impaired sensation  Assessment: Pt is 80year old female seen for PT evaluation s/p admit to [facility] on 9/20/2022 with Hypertensive urgency  PT consulted to assess pt's functional mobility and d/c needs  Order placed for PT eval and tx, with up and OOB as tolerated order  Comorbidities affecting pt's physical performance at time of assessment include type 2 DM, hypothyroidism, history of heart block, GERD, paroxsymal atrial fibrillation, CKD stage 3, hypoxia, and dizziness  PTA, pt was independent with all functional mobility with a rollator  [facility]  Personal factors affecting pt at time of IE include inability to ambulate household distances, inability to navigate level surfaces without external assistance, limited home support, inability to perform IADLs, and difficulty performing ADLs  Please find objective findings from PT assessment regarding body systems outlined above with impairments and limitations including weakness, impaired balance, decreased endurance, gait deviations, decreased activity tolerance, decreased functional mobility tolerance, altered sensation, and fall risk  The following objective measures performed on IE also reveal limitations: Barthel Index: 50/100, Modified [place]: 4 (moderate/severe disability) and AM-PAC 6-Clicks: 66/83   Pt's clinical presentation is currently unstable/unpredictable seen in pt's presentation of need for ongoing medical management/monitoring, pt is a fall risk, pt has intermittent complaints of dizziness limiting functional mobility, and pt requires cues and assist for safety with functional mobility  Pt to benefit from continued PT tx to address deficits as defined above and maximize level of functional independent mobility and consistency  From PT/mobility standpoint, recommendation at time of d/c would be STR pending progress in order to facilitate return to PLOF  Barriers to Discharge: Other (Comment) (decline in functional status)     PT Discharge Recommendation: Post acute rehabilitation services    See flowsheet documentation for full assessment

## 2022-09-23 ENCOUNTER — APPOINTMENT (INPATIENT)
Dept: CT IMAGING | Facility: HOSPITAL | Age: 87
DRG: 638 | End: 2022-09-23
Payer: MEDICARE

## 2022-09-23 ENCOUNTER — APPOINTMENT (INPATIENT)
Dept: NON INVASIVE DIAGNOSTICS | Facility: HOSPITAL | Age: 87
DRG: 638 | End: 2022-09-23
Payer: MEDICARE

## 2022-09-23 ENCOUNTER — APPOINTMENT (OUTPATIENT)
Dept: RADIOLOGY | Facility: HOSPITAL | Age: 87
DRG: 638 | End: 2022-09-23
Payer: MEDICARE

## 2022-09-23 LAB
ANION GAP SERPL CALCULATED.3IONS-SCNC: 8 MMOL/L (ref 4–13)
AORTIC ROOT: 2.8 CM
APICAL FOUR CHAMBER EJECTION FRACTION: 63 %
ASCENDING AORTA: 3.1 CM
BILIRUB UR QL STRIP: NEGATIVE
BUN SERPL-MCNC: 25 MG/DL (ref 5–25)
CALCIUM SERPL-MCNC: 9.1 MG/DL (ref 8.3–10.1)
CHLORIDE SERPL-SCNC: 102 MMOL/L (ref 96–108)
CHOLEST SERPL-MCNC: 137 MG/DL
CLARITY UR: CLEAR
CO2 SERPL-SCNC: 30 MMOL/L (ref 21–32)
COLOR UR: YELLOW
CREAT SERPL-MCNC: 1.22 MG/DL (ref 0.6–1.3)
E WAVE DECELERATION TIME: 211 MS
FRACTIONAL SHORTENING: 31 (ref 28–44)
GFR SERPL CREATININE-BSD FRML MDRD: 38 ML/MIN/1.73SQ M
GLUCOSE SERPL-MCNC: 176 MG/DL (ref 65–140)
GLUCOSE SERPL-MCNC: 184 MG/DL (ref 65–140)
GLUCOSE SERPL-MCNC: 188 MG/DL (ref 65–140)
GLUCOSE SERPL-MCNC: 239 MG/DL (ref 65–140)
GLUCOSE SERPL-MCNC: 252 MG/DL (ref 65–140)
GLUCOSE UR STRIP-MCNC: NEGATIVE MG/DL
HDLC SERPL-MCNC: 39 MG/DL
HGB UR QL STRIP.AUTO: NEGATIVE
INTERVENTRICULAR SEPTUM IN DIASTOLE (PARASTERNAL SHORT AXIS VIEW): 1.1 CM
INTERVENTRICULAR SEPTUM: 1.1 CM (ref 0.6–1.1)
KETONES UR STRIP-MCNC: NEGATIVE MG/DL
LA/AORTA RATIO 2D: 1.39
LAAS-AP2: 13.3 CM2
LAAS-AP4: 20.9 CM2
LDLC SERPL CALC-MCNC: 79 MG/DL (ref 0–100)
LEFT ATRIUM SIZE: 3.9 CM
LEFT INTERNAL DIMENSION IN SYSTOLE: 2.4 CM (ref 2.1–4)
LEFT VENTRICULAR INTERNAL DIMENSION IN DIASTOLE: 3.5 CM (ref 3.5–6)
LEFT VENTRICULAR POSTERIOR WALL IN END DIASTOLE: 1.2 CM
LEFT VENTRICULAR STROKE VOLUME: 29 ML
LEUKOCYTE ESTERASE UR QL STRIP: NEGATIVE
LVSV (TEICH): 29 ML
MV PEAK A VEL: 0.48 M/S
MV PEAK E VEL: 132 CM/S
MV STENOSIS PRESSURE HALF TIME: 62 MS
MV VALVE AREA P 1/2 METHOD: 3.55
NITRITE UR QL STRIP: NEGATIVE
PA SYSTOLIC PRESSURE: 53 MMHG
PH UR STRIP.AUTO: 6 [PH]
POTASSIUM SERPL-SCNC: 3.9 MMOL/L (ref 3.5–5.3)
PROT UR STRIP-MCNC: NEGATIVE MG/DL
SL CV LV EF: 68
SL CV PED ECHO LEFT VENTRICLE DIASTOLIC VOLUME (MOD BIPLANE) 2D: 50 ML
SL CV PED ECHO LEFT VENTRICLE SYSTOLIC VOLUME (MOD BIPLANE) 2D: 21 ML
SODIUM SERPL-SCNC: 140 MMOL/L (ref 135–147)
SP GR UR STRIP.AUTO: 1.01 (ref 1–1.03)
TR MAX PG: 45 MMHG
TR PEAK VELOCITY: 3.4 M/S
TRICUSPID VALVE PEAK REGURGITATION VELOCITY: 3.37 M/S
TRIGL SERPL-MCNC: 94 MG/DL
UROBILINOGEN UR QL STRIP.AUTO: 0.2 E.U./DL

## 2022-09-23 PROCEDURE — G1004 CDSM NDSC: HCPCS

## 2022-09-23 PROCEDURE — 82948 REAGENT STRIP/BLOOD GLUCOSE: CPT

## 2022-09-23 PROCEDURE — 81003 URINALYSIS AUTO W/O SCOPE: CPT | Performed by: STUDENT IN AN ORGANIZED HEALTH CARE EDUCATION/TRAINING PROGRAM

## 2022-09-23 PROCEDURE — 70498 CT ANGIOGRAPHY NECK: CPT

## 2022-09-23 PROCEDURE — 97116 GAIT TRAINING THERAPY: CPT

## 2022-09-23 PROCEDURE — 97110 THERAPEUTIC EXERCISES: CPT

## 2022-09-23 PROCEDURE — 80048 BASIC METABOLIC PNL TOTAL CA: CPT | Performed by: STUDENT IN AN ORGANIZED HEALTH CARE EDUCATION/TRAINING PROGRAM

## 2022-09-23 PROCEDURE — 70496 CT ANGIOGRAPHY HEAD: CPT

## 2022-09-23 PROCEDURE — 80061 LIPID PANEL: CPT | Performed by: STUDENT IN AN ORGANIZED HEALTH CARE EDUCATION/TRAINING PROGRAM

## 2022-09-23 PROCEDURE — 99232 SBSQ HOSP IP/OBS MODERATE 35: CPT | Performed by: STUDENT IN AN ORGANIZED HEALTH CARE EDUCATION/TRAINING PROGRAM

## 2022-09-23 PROCEDURE — 71046 X-RAY EXAM CHEST 2 VIEWS: CPT

## 2022-09-23 PROCEDURE — 93306 TTE W/DOPPLER COMPLETE: CPT | Performed by: INTERNAL MEDICINE

## 2022-09-23 PROCEDURE — 93306 TTE W/DOPPLER COMPLETE: CPT

## 2022-09-23 RX ORDER — INSULIN LISPRO 100 [IU]/ML
3 INJECTION, SOLUTION INTRAVENOUS; SUBCUTANEOUS
Status: DISCONTINUED | OUTPATIENT
Start: 2022-09-23 | End: 2022-09-26 | Stop reason: HOSPADM

## 2022-09-23 RX ORDER — LOSARTAN POTASSIUM 100 MG/1
50 TABLET ORAL DAILY
Qty: 30 TABLET | Refills: 0 | Status: SHIPPED | OUTPATIENT
Start: 2022-09-23

## 2022-09-23 RX ORDER — INSULIN LISPRO 100 [IU]/ML
3 INJECTION, SOLUTION INTRAVENOUS; SUBCUTANEOUS
Qty: 10 ML | Refills: 0 | Status: SHIPPED | OUTPATIENT
Start: 2022-09-23

## 2022-09-23 RX ORDER — ASPIRIN 81 MG/1
81 TABLET ORAL DAILY
Status: DISCONTINUED | OUTPATIENT
Start: 2022-09-23 | End: 2022-09-25

## 2022-09-23 RX ORDER — MECLIZINE HYDROCHLORIDE 25 MG/1
25 TABLET ORAL EVERY 12 HOURS PRN
Qty: 30 TABLET | Refills: 0 | Status: SHIPPED | OUTPATIENT
Start: 2022-09-23 | End: 2022-09-26 | Stop reason: SDUPTHER

## 2022-09-23 RX ORDER — FUROSEMIDE 40 MG/1
40 TABLET ORAL
Status: DISCONTINUED | OUTPATIENT
Start: 2022-09-23 | End: 2022-09-24

## 2022-09-23 RX ORDER — ATORVASTATIN CALCIUM 40 MG/1
80 TABLET, FILM COATED ORAL
Status: DISCONTINUED | OUTPATIENT
Start: 2022-09-24 | End: 2022-09-26 | Stop reason: HOSPADM

## 2022-09-23 RX ORDER — MECLIZINE HYDROCHLORIDE 25 MG/1
25 TABLET ORAL EVERY 8 HOURS SCHEDULED
Status: COMPLETED | OUTPATIENT
Start: 2022-09-23 | End: 2022-09-24

## 2022-09-23 RX ADMIN — ASPIRIN 81 MG: 81 TABLET, COATED ORAL at 18:59

## 2022-09-23 RX ADMIN — APIXABAN 5 MG: 5 TABLET, FILM COATED ORAL at 08:50

## 2022-09-23 RX ADMIN — INSULIN LISPRO 1 UNITS: 100 INJECTION, SOLUTION INTRAVENOUS; SUBCUTANEOUS at 07:33

## 2022-09-23 RX ADMIN — CYANOCOBALAMIN TAB 500 MCG 1000 MCG: 500 TAB at 08:51

## 2022-09-23 RX ADMIN — FUROSEMIDE 40 MG: 40 TABLET ORAL at 17:07

## 2022-09-23 RX ADMIN — MECLIZINE HYDROCHLORIDE 25 MG: 25 TABLET ORAL at 21:43

## 2022-09-23 RX ADMIN — IOHEXOL 85 ML: 350 INJECTION, SOLUTION INTRAVENOUS at 18:04

## 2022-09-23 RX ADMIN — METOPROLOL SUCCINATE 50 MG: 50 TABLET, EXTENDED RELEASE ORAL at 08:51

## 2022-09-23 RX ADMIN — INSULIN LISPRO 2 UNITS: 100 INJECTION, SOLUTION INTRAVENOUS; SUBCUTANEOUS at 17:07

## 2022-09-23 RX ADMIN — ATORVASTATIN CALCIUM 40 MG: 40 TABLET, FILM COATED ORAL at 21:43

## 2022-09-23 RX ADMIN — INSULIN LISPRO 2 UNITS: 100 INJECTION, SOLUTION INTRAVENOUS; SUBCUTANEOUS at 21:43

## 2022-09-23 RX ADMIN — INSULIN LISPRO 3 UNITS: 100 INJECTION, SOLUTION INTRAVENOUS; SUBCUTANEOUS at 17:43

## 2022-09-23 RX ADMIN — LOSARTAN POTASSIUM 50 MG: 50 TABLET, FILM COATED ORAL at 08:51

## 2022-09-23 RX ADMIN — PANTOPRAZOLE SODIUM 40 MG: 40 TABLET, DELAYED RELEASE ORAL at 05:10

## 2022-09-23 RX ADMIN — LEVOTHYROXINE SODIUM 50 MCG: 0.05 TABLET ORAL at 08:51

## 2022-09-23 RX ADMIN — INSULIN LISPRO 1 UNITS: 100 INJECTION, SOLUTION INTRAVENOUS; SUBCUTANEOUS at 12:09

## 2022-09-23 RX ADMIN — MECLIZINE HYDROCHLORIDE 25 MG: 25 TABLET ORAL at 14:03

## 2022-09-23 RX ADMIN — APIXABAN 5 MG: 5 TABLET, FILM COATED ORAL at 17:07

## 2022-09-23 RX ADMIN — INSULIN GLARGINE 15 UNITS: 100 INJECTION, SOLUTION SUBCUTANEOUS at 21:43

## 2022-09-23 RX ADMIN — AMLODIPINE BESYLATE 10 MG: 10 TABLET ORAL at 08:51

## 2022-09-23 RX ADMIN — POTASSIUM CHLORIDE 20 MEQ: 1500 TABLET, EXTENDED RELEASE ORAL at 08:50

## 2022-09-23 NOTE — CASE MANAGEMENT
Case Management Discharge Planning Note    Patient name Shelbi Wilson  Location [address] [id number] MRN [id number]  : 3/24/1929 Date 2022       Current Admission Date: 2022  Current Admission Diagnosis:Hypertensive urgency   Patient Active Problem List    Diagnosis Date Noted    Hypoxia 2022    Dizziness 2022    CKD (chronic kidney disease) stage 3, GFR 30-59 ml/min ([facility]) 10/22/2021    CHF (congestive heart failure) ([facility] ) 10/22/2021    Paroxysmal atrial fibrillation ([facility] ) 2021    Pacemaker 2020    GERD without esophagitis 2020    Hypothyroidism 2020    History of heart block 2020    Bilateral carpal tunnel syndrome 2019    Vitamin D deficiency 2019    Screening for skin condition 2018    History of skin cancer 2018    Hypertensive urgency 2017    Type 2 diabetes mellitus with hyperglycemia, with long-term current use of insulin ([facility] ) 2017    Leukocytosis 2017    Slow transit constipation 2017    Elevated troponin 2017    Actinic keratosis 2015    Seborrheic keratosis 2015      LOS (days): 2  Geometric Mean LOS (GMLOS) (days): 2 50  Days to GMLOS:0 4     OBJECTIVE:  Risk of Unplanned Readmission Score: 14 42      Current admission status: Inpatient   Preferred Pharmacy:   [address] [place] [address]   [facility] [place] [address]  Phone: [phone] Fax: [phone]    Primary Care Provider: Karen Villareal MD    Primary Insurance: MEDICARE  Secondary Insurance: AARP    DISCHARGE DETAILS:    Additional Comments: Per the medical team, the patient is not medically ready to discharge to [facility] independent living  However, the patient was adamant of  leaving [place] today  The CM spoke with the patient, her daughter Jessica Jo & son-in-law regarding her discharge plan at the bedside   The CM informed the patient and her family that PT has recommended STR and [facility] is not able to accept her return at this time due to ambulation limitations  After further discussion with the patient and her family  The patient is now agreeable to STR  The CM submitted STR referrals and the patient and family 1st choice is [facility] and 2nd choice is [facility] will have a bed for the patient on Monday pending COVID test on Sunday night & [facility] determination is pending  CM will continue to follow

## 2022-09-23 NOTE — PLAN OF CARE
Problem: Potential for Falls  Goal: Patient will remain free of falls  Description: INTERVENTIONS:  - Educate patient/family on patient safety including physical limitations  - Instruct patient to call for assistance with activity   - Consult OT/PT to assist with strengthening/mobility   - Keep Call bell within reach  - Keep bed low and locked with side rails adjusted as appropriate  - Keep care items and personal belongings within reach  - Initiate and maintain comfort rounds  - Make Fall Risk Sign visible to staff  - Offer Toileting every 2 Hours, in advance of need  - Initiate/Maintain bed alarm  - Obtain necessary fall risk management equipment: socks  - Apply yellow socks and bracelet for high fall risk patients  - Consider moving patient to room near nurses station  Outcome: Progressing     Problem: PAIN - ADULT  Goal: Verbalizes/displays adequate comfort level or baseline comfort level  Description: Interventions:  - Encourage patient to monitor pain and request assistance  - Assess pain using appropriate pain scale  - Administer analgesics based on type and severity of pain and evaluate response  - Implement non-pharmacological measures as appropriate and evaluate response  - Consider cultural and social influences on pain and pain management  - Notify physician/advanced practitioner if interventions unsuccessful or patient reports new pain  Outcome: Progressing     Problem: INFECTION - ADULT  Goal: Absence or prevention of progression during hospitalization  Description: INTERVENTIONS:  - Assess and monitor for signs and symptoms of infection  - Monitor lab/diagnostic results  - Monitor all insertion sites, i e  indwelling lines, tubes, and drains  - Monitor endotracheal if appropriate and nasal secretions for changes in amount and color  - [place] appropriate cooling/warming therapies per order  - Administer medications as ordered  - Instruct and encourage patient and family to use good hand hygiene technique  - Identify and instruct in appropriate isolation precautions for identified infection/condition  Outcome: Progressing  Goal: Absence of fever/infection during neutropenic period  Description: INTERVENTIONS:  - Monitor WBC    Outcome: Progressing     Problem: SAFETY ADULT  Goal: Patient will remain free of falls  Description: INTERVENTIONS:  - Educate patient/family on patient safety including physical limitations  - Instruct patient to call for assistance with activity   - Consult OT/PT to assist with strengthening/mobility   - Keep Call bell within reach  - Keep bed low and locked with side rails adjusted as appropriate  - Keep care items and personal belongings within reach  - Initiate and maintain comfort rounds  - Make Fall Risk Sign visible to staff  - Offer Toileting every 2 Hours, in advance of need  - Initiate/Maintain bed alarm  - Obtain necessary fall risk management equipment: socks  - Apply yellow socks and bracelet for high fall risk patients  - Consider moving patient to room near nurses station  Outcome: Progressing  Goal: Maintain or return to baseline ADL function  Description: INTERVENTIONS:  -  Assess patient's ability to carry out ADLs; assess patient's baseline for ADL function and identify physical deficits which impact ability to perform ADLs (bathing, care of mouth/teeth, toileting, grooming, dressing, etc )  - Assess/evaluate cause of self-care deficits   - Assess range of motion  - Assess patient's mobility; develop plan if impaired  - Assess patient's need for assistive devices and provide as appropriate  - Encourage maximum independence but intervene and supervise when necessary  - Involve family in performance of ADLs  - Assess for home care needs following discharge   - Consider OT consult to assist with ADL evaluation and planning for discharge  - Provide patient education as appropriate  Outcome: Progressing  Goal: Maintains/Returns to pre admission functional level  Description: INTERVENTIONS:  - Perform BMAT or MOVE assessment daily    - Set and communicate daily mobility goal to care team and patient/family/caregiver  - Collaborate with rehabilitation services on mobility goals if consulted  - Perform Range of Motion 2 times a day  - Reposition patient every 2 hours  - Dangle patient 2 times a day  - Stand patient 2 times a day  - Ambulate patient 2 times a day  - Out of bed to chair 2 times a day   - Out of bed for meals 2 times a day  - Out of bed for toileting  - Record patient progress and toleration of activity level   Outcome: Progressing     Problem: DISCHARGE PLANNING  Goal: Discharge to home or other facility with appropriate resources  Description: INTERVENTIONS:  - Identify barriers to discharge w/patient and caregiver  - Arrange for needed discharge resources and transportation as appropriate  - Identify discharge learning needs (meds, wound care, etc )  - Arrange for interpretive services to assist at discharge as needed  - Refer to Case Management Department for coordinating discharge planning if the patient needs post-hospital services based on physician/advanced practitioner order or complex needs related to functional status, cognitive ability, or social support system  Outcome: Progressing     Problem: Knowledge Deficit  Goal: Patient/family/caregiver demonstrates understanding of disease process, treatment plan, medications, and discharge instructions  Description: Complete learning assessment and assess knowledge base    Interventions:  - Provide teaching at level of understanding  - Provide teaching via preferred learning methods  Outcome: Progressing     Problem: MOBILITY - ADULT  Goal: Maintain or return to baseline ADL function  Description: INTERVENTIONS:  -  Assess patient's ability to carry out ADLs; assess patient's baseline for ADL function and identify physical deficits which impact ability to perform ADLs (bathing, care of mouth/teeth, toileting, grooming, dressing, etc )  - Assess/evaluate cause of self-care deficits   - Assess range of motion  - Assess patient's mobility; develop plan if impaired  - Assess patient's need for assistive devices and provide as appropriate  - Encourage maximum independence but intervene and supervise when necessary  - Involve family in performance of ADLs  - Assess for home care needs following discharge   - Consider OT consult to assist with ADL evaluation and planning for discharge  - Provide patient education as appropriate  Outcome: Progressing  Goal: Maintains/Returns to pre admission functional level  Description: INTERVENTIONS:  - Perform BMAT or MOVE assessment daily    - Set and communicate daily mobility goal to care team and patient/family/caregiver  - Collaborate with rehabilitation services on mobility goals if consulted  - Perform Range of Motion 2 times a day  - Reposition patient every 2 hours    - Dangle patient 2 times a day  - Stand patient 2 times a day  - Ambulate patient 2 times a day  - Out of bed to chair 2 times a day   - Out of bed for meals 2 times a day  - Out of bed for toileting  - Record patient progress and toleration of activity level   Outcome: Progressing     Problem: Prexisting or High Potential for Compromised Skin Integrity  Goal: Skin integrity is maintained or improved  Description: INTERVENTIONS:  - Identify patients at risk for skin breakdown  - Assess and monitor skin integrity  - Assess and monitor nutrition and hydration status  - Monitor labs   - Assess for incontinence   - Turn and reposition patient  - Assist with mobility/ambulation  - Relieve pressure over bony prominences  - Avoid friction and shearing  - Provide appropriate hygiene as needed including keeping skin clean and dry  - Evaluate need for skin moisturizer/barrier cream  - Collaborate with interdisciplinary team   - Patient/family teaching  - Consider wound care consult   Outcome: Progressing

## 2022-09-23 NOTE — ASSESSMENT & PLAN NOTE
Lab Results   Component Value Date    EGFR 38 09/23/2022    EGFR 39 09/22/2022    EGFR 40 09/21/2022    CREATININE 1 22 09/23/2022    CREATININE 1 20 09/22/2022    CREATININE 1 16 09/21/2022   Creatinine appears to be at baseline  Intake and output  Monitor with a m   BMP

## 2022-09-23 NOTE — QUICK NOTE
[facility] cardiology office for PPM details and MRI compatibility  Confirmed device was Not MRI Compatible

## 2022-09-23 NOTE — PHYSICAL THERAPY NOTE
Physical Therapy Treatment Note    Patient Name: Mariano Ching    Diagnosis: Dizziness [R42]  Hypoglycemia [E16 2]  Hypertension [I10]  Uncontrolled hypertension [I10]  Ambulatory dysfunction [R26 2]     09/23/22 1250   PT Last Visit   PT Visit Date 09/23/22   Note Type   Note Type Treatment   Pain Assessment   Pain Assessment Tool 0-10   Pain Score 4   Pain Location/Orientation Location: Head   Pain Onset/Description Descriptor: Headache   Hospital Pain Intervention(s) Repositioned; Ambulation/increased activity   Restrictions/Precautions   Weight Bearing Precautions Per Order No   Other Precautions Chair Alarm; Bed Alarm; Fall Risk;Pain  (intermittent dizziness)   General   Chart Reviewed Yes   Response to Previous Treatment Patient with no complaints from previous session  Family/Caregiver Present No   Cognition   Overall Cognitive Status WFL   Arousal/Participation Alert; Responsive; Cooperative   Attention Within functional limits   Orientation Level Oriented X4   Memory Decreased recall of recent events;Decreased short term memory   Following Commands Follows all commands and directions without difficulty   Comments Pt agreeable to PT  Subjective   Subjective "I have a headache "   Bed Mobility   Supine to Sit 3  Moderate assistance   Additional items Assist x 1;HOB elevated; Bedrails; Increased time required;Verbal cues;LE management   Transfers   Sit to Stand 4  Minimal assistance   Additional items Assist x 1; Increased time required;Verbal cues   Stand to Sit 4  Minimal assistance   Additional items Assist x 1; Armrests; Increased time required;Verbal cues   Ambulation/Elevation   Gait pattern Decreased toe off;Decreased heel strike;Decreased hip extension; Excessively slow; Short stride; Shuffling   Gait Assistance 4  Minimal assist   Additional items Assist x 1;Verbal cues   Assistive Device Rolling walker   Distance 80 feet   Stair Management Assistance Not tested   Balance   Static Sitting Fair +   Dynamic Sitting Fair   Static Standing Fair -   Dynamic Standing Poor +   Ambulatory Poor +   Endurance Deficit   Endurance Deficit Yes   Endurance Deficit Description decreased activity tolerance   Activity Tolerance   Activity Tolerance Patient limited by fatigue;Patient limited by pain; Other (Comment)  (intermittent c/o dizziness)   Medical Staff Made Aware Discussed case with Dr Landen Olsen   Exercises   Hip Flexion Sitting;20 reps;AROM; Bilateral   Hip Adduction Sitting;20 reps;AROM; Bilateral   Knee AROM Long Arc Quad Sitting;20 reps;AROM; Bilateral   Ankle Pumps Sitting;20 reps;AROM; Bilateral   Assessment   Prognosis Good   Problem List Decreased strength;Decreased endurance; Impaired balance;Decreased mobility; Impaired sensation;Pain   Assessment Chart reviewed  Patient was received supine in bed in NAD and agreeable to PT session  Today's PT treatment session consisted of therapeutic activity for facilitation of transitional movements and safe performance of correct technique for bed mobility and sit to stand transfers, therapeutic exercise to increase lower extremity muscle strength, and gait training to promote safe and functional ambulation on level surfaces  In comparison to the previous session the patient has made progress as evident by ability to ambulate an increased distance with use of RW  When ambulating pt exhibits decreased tahira, decreased stride length, and decreased step height  Pt's ambulation distance limited secondary to complaints of fatigue and dizziness  Pt tolerated all therapeutic exercise well without complaints, but required seated rest breaks  Overall, patient tolerated today's session well and continues to be making progress towards achieving her STG's  Patient's prognosis for achieving their STG's is good as evident by pt's motivation   PT intervention continues to be appropriate as the patient continues to be limited by pain, decreased lower extremity strength, impaired balance, decreased endurance, gait deviations, and decreased functional mobility  Continue to recommend STR  PT to continue to see patient in order to address the deficits listed above and provide interventions consistent with the POC in order to achieve STG's and optimize the patient's independence with functional mobility  Barriers to Discharge Other (Comment)  (decline in functional status)   Goals   STG Expiration Date 10/02/22   Plan   Treatment/Interventions Functional transfer training;LE strengthening/ROM; Therapeutic exercise; Endurance training;Patient/family training;Bed mobility;Gait training;Spoke to nursing;Spoke to MD;OT   Progress Progressing toward goals   PT Frequency 3-5x/wk   Recommendation   PT Discharge Recommendation Post acute rehabilitation services   AM-PAC Basic Mobility Inpatient   Turning in Bed Without Bedrails 3   Lying on Back to Sitting on Edge of Flat Bed 2   Moving Bed to Chair 3   Standing Up From Chair 3   Walk in Room 3   Climb 3-5 Stairs 1   Basic Mobility Inpatient Raw Score 15   Basic Mobility Standardized Score 36 97   Highest Level Of Mobility   JH-HLM Goal 4: Move to chair/commode   JH-HLM Achieved 7: Walk 25 feet or more   Education   Education Provided Mobility training;Assistive device;Home exercise program   Patient Reinforcement needed   End of Consult   Patient Position at End of Consult Bedside chair;Bed/Chair alarm activated; All needs within reach  (RN aware)     Marisol Hernandez, PT, DPT    Time of PT treatment session: 0523-1565  24 minutes

## 2022-09-23 NOTE — ASSESSMENT & PLAN NOTE
Per daughter and patient ongoing 2 week history weakness and dizziness  Denies focal deficits  No focal neurological deficit, exam   Difficulty with ambulation secondary to dizziness  Differential include secondary to hypertensive urgency versus vertigo (per chart review has previous history) versus less likely posterior stroke  · Head CT reveals chronic microangiopathic changes  No acute changes noted  · Echocardiogram unremarkable  · Orthostatic blood pressure negative  · Blood pressure management as highlighted above  · Will trial meclizine  · Unfortunately pacemaker is not MRI compatible    Follow-up on head and neck CTA, consider neurology consult  · PT/OT recommending rehab

## 2022-09-23 NOTE — PLAN OF CARE
Problem: PHYSICAL THERAPY ADULT  Goal: Performs mobility at highest level of function for planned discharge setting  See evaluation for individualized goals  Description: Treatment/Interventions: Functional transfer training, LE strengthening/ROM, Therapeutic exercise, Endurance training, Patient/family training, Bed mobility, Gait training, Spoke to case management, OT          See flowsheet documentation for full assessment, interventions and recommendations  Outcome: Progressing  Note: Prognosis: Good  Problem List: Decreased strength, Decreased endurance, Impaired balance, Decreased mobility, Impaired sensation, Pain  Assessment: Chart reviewed  Patient was received supine in bed in NAD and agreeable to PT session  Today's PT treatment session consisted of therapeutic activity for facilitation of transitional movements and safe performance of correct technique for bed mobility and sit to stand transfers, therapeutic exercise to increase lower extremity muscle strength, and gait training to promote safe and functional ambulation on level surfaces  In comparison to the previous session the patient has made progress as evident by ability to ambulate an increased distance with use of RW  When ambulating pt exhibits decreased tahira, decreased stride length, and decreased step height  Pt's ambulation distance limited secondary to complaints of fatigue and dizziness  Pt tolerated all therapeutic exercise well without complaints, but required seated rest breaks  Overall, patient tolerated today's session well and continues to be making progress towards achieving her STG's  Patient's prognosis for achieving their STG's is good as evident by pt's motivation  PT intervention continues to be appropriate as the patient continues to be limited by pain, decreased lower extremity strength, impaired balance, decreased endurance, gait deviations, and decreased functional mobility  Continue to recommend STR   PT to continue to see patient in order to address the deficits listed above and provide interventions consistent with the POC in order to achieve STG's and optimize the patient's independence with functional mobility  Barriers to Discharge: Other (Comment) (decline in functional status)     PT Discharge Recommendation: Post acute rehabilitation services    See flowsheet documentation for full assessment

## 2022-09-23 NOTE — ASSESSMENT & PLAN NOTE
Lab Results   Component Value Date    HGBA1C 8 0 (H) 09/15/2022       Recent Labs     09/22/22 2059 09/23/22  0632 09/23/22  1055 09/23/22  1548   POCGLU 266* 176* 188* 252*       Blood Sugar Average: Last 72 hrs:  (P) 207 0625     Endorses compliance with home regimen of Lantus 16 units q h s  And 11 units t i d  Of Humalog  Initially referred to ED with hypoglycemic episode, received her insulin prior to feeding per nursing facility  Most recent blood glucose 252  · Lantus 15 units q h s   And Humalog 3 units t i d  + sliding scale insulin for now

## 2022-09-23 NOTE — ASSESSMENT & PLAN NOTE
Initial SBP in the 200s, now improved  ·  Lasix 40 mg b i d   And metoprolol succinate 50 mg b i d   · Continue with amlodipine 10 mg daily and losartan 50 mg daily  · IV hydralazine for SBP greater than 180  · Monitor vitals closely

## 2022-09-23 NOTE — PLAN OF CARE
Problem: Potential for Falls  Goal: Patient will remain free of falls  Description: INTERVENTIONS:  - Educate patient/family on patient safety including physical limitations  - Instruct patient to call for assistance with activity   - Consult OT/PT to assist with strengthening/mobility   - Keep Call bell within reach  - Keep bed low and locked with side rails adjusted as appropriate  - Keep care items and personal belongings within reach  - Initiate and maintain comfort rounds  - Make Fall Risk Sign visible to staff  - Offer Toileting every 3 Hours, in advance of need  - Initiate/Maintain bed alarm  - Obtain necessary fall risk management equipment  - Apply yellow socks and bracelet for high fall risk patients  - Consider moving patient to room near nurses station  Outcome: Progressing     Problem: PAIN - ADULT  Goal: Verbalizes/displays adequate comfort level or baseline comfort level  Description: Interventions:  - Encourage patient to monitor pain and request assistance  - Assess pain using appropriate pain scale  - Administer analgesics based on type and severity of pain and evaluate response  - Implement non-pharmacological measures as appropriate and evaluate response  - Consider cultural and social influences on pain and pain management  - Notify physician/advanced practitioner if interventions unsuccessful or patient reports new pain  Outcome: Progressing     Problem: INFECTION - ADULT  Goal: Absence or prevention of progression during hospitalization  Description: INTERVENTIONS:  - Assess and monitor for signs and symptoms of infection  - Monitor lab/diagnostic results  - Monitor all insertion sites, i e  indwelling lines, tubes, and drains  - Monitor endotracheal if appropriate and nasal secretions for changes in amount and color  - [place] appropriate cooling/warming therapies per order  - Administer medications as ordered  - Instruct and encourage patient and family to use good hand hygiene technique  - Identify and instruct in appropriate isolation precautions for identified infection/condition  Outcome: Progressing  Goal: Absence of fever/infection during neutropenic period  Description: INTERVENTIONS:  - Monitor WBC    Outcome: Progressing     Problem: SAFETY ADULT  Goal: Patient will remain free of falls  Description: INTERVENTIONS:  - Educate patient/family on patient safety including physical limitations  - Instruct patient to call for assistance with activity   - Consult OT/PT to assist with strengthening/mobility   - Keep Call bell within reach  - Keep bed low and locked with side rails adjusted as appropriate  - Keep care items and personal belongings within reach  - Initiate and maintain comfort rounds  - Make Fall Risk Sign visible to staff  - Offer Toileting every 3 Hours, in advance of need  - Initiate/Maintain bed alarm  - Obtain necessary fall risk management equipment  - Apply yellow socks and bracelet for high fall risk patients  - Consider moving patient to room near nurses station  Outcome: Progressing  Goal: Maintain or return to baseline ADL function  Description: INTERVENTIONS:  -  Assess patient's ability to carry out ADLs; assess patient's baseline for ADL function and identify physical deficits which impact ability to perform ADLs (bathing, care of mouth/teeth, toileting, grooming, dressing, etc )  - Assess/evaluate cause of self-care deficits   - Assess range of motion  - Assess patient's mobility; develop plan if impaired  - Assess patient's need for assistive devices and provide as appropriate  - Encourage maximum independence but intervene and supervise when necessary  - Involve family in performance of ADLs  - Assess for home care needs following discharge   - Consider OT consult to assist with ADL evaluation and planning for discharge  - Provide patient education as appropriate  Outcome: Progressing  Goal: Maintains/Returns to pre admission functional level  Description: INTERVENTIONS:  - Perform BMAT or MOVE assessment daily    - Set and communicate daily mobility goal to care team and patient/family/caregiver  - Collaborate with rehabilitation services on mobility goals if consulted  - Perform Range of Motion 3 times a day  - Reposition patient every 3 hours  - Dangle patient 3 times a day  - Stand patient 3 times a day  - Ambulate patient 3 times a day  - Out of bed to chair 3 times a day   - Out of bed for meals 3 times a day  - Out of bed for toileting  - Record patient progress and toleration of activity level   Outcome: Progressing     Problem: DISCHARGE PLANNING  Goal: Discharge to home or other facility with appropriate resources  Description: INTERVENTIONS:  - Identify barriers to discharge w/patient and caregiver  - Arrange for needed discharge resources and transportation as appropriate  - Identify discharge learning needs (meds, wound care, etc )  - Arrange for interpretive services to assist at discharge as needed  - Refer to Case Management Department for coordinating discharge planning if the patient needs post-hospital services based on physician/advanced practitioner order or complex needs related to functional status, cognitive ability, or social support system  Outcome: Progressing     Problem: Knowledge Deficit  Goal: Patient/family/caregiver demonstrates understanding of disease process, treatment plan, medications, and discharge instructions  Description: Complete learning assessment and assess knowledge base    Interventions:  - Provide teaching at level of understanding  - Provide teaching via preferred learning methods  Outcome: Progressing     Problem: MOBILITY - ADULT  Goal: Maintain or return to baseline ADL function  Description: INTERVENTIONS:  -  Assess patient's ability to carry out ADLs; assess patient's baseline for ADL function and identify physical deficits which impact ability to perform ADLs (bathing, care of mouth/teeth, toileting, grooming, dressing, etc )  - Assess/evaluate cause of self-care deficits   - Assess range of motion  - Assess patient's mobility; develop plan if impaired  - Assess patient's need for assistive devices and provide as appropriate  - Encourage maximum independence but intervene and supervise when necessary  - Involve family in performance of ADLs  - Assess for home care needs following discharge   - Consider OT consult to assist with ADL evaluation and planning for discharge  - Provide patient education as appropriate  Outcome: Progressing  Goal: Maintains/Returns to pre admission functional level  Description: INTERVENTIONS:  - Perform BMAT or MOVE assessment daily    - Set and communicate daily mobility goal to care team and patient/family/caregiver  - Collaborate with rehabilitation services on mobility goals if consulted  - Perform Range of Motion 3 times a day  - Reposition patient every 3 hours    - Dangle patient 3 times a day  - Stand patient 3 times a day  - Ambulate patient 3 times a day  - Out of bed to chair 3 times a day   - Out of bed for meals 3 times a day  - Out of bed for toileting  - Record patient progress and toleration of activity level   Outcome: Progressing     Problem: Prexisting or High Potential for Compromised Skin Integrity  Goal: Skin integrity is maintained or improved  Description: INTERVENTIONS:  - Identify patients at risk for skin breakdown  - Assess and monitor skin integrity  - Assess and monitor nutrition and hydration status  - Monitor labs   - Assess for incontinence   - Turn and reposition patient  - Assist with mobility/ambulation  - Relieve pressure over bony prominences  - Avoid friction and shearing  - Provide appropriate hygiene as needed including keeping skin clean and dry  - Evaluate need for skin moisturizer/barrier cream  - Collaborate with interdisciplinary team   - Patient/family teaching  - Consider wound care consult   Outcome: Progressing

## 2022-09-23 NOTE — ASSESSMENT & PLAN NOTE
New onset hypoxia  de satting to the mid [de-identified] on room air  Now improved on room air after IV diuresis  Denies shortness of breath  No wheezing appreciated  Etiology deconditioning versus atelectasis versus underlying lung process  · Chest x-ray PA lateral 9/21 mild pulmonary vascular congestive changes slightly improved from previous exam   Patchy increased parenchymal density in left lateral base on full view suspicious for atelectasis or pneumonia and lingula  Continued radiographic follow-up recommended  · No productive cough or acute signs of infection at this time, monitor off antibiotics  · Increased to p o  40 Lasix b i d  · Repeat chest x-ray tomorrow a m    · Continue to wean off oxygen as tolerated to maintain SpO2 sats greater than 90%

## 2022-09-23 NOTE — PLAN OF CARE
Problem: Potential for Falls  Goal: Patient will remain free of falls  Description: INTERVENTIONS:  - Educate patient/family on patient safety including physical limitations  - Instruct patient to call for assistance with activity   - Consult OT/PT to assist with strengthening/mobility   - Keep Call bell within reach  - Keep bed low and locked with side rails adjusted as appropriate  - Keep care items and personal belongings within reach  - Initiate and maintain comfort rounds  - Make Fall Risk Sign visible to staff  - Offer Toileting every 2 Hours, in advance of need  - Initiate/Maintain bed alarm  - Obtain necessary fall risk management equipment: socks  - Apply yellow socks and bracelet for high fall risk patients  - Consider moving patient to room near nurses station  Outcome: Progressing     Problem: PAIN - ADULT  Goal: Verbalizes/displays adequate comfort level or baseline comfort level  Description: Interventions:  - Encourage patient to monitor pain and request assistance  - Assess pain using appropriate pain scale  - Administer analgesics based on type and severity of pain and evaluate response  - Implement non-pharmacological measures as appropriate and evaluate response  - Consider cultural and social influences on pain and pain management  - Notify physician/advanced practitioner if interventions unsuccessful or patient reports new pain  Outcome: Progressing     Problem: INFECTION - ADULT  Goal: Absence or prevention of progression during hospitalization  Description: INTERVENTIONS:  - Assess and monitor for signs and symptoms of infection  - Monitor lab/diagnostic results  - Monitor all insertion sites, i e  indwelling lines, tubes, and drains  - Monitor endotracheal if appropriate and nasal secretions for changes in amount and color  - [place] appropriate cooling/warming therapies per order  - Administer medications as ordered  - Instruct and encourage patient and family to use good hand hygiene technique  - Identify and instruct in appropriate isolation precautions for identified infection/condition  Outcome: Progressing  Goal: Absence of fever/infection during neutropenic period  Description: INTERVENTIONS:  - Monitor WBC    Outcome: Progressing     Problem: SAFETY ADULT  Goal: Patient will remain free of falls  Description: INTERVENTIONS:  - Educate patient/family on patient safety including physical limitations  - Instruct patient to call for assistance with activity   - Consult OT/PT to assist with strengthening/mobility   - Keep Call bell within reach  - Keep bed low and locked with side rails adjusted as appropriate  - Keep care items and personal belongings within reach  - Initiate and maintain comfort rounds  - Make Fall Risk Sign visible to staff  - Offer Toileting every 2 Hours, in advance of need  - Initiate/Maintain bed alarm  - Obtain necessary fall risk management equipment: socks  - Apply yellow socks and bracelet for high fall risk patients  - Consider moving patient to room near nurses station  Outcome: Progressing  Goal: Maintain or return to baseline ADL function  Description: INTERVENTIONS:  -  Assess patient's ability to carry out ADLs; assess patient's baseline for ADL function and identify physical deficits which impact ability to perform ADLs (bathing, care of mouth/teeth, toileting, grooming, dressing, etc )  - Assess/evaluate cause of self-care deficits   - Assess range of motion  - Assess patient's mobility; develop plan if impaired  - Assess patient's need for assistive devices and provide as appropriate  - Encourage maximum independence but intervene and supervise when necessary  - Involve family in performance of ADLs  - Assess for home care needs following discharge   - Consider OT consult to assist with ADL evaluation and planning for discharge  - Provide patient education as appropriate  Outcome: Progressing  Goal: Maintains/Returns to pre admission functional level  Description: INTERVENTIONS:  - Perform BMAT or MOVE assessment daily    - Set and communicate daily mobility goal to care team and patient/family/caregiver  - Collaborate with rehabilitation services on mobility goals if consulted  - Perform Range of Motion 2 times a day  - Reposition patient every 2 hours  - Dangle patient 2 times a day  - Stand patient 2 times a day  - Ambulate patient 2 times a day  - Out of bed to chair 2 times a day   - Out of bed for meals 2 times a day  - Out of bed for toileting  - Record patient progress and toleration of activity level   Outcome: Progressing     Problem: DISCHARGE PLANNING  Goal: Discharge to home or other facility with appropriate resources  Description: INTERVENTIONS:  - Identify barriers to discharge w/patient and caregiver  - Arrange for needed discharge resources and transportation as appropriate  - Identify discharge learning needs (meds, wound care, etc )  - Arrange for interpretive services to assist at discharge as needed  - Refer to Case Management Department for coordinating discharge planning if the patient needs post-hospital services based on physician/advanced practitioner order or complex needs related to functional status, cognitive ability, or social support system  Outcome: Progressing     Problem: Knowledge Deficit  Goal: Patient/family/caregiver demonstrates understanding of disease process, treatment plan, medications, and discharge instructions  Description: Complete learning assessment and assess knowledge base    Interventions:  - Provide teaching at level of understanding  - Provide teaching via preferred learning methods  Outcome: Progressing     Problem: MOBILITY - ADULT  Goal: Maintain or return to baseline ADL function  Description: INTERVENTIONS:  -  Assess patient's ability to carry out ADLs; assess patient's baseline for ADL function and identify physical deficits which impact ability to perform ADLs (bathing, care of mouth/teeth, toileting, grooming, dressing, etc )  - Assess/evaluate cause of self-care deficits   - Assess range of motion  - Assess patient's mobility; develop plan if impaired  - Assess patient's need for assistive devices and provide as appropriate  - Encourage maximum independence but intervene and supervise when necessary  - Involve family in performance of ADLs  - Assess for home care needs following discharge   - Consider OT consult to assist with ADL evaluation and planning for discharge  - Provide patient education as appropriate  Outcome: Progressing  Goal: Maintains/Returns to pre admission functional level  Description: INTERVENTIONS:  - Perform BMAT or MOVE assessment daily    - Set and communicate daily mobility goal to care team and patient/family/caregiver  - Collaborate with rehabilitation services on mobility goals if consulted  - Perform Range of Motion 2 times a day  - Reposition patient every 2 hours    - Dangle patient 2 times a day  - Stand patient 2 times a day  - Ambulate patient 2 times a day  - Out of bed to chair 2 times a day   - Out of bed for meals 2 times a day  - Out of bed for toileting  - Record patient progress and toleration of activity level   Outcome: Progressing     Problem: Prexisting or High Potential for Compromised Skin Integrity  Goal: Skin integrity is maintained or improved  Description: INTERVENTIONS:  - Identify patients at risk for skin breakdown  - Assess and monitor skin integrity  - Assess and monitor nutrition and hydration status  - Monitor labs   - Assess for incontinence   - Turn and reposition patient  - Assist with mobility/ambulation  - Relieve pressure over bony prominences  - Avoid friction and shearing  - Provide appropriate hygiene as needed including keeping skin clean and dry  - Evaluate need for skin moisturizer/barrier cream  - Collaborate with interdisciplinary team   - Patient/family teaching  - Consider wound care consult   Outcome: Progressing

## 2022-09-23 NOTE — PROGRESS NOTES
[address] [facility]  Progress Note Martin Decker 3/24/1929, 80 y o  female MRN: [id number]  Unit/Bed#: -01 Encounter: [id number]  Primary Care Provider: Kaila Berumen MD   Date and time admitted to hospital: 9/20/2022  5:54 PM    Dizziness  Assessment & Plan  Per daughter and patient ongoing 2 week history weakness and dizziness  Denies focal deficits  No focal neurological deficit, exam   Difficulty with ambulation secondary to dizziness  Differential include secondary to hypertensive urgency versus vertigo (per chart review has previous history) versus less likely posterior stroke  · Head CT reveals chronic microangiopathic changes  No acute changes noted  · Echocardiogram unremarkable  · Orthostatic blood pressure negative  · Blood pressure management as highlighted above  · Will trial meclizine  · Unfortunately pacemaker is not MRI compatible  Follow-up on head and neck CTA, consider neurology consult  · PT/OT recommending rehab      Hypoxia  Assessment & Plan  New onset hypoxia  de satting to the mid [de-identified] on room air  Now improved on room air after IV diuresis  Denies shortness of breath  No wheezing appreciated  Etiology deconditioning versus atelectasis versus underlying lung process  · Chest x-ray PA lateral 9/21 mild pulmonary vascular congestive changes slightly improved from previous exam   Patchy increased parenchymal density in left lateral base on full view suspicious for atelectasis or pneumonia and lingula  Continued radiographic follow-up recommended  · No productive cough or acute signs of infection at this time, monitor off antibiotics  · Increased to p o  40 Lasix b i d  · Repeat chest x-ray tomorrow a m    · Continue to wean off oxygen as tolerated to maintain SpO2 sats greater than 90%    CKD (chronic kidney disease) stage 3, GFR 30-59 ml/min [facility])  Assessment & Plan  Lab Results   Component Value Date    EGFR 38 09/23/2022    EGFR 39 09/22/2022    EGFR 40 09/21/2022    CREATININE 1 22 09/23/2022    CREATININE 1 20 09/22/2022    CREATININE 1 16 09/21/2022   Creatinine appears to be at baseline  Intake and output  Monitor with a linden AKHTAR    Paroxysmal atrial fibrillation ([address] )  Assessment & Plan  Currently heart rate well controlled  Denies chest pain or palpitations  · Metoprolol succinate 50 mg b i d   · Eliquis 5 mg b i d   · Monitor on telemetry    GERD without esophagitis  Assessment & Plan  No acute complaints at this time  Continue Protonix while inpatient    History of heart block  Assessment & Plan  Status post permanent pacemaker  Confirmed with outpatient Cardiology, not MRI compatible    Hypothyroidism  Assessment & Plan  Continue home dose levothyroxine 50 daily  TSH WNL    Type 2 diabetes mellitus with hyperglycemia, with long-term current use of insulin [facility])  Assessment & Plan  Lab Results   Component Value Date    HGBA1C 8 0 (H) 09/15/2022       Recent Labs     09/22/22  2059 09/23/22  0632 09/23/22  1055 09/23/22  1548   POCGLU 266* 176* 188* 252*       Blood Sugar Average: Last 72 hrs:  (P) 207 0625     Endorses compliance with home regimen of Lantus 16 units q h s  And 11 units t i d  Of Humalog  Initially referred to ED with hypoglycemic episode, received her insulin prior to feeding per nursing facility  Most recent blood glucose 252  · Lantus 15 units q h s  And Humalog 3 units t i d  + sliding scale insulin for now      * Hypertensive urgency  Assessment & Plan  Initial SBP in the 200s, now improved  ·  Lasix 40 mg b i d  And metoprolol succinate 50 mg b i d   · Continue with amlodipine 10 mg daily and losartan 50 mg daily  · IV hydralazine for SBP greater than 180  · Monitor vitals closely        VTE Pharmacologic Prophylaxis: VTE Score: 5 High Risk (Score >/= 5) - Pharmacological DVT Prophylaxis Ordered: apixaban (Eliquis)  Sequential Compression Devices Ordered      Patient Centered Rounds: I performed bedside rounds with nursing staff today   Discussions with Specialists or Other Care Team Provider: none    Education and Discussions with Family / Patient: Updated  (wife) via phone  Time Spent for Care: 30 minutes  More than 50% of total time spent on counseling and coordination of care as described above  Current Length of Stay: 2 day(s)  Current Patient Status: Inpatient   Certification Statement: The patient will continue to require additional inpatient hospital stay due to Persistent dizziness  Discharge Plan: Anticipate discharge in 24-48 hrs to rehab facility  Code Status: Level 3 - DNAR and DNI    Subjective:   Seen and examined at bedside  Continues to have dizziness  Patient is eager to go home, is able to verbalize that it would be against medical advice  Patient will discuss with daughter and son-in-law at this time  Denies nausea, vomiting, headache  All other review of systems negative at this time    Objective:     Vitals:   Temp (24hrs), Av 1 °F (36 7 °C), Min:97 6 °F (36 4 °C), Max:98 4 °F (36 9 °C)    Temp:  [97 6 °F (36 4 °C)-98 4 °F (36 9 °C)] 98 3 °F (36 8 °C)  HR:  [70-72] 70  Resp:  [16-19] 19  BP: (123-161)/(67-80) 123/69  SpO2:  [92 %-96 %] 92 %  Body mass index is 27 98 kg/m²  Input and Output Summary (last 24 hours): Intake/Output Summary (Last 24 hours) at 2022 1739  Last data filed at 2022 1701  Gross per 24 hour   Intake 920 ml   Output 1050 ml   Net -130 ml       Physical Exam:   Physical Exam  Vitals reviewed  Constitutional:       Appearance: She is not toxic-appearing or diaphoretic  HENT:      Head: Normocephalic and atraumatic  Eyes:      General: No scleral icterus  Cardiovascular:      Rate and Rhythm: Normal rate and regular rhythm  Pulmonary:      Breath sounds: Normal breath sounds  No wheezing  Abdominal:      Palpations: Abdomen is soft  Tenderness: There is no abdominal tenderness  Musculoskeletal:         General: No swelling        Right lower leg: No edema  Left lower leg: No edema  Skin:     Coloration: Skin is pale  Neurological:      Mental Status: She is oriented to person, place, and time  Motor: Weakness present  Psychiatric:         Mood and Affect: Mood normal          Behavior: Behavior normal           Additional Data:     Labs:  Results from last 7 days   Lab Units 09/22/22  0434 09/21/22  1028 09/20/22  1809   WBC Thousand/uL 8 75   < > 7 64   HEMOGLOBIN g/dL 14 3   < > 15 1   HEMATOCRIT % 44 1   < > 46 0   PLATELETS Thousands/uL 223   < > 232   NEUTROS PCT %  --   --  61   LYMPHS PCT %  --   --  27   MONOS PCT %  --   --  8   EOS PCT %  --   --  3    < > = values in this interval not displayed  Results from last 7 days   Lab Units 09/23/22  0546 09/21/22  1028 09/20/22  1809   SODIUM mmol/L 140   < > 140   POTASSIUM mmol/L 3 9   < > 3 6   CHLORIDE mmol/L 102   < > 104   CO2 mmol/L 30   < > 26   BUN mg/dL 25   < > 20   CREATININE mg/dL 1 22   < > 1 08   ANION GAP mmol/L 8   < > 10   CALCIUM mg/dL 9 1   < > 9 2   ALBUMIN g/dL  --   --  3 1*   TOTAL BILIRUBIN mg/dL  --   --  0 39   ALK PHOS U/L  --   --  108   ALT U/L  --   --  68   AST U/L  --   --  47*   GLUCOSE RANDOM mg/dL 184*   < > 103    < > = values in this interval not displayed       Results from last 7 days   Lab Units 09/20/22  1809   INR  1 21*     Results from last 7 days   Lab Units 09/23/22  1548 09/23/22  1055 09/23/22  0632 09/22/22  2059 09/22/22  1549 09/22/22  1101 09/22/22  0609 09/21/22  2039 09/21/22  1538 09/21/22  1057 09/21/22  0609 09/21/22  0012   POC GLUCOSE mg/dl 252* 188* 176* 266* 256* 220* 190* 329* 241* 290* 214* 194*               Lines/Drains:  Invasive Devices  Report    Peripheral Intravenous Line  Duration           Peripheral IV 09/20/22 Left Antecubital 2 days                      Imaging: Reviewed radiology reports from this admission including: CT head and ECHO    Recent Cultures (last 7 days):         Last 24 Hours Medication List:   Current Facility-Administered Medications   Medication Dose Route Frequency Provider Last Rate    [facility]ODIPine  10 mg Oral Daily [facility]      apixaban  5 mg Oral BID [facility]      atorvastatin  40 mg Oral HS [facility] Saraiya, DO      cyanocobalamin  1,000 mcg Oral Daily [facility]      furosemide  40 mg Oral BID (diuretic) [facility], [facility]      hydrALAZINE  5 mg Intravenous Q6H PRN [facility] Saraiya, DO      insulin glargine  15 Units Subcutaneous HS [facility] Saraiya, DO      insulin lispro  1-5 Units Subcutaneous TID AC [facility] Saraiya, DO      insulin lispro  1-5 Units Subcutaneous HS [facility]      insulin lispro  3 Units Subcutaneous TID With Meals [facility] Saraiya, DO      levothyroxine  50 mcg Oral QAM [facility] Saraiya, DO      losartan  50 mg Oral Daily [facility] Saraiya, DO      meclizine  25 mg Oral [facility] Saraiya, DO      metoprolol succinate  50 mg Oral Daily [facility] Saraiya, DO      pantoprazole  40 mg Oral Early Morning [facility]      potassium chloride  20 mEq Oral QAM Shital Katherine Cortes DO          Today, Patient Was Seen By: Arpita Longoria DO    **Please Note: This note may have been constructed using a voice recognition system  **

## 2022-09-24 LAB
ANION GAP SERPL CALCULATED.3IONS-SCNC: 8 MMOL/L (ref 4–13)
BUN SERPL-MCNC: 26 MG/DL (ref 5–25)
CALCIUM SERPL-MCNC: 9.3 MG/DL (ref 8.3–10.1)
CHLORIDE SERPL-SCNC: 101 MMOL/L (ref 96–108)
CO2 SERPL-SCNC: 30 MMOL/L (ref 21–32)
CREAT SERPL-MCNC: 1.3 MG/DL (ref 0.6–1.3)
GFR SERPL CREATININE-BSD FRML MDRD: 35 ML/MIN/1.73SQ M
GLUCOSE SERPL-MCNC: 159 MG/DL (ref 65–140)
GLUCOSE SERPL-MCNC: 170 MG/DL (ref 65–140)
GLUCOSE SERPL-MCNC: 173 MG/DL (ref 65–140)
GLUCOSE SERPL-MCNC: 202 MG/DL (ref 65–140)
GLUCOSE SERPL-MCNC: 285 MG/DL (ref 65–140)
POTASSIUM SERPL-SCNC: 3.9 MMOL/L (ref 3.5–5.3)
SODIUM SERPL-SCNC: 139 MMOL/L (ref 135–147)

## 2022-09-24 PROCEDURE — 99232 SBSQ HOSP IP/OBS MODERATE 35: CPT | Performed by: STUDENT IN AN ORGANIZED HEALTH CARE EDUCATION/TRAINING PROGRAM

## 2022-09-24 PROCEDURE — 82948 REAGENT STRIP/BLOOD GLUCOSE: CPT

## 2022-09-24 PROCEDURE — 80048 BASIC METABOLIC PNL TOTAL CA: CPT | Performed by: STUDENT IN AN ORGANIZED HEALTH CARE EDUCATION/TRAINING PROGRAM

## 2022-09-24 RX ORDER — AMOXICILLIN 250 MG
1 CAPSULE ORAL 2 TIMES DAILY
Status: DISCONTINUED | OUTPATIENT
Start: 2022-09-24 | End: 2022-09-26 | Stop reason: HOSPADM

## 2022-09-24 RX ORDER — FUROSEMIDE 20 MG/1
30 TABLET ORAL
Status: DISCONTINUED | OUTPATIENT
Start: 2022-09-24 | End: 2022-09-26

## 2022-09-24 RX ORDER — BISACODYL 10 MG
10 SUPPOSITORY, RECTAL RECTAL DAILY PRN
Status: DISCONTINUED | OUTPATIENT
Start: 2022-09-24 | End: 2022-09-26 | Stop reason: HOSPADM

## 2022-09-24 RX ORDER — MECLIZINE HYDROCHLORIDE 25 MG/1
25 TABLET ORAL EVERY 8 HOURS SCHEDULED
Status: DISCONTINUED | OUTPATIENT
Start: 2022-09-24 | End: 2022-09-26 | Stop reason: HOSPADM

## 2022-09-24 RX ORDER — POLYETHYLENE GLYCOL 3350 17 G/17G
17 POWDER, FOR SOLUTION ORAL DAILY
Status: DISCONTINUED | OUTPATIENT
Start: 2022-09-24 | End: 2022-09-26 | Stop reason: HOSPADM

## 2022-09-24 RX ADMIN — FUROSEMIDE 40 MG: 40 TABLET ORAL at 08:14

## 2022-09-24 RX ADMIN — ASPIRIN 81 MG: 81 TABLET, COATED ORAL at 08:14

## 2022-09-24 RX ADMIN — INSULIN LISPRO 1 UNITS: 100 INJECTION, SOLUTION INTRAVENOUS; SUBCUTANEOUS at 11:58

## 2022-09-24 RX ADMIN — INSULIN LISPRO 3 UNITS: 100 INJECTION, SOLUTION INTRAVENOUS; SUBCUTANEOUS at 11:58

## 2022-09-24 RX ADMIN — ATORVASTATIN CALCIUM 80 MG: 40 TABLET, FILM COATED ORAL at 21:47

## 2022-09-24 RX ADMIN — INSULIN LISPRO 3 UNITS: 100 INJECTION, SOLUTION INTRAVENOUS; SUBCUTANEOUS at 17:13

## 2022-09-24 RX ADMIN — MECLIZINE HYDROCHLORIDE 25 MG: 25 TABLET ORAL at 21:47

## 2022-09-24 RX ADMIN — POTASSIUM CHLORIDE 20 MEQ: 1500 TABLET, EXTENDED RELEASE ORAL at 08:14

## 2022-09-24 RX ADMIN — INSULIN LISPRO 1 UNITS: 100 INJECTION, SOLUTION INTRAVENOUS; SUBCUTANEOUS at 08:15

## 2022-09-24 RX ADMIN — SENNOSIDES AND DOCUSATE SODIUM 1 TABLET: 50; 8.6 TABLET ORAL at 17:14

## 2022-09-24 RX ADMIN — MECLIZINE HYDROCHLORIDE 25 MG: 25 TABLET ORAL at 15:35

## 2022-09-24 RX ADMIN — INSULIN GLARGINE 15 UNITS: 100 INJECTION, SOLUTION SUBCUTANEOUS at 21:46

## 2022-09-24 RX ADMIN — INSULIN LISPRO 1 UNITS: 100 INJECTION, SOLUTION INTRAVENOUS; SUBCUTANEOUS at 21:46

## 2022-09-24 RX ADMIN — POLYETHYLENE GLYCOL 3350 17 G: 17 POWDER, FOR SOLUTION ORAL at 11:23

## 2022-09-24 RX ADMIN — PANTOPRAZOLE SODIUM 40 MG: 40 TABLET, DELAYED RELEASE ORAL at 05:01

## 2022-09-24 RX ADMIN — APIXABAN 5 MG: 5 TABLET, FILM COATED ORAL at 17:14

## 2022-09-24 RX ADMIN — FUROSEMIDE 30 MG: 20 TABLET ORAL at 15:35

## 2022-09-24 RX ADMIN — AMLODIPINE BESYLATE 10 MG: 10 TABLET ORAL at 08:15

## 2022-09-24 RX ADMIN — LOSARTAN POTASSIUM 50 MG: 50 TABLET, FILM COATED ORAL at 08:15

## 2022-09-24 RX ADMIN — APIXABAN 5 MG: 5 TABLET, FILM COATED ORAL at 08:15

## 2022-09-24 RX ADMIN — MECLIZINE HYDROCHLORIDE 25 MG: 25 TABLET ORAL at 05:01

## 2022-09-24 RX ADMIN — INSULIN LISPRO 3 UNITS: 100 INJECTION, SOLUTION INTRAVENOUS; SUBCUTANEOUS at 08:15

## 2022-09-24 RX ADMIN — LEVOTHYROXINE SODIUM 50 MCG: 0.05 TABLET ORAL at 08:14

## 2022-09-24 RX ADMIN — SENNOSIDES AND DOCUSATE SODIUM 1 TABLET: 50; 8.6 TABLET ORAL at 11:23

## 2022-09-24 RX ADMIN — METOPROLOL SUCCINATE 50 MG: 50 TABLET, EXTENDED RELEASE ORAL at 08:15

## 2022-09-24 RX ADMIN — CYANOCOBALAMIN TAB 500 MCG 1000 MCG: 500 TAB at 08:14

## 2022-09-24 NOTE — QUICK NOTE
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

## 2022-09-24 NOTE — ASSESSMENT & PLAN NOTE
Per daughter and patient ongoing 2 week history weakness and dizziness  Denies focal deficits  No focal neurological deficit, exam   Difficulty with ambulation secondary to dizziness  Differential include secondary to hypertensive urgency versus vertigo (per chart review has previous history) versus less likely posterior stroke  · Head CT reveals chronic microangiopathic changes  No acute changes noted  · Echocardiogram unremarkable  · Orthostatic blood pressure negative  · Blood pressure management as highlighted above  · Will trial meclizine  · Unfortunately pacemaker is not MRI compatible  · CTA head/neck revealed: No evidence of acute intracranial hemorrhage  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

## 2022-09-24 NOTE — ASSESSMENT & PLAN NOTE
Lab Results   Component Value Date    HGBA1C 8 0 (H) 09/15/2022       Recent Labs     09/23/22  1548 09/23/22  2110 09/24/22  0618 09/24/22  1127   POCGLU 252* 239* 170* 202*       Blood Sugar Average: Last 72 hrs:  (P) 602 [id number]     Endorses compliance with home regimen of Lantus 16 units q h s  And 11 units t i d  Of Humalog  Initially referred to ED with hypoglycemic episode, received her insulin prior to feeding per nursing facility  Most recent blood glucose 252  · Lantus 15 units q h s   And Humalog 3 units t i d  + sliding scale insulin for now

## 2022-09-24 NOTE — ASSESSMENT & PLAN NOTE
New onset hypoxia  de satting to the mid [de-identified] on room air  Now improved on room air after IV diuresis  Denies shortness of breath  No wheezing appreciated  Etiology deconditioning versus atelectasis versus underlying lung process  · Chest x-ray PA lateral 9/21 mild pulmonary vascular congestive changes slightly improved from previous exam   Patchy increased parenchymal density in left lateral base on full view suspicious for atelectasis or pneumonia and lingula  Continued radiographic follow-up recommended  · No productive cough or acute signs of infection at this time, monitor off antibiotics  · Continue with 40 Lasix b i d    · Repeat chest x-ray tomorrow a m  · Continue to wean off oxygen as tolerated to maintain SpO2 sats greater than 90%

## 2022-09-24 NOTE — PROGRESS NOTES
[facility]  Progress Note Elba Callahan 3/24/1929, 80 y o  female MRN: [id number]  Unit/Bed#: -01 Encounter: [id number]  Primary Care Provider: Sedrick White MD   Date and time admitted to hospital: 9/20/2022  5:54 PM    Dizziness  Assessment & Plan  Per daughter and patient ongoing 2 week history weakness and dizziness  Denies focal deficits  No focal neurological deficit, exam   Difficulty with ambulation secondary to dizziness  Differential include secondary to hypertensive urgency versus vertigo (per chart review has previous history) versus less likely posterior stroke  · Head CT reveals chronic microangiopathic changes  No acute changes noted  · Echocardiogram unremarkable  · Orthostatic blood pressure negative  · Blood pressure management as highlighted above  · Will trial meclizine  · Unfortunately pacemaker is not MRI compatible  · CTA head/neck revealed: No evidence of acute intracranial hemorrhage  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  ·  Poor candidate for carotid enterectomy due to advanced age  · Neurology consulted, appreciate further recommendations  · Continue with aspirin 81 mg daily and high-dose statin  · PT/OT recommending rehab      Hypoxia  Assessment & Plan  New onset hypoxia  de satting to the mid [de-identified] on room air  Now improved on room air after IV diuresis  Denies shortness of breath  No wheezing appreciated  Etiology deconditioning versus atelectasis versus underlying lung process  · Chest x-ray PA lateral 9/21 mild pulmonary vascular congestive changes slightly improved from previous exam   Patchy increased parenchymal density in left lateral base on full view suspicious for atelectasis or pneumonia and lingula  Continued radiographic follow-up recommended     · No productive cough or acute signs of infection at this time, monitor off antibiotics  · Continue with 40 Lasix b i d  · Repeat chest x-ray tomorrow a m  · Continue to wean off oxygen as tolerated to maintain SpO2 sats greater than 90%    Paroxysmal atrial fibrillation ([address] )  Assessment & Plan  Currently heart rate well controlled  Denies chest pain or palpitations  · Metoprolol succinate 50 mg b i d   · Eliquis 5 mg b i d   · Monitor on telemetry    GERD without esophagitis  Assessment & Plan  No acute complaints at this time  Continue Protonix while inpatient    History of heart block  Assessment & Plan  Status post permanent pacemaker  Confirmed with outpatient Cardiology, not MRI compatible    Hypothyroidism  Assessment & Plan  Continue home dose levothyroxine 50 daily  TSH WNL    Type 2 diabetes mellitus with hyperglycemia, with long-term current use of insulin [facility])  Assessment & Plan  Lab Results   Component Value Date    HGBA1C 8 0 (H) 09/15/2022       Recent Labs     09/23/22  1548 09/23/22  2110 09/24/22  0618 09/24/22  1127   POCGLU 252* 239* 170* 202*       Blood Sugar Average: Last 72 hrs:  (P) 700 [id number]     Endorses compliance with home regimen of Lantus 16 units q h s  And 11 units t i d  Of Humalog  Initially referred to ED with hypoglycemic episode, received her insulin prior to feeding per nursing facility  Most recent blood glucose 252  · Lantus 15 units q h s  And Humalog 3 units t i d  + sliding scale insulin for now      * Hypertensive urgency  Assessment & Plan  Initial SBP in the 200s, now improved  ·  Lasix 40 mg b i d  And metoprolol succinate 50 mg b i d   · Continue with amlodipine 10 mg daily and losartan 50 mg daily  · IV hydralazine for SBP greater than 180  · Monitor vitals closely           VTE Pharmacologic Prophylaxis: VTE Score: 5 Moderate Risk (Score 3-4) - Pharmacological DVT Prophylaxis Ordered: apixaban (Eliquis)  Patient Centered Rounds: I performed bedside rounds with nursing staff today    Discussions with Specialists or Other Care Team Provider:  Neurology    Education and Discussions with Family / Patient: Attempted to update  (daughter) via phone  Unable to contact  Time Spent for Care: 30 minutes  More than 50% of total time spent on counseling and coordination of care as described above  Current Length of Stay: 3 day(s)  Current Patient Status: Inpatient   Certification Statement: The patient will continue to require additional inpatient hospital stay due to Rehab placement  Discharge Plan: Anticipate discharge in 24-48 hrs to rehab facility  Code Status: Level 3 - DNAR and DNI    Subjective:   Patient states she is feeling okay today  Unable confirm if meclizine has helped with dizziness  Denies nausea, vomiting, diarrhea, constipation  All other review of systems negative at this    Objective:     Vitals:   Temp (24hrs), Av 5 °F (36 9 °C), Min:98 3 °F (36 8 °C), Max:98 6 °F (37 °C)    Temp:  [98 3 °F (36 8 °C)-98 6 °F (37 °C)] 98 5 °F (36 9 °C)  HR:  [70] 70  Resp:  [18-22] 18  BP: (123-169)/(69-81) 158/74  SpO2:  [90 %-92 %] 90 %  Body mass index is 27 98 kg/m²  Input and Output Summary (last 24 hours): Intake/Output Summary (Last 24 hours) at 2022 1317  Last data filed at 2022 0901  Gross per 24 hour   Intake 1380 ml   Output 700 ml   Net 680 ml       Physical Exam:   Physical Exam  Vitals reviewed  Constitutional:       General: She is not in acute distress  Appearance: She is well-developed  She is not diaphoretic  HENT:      Head: Normocephalic and atraumatic  Nose: Nose normal       Mouth/Throat:      Pharynx: No oropharyngeal exudate  Eyes:      General: No scleral icterus  Right eye: No discharge  Left eye: No discharge  Conjunctiva/sclera: Conjunctivae normal    Cardiovascular:      Rate and Rhythm: Normal rate and regular rhythm  Heart sounds: Normal heart sounds  No murmur heard  No friction rub  No gallop     Pulmonary: Effort: Pulmonary effort is normal  No respiratory distress  Breath sounds: Normal breath sounds  No wheezing or rales  Abdominal:      General: Bowel sounds are normal  There is no distension  Palpations: Abdomen is soft  Tenderness: There is no abdominal tenderness  There is no guarding  Musculoskeletal:      Cervical back: Normal range of motion and neck supple  Comments: Generalized weakness in all 4 extremities   Skin:     General: Skin is warm and dry  Findings: No erythema  Neurological:      Mental Status: She is alert  Gait: Gait abnormal       Comments: Oriented to person place   Psychiatric:         Behavior: Behavior normal         Additional Data:     Labs:  Results from last 7 days   Lab Units 09/22/22  0434 09/21/22  1028 09/20/22  1809   WBC Thousand/uL 8 75   < > 7 64   HEMOGLOBIN g/dL 14 3   < > 15 1   HEMATOCRIT % 44 1   < > 46 0   PLATELETS Thousands/uL 223   < > 232   NEUTROS PCT %  --   --  61   LYMPHS PCT %  --   --  27   MONOS PCT %  --   --  8   EOS PCT %  --   --  3    < > = values in this interval not displayed  Results from last 7 days   Lab Units 09/24/22  0425 09/21/22  1028 09/20/22  1809   SODIUM mmol/L 139   < > 140   POTASSIUM mmol/L 3 9   < > 3 6   CHLORIDE mmol/L 101   < > 104   CO2 mmol/L 30   < > 26   BUN mg/dL 26*   < > 20   CREATININE mg/dL 1 30   < > 1 08   ANION GAP mmol/L 8   < > 10   CALCIUM mg/dL 9 3   < > 9 2   ALBUMIN g/dL  --   --  3 1*   TOTAL BILIRUBIN mg/dL  --   --  0 39   ALK PHOS U/L  --   --  108   ALT U/L  --   --  68   AST U/L  --   --  47*   GLUCOSE RANDOM mg/dL 173*   < > 103    < > = values in this interval not displayed       Results from last 7 days   Lab Units 09/20/22  1809   INR  1 21*     Results from last 7 days   Lab Units 09/24/22  1127 09/24/22  0618 09/23/22  2110 09/23/22  1548 09/23/22  1055 09/23/22  0632 09/22/22 2059 09/22/22  1549 09/22/22  1101 09/22/22  0609 09/21/22  2039 09/21/22  1538   POC GLUCOSE mg/dl 202* 170* 239* 252* 188* 176* 266* 256* 220* 190* 329* 241*               Lines/Drains:  Invasive Devices  Report    Peripheral Intravenous Line  Duration           Peripheral IV 09/23/22 Right Antecubital <1 day                      Imaging: Reviewed radiology reports from this admission including: CT head CTA head and neck    Recent Cultures (last 7 days):         Last 24 Hours Medication List:   Current Facility-Administered Medications   Medication Dose Route Frequency Provider Last Rate    amLODIPine  10 mg Oral Daily [facility], [facility]      apixaban  5 mg Oral BID [facility], DO      aspirin  81 mg Oral Daily [facility], DO      atorvastatin  80 mg Oral HS Kenzie Valle PA-C      bisacodyl  10 mg Rectal Daily PRN [facility], DO      cyanocobalamin  1,000 mcg Oral Daily [facility], DO      furosemide  40 mg Oral BID (diuretic) [facility], DO      hydrALAZINE  5 mg Intravenous Q6H PRN [facility], DO      insulin glargine  15 Units Subcutaneous HS [facility], DO      insulin lispro  1-5 Units Subcutaneous TID AC [facility], DO      insulin lispro  1-5 Units Subcutaneous HS [facility], DO      insulin lispro  3 Units Subcutaneous TID With Meals [facility], DO      levothyroxine  50 mcg Oral QAM [facility], DO      losartan  50 mg Oral Daily [facility], DO      metoprolol succinate  50 mg Oral Daily [facility], DO      pantoprazole  40 mg Oral Early Morning [facility]      polyethylene glycol  17 g Oral Daily [facility], DO      potassium chloride  20 mEq Oral QAM [facility], DO      senna-docusate sodium  1 tablet Oral BID Shitalgil Vargas DO          Today, Patient Was Seen By: Prince Dmitri DO    **Please Note: This note may have been constructed using a voice recognition system  **

## 2022-09-25 LAB
ANION GAP SERPL CALCULATED.3IONS-SCNC: 10 MMOL/L (ref 4–13)
BUN SERPL-MCNC: 28 MG/DL (ref 5–25)
CALCIUM SERPL-MCNC: 9.2 MG/DL (ref 8.3–10.1)
CHLORIDE SERPL-SCNC: 101 MMOL/L (ref 96–108)
CO2 SERPL-SCNC: 28 MMOL/L (ref 21–32)
CREAT SERPL-MCNC: 1.31 MG/DL (ref 0.6–1.3)
GFR SERPL CREATININE-BSD FRML MDRD: 35 ML/MIN/1.73SQ M
GLUCOSE SERPL-MCNC: 195 MG/DL (ref 65–140)
GLUCOSE SERPL-MCNC: 198 MG/DL (ref 65–140)
GLUCOSE SERPL-MCNC: 199 MG/DL (ref 65–140)
GLUCOSE SERPL-MCNC: 219 MG/DL (ref 65–140)
GLUCOSE SERPL-MCNC: 257 MG/DL (ref 65–140)
POTASSIUM SERPL-SCNC: 3.9 MMOL/L (ref 3.5–5.3)
SODIUM SERPL-SCNC: 139 MMOL/L (ref 135–147)

## 2022-09-25 PROCEDURE — 99232 SBSQ HOSP IP/OBS MODERATE 35: CPT | Performed by: STUDENT IN AN ORGANIZED HEALTH CARE EDUCATION/TRAINING PROGRAM

## 2022-09-25 PROCEDURE — 80048 BASIC METABOLIC PNL TOTAL CA: CPT | Performed by: STUDENT IN AN ORGANIZED HEALTH CARE EDUCATION/TRAINING PROGRAM

## 2022-09-25 PROCEDURE — 82948 REAGENT STRIP/BLOOD GLUCOSE: CPT

## 2022-09-25 RX ADMIN — PANTOPRAZOLE SODIUM 40 MG: 40 TABLET, DELAYED RELEASE ORAL at 05:09

## 2022-09-25 RX ADMIN — INSULIN LISPRO 3 UNITS: 100 INJECTION, SOLUTION INTRAVENOUS; SUBCUTANEOUS at 17:04

## 2022-09-25 RX ADMIN — INSULIN LISPRO 2 UNITS: 100 INJECTION, SOLUTION INTRAVENOUS; SUBCUTANEOUS at 11:34

## 2022-09-25 RX ADMIN — INSULIN LISPRO 3 UNITS: 100 INJECTION, SOLUTION INTRAVENOUS; SUBCUTANEOUS at 07:35

## 2022-09-25 RX ADMIN — APIXABAN 5 MG: 5 TABLET, FILM COATED ORAL at 09:36

## 2022-09-25 RX ADMIN — MECLIZINE HYDROCHLORIDE 25 MG: 25 TABLET ORAL at 21:43

## 2022-09-25 RX ADMIN — SENNOSIDES AND DOCUSATE SODIUM 1 TABLET: 50; 8.6 TABLET ORAL at 09:35

## 2022-09-25 RX ADMIN — POLYETHYLENE GLYCOL 3350 17 G: 17 POWDER, FOR SOLUTION ORAL at 09:39

## 2022-09-25 RX ADMIN — LEVOTHYROXINE SODIUM 50 MCG: 0.05 TABLET ORAL at 09:35

## 2022-09-25 RX ADMIN — POTASSIUM CHLORIDE 20 MEQ: 1500 TABLET, EXTENDED RELEASE ORAL at 09:35

## 2022-09-25 RX ADMIN — METOPROLOL SUCCINATE 50 MG: 50 TABLET, EXTENDED RELEASE ORAL at 09:35

## 2022-09-25 RX ADMIN — INSULIN LISPRO 1 UNITS: 100 INJECTION, SOLUTION INTRAVENOUS; SUBCUTANEOUS at 07:35

## 2022-09-25 RX ADMIN — INSULIN LISPRO 3 UNITS: 100 INJECTION, SOLUTION INTRAVENOUS; SUBCUTANEOUS at 11:34

## 2022-09-25 RX ADMIN — INSULIN GLARGINE 15 UNITS: 100 INJECTION, SOLUTION SUBCUTANEOUS at 21:44

## 2022-09-25 RX ADMIN — MECLIZINE HYDROCHLORIDE 25 MG: 25 TABLET ORAL at 14:14

## 2022-09-25 RX ADMIN — APIXABAN 5 MG: 5 TABLET, FILM COATED ORAL at 17:05

## 2022-09-25 RX ADMIN — SENNOSIDES AND DOCUSATE SODIUM 1 TABLET: 50; 8.6 TABLET ORAL at 17:04

## 2022-09-25 RX ADMIN — FUROSEMIDE 30 MG: 20 TABLET ORAL at 07:32

## 2022-09-25 RX ADMIN — ATORVASTATIN CALCIUM 80 MG: 40 TABLET, FILM COATED ORAL at 21:43

## 2022-09-25 RX ADMIN — INSULIN LISPRO 2 UNITS: 100 INJECTION, SOLUTION INTRAVENOUS; SUBCUTANEOUS at 17:04

## 2022-09-25 RX ADMIN — FUROSEMIDE 30 MG: 20 TABLET ORAL at 17:05

## 2022-09-25 RX ADMIN — MECLIZINE HYDROCHLORIDE 25 MG: 25 TABLET ORAL at 05:09

## 2022-09-25 RX ADMIN — AMLODIPINE BESYLATE 10 MG: 10 TABLET ORAL at 09:37

## 2022-09-25 RX ADMIN — ASPIRIN 81 MG: 81 TABLET, COATED ORAL at 09:34

## 2022-09-25 RX ADMIN — CYANOCOBALAMIN TAB 500 MCG 1000 MCG: 500 TAB at 09:35

## 2022-09-25 RX ADMIN — LOSARTAN POTASSIUM 50 MG: 50 TABLET, FILM COATED ORAL at 09:36

## 2022-09-25 RX ADMIN — INSULIN LISPRO 1 UNITS: 100 INJECTION, SOLUTION INTRAVENOUS; SUBCUTANEOUS at 21:44

## 2022-09-25 NOTE — ASSESSMENT & PLAN NOTE
Lab Results   Component Value Date    HGBA1C 8 0 (H) 09/15/2022       Recent Labs     09/24/22  1711 09/24/22  2125 09/25/22  0635 09/25/22  1047   POCGLU 285* 159* 198* 219*       Blood Sugar Average: Last 72 hrs:  (P) 215 [id number]     Endorses compliance with home regimen of Lantus 16 units q h s  And 11 units t i d  Of Humalog  Initially referred to ED with hypoglycemic episode, received her insulin prior to feeding per nursing facility  Most recent blood glucose 219  · Lantus 15 units q h s   And Humalog 3 units t i d  + sliding scale insulin for now

## 2022-09-25 NOTE — ASSESSMENT & PLAN NOTE
New onset hypoxia  de satting to the mid [de-identified] on room air  Now improved on room air after IV diuresis  Denies shortness of breath  No wheezing appreciated  Now resolved    Etiology deconditioning versus atelectasis versus underlying lung process  Currently satting 94% on room air  · Chest x-ray PA lateral 9/21 mild pulmonary vascular congestive changes slightly improved from previous exam   Patchy increased parenchymal density in left lateral base on full view suspicious for atelectasis or pneumonia and lingula     · Repeat chest x-ray PA and lateral notable congestive improvement  · No productive cough or acute signs of infection at this time, monitor off antibiotics  · Continue with Lasix 30 mg b i d   · Monitor SpO2 sats closely

## 2022-09-25 NOTE — PLAN OF CARE
Problem: INFECTION - ADULT  Goal: Absence or prevention of progression during hospitalization  Description: INTERVENTIONS:  - Assess and monitor for signs and symptoms of infection  - Monitor lab/diagnostic results  - Monitor all insertion sites, i e  indwelling lines, tubes, and drains  - Monitor endotracheal if appropriate and nasal secretions for changes in amount and color  - [place] appropriate cooling/warming therapies per order  - Administer medications as ordered  - Instruct and encourage patient and family to use good hand hygiene technique  - Identify and instruct in appropriate isolation precautions for identified infection/condition  Outcome: Progressing

## 2022-09-25 NOTE — ASSESSMENT & PLAN NOTE
Initial SBP in the 200s, now improved  ·  Lasix 30 mg b i d   And metoprolol succinate 50 mg b i d   · Continue with amlodipine 10 mg daily and losartan 50 mg daily  · IV hydralazine for SBP greater than 180  · Monitor vitals closely

## 2022-09-25 NOTE — PROGRESS NOTES
[address] [facility]  Progress Note Yari Kowaslki 3/24/1929, 80 y o  female MRN: [id number]  Unit/Bed#: -01 Encounter: [id number]  Primary Care Provider: Prince Naa MD   Date and time admitted to hospital: 9/20/2022  5:54 PM    Dizziness  Assessment & Plan  Per daughter and patient ongoing 2 week history weakness and dizziness  Denies focal deficits  No focal neurological deficit, exam   Difficulty with ambulation secondary to dizziness  Differential include secondary to hypertensive urgency versus vertigo (per chart review has previous history) versus less likely posterior stroke  Dizziness now improved after meclizine  · Head CT reveals chronic microangiopathic changes  No acute changes noted  · Echocardiogram unremarkable  · Orthostatic blood pressure negative  · Blood pressure management as highlighted above  · Will trial meclizine  · Unfortunately pacemaker is not MRI compatible  · CTA head/neck revealed: No evidence of acute intracranial hemorrhage  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  ·  Poor candidate for carotid enterectomy due to advanced age  · Discussed with neurology, dizziness not likely due to ICA stenosis the anterior circulation  · Recommended outpatient neurosurgery follow-up  ·  high-dose statin  · Continue with scheduled meclizine  · PT/OT recommending rehab      Hypoxia  Assessment & Plan  New onset hypoxia  de satting to the mid [de-identified] on room air  Now improved on room air after IV diuresis  Denies shortness of breath  No wheezing appreciated  Now resolved    Etiology deconditioning versus atelectasis versus underlying lung process    Currently satting 94% on room air  · Chest x-ray PA lateral 9/21 mild pulmonary vascular congestive changes slightly improved from previous exam   Patchy increased parenchymal density in left lateral base on full view suspicious for atelectasis or pneumonia and lingula  · Repeat chest x-ray PA and lateral notable congestive improvement  · No productive cough or acute signs of infection at this time, monitor off antibiotics  · Continue with Lasix 30 mg b i d   · Monitor SpO2 sats closely    CKD (chronic kidney disease) stage 3, GFR 30-59 ml/min [facility])  Assessment & Plan  Lab Results   Component Value Date    EGFR 35 09/25/2022    EGFR 35 09/24/2022    EGFR 38 09/23/2022    CREATININE 1 31 (H) 09/25/2022    CREATININE 1 30 09/24/2022    CREATININE 1 22 09/23/2022   Creatinine appears to be at baseline  Intake and output  Monitor with a m  BMP    Paroxysmal atrial fibrillation ([facility] [address] )  Assessment & Plan  Currently heart rate well controlled  Denies chest pain or palpitations  · Metoprolol succinate 50 mg b i d   · Eliquis 5 mg b i d   · Monitor on telemetry    GERD without esophagitis  Assessment & Plan  No acute complaints at this time  Continue Protonix while inpatient    History of heart block  Assessment & Plan  Status post permanent pacemaker  Confirmed with outpatient Cardiology, not MRI compatible    Hypothyroidism  Assessment & Plan  Continue home dose levothyroxine 50 daily  TSH WNL    Type 2 diabetes mellitus with hyperglycemia, with long-term current use of insulin [facility])  Assessment & Plan  Lab Results   Component Value Date    HGBA1C 8 0 (H) 09/15/2022       Recent Labs     09/24/22  1711 09/24/22  2125 09/25/22  0635 09/25/22  1047   POCGLU 285* 159* 198* 219*       Blood Sugar Average: Last 72 hrs:  (P) 215 [id number]     Endorses compliance with home regimen of Lantus 16 units q h s  And 11 units t i d  Of Humalog  Initially referred to ED with hypoglycemic episode, received her insulin prior to feeding per nursing facility  Most recent blood glucose 219  · Lantus 15 units q h s   And Humalog 3 units t i d  + sliding scale insulin for now      * Hypertension  Assessment & Plan  Initial SBP in the 200s, now improved  ·  Lasix 30 mg b i d  And metoprolol succinate 50 mg b i d   · Continue with amlodipine 10 mg daily and losartan 50 mg daily  · IV hydralazine for SBP greater than 180  · Monitor vitals closely          VTE Pharmacologic Prophylaxis: VTE Score: 5 Moderate Risk (Score 3-4) - Pharmacological DVT Prophylaxis Ordered: apixaban (Eliquis)  Patient Centered Rounds: I performed bedside rounds with nursing staff today  Discussions with Specialists or Other Care Team Provider:  None    Education and Discussions with Family / Patient: Updated  (daughter) at bedside  Time Spent for Care: 45 minutes  More than 50% of total time spent on counseling and coordination of care as described above  Current Length of Stay: 4 day(s)  Current Patient Status: Inpatient   Certification Statement: The patient will continue to require additional inpatient hospital stay due to Rehab placement  Discharge Plan: Anticipate discharge tomorrow to rehab facility  Code Status: Level 3 - DNAR and DNI    Subjective:   Patient seen and examined at bedside  Ambulating with nursing, notable improvement in dizziness compared to previous studies  Denies nausea, diarrhea constipation fevers or chills  All other review of Systems negative  Objective:     Vitals:   Temp (24hrs), Av 3 °F (36 8 °C), Min:98 °F (36 7 °C), Max:98 6 °F (37 °C)    Temp:  [98 °F (36 7 °C)-98 6 °F (37 °C)] 98 6 °F (37 °C)  HR:  [70-71] 71  Resp:  [18] 18  BP: (128-163)/(62-78) 128/62  SpO2:  [90 %-98 %] 90 %  Body mass index is 27 98 kg/m²  Input and Output Summary (last 24 hours): Intake/Output Summary (Last 24 hours) at 2022 1248  Last data filed at 2022 1220  Gross per 24 hour   Intake 1060 ml   Output 1000 ml   Net 60 ml       Physical Exam:   Physical Exam  Vitals reviewed  Constitutional:       General: She is not in acute distress  Appearance: She is well-developed  She is not diaphoretic     HENT: Head: Normocephalic and atraumatic  Nose: Nose normal       Mouth/Throat:      Pharynx: No oropharyngeal exudate  Eyes:      General: No scleral icterus  Right eye: No discharge  Left eye: No discharge  Conjunctiva/sclera: Conjunctivae normal    Cardiovascular:      Rate and Rhythm: Normal rate  Rhythm irregular  Heart sounds: Normal heart sounds  No murmur heard  No friction rub  No gallop  Pulmonary:      Effort: Pulmonary effort is normal  No respiratory distress  Breath sounds: Normal breath sounds  No wheezing or rales  Abdominal:      General: Bowel sounds are normal  There is no distension  Palpations: Abdomen is soft  Tenderness: There is no abdominal tenderness  There is no guarding  Musculoskeletal:         General: Normal range of motion  Cervical back: Normal range of motion and neck supple  Skin:     General: Skin is warm and dry  Findings: No erythema  Neurological:      Mental Status: She is alert and oriented to person, place, and time  Psychiatric:         Behavior: Behavior normal           Additional Data:     Labs:  Results from last 7 days   Lab Units 09/22/22  0434 09/21/22  1028 09/20/22  1809   WBC Thousand/uL 8 75   < > 7 64   HEMOGLOBIN g/dL 14 3   < > 15 1   HEMATOCRIT % 44 1   < > 46 0   PLATELETS Thousands/uL 223   < > 232   NEUTROS PCT %  --   --  61   LYMPHS PCT %  --   --  27   MONOS PCT %  --   --  8   EOS PCT %  --   --  3    < > = values in this interval not displayed       Results from last 7 days   Lab Units 09/25/22  0641 09/21/22  1028 09/20/22  1809   SODIUM mmol/L 139   < > 140   POTASSIUM mmol/L 3 9   < > 3 6   CHLORIDE mmol/L 101   < > 104   CO2 mmol/L 28   < > 26   BUN mg/dL 28*   < > 20   CREATININE mg/dL 1 31*   < > 1 08   ANION GAP mmol/L 10   < > 10   CALCIUM mg/dL 9 2   < > 9 2   ALBUMIN g/dL  --   --  3 1*   TOTAL BILIRUBIN mg/dL  --   --  0 39   ALK PHOS U/L  --   --  108   ALT U/L  --   -- 68   AST U/L  --   --  47*   GLUCOSE RANDOM mg/dL 195*   < > 103    < > = values in this interval not displayed       Results from last 7 days   Lab Units 09/20/22  1809   INR  1 21*     Results from last 7 days   Lab Units 09/25/22  1047 09/25/22  0635 09/24/22  2125 09/24/22  1711 09/24/22  1127 09/24/22  0618 09/23/22  2110 09/23/22  1548 09/23/22  1055 09/23/22  0632 09/22/22  2059 09/22/22  1549   POC GLUCOSE mg/dl 219* 198* 159* 285* 202* 170* 239* 252* 188* 176* 266* 256*               Lines/Drains:  Invasive Devices  Report    Peripheral Intravenous Line  Duration           Peripheral IV 09/23/22 Right Antecubital 1 day                      Imaging: Reviewed radiology reports from this admission including: CT head and ECHO    Recent Cultures (last 7 days):         Last 24 Hours Medication List:   Current Facility-Administered Medications   Medication Dose Route Frequency Provider Last Rate    amLODIPine  10 mg Oral Daily [facility], [facility]      apixaban  5 mg Oral BID [facility], DO      aspirin  81 mg Oral Daily [facility], DO      atorvastatin  80 mg Oral HS Kenzie Valle PA-C      bisacodyl  10 mg Rectal Daily PRN [facility] Saraiya, DO      cyanocobalamin  1,000 mcg Oral Daily [facility], DO      furosemide  30 mg Oral BID (diuretic) [facility], DO      hydrALAZINE  5 mg Intravenous Q6H PRN [facility] Saraiya, DO      insulin glargine  15 Units Subcutaneous HS [facility] Saraiya, DO      insulin lispro  1-5 Units Subcutaneous TID AC [facility], DO      insulin lispro  1-5 Units Subcutaneous HS [facility] Saraiya, DO      insulin lispro  3 Units Subcutaneous TID With Meals [facility] Saraiya, DO      levothyroxine  50 mcg Oral QAM [facility], DO      losartan  50 mg Oral Daily [facility], DO      meclizine  25 mg Oral Q8H [facility],       metoprolol succinate  50 mg Oral Daily Shital [facility] Landen Olsen,       pantoprazole  40 mg Oral Early Morning Shital Janettechance Huitron, DO      polyethylene glycol  17 g Oral Daily Shital Janettechance Huitron, DO      potassium chloride  20 mEq Oral QAM [facility] Tomy, DO      senna-docusate sodium  1 tablet Oral BID Shitalgil Khan DO          Today, Patient Was Seen By: Yadiel Meyers DO    **Please Note: This note may have been constructed using a voice recognition system  **

## 2022-09-25 NOTE — ASSESSMENT & PLAN NOTE
Per daughter and patient ongoing 2 week history weakness and dizziness  Denies focal deficits  No focal neurological deficit, exam   Difficulty with ambulation secondary to dizziness  Differential include secondary to hypertensive urgency versus vertigo (per chart review has previous history) versus less likely posterior stroke  Dizziness now improved after meclizine  · Head CT reveals chronic microangiopathic changes  No acute changes noted  · Echocardiogram unremarkable  · Orthostatic blood pressure negative  · Blood pressure management as highlighted above  · Will trial meclizine  · Unfortunately pacemaker is not MRI compatible  · CTA head/neck revealed: No evidence of acute intracranial hemorrhage  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

## 2022-09-25 NOTE — ASSESSMENT & PLAN NOTE
Lab Results   Component Value Date    EGFR 35 09/25/2022    EGFR 35 09/24/2022    EGFR 38 09/23/2022    CREATININE 1 31 (H) 09/25/2022    CREATININE 1 30 09/24/2022    CREATININE 1 22 09/23/2022   Creatinine appears to be at baseline  Intake and output  Monitor with a m   BMP

## 2022-09-26 VITALS
HEIGHT: 62 IN | DIASTOLIC BLOOD PRESSURE: 86 MMHG | TEMPERATURE: 98 F | SYSTOLIC BLOOD PRESSURE: 153 MMHG | RESPIRATION RATE: 17 BRPM | HEART RATE: 71 BPM | OXYGEN SATURATION: 97 % | WEIGHT: 153 LBS | BODY MASS INDEX: 28.16 KG/M2

## 2022-09-26 PROBLEM — R42 DIZZINESS: Status: RESOLVED | Noted: 2022-09-21 | Resolved: 2022-09-26

## 2022-09-26 PROBLEM — R09.02 HYPOXIA: Status: RESOLVED | Noted: 2022-09-21 | Resolved: 2022-09-26

## 2022-09-26 LAB
ANION GAP SERPL CALCULATED.3IONS-SCNC: 8 MMOL/L (ref 4–13)
BUN SERPL-MCNC: 34 MG/DL (ref 5–25)
CALCIUM SERPL-MCNC: 9.2 MG/DL (ref 8.3–10.1)
CHLORIDE SERPL-SCNC: 102 MMOL/L (ref 96–108)
CO2 SERPL-SCNC: 29 MMOL/L (ref 21–32)
CREAT SERPL-MCNC: 1.43 MG/DL (ref 0.6–1.3)
GFR SERPL CREATININE-BSD FRML MDRD: 31 ML/MIN/1.73SQ M
GLUCOSE SERPL-MCNC: 167 MG/DL (ref 65–140)
GLUCOSE SERPL-MCNC: 171 MG/DL (ref 65–140)
GLUCOSE SERPL-MCNC: 227 MG/DL (ref 65–140)
POTASSIUM SERPL-SCNC: 4.1 MMOL/L (ref 3.5–5.3)
SODIUM SERPL-SCNC: 139 MMOL/L (ref 135–147)

## 2022-09-26 PROCEDURE — 99239 HOSP IP/OBS DSCHRG MGMT >30: CPT | Performed by: STUDENT IN AN ORGANIZED HEALTH CARE EDUCATION/TRAINING PROGRAM

## 2022-09-26 PROCEDURE — 82948 REAGENT STRIP/BLOOD GLUCOSE: CPT

## 2022-09-26 PROCEDURE — 80048 BASIC METABOLIC PNL TOTAL CA: CPT | Performed by: STUDENT IN AN ORGANIZED HEALTH CARE EDUCATION/TRAINING PROGRAM

## 2022-09-26 RX ORDER — AMLODIPINE BESYLATE 10 MG/1
10 TABLET ORAL DAILY
Qty: 30 TABLET | Refills: 0 | Status: SHIPPED | OUTPATIENT
Start: 2022-09-26

## 2022-09-26 RX ORDER — FUROSEMIDE 20 MG/1
30 TABLET ORAL
Status: DISCONTINUED | OUTPATIENT
Start: 2022-09-26 | End: 2022-09-26 | Stop reason: HOSPADM

## 2022-09-26 RX ORDER — ATORVASTATIN CALCIUM 40 MG/1
80 TABLET, FILM COATED ORAL
Qty: 30 TABLET | Refills: 0 | Status: SHIPPED | OUTPATIENT
Start: 2022-09-26

## 2022-09-26 RX ORDER — MECLIZINE HYDROCHLORIDE 25 MG/1
25 TABLET ORAL EVERY 8 HOURS PRN
Qty: 30 TABLET | Refills: 0 | Status: SHIPPED | OUTPATIENT
Start: 2022-09-26

## 2022-09-26 RX ORDER — INSULIN GLARGINE 100 [IU]/ML
15 INJECTION, SOLUTION SUBCUTANEOUS
Qty: 9 ML | Refills: 0 | Status: SHIPPED | OUTPATIENT
Start: 2022-09-26

## 2022-09-26 RX ADMIN — AMLODIPINE BESYLATE 10 MG: 10 TABLET ORAL at 08:37

## 2022-09-26 RX ADMIN — INSULIN LISPRO 1 UNITS: 100 INJECTION, SOLUTION INTRAVENOUS; SUBCUTANEOUS at 08:39

## 2022-09-26 RX ADMIN — CYANOCOBALAMIN TAB 500 MCG 1000 MCG: 500 TAB at 08:37

## 2022-09-26 RX ADMIN — PANTOPRAZOLE SODIUM 40 MG: 40 TABLET, DELAYED RELEASE ORAL at 06:34

## 2022-09-26 RX ADMIN — LOSARTAN POTASSIUM 50 MG: 50 TABLET, FILM COATED ORAL at 08:37

## 2022-09-26 RX ADMIN — POLYETHYLENE GLYCOL 3350 17 G: 17 POWDER, FOR SOLUTION ORAL at 08:37

## 2022-09-26 RX ADMIN — LEVOTHYROXINE SODIUM 50 MCG: 0.05 TABLET ORAL at 08:37

## 2022-09-26 RX ADMIN — SENNOSIDES AND DOCUSATE SODIUM 1 TABLET: 50; 8.6 TABLET ORAL at 08:37

## 2022-09-26 RX ADMIN — INSULIN LISPRO 3 UNITS: 100 INJECTION, SOLUTION INTRAVENOUS; SUBCUTANEOUS at 08:39

## 2022-09-26 RX ADMIN — APIXABAN 5 MG: 5 TABLET, FILM COATED ORAL at 08:37

## 2022-09-26 RX ADMIN — METOPROLOL SUCCINATE 50 MG: 50 TABLET, EXTENDED RELEASE ORAL at 08:37

## 2022-09-26 RX ADMIN — POTASSIUM CHLORIDE 20 MEQ: 1500 TABLET, EXTENDED RELEASE ORAL at 08:37

## 2022-09-26 RX ADMIN — MECLIZINE HYDROCHLORIDE 25 MG: 25 TABLET ORAL at 06:34

## 2022-09-26 NOTE — INCIDENTAL FINDINGS
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

## 2022-09-26 NOTE — ASSESSMENT & PLAN NOTE
Lab Results   Component Value Date    EGFR 31 09/26/2022    EGFR 35 09/25/2022    EGFR 35 09/24/2022    CREATININE 1 43 (H) 09/26/2022    CREATININE 1 31 (H) 09/25/2022    CREATININE 1 30 09/24/2022   Creatinine appears to be at baseline  Intake and output  Monitor with a m   BMP

## 2022-09-26 NOTE — DISCHARGE SUMMARY
[address]  Discharge Summary - Henri Hernández 3/24/1929, 80 y o  female MRN: [id number]  Unit/Bed#: -01 Encounter: [id number]  Primary Care Provider: Eitan Tirado MD   Date and time admitted to hospital: 9/20/2022  5:54 PM    CKD (chronic kidney disease) stage 3, GFR 30-59 ml/min [facility])  Assessment & Plan  Lab Results   Component Value Date    EGFR 31 09/26/2022    EGFR 35 09/25/2022    EGFR 35 09/24/2022    CREATININE 1 43 (H) 09/26/2022    CREATININE 1 31 (H) 09/25/2022    CREATININE 1 30 09/24/2022   Creatinine appears to be at baseline  Intake and output  Monitor with a m  BMP    Paroxysmal atrial fibrillation ([facility] [address] )  Assessment & Plan  Currently heart rate well controlled  Denies chest pain or palpitations  · Metoprolol succinate 50 mg b i d   · Eliquis 5 mg b i d   · Monitor on telemetry    GERD without esophagitis  Assessment & Plan  No acute complaints at this time  Continue Protonix while inpatient    History of heart block  Assessment & Plan  Status post permanent pacemaker  Confirmed with outpatient Cardiology, not MRI compatible    Hypothyroidism  Assessment & Plan  Continue home dose levothyroxine 50 daily  TSH WNL    Type 2 diabetes mellitus with hyperglycemia, with long-term current use of insulin [facility])  Assessment & Plan  Lab Results   Component Value Date    HGBA1C 8 0 (H) 09/15/2022       Recent Labs     09/25/22  1616 09/25/22  2107 09/26/22  0646 09/26/22  1044   POCGLU 257* 199* 171* 227*       Blood Sugar Average: Last 72 hrs:  (P) 210 [id number]     Endorses compliance with home regimen of Lantus 16 units q h s  And 11 units t i d  Of Humalog  Initially referred to ED with hypoglycemic episode, received her insulin prior to feeding per nursing facility  Most recent blood glucose 219  · Lantus 15 units q h s   And Humalog 3 units t i d  + sliding scale insulin for now      * Hypertension  Assessment & Plan  Initial SBP in the 200s, now improved  ·  Lasix 30 mg b i d  And metoprolol succinate 50 mg b i d   · Continue with amlodipine 10 mg daily and losartan 50 mg daily  · IV hydralazine for SBP greater than 180  · Monitor vitals closely      Medical Problems             Resolved Problems  Date Reviewed: 9/26/2022          Resolved    Hypoxia 9/26/2022     Resolved by  Agustina Van DO    Dizziness 9/26/2022     Resolved by  Agustina Van DO              Discharging Physician / Practitioner: Agustina Van DO  PCP: Carlene Waddell MD  Admission Date:   Admission Orders (From admission, onward)     Ordered        09/21/22 1257  Inpatient Admission  Once            09/21/22 0805  Place in Observation  Once                      Discharge Date: 09/26/22    Consultations During Hospital Stay:  · None    Procedures Performed:   · None    Significant Findings / Test Results:   · CTA revealed near occlusive greater than 90% plaque stenosis of proximal right cervical ICA    Incidental Findings:   · CT revealed near occlusive greater than 90% plaque stenosis of proximal right cervical ICA    Test Results Pending at Discharge (will require follow up): · None     Outpatient Tests Requested:  · None    Complications:  None    Reason for Admission:  Dizziness hypoglycemia    Hospital Course:   Doris Arambula is a 80 y o  female patient who originally presented to the hospital on 9/20/2022 due to dizziness and hypoglycemia  Blood glucose improved but systolic blood pressure remained in the 200s  Patient was admitted for further management and workup of hypertensive urgency and dizziness  Med reconciliation completed  Initiated on amlodipine and losartan  Dizziness continued to persist despite blood pressure being improved  Patient was initiated on meclizine, did note to have significant improvement  Re-evaluated by PT/OT who deemed patient stable to return to [facility]  Insulin dose adjusted to Lantus 15 units q h s  And 3 units t i d  With meals    CTA incidental findings as highlighted above  Discussed with Neurology, recommended outpatient neurosurgery follow-up  Referrals placed for neurosurgery and vestibular rehab  Patient remained hemodynamically stable on day of discharge  Please see above list of diagnoses and related plan for additional information  Condition at Discharge: good    Discharge Day Visit / Exam:   Subjective:  Seen and examined at bedside  Patient states she feels somewhat better  Dizziness has improved  Notes improvement with ambulation denies nausea, vomiting, diarrhea, constipation, fevers or chills  All other review of systems negative at this time  Vitals: Blood Pressure: 153/86 (09/26/22 0715)  Pulse: 71 (09/26/22 0715)  Temperature: 98 °F (36 7 °C) (09/26/22 0715)  Temp Source: Oral (09/23/22 0737)  Respirations: 17 (09/26/22 0715)  Height: 5' 2" (157 5 cm) (09/23/22 1100)  Weight - Scale: 69 4 kg (153 lb) (09/23/22 1100)  SpO2: 97 % (09/26/22 0715)  Exam:   Physical Exam  Vitals reviewed  Constitutional:       General: She is not in acute distress  Appearance: She is well-developed  She is not diaphoretic  HENT:      Head: Normocephalic and atraumatic  Nose: Nose normal       Mouth/Throat:      Pharynx: No oropharyngeal exudate  Eyes:      General: No scleral icterus  Right eye: No discharge  Left eye: No discharge  Conjunctiva/sclera: Conjunctivae normal    Cardiovascular:      Rate and Rhythm: Normal rate and regular rhythm  Heart sounds: Normal heart sounds  No murmur heard  No friction rub  No gallop  Pulmonary:      Effort: Pulmonary effort is normal  No respiratory distress  Breath sounds: Normal breath sounds  No wheezing or rales  Abdominal:      General: Bowel sounds are normal  There is no distension  Palpations: Abdomen is soft  Tenderness: There is no abdominal tenderness  There is no guarding     Musculoskeletal:      Cervical back: Normal range of motion and neck supple  Skin:     General: Skin is warm and dry  Findings: No erythema  Neurological:      Mental Status: She is alert and oriented to person, place, and time  Mental status is at baseline  Psychiatric:         Behavior: Behavior normal           Discussion with Family: Updated  (daughter) at bedside  Discharge instructions/Information to patient and family:   See after visit summary for information provided to patient and family  Provisions for Follow-Up Care:  See after visit summary for information related to follow-up care and any pertinent home health orders  Disposition:   [address] [facility] Readmission: none     Discharge Statement:  I spent 35 minutes discharging the patient  This time was spent on the day of discharge  I had direct contact with the patient on the day of discharge  Greater than 50% of the total time was spent examining patient, answering all patient questions, arranging and discussing plan of care with patient as well as directly providing post-discharge instructions  Additional time then spent on discharge activities  Discharge Medications:  See after visit summary for reconciled discharge medications provided to patient and/or family        **Please Note: This note may have been constructed using a voice recognition system**

## 2022-09-26 NOTE — CASE MANAGEMENT
Case Management Discharge Planning Note    Patient name Stef Anguiano  Location [address] [id number] MRN [id number]  : 3/24/1929 Date 2022       Current Admission Date: 2022  Current Admission Diagnosis:Hypertension   Patient Active Problem List    Diagnosis Date Noted    CKD (chronic kidney disease) stage 3, GFR 30-59 ml/min ([facility] [address] ) 10/22/2021    CHF (congestive heart failure) ([facility] ) 10/22/2021    Paroxysmal atrial fibrillation ([facility] ) 2021    Pacemaker 2020    GERD without esophagitis 2020    Hypothyroidism 2020    History of heart block 2020    Bilateral carpal tunnel syndrome 2019    Vitamin D deficiency 2019    Screening for skin condition 2018    History of skin cancer 2018    Hypertension 2017    Type 2 diabetes mellitus with hyperglycemia, with long-term current use of insulin ([place] [address] ) 2017    Leukocytosis 2017    Slow transit constipation 2017    Elevated troponin 2017    Actinic keratosis 2015    Seborrheic keratosis 2015      LOS (days): 5  Geometric Mean LOS (GMLOS) (days): 2 50  Days to GMLOS:-2 5     OBJECTIVE:  Risk of Unplanned Readmission Score: 16 87      Current admission status: Inpatient   Preferred Pharmacy:   [address] [place] [address]  [address]   [place] [address]  Phone: [phone] Fax: [phone]    Primary Care Provider: Neida Bo MD    Primary Insurance: MEDICARE  Secondary Insurance: [facility]    DISCHARGE DETAILS:     IMM Given (Date):: 22  IMM Given to[de-identified] Patient     Additional Comments: Per the medical team, the patient is medically ready to discharge today  [facility] has presented to the hospital this morning to evaluate patient for readmission  [facility] has accepted the patient to return to independent living  The patient's family will provide transportation home   The CM explained and provided a copy of the IMM

## 2022-09-26 NOTE — ASSESSMENT & PLAN NOTE
Lab Results   Component Value Date    HGBA1C 8 0 (H) 09/15/2022       Recent Labs     09/25/22  1616 09/25/22  2107 09/26/22  0646 09/26/22  1044   POCGLU 257* 199* 171* 227*       Blood Sugar Average: Last 72 hrs:  (P) 091 [id number]     Endorses compliance with home regimen of Lantus 16 units q h s  And 11 units t i d  Of Humalog  Initially referred to ED with hypoglycemic episode, received her insulin prior to feeding per nursing facility  Most recent blood glucose 219  · Lantus 15 units q h s   And Humalog 3 units t i d  + sliding scale insulin for now

## 2022-09-27 ENCOUNTER — TRANSITIONAL CARE MANAGEMENT (OUTPATIENT)
Dept: INTERNAL MEDICINE CLINIC | Facility: CLINIC | Age: 87
End: 2022-09-27

## 2022-09-29 ENCOUNTER — OFFICE VISIT (OUTPATIENT)
Dept: INTERNAL MEDICINE CLINIC | Facility: CLINIC | Age: 87
End: 2022-09-29
Payer: MEDICARE

## 2022-09-29 VITALS
SYSTOLIC BLOOD PRESSURE: 110 MMHG | RESPIRATION RATE: 16 BRPM | HEIGHT: 62 IN | OXYGEN SATURATION: 95 % | HEART RATE: 70 BPM | DIASTOLIC BLOOD PRESSURE: 58 MMHG | BODY MASS INDEX: 29.66 KG/M2 | WEIGHT: 161.2 LBS

## 2022-09-29 DIAGNOSIS — R42 VERTIGO: ICD-10-CM

## 2022-09-29 DIAGNOSIS — Z79.4 TYPE 2 DIABETES MELLITUS WITH HYPERGLYCEMIA, WITH LONG-TERM CURRENT USE OF INSULIN (HCC): Chronic | ICD-10-CM

## 2022-09-29 DIAGNOSIS — I10 PRIMARY HYPERTENSION: Primary | ICD-10-CM

## 2022-09-29 DIAGNOSIS — E11.65 TYPE 2 DIABETES MELLITUS WITH HYPERGLYCEMIA, WITH LONG-TERM CURRENT USE OF INSULIN (HCC): Chronic | ICD-10-CM

## 2022-09-29 PROCEDURE — 99495 TRANSJ CARE MGMT MOD F2F 14D: CPT | Performed by: INTERNAL MEDICINE

## 2022-09-29 RX ORDER — NETARSUDIL 0.2 MG/ML
SOLUTION/ DROPS OPHTHALMIC; TOPICAL
COMMUNITY
Start: 2022-07-23

## 2022-09-29 RX ORDER — TIMOLOL MALEATE 5 MG/ML
SOLUTION/ DROPS OPHTHALMIC
COMMUNITY
Start: 2022-09-16

## 2022-09-29 RX ORDER — ATORVASTATIN CALCIUM 80 MG/1
TABLET, FILM COATED ORAL
COMMUNITY
Start: 2022-09-26

## 2022-10-11 ENCOUNTER — OFFICE VISIT (OUTPATIENT)
Dept: INTERNAL MEDICINE CLINIC | Facility: CLINIC | Age: 87
End: 2022-10-11
Payer: MEDICARE

## 2022-10-11 VITALS
WEIGHT: 165.4 LBS | DIASTOLIC BLOOD PRESSURE: 64 MMHG | BODY MASS INDEX: 30.44 KG/M2 | HEIGHT: 62 IN | HEART RATE: 70 BPM | RESPIRATION RATE: 16 BRPM | OXYGEN SATURATION: 95 % | SYSTOLIC BLOOD PRESSURE: 110 MMHG

## 2022-10-11 DIAGNOSIS — Z00.00 MEDICARE ANNUAL WELLNESS VISIT, SUBSEQUENT: Primary | ICD-10-CM

## 2022-10-11 DIAGNOSIS — Z13.31 DEPRESSION SCREENING: ICD-10-CM

## 2022-10-11 DIAGNOSIS — I10 PRIMARY HYPERTENSION: ICD-10-CM

## 2022-10-11 DIAGNOSIS — I50.9 CHRONIC CONGESTIVE HEART FAILURE, UNSPECIFIED HEART FAILURE TYPE (HCC): ICD-10-CM

## 2022-10-11 PROCEDURE — G0444 DEPRESSION SCREEN ANNUAL: HCPCS | Performed by: INTERNAL MEDICINE

## 2022-10-11 PROCEDURE — G0439 PPPS, SUBSEQ VISIT: HCPCS | Performed by: INTERNAL MEDICINE

## 2022-10-11 PROCEDURE — 99214 OFFICE O/P EST MOD 30 MIN: CPT | Performed by: INTERNAL MEDICINE

## 2022-10-11 RX ORDER — OLOPATADINE HYDROCHLORIDE 1 MG/ML
1 SOLUTION/ DROPS OPHTHALMIC DAILY
COMMUNITY

## 2022-10-11 RX ORDER — FUROSEMIDE 20 MG/1
TABLET ORAL
Qty: 84 TABLET | Refills: 4 | Status: SHIPPED | OUTPATIENT
Start: 2022-10-11 | End: 2022-10-12 | Stop reason: SDUPTHER

## 2022-10-11 NOTE — PROGRESS NOTES
Assessment and Plan:     Problem List Items Addressed This Visit        Cardiovascular and Mediastinum    Hypertension    Relevant Medications    furosemide (LASIX) 20 mg tablet    CHF (congestive heart failure) (HCC)    Relevant Medications    furosemide (LASIX) 20 mg tablet      Other Visit Diagnoses     Medicare annual wellness visit, subsequent    -  Primary    Depression screening            Will change the timing of her p m  dose of water pill  She will call with an update on weight changes  May need to increase it further  But then she will require blood work  Continue other medications  Follow-up with endocrinology tomorrow  Follow-up with cardiology as planned  Preventive health issues were discussed with patient, and age appropriate screening tests were ordered as noted in patient's After Visit Summary  Personalized health advice and appropriate referrals for health education or preventive services given if needed, as noted in patient's After Visit Summary  History of Present Illness:     Patient presents for a Medicare Wellness Visit      Patient comes in today for routine follow-up and Medicare wellness  Her sugars are still doing okay  She has her appointment with endocrinology tomorrow  Her pressure is doing okay  But she is gaining weight  She does not feel like her water pill dose is high enough  She notes no swelling  No shortness of breath  She is not scheduled to see Cardiology until early November  Patient Care Team:  Valeria Gutiérrez MD as PCP - General (Internal Medicine)  MD Asha Jimenez MD (Gastroenterology)  Emely Betancourt PA-C as Physician Assistant (Gastroenterology)  Beti Engle PA-C as Physician Assistant (Physician Assistant)     Review of Systems:     Review of Systems   Respiratory: Negative for shortness of breath  Cardiovascular: Negative for chest pain  Gastrointestinal: Negative for abdominal pain          Problem List:     Patient Active Problem List   Diagnosis   • Hypertension   • Type 2 diabetes mellitus with hyperglycemia, with long-term current use of insulin ([facility])   • Leukocytosis   • Slow transit constipation   • Elevated troponin   • Actinic keratosis   • Seborrheic keratosis   • Screening for skin condition   • History of skin cancer   • Hypothyroidism   • History of heart block   • Bilateral carpal tunnel syndrome   • Vitamin D deficiency   • Pacemaker   • GERD without esophagitis   • Paroxysmal atrial fibrillation (HCC)   • CKD (chronic kidney disease) stage 3, GFR 30-59 ml/min ([facility])   • CHF (congestive heart failure) ([facility] )      Past Medical and Surgical History:     Past Medical History:   Diagnosis Date   • Atrial fibrillation (HCC)    • CHF (congestive heart failure) ([facility])    • Chronic pain    • Diabetes mellitus ([facility] )    • Hyperlipidemia    • Hypertension    • Pacemaker    • Pneumonia due to COVID-19 virus 04/14/2020   • Squamous cell skin cancer    • Type 2 diabetes mellitus with hyperglycemia, with long-term current use of insulin ([facility] ) 12/28/2017     Past Surgical History:   Procedure Laterality Date   • BACK SURGERY     • CARDIAC PACEMAKER PLACEMENT     • COLONOSCOPY        Family History:     Family History   Problem Relation Age of Onset   • Colon cancer Mother    • Heart attack Father       Social History:     Social History     Socioeconomic History   • Marital status:       Spouse name: None   • Number of children: None   • Years of education: None   • Highest education level: None   Occupational History   • None   Tobacco Use   • Smoking status: Never Smoker   • Smokeless tobacco: Never Used   Vaping Use   • Vaping Use: Never used   Substance and Sexual Activity   • Alcohol use: Not Currently     Alcohol/week: 0 0 standard drinks   • Drug use: No   • Sexual activity: Not Currently   Other Topics Concern   • None   Social History Narrative   • None     Social Determinants of Health     Financial Resource Strain: Low Risk    • Difficulty of Paying Living Expenses: Not hard at all   Food Insecurity: No Food Insecurity   • Worried About Running Out of Food in the Last Year: Never true   • Ran Out of Food in the Last Year: Never true   Transportation Needs: No Transportation Needs   • Lack of Transportation (Medical): No   • Lack of Transportation (Non-Medical):  No   Physical Activity: Not on file   Stress: Not on file   Social Connections: Not on file   Intimate Partner Violence: Not on file   Housing Stability: Low Risk    • Unable to Pay for Housing in the Last Year: No   • Number of Places Lived in the Last Year: 1   • Unstable Housing in the Last Year: No      Medications and Allergies:     Current Outpatient Medications   Medication Sig Dispense Refill   • amLODIPine (NORVASC) 10 mg tablet Take 1 tablet (10 mg total) by mouth daily 30 tablet 0   • atorvastatin (LIPITOR) 80 mg tablet      • Blood Glucose Monitoring Suppl (Glucocard Vital Monitor) w/Device KIT Use 4 (four) times a day E11 65 1 kit 1   • calcium polycarbophil (FIBERCON) 625 mg tablet Take 1 tablet (625 mg total) by mouth daily 90 tablet 3   • Cyanocobalamin (B-12) 1000 MCG TABS Take 1,000 mcg by mouth daily 90 tablet 3   • dorzolamide (TRUSOPT) 2 % ophthalmic solution      • Eliquis 5 MG Take 5 mg by mouth 2 (two) times a day      • furosemide (LASIX) 20 mg tablet 1 5 TAB (30MG) ORALLY TWICE DAILY AT 8AM & 1PM DX: EDEMA 84 tablet 4   • Glucagon 3 MG/DOSE POWD 3 mg     • glucose blood (Glucocard Vital Test) test strip Check sugar up to 4 times a day 100 each 4   • Insulin Glargine Solostar (Lantus SoloStar) 100 UNIT/ML SOPN Inject 0 15 mL (15 Units total) under the skin daily at bedtime 9 mL 0   • insulin lispro (HumaLOG) 100 units/mL injection pen Inject 3 Units under the skin 3 (three) times a day with meals 10 mL 0   • Insulin Pen Needle (NovoFine Autocover) 30G X 8 MM MISC Inject under the skin 4 (four) times a day 100 each 4   • latanoprost (XALATAN) 0 005 % ophthalmic solution INSTILL 1 DROP INTO RIGHT EYE AT BEDTIME DX: GLAUCOMA **DISCARD 6 WK AFTER OPENED** 2 5 mL 4   • levothyroxine 50 mcg tablet 1 TAB ORALLY EVERY MORNING DX: HYPOTHYROIDISM     • losartan (COZAAR) 100 MG tablet Take 0 5 tablets (50 mg total) by mouth daily 30 tablet 0   • meclizine (ANTIVERT) 25 mg tablet Take 1 tablet (25 mg total) by mouth every 8 (eight) hours as needed for dizziness 30 tablet 0   • methylcellulose (Citrucel) 500 mg tablet Take 1 tablet (500 mg total) by mouth daily 90 each 3   • metoprolol succinate (TOPROL-XL) 50 mg 24 hr tablet 1 TAB ORALLY TWICE DAILY DX: HYPERTENSION     • Multiple Vitamins-Calcium (One-A-Day Womens Formula) TABS Take 1 tablet by mouth every morning Dx: Supplement 30 tablet 2   • Multiple Vitamins-Minerals (One-A-Day Womens) tablet Take 1 tablet by mouth every morning Dx: Supplement 30 tablet 5   • olopatadine (PATANOL) 0 1 % ophthalmic solution Administer 1 drop to both eyes in the morning PRN     • omeprazole (PriLOSEC) 20 mg delayed release capsule 1 CAP ORALLY EVERY MORNING DX: GERD 28 capsule 4   • potassium chloride (K-DUR,KLOR-CON) 20 mEq tablet 1 TAB ORALLY EVERY MORNING DX:ELECTROLYTE DEFICIENCY 28 tablet 4   • ReadyLance Safety Lancets MISC AS DIRECTED FOR BLOOD GLUCOSE TESTING 4 TIMES DAILY AND AS NEEDED FORSIGNS/SYMPTOMS OF HI/LOW  each 4   • atorvastatin (LIPITOR) 40 mg tablet Take 2 tablets (80 mg total) by mouth daily at bedtime Hyperlipidemia (Patient not taking: Reported on 10/11/2022) 30 tablet 0   • atorvastatin (LIPITOR) 80 mg tablet      • furosemide (LASIX) 20 mg tablet 1 5 TAB (30MG) ORALLY TWICE DAILY AT 8AM & 1PM DX: EDEMA 84 tablet 4   • Nutritional Supplements (Ensure Complete Shake) LIQD Take 1 each by mouth 3 (three) times a day (Patient not taking: Reported on 10/11/2022) 711 mL 3   • olopatadine (PATANOL) 0 1 % ophthalmic solution Administer 1 drop to both eyes in the morning PRN     • Rhopressa 0 02 % SOLN  (Patient not taking: Reported on 10/11/2022)     • timolol (TIMOPTIC) 0 5 % ophthalmic solution  (Patient not taking: Reported on 10/11/2022)       No current facility-administered medications for this visit  Allergies   Allergen Reactions   • Hydrocodone-Acetaminophen Nausea Only      Immunizations:     Immunization History   Administered Date(s) Administered   • COVID-19 PFIZER VACCINE 0 3 ML IM 01/29/2021, 02/19/2021, 10/16/2021   • COVID-19 Pfizer vac (Angel-sucrose, gray cap) 12 yr+ IM 05/05/2022   • INFLUENZA 09/24/2015, 10/27/2018, 09/06/2019, 09/15/2022   • Influenza Split 10/11/2012, 10/27/2014   • Influenza Split High Dose Preservative Free IM 10/10/2016, 09/06/2019   • Influenza, high dose seasonal 0 7 mL 09/15/2020   • Influenza, seasonal, injectable, preservative free 11/19/2013, 09/18/2017   • Pneumococcal Conjugate 13-Valent 10/10/2016, 12/31/2017      Health Maintenance: There are no preventive care reminders to display for this patient  Topic Date Due   • Pneumococcal Vaccine: 65+ Years (2 - PPSV23 or PCV20) 12/31/2018      Medicare Screening Tests and Risk Assessments:     Milena Guevara is here for her Subsequent Wellness visit  Health Risk Assessment:   Patient rates overall health as fair  Patient feels that their physical health rating is slightly worse  Patient is dissatisfied with their life  Eyesight was rated as same  Hearing was rated as slightly worse  Patient feels that their emotional and mental health rating is same  Patients states they are sometimes angry  Patient states they are often unusually tired/fatigued  Pain experienced in the last 7 days has been some  Patient's pain rating has been 8/10  Patient states that she has experienced no weight loss or gain in last 6 months  Depression Screening:   PHQ-2 Score: 0      Fall Risk Screening:    In the past year, patient has experienced: history of falling in past year    Number of falls: 1  Injured during fall?: No    Feels unsteady when standing or walking?: No    Worried about falling?: Yes      Urinary Incontinence Screening:   Patient has leaked urine accidently in the last six months  Home Safety:  Patient has trouble with stairs inside or outside of their home  Patient has working smoke alarms and has working carbon monoxide detector  Home safety hazards include: none  Nutrition:   Current diet is Regular  Medications:   Patient is currently taking over-the-counter supplements  OTC medications include: see medication list  Patient is able to manage medications  Activities of Daily Living (ADLs)/Instrumental Activities of Daily Living (IADLs):   Walk and transfer into and out of bed and chair?: Yes  Dress and groom yourself?: Yes    Bathe or shower yourself?: Yes    Feed yourself? Yes  Do your laundry/housekeeping?: No  Manage your money, pay your bills and track your expenses?: No  Make your own meals?: No    Do your own shopping?: No    ADL comments: Pt lives  at [facility]     Previous Hospitalizations:   Any hospitalizations or ED visits within the last 12 months?: Yes    How many hospitalizations have you had in the last year?: 1-2    Advance Care Planning:   Living will: Yes    Durable POA for healthcare:  Yes    Advanced directive: Yes    Advanced directive counseling given: Yes      Cognitive Screening:   Provider or family/friend/caregiver concerned regarding cognition?: No    PREVENTIVE SCREENINGS      Cardiovascular Screening:    General: Screening Current      Diabetes Screening:     General: Screening Not Indicated and History Diabetes      Colorectal Cancer Screening:     General: Screening Not Indicated      Breast Cancer Screening:     General: Screening Current      Cervical Cancer Screening:    General: Screening Not Indicated      Osteoporosis Screening:    General: Patient Declines      Abdominal Aortic Aneurysm (AAA) Screening:        General: Screening Not Indicated      Lung Cancer Screening:     General: Screening Not Indicated      Hepatitis C Screening:    General: Screening Not Indicated    Screening, Brief Intervention, and Referral to Treatment (SBIRT)    Screening  Typical number of drinks in a day: 0  Typical number of drinks in a week: 0  Interpretation: Low risk drinking behavior  AUDIT-C Screenin) How often did you have a drink containing alcohol in the past year? never  2) How many drinks did you have on a typical day when you were drinking in the past year? 0  3) How often did you have 6 or more drinks on one occasion in the past year? never    AUDIT-C Score: 0  Interpretation: Score 0-2 (female): Negative screen for alcohol misuse    Brief Intervention  Alcohol & drug use screenings were reviewed  No concerns regarding substance use disorder identified  No exam data present     Physical Exam:     /64 (BP Location: Left arm, Patient Position: Sitting, Cuff Size: Adult)   Pulse 70   Resp 16   Ht 5' 2" (1 575 m)   Wt 75 kg (165 lb 6 4 oz)   LMP  (LMP Unknown)   SpO2 95%   BMI 30 25 kg/m²     Physical Exam  Vitals and nursing note reviewed  Constitutional:       Appearance: She is well-developed  Cardiovascular:      Rate and Rhythm: Normal rate and regular rhythm  Pulmonary:      Effort: Pulmonary effort is normal       Breath sounds: Normal breath sounds  No rales  Abdominal:      General: Abdomen is flat  Tenderness: There is no abdominal tenderness  Musculoskeletal:      Right lower leg: No edema  Left lower leg: No edema  Neurological:      Mental Status: She is alert and oriented to person, place, and time  Psychiatric:         Behavior: Behavior normal          Thought Content:  Thought content normal          Judgment: Judgment normal           Sedrick White MD

## 2022-10-11 NOTE — PATIENT INSTRUCTIONS
Medicare Preventive Visit Patient Instructions  Thank you for completing your Welcome to Medicare Visit or Medicare Annual Wellness Visit today  Your next wellness visit will be due in one year (10/12/2023)  The screening/preventive services that you may require over the next 5-10 years are detailed below  Some tests may not apply to you based off risk factors and/or age  Screening tests ordered at today's visit but not completed yet may show as past due  Also, please note that scanned in results may not display below  Preventive Screenings:  Service Recommendations Previous Testing/Comments   Colorectal Cancer Screening  * Colonoscopy    * Fecal Occult Blood Test (FOBT)/Fecal Immunochemical Test (FIT)  * Fecal DNA/Cologuard Test  * Flexible Sigmoidoscopy Age: 39-70 years old   Colonoscopy: every 10 years (may be performed more frequently if at higher risk)  OR  FOBT/FIT: every 1 year  OR  Cologuard: every 3 years  OR  Sigmoidoscopy: every 5 years  Screening may be recommended earlier than age 39 if at higher risk for colorectal cancer  Also, an individualized decision between you and your healthcare provider will decide whether screening between the ages of 74-80 would be appropriate  Colonoscopy: Not on file  FOBT/FIT: Not on file  Cologuard: Not on file  Sigmoidoscopy: Not on file    Screening Not Indicated     Breast Cancer Screening Age: 36 years old  Frequency: every 1-2 years  Not required if history of left and right mastectomy Mammogram: Not on file        Cervical Cancer Screening Between the ages of 21-29, pap smear recommended once every 3 years  Between the ages of 33-67, can perform pap smear with HPV co-testing every 5 years     Recommendations may differ for women with a history of total hysterectomy, cervical cancer, or abnormal pap smears in past  Pap Smear: Not on file    Screening Not Indicated   Hepatitis C Screening Once for adults born between 1945 and 1965  More frequently in patients at high risk for Hepatitis C Hep C Antibody: Not on file        Diabetes Screening 1-2 times per year if you're at risk for diabetes or have pre-diabetes Fasting glucose: No results in last 5 years (No results in last 5 years)  A1C: 8 0 % (9/15/2022)  Screening Not Indicated  History Diabetes   Cholesterol Screening Once every 5 years if you don't have a lipid disorder  May order more often based on risk factors  Lipid panel: 09/23/2022    Screening Current     Other Preventive Screenings Covered by Medicare:  1  Abdominal Aortic Aneurysm (AAA) Screening: covered once if your at risk  You're considered to be at risk if you have a family history of AAA  2  Lung Cancer Screening: covers low dose CT scan once per year if you meet all of the following conditions: (1) Age 50-69; (2) No signs or symptoms of lung cancer; (3) Current smoker or have quit smoking within the last 15 years; (4) You have a tobacco smoking history of at least 20 pack years (packs per day multiplied by number of years you smoked); (5) You get a written order from a healthcare provider  3  Glaucoma Screening: covered annually if you're considered high risk: (1) You have diabetes OR (2) Family history of glaucoma OR (3)  aged 48 and older OR (3)  American aged 72 and older  3  Osteoporosis Screening: covered every 2 years if you meet one of the following conditions: (1) You're estrogen deficient and at risk for osteoporosis based off medical history and other findings; (2) Have a vertebral abnormality; (3) On glucocorticoid therapy for more than 3 months; (4) Have primary hyperparathyroidism; (5) On osteoporosis medications and need to assess response to drug therapy  · Last bone density test (DXA Scan): Not on file  5  HIV Screening: covered annually if you're between the age of 12-76  Also covered annually if you are younger than 13 and older than 72 with risk factors for HIV infection   For pregnant patients, it is covered up to 3 times per pregnancy  Immunizations:  Immunization Recommendations   Influenza Vaccine Annual influenza vaccination during flu season is recommended for all persons aged >= 6 months who do not have contraindications   Pneumococcal Vaccine   * Pneumococcal conjugate vaccine = PCV13 (Prevnar 13), PCV15 (Vaxneuvance), PCV20 (Prevnar 20)  * Pneumococcal polysaccharide vaccine = PPSV23 (Pneumovax) Adults 25-60 years old: 1-3 doses may be recommended based on certain risk factors  Adults 72 years old: 1-2 doses may be recommended based off what pneumonia vaccine you previously received   Hepatitis B Vaccine 3 dose series if at intermediate or high risk (ex: diabetes, end stage renal disease, liver disease)   Tetanus (Td) Vaccine - COST NOT COVERED BY MEDICARE PART B Following completion of primary series, a booster dose should be given every 10 years to maintain immunity against tetanus  Td may also be given as tetanus wound prophylaxis  Tdap Vaccine - COST NOT COVERED BY MEDICARE PART B Recommended at least once for all adults  For pregnant patients, recommended with each pregnancy  Shingles Vaccine (Shingrix) - COST NOT COVERED BY MEDICARE PART B  2 shot series recommended in those aged 48 and above     Health Maintenance Due:  There are no preventive care reminders to display for this patient  Immunizations Due:      Topic Date Due   • Pneumococcal Vaccine: 65+ Years (2 - PPSV23 or PCV20) 12/31/2018     Advance Directives   What are advance directives? Advance directives are legal documents that state your wishes and plans for medical care  These plans are made ahead of time in case you lose your ability to make decisions for yourself  Advance directives can apply to any medical decision, such as the treatments you want, and if you want to donate organs  What are the types of advance directives? There are many types of advance directives, and each state has rules about how to use them   You may choose a combination of any of the following:  · Living will: This is a written record of the treatment you want  You can also choose which treatments you do not want, which to limit, and which to stop at a certain time  This includes surgery, medicine, IV fluid, and tube feedings  · Durable power of  for healthcare [facility]): This is a written record that states who you want to make healthcare choices for you when you are unable to make them for yourself  This person, called a proxy, is usually a family member or a friend  You may choose more than 1 proxy  · Do not resuscitate (DNR) order:  A DNR order is used in case your heart stops beating or you stop breathing  It is a request not to have certain forms of treatment, such as CPR  A DNR order may be included in other types of advance directives  · Medical directive: This covers the care that you want if you are in a coma, near death, or unable to make decisions for yourself  You can list the treatments you want for each condition  Treatment may include pain medicine, surgery, blood transfusions, dialysis, IV or tube feedings, and a ventilator (breathing machine)  · Values history: This document has questions about your views, beliefs, and how you feel and think about life  This information can help others choose the care that you would choose  Why are advance directives important? An advance directive helps you control your care  Although spoken wishes may be used, it is better to have your wishes written down  Spoken wishes can be misunderstood, or not followed  Treatments may be given even if you do not want them  An advance directive may make it easier for your family to make difficult choices about your care  Fall Prevention    Fall prevention  includes ways to make your home and other areas safer  It also includes ways you can move more carefully to prevent a fall   Health conditions that cause changes in your blood pressure, vision, or muscle strength and coordination may increase your risk for falls  Medicines may also increase your risk for falls if they make you dizzy, weak, or sleepy  Fall prevention tips:   · Stand or sit up slowly  · Use assistive devices as directed  · Wear shoes that fit well and have soles that   · Wear a personal alarm  · Stay active  · Manage your medical conditions  Home Safety Tips:  · Add items to prevent falls in the bathroom  · Keep paths clear  · Install bright lights in your home  · Keep items you use often on shelves within reach  · Paint or place reflective tape on the edges of your stairs  Urinary Incontinence   Urinary incontinence (UI)  is when you lose control of your bladder  UI develops because your bladder cannot store or empty urine properly  The 3 most common types of UI are stress incontinence, urge incontinence, or both  Medicines:   · May be given to help strengthen your bladder control  Report any side effects of medication to your healthcare provider  Do pelvic muscle exercises often:  Your pelvic muscles help you stop urinating  Squeeze these muscles tight for 5 seconds, then relax for 5 seconds  Gradually work up to squeezing for 10 seconds  Do 3 sets of 15 repetitions a day, or as directed  This will help strengthen your pelvic muscles and improve bladder control  Train your bladder:  Go to the bathroom at set times, such as every 2 hours, even if you do not feel the urge to go  You can also try to hold your urine when you feel the urge to go  For example, hold your urine for 5 minutes when you feel the urge to go  As that becomes easier, hold your urine for 10 minutes  Self-care:   · Keep a UI record  Write down how often you leak urine and how much you leak  Make a note of what you were doing when you leaked urine  · Drink liquids as directed  You may need to limit the amount of liquid you drink to help control your urine leakage   Do not drink any liquid right before you go to bed  Limit or do not have drinks that contain caffeine or alcohol  · Prevent constipation  Eat a variety of high-fiber foods  Good examples are high-fiber cereals, beans, vegetables, and whole-grain breads  Walking is the best way to trigger your intestines to have a bowel movement  · Exercise regularly and maintain a healthy weight  Weight loss and exercise will decrease pressure on your bladder and help you control your leakage  · Use a catheter as directed  to help empty your bladder  A catheter is a tiny, plastic tube that is put into your bladder to drain your urine  · Go to behavior therapy as directed  Behavior therapy may be used to help you learn to control your urge to urinate  Weight Management   Why it is important to manage your weight:  Being overweight increases your risk of health conditions such as heart disease, high blood pressure, type 2 diabetes, and certain types of cancer  It can also increase your risk for osteoarthritis, sleep apnea, and other respiratory problems  Aim for a slow, steady weight loss  Even a small amount of weight loss can lower your risk of health problems  How to lose weight safely:  A safe and healthy way to lose weight is to eat fewer calories and get regular exercise  You can lose up about 1 pound a week by decreasing the number of calories you eat by 500 calories each day  Healthy meal plan for weight management:  A healthy meal plan includes a variety of foods, contains fewer calories, and helps you stay healthy  A healthy meal plan includes the following:  · Eat whole-grain foods more often  A healthy meal plan should contain fiber  Fiber is the part of grains, fruits, and vegetables that is not broken down by your body  Whole-grain foods are healthy and provide extra fiber in your diet  Some examples of whole-grain foods are whole-wheat breads and pastas, oatmeal, brown rice, and bulgur    · Eat a variety of vegetables every day  Include dark, leafy greens such as spinach, kale, sarah greens, and mustard greens  Eat yellow and orange vegetables such as carrots, sweet potatoes, and winter squash  · Eat a variety of fruits every day  Choose fresh or canned fruit (canned in its own juice or light syrup) instead of juice  Fruit juice has very little or no fiber  · Eat low-fat dairy foods  Drink fat-free (skim) milk or 1% milk  Eat fat-free yogurt and low-fat cottage cheese  Try low-fat cheeses such as mozzarella and other reduced-fat cheeses  · Choose meat and other protein foods that are low in fat  Choose beans or other legumes such as split peas or lentils  Choose fish, skinless poultry (chicken or turkey), or lean cuts of red meat (beef or pork)  Before you cook meat or poultry, cut off any visible fat  · Use less fat and oil  Try baking foods instead of frying them  Add less fat, such as margarine, sour cream, regular salad dressing and mayonnaise to foods  Eat fewer high-fat foods  Some examples of high-fat foods include french fries, doughnuts, ice cream, and cakes  · Eat fewer sweets  Limit foods and drinks that are high in sugar  This includes candy, cookies, regular soda, and sweetened drinks  Exercise:  Exercise at least 30 minutes per day on most days of the week  Some examples of exercise include walking, biking, dancing, and swimming  You can also fit in more physical activity by taking the stairs instead of the elevator or parking farther away from stores  Ask your healthcare provider about the best exercise plan for you  © Copyright [address] 2018 Information is for End User's use only and may not be sold, redistributed or otherwise used for commercial purposes   All illustrations and images included in CareNotes® are the copyrighted property of A D A M , [employer]  or [address] [place] [address] [employer]

## 2022-10-12 ENCOUNTER — TELEPHONE (OUTPATIENT)
Dept: INTERNAL MEDICINE CLINIC | Facility: CLINIC | Age: 87
End: 2022-10-12

## 2022-10-12 DIAGNOSIS — I50.9 CHRONIC CONGESTIVE HEART FAILURE, UNSPECIFIED HEART FAILURE TYPE (HCC): ICD-10-CM

## 2022-10-12 RX ORDER — FUROSEMIDE 20 MG/1
TABLET ORAL
Qty: 105 TABLET | Refills: 4 | Status: SHIPPED | OUTPATIENT
Start: 2022-10-12 | End: 2022-10-17 | Stop reason: SDUPTHER

## 2022-10-12 NOTE — TELEPHONE ENCOUNTER
Patient called and forgot she did take the water pill in the afternoon after telling you she didn't at yesterdays visit  She has been taking it in the morning and afternoon and doesn't think it is helping  She feels bad she forgot that she took it  Please advise        Call back #[phone] retired

## 2022-10-12 NOTE — TELEPHONE ENCOUNTER
I increased her a m  dose of Lasix slightly   I sent in a new prescription   But this means she needs to do blood work on Monday   I placed an order but it may need to be faxed to [facility] [place]

## 2022-10-13 NOTE — TELEPHONE ENCOUNTER
Pt advised  Pt verbalized understanding  Lab order faxed to [facility] @ [phone]

## 2022-10-17 ENCOUNTER — TELEPHONE (OUTPATIENT)
Dept: INTERNAL MEDICINE CLINIC | Facility: CLINIC | Age: 87
End: 2022-10-17

## 2022-10-17 DIAGNOSIS — I50.9 CHRONIC CONGESTIVE HEART FAILURE, UNSPECIFIED HEART FAILURE TYPE (HCC): ICD-10-CM

## 2022-10-17 RX ORDER — FUROSEMIDE 20 MG/1
TABLET ORAL
Qty: 120 TABLET | Refills: 5 | Status: SHIPPED | OUTPATIENT
Start: 2022-10-17

## 2022-10-17 NOTE — TELEPHONE ENCOUNTER
She can try doing 40 mg twice a day but I would need those labs done that I ordered when I increased the furosemide

## 2022-10-17 NOTE — TELEPHONE ENCOUNTER
Patient is drinking a lot of water and she is not really urinating  She also gained 8 pounds since her visit on 10/11  She is taking the medication as prescribed  Is there anything else she can take or do? Please advise        Call back #[phone]

## 2022-10-17 NOTE — TELEPHONE ENCOUNTER
Spoke with the patient and advised her of this  She verbalized understanding and said she'll try taking the 40MG BID  A new rx was sent over  I advised the patient to still get the blood work done that was ordered  The patient informed me that she believes they're coming tomorrow to have the labs drawn

## 2022-10-21 ENCOUNTER — TELEPHONE (OUTPATIENT)
Dept: INTERNAL MEDICINE CLINIC | Facility: CLINIC | Age: 87
End: 2022-10-21

## 2022-10-21 NOTE — TELEPHONE ENCOUNTER
Helen Diazdra, daughter, called and she stated her mom gained another two pounds  She also said her mother told her she was having shortness of breath  Spoke to [employer] and advise she should go to ER  Advised daughter of this  She was going to call [facility] and see if they were weighing her as well

## 2022-10-31 ENCOUNTER — TELEPHONE (OUTPATIENT)
Dept: INTERNAL MEDICINE CLINIC | Facility: CLINIC | Age: 87
End: 2022-10-31

## 2022-10-31 NOTE — TELEPHONE ENCOUNTER
Pt called complaining of sob a lot, and would like us to send her an albuteral inhaler please to help her? She supplies a phone # that's not enough numbers    so hopefully # in chart works

## 2022-10-31 NOTE — TELEPHONE ENCOUNTER
Are you okay with just sending in an inhaler? Or would you like for her to come in to be seen and assessed?

## 2022-11-01 NOTE — TELEPHONE ENCOUNTER
She could be in heart failure, making her SOB  So she should be seen somewhere before just sending an inhaler in

## 2022-11-01 NOTE — TELEPHONE ENCOUNTER
Dhiraj Mane been trying to reach her back but # in chart didn't answer,  and # she left is misconstrued     I called [facility] but they have the # we have

## 2022-11-01 NOTE — TELEPHONE ENCOUNTER
Pt called, she mentioned saying dr Ivania Lea had said she could see dr Junior Oates, which she has appt on Thursday with  She will call his office to make sure he is back from him being away and if so she will see them     If not she will set up appt to see renee at 10 am zackary

## 2022-11-02 ENCOUNTER — TELEPHONE (OUTPATIENT)
Dept: INTERNAL MEDICINE CLINIC | Facility: CLINIC | Age: 87
End: 2022-11-02

## 2022-11-04 ENCOUNTER — TELEPHONE (OUTPATIENT)
Dept: INTERNAL MEDICINE CLINIC | Facility: CLINIC | Age: 87
End: 2022-11-04

## 2022-11-04 NOTE — TELEPHONE ENCOUNTER
Hi, my name is Raven Cheng calling  In regards to Carmen Booker, Please reach me at [phone]  She's currently at [facility] and she's going at the Hospice there and they need a treatment and release form or treatment form a Hospice form  I'm not quite sure what the full name of it is, but if you could call me back, I'd appreciate it, [phone]  Thanks so much   Gary

## 2023-05-08 NOTE — ASSESSMENT & PLAN NOTE
Lab Results   Component Value Date    EGFR 44 09/20/2022    EGFR 44 10/24/2021    EGFR 43 10/24/2021    CREATININE 1 08 09/20/2022    CREATININE 1 09 10/24/2021    CREATININE 1 11 10/24/2021   Creatinine appears to be at baseline  Intake and output  Monitor with a m   BMP Detail Level: Zone Additional Note: Benign Hide Include Location In Plan Question?: No Include Location In Plan?: Yes